# Patient Record
Sex: MALE | Race: WHITE | NOT HISPANIC OR LATINO | Employment: OTHER | URBAN - METROPOLITAN AREA
[De-identification: names, ages, dates, MRNs, and addresses within clinical notes are randomized per-mention and may not be internally consistent; named-entity substitution may affect disease eponyms.]

---

## 2017-04-17 ENCOUNTER — HOSPITAL ENCOUNTER (EMERGENCY)
Facility: HOSPITAL | Age: 57
Discharge: HOME | End: 2017-04-17
Attending: EMERGENCY MEDICINE

## 2017-04-17 VITALS
WEIGHT: 155 LBS | HEIGHT: 67 IN | OXYGEN SATURATION: 98 % | DIASTOLIC BLOOD PRESSURE: 67 MMHG | RESPIRATION RATE: 20 BRPM | HEART RATE: 69 BPM | TEMPERATURE: 98 F | BODY MASS INDEX: 24.33 KG/M2 | SYSTOLIC BLOOD PRESSURE: 147 MMHG

## 2017-04-17 DIAGNOSIS — S00.432A CONTUSION OF LEFT EAR, INITIAL ENCOUNTER: ICD-10-CM

## 2017-04-17 DIAGNOSIS — S09.90XA HEAD INJURY, INITIAL ENCOUNTER: Primary | ICD-10-CM

## 2017-04-17 DIAGNOSIS — R52 PAIN: ICD-10-CM

## 2017-04-17 PROCEDURE — 99283 EMERGENCY DEPT VISIT LOW MDM: CPT | Mod: 25

## 2017-04-17 PROCEDURE — 63600175 PHARM REV CODE 636 W HCPCS: Performed by: PHYSICIAN ASSISTANT

## 2017-04-17 PROCEDURE — 96372 THER/PROPH/DIAG INJ SC/IM: CPT

## 2017-04-17 RX ORDER — NAPROXEN 500 MG/1
500 TABLET ORAL 2 TIMES DAILY WITH MEALS
Qty: 30 TABLET | Refills: 0 | Status: SHIPPED | OUTPATIENT
Start: 2017-04-17 | End: 2018-05-17

## 2017-04-17 RX ORDER — KETOROLAC TROMETHAMINE 30 MG/ML
30 INJECTION, SOLUTION INTRAMUSCULAR; INTRAVENOUS
Status: COMPLETED | OUTPATIENT
Start: 2017-04-17 | End: 2017-04-17

## 2017-04-17 RX ADMIN — KETOROLAC TROMETHAMINE 30 MG: 30 INJECTION, SOLUTION INTRAMUSCULAR at 08:04

## 2017-04-17 NOTE — ED AVS SNAPSHOT
[facility]  [address] [facility] [address]               Efra Payton III   2017  8:02 AM   ED    Description:  Male : 1960   Department:  [facility]           Your Care was Coordinated By:     Provider Role From To    Reginald Rosas MD Attending Provider 17 0840 --    KIMBERLYN Mariee Physician Assistant 17 0805 --      Reason for Visit     Head Injury     Jaw Pain           Diagnoses this Visit        Comments    Head injury, initial encounter    -  Primary     Pain         Contusion of left ear, initial encounter           ED Disposition     None           To Do List           Follow-up Information     Follow up with [facility]. Go in 1 week.    Specialty:  Family Medicine    Contact information:    [address], [address]  [facility] [address] [phone]       These Medications        Disp Refills Start End    naproxen (NAPROSYN) 500 MG tablet 30 tablet 0 2017     Take 1 tablet (500 mg total) by mouth 2 (two) times daily with meals. - Oral      [facility]     [facility] Care Line -  Assistance  Unless otherwise directed by your provider, please contact [facility], our nurse care line that is available for  assistance.     Registered nurses in the [facility] provide: appointment scheduling, clinical advisement, health education, and other advisory services.  Call: [phone] (toll free)               Medications           Message regarding Medications     Verify the changes and/or additions to your medication regime listed below are the same as discussed with your clinician today.  If any of these changes or additions are incorrect, please notify your healthcare provider.        START taking these NEW medications        Refills    naproxen (NAPROSYN) 500 MG tablet 0    Sig: Take 1 tablet (500 mg total) by mouth 2 (two) times  "daily with meals.    Class: Print    Route: Oral      These medications were administered today        Dose Freq    ketorolac injection 30 mg 30 mg ED 1 Time    Sig: Inject 30 mg into the muscle ED 1 Time.    Class: Normal    Route: Intramuscular    Cosign for Ordering: Required by Roland Swanson Jr., MD           Verify that the below list of medications is an accurate representation of the medications you are currently taking.  If none reported, the list may be blank. If incorrect, please contact your healthcare provider. Carry this list with you in case of emergency.           Current Medications     methadone (DOLOPHINE) 5 MG tablet Take 120 mg by mouth once daily.    naproxen (NAPROSYN) 500 MG tablet Take 1 tablet (500 mg total) by mouth 2 (two) times daily with meals.           Clinical Reference Information           Your Vitals Were     BP Pulse Temp Resp Height Weight    147/67 69 97.7 °F (36.5 °C) (Oral) 20 5' 7" (1.702 m) 70.3 kg (155 lb)    SpO2 BMI             98% 24.28 kg/m2         Allergies as of 4/17/2017     No Known Allergies      Immunizations Administered on Date of Encounter - 4/17/2017     None      ED Micro, Lab, POCT     None      ED Imaging Orders     Start Ordered       Status Ordering Provider    04/17/17 0814 04/17/17 0813  X-Ray Facial Bones  3 Or More View  1 time imaging      Acknowledged       Discharge References/Attachments     BRUISES (CONTUSIONS) (ENGLISH)      [facility] Sign-Up     Activating your [facility] account is as easy as 1-2-3!     1) Visit [email or web address], select Sign Up Now, enter this activation code and your date of birth, then select Next.  [email or web address]  Expires: 6/1/2017  8:55 AM      2) Create a username and password to use when you visit [facility] in the future and select a security question in case you lose your password and select Next.    3) Enter your e-mail address and click Sign Up!    Additional Information  If you have questions, please e-mail " [email or web address] or call [phone] to talk to our [facility] staff. Remember, [employer] is NOT to be used for urgent needs. For medical emergencies, dial 911.         Smoking Cessation     If you would like to quit smoking:   You may be eligible for free services if you are a [place] resident and started smoking cigarettes before September 1, 1988.  Call the Smoking Cessation Trust ([facility]) toll free at ([phone] [phone] or [phone].   Call 3-379-QUIT-NOW if you do not meet the above criteria.   Contact us via email: [email or web address]   View our website for more information: [email or web address]         [facility] complies with applicable Federal civil rights laws and does not discriminate on the basis of race, color, national origin, age, disability, or sex.        Language Assistance Services     ATTENTION: Language assistance services are available, free of charge. Please call [phone].      ATENCIÓN: Si habla español, tiene a pinedo disposición servicios gratuitos de asistencia lingüística. Llame al [phone].     CHÚ Ý: N?u b?n nói Ti?ng Vi?t, có các d?ch v? h? tr? ngôn ng? mi?n phí dành cho b?n. G?i s? [phone].
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
## 2017-10-10 ENCOUNTER — HOSPITAL ENCOUNTER (EMERGENCY)
Facility: HOSPITAL | Age: 57
Discharge: HOME | End: 2017-10-11

## 2017-10-10 DIAGNOSIS — J44.1 COPD WITH EXACERBATION: Primary | ICD-10-CM

## 2017-10-10 DIAGNOSIS — R06.02 SOB (SHORTNESS OF BREATH): ICD-10-CM

## 2017-10-10 PROCEDURE — 25000242 PHARM REV CODE 250 ALT 637 W/ HCPCS: Performed by: EMERGENCY MEDICINE

## 2017-10-10 PROCEDURE — 96374 THER/PROPH/DIAG INJ IV PUSH: CPT

## 2017-10-10 PROCEDURE — 80053 COMPREHEN METABOLIC PANEL: CPT

## 2017-10-10 PROCEDURE — 85025 COMPLETE CBC W/AUTO DIFF WBC: CPT

## 2017-10-10 PROCEDURE — 63600175 PHARM REV CODE 636 W HCPCS: Performed by: EMERGENCY MEDICINE

## 2017-10-10 PROCEDURE — 99285 EMERGENCY DEPT VISIT HI MDM: CPT | Mod: 25

## 2017-10-10 PROCEDURE — 96375 TX/PRO/DX INJ NEW DRUG ADDON: CPT

## 2017-10-10 PROCEDURE — 83880 ASSAY OF NATRIURETIC PEPTIDE: CPT

## 2017-10-10 PROCEDURE — 93005 ELECTROCARDIOGRAM TRACING: CPT

## 2017-10-10 PROCEDURE — 94640 AIRWAY INHALATION TREATMENT: CPT

## 2017-10-10 RX ORDER — IPRATROPIUM BROMIDE AND ALBUTEROL SULFATE 2.5; .5 MG/3ML; MG/3ML
3 SOLUTION RESPIRATORY (INHALATION) EVERY 20 MIN
Status: COMPLETED | OUTPATIENT
Start: 2017-10-10 | End: 2017-10-10

## 2017-10-10 RX ORDER — METHYLPREDNISOLONE SOD SUCC 125 MG
125 VIAL (EA) INJECTION
Status: COMPLETED | OUTPATIENT
Start: 2017-10-10 | End: 2017-10-10

## 2017-10-10 RX ADMIN — METHYLPREDNISOLONE SODIUM SUCCINATE 125 MG: 125 INJECTION, POWDER, FOR SOLUTION INTRAMUSCULAR; INTRAVENOUS at 11:10

## 2017-10-10 RX ADMIN — IPRATROPIUM BROMIDE AND ALBUTEROL SULFATE 3 ML: .5; 3 SOLUTION RESPIRATORY (INHALATION) at 11:10

## 2017-10-11 VITALS
HEART RATE: 73 BPM | TEMPERATURE: 98 F | OXYGEN SATURATION: 92 % | RESPIRATION RATE: 18 BRPM | HEIGHT: 67 IN | SYSTOLIC BLOOD PRESSURE: 151 MMHG | WEIGHT: 120 LBS | DIASTOLIC BLOOD PRESSURE: 63 MMHG | BODY MASS INDEX: 18.83 KG/M2

## 2017-10-11 LAB
ALBUMIN SERPL BCP-MCNC: 3.3 G/DL
ALP SERPL-CCNC: 57 U/L
ALT SERPL W/O P-5'-P-CCNC: 49 U/L
ANION GAP SERPL CALC-SCNC: 6 MMOL/L
AST SERPL-CCNC: 69 U/L
BASOPHILS # BLD AUTO: 0 K/UL
BASOPHILS NFR BLD: 0 %
BILIRUB SERPL-MCNC: 0.3 MG/DL
BNP SERPL-MCNC: 127 PG/ML
BUN SERPL-MCNC: 11 MG/DL
CALCIUM SERPL-MCNC: 9.1 MG/DL
CHLORIDE SERPL-SCNC: 101 MMOL/L
CO2 SERPL-SCNC: 31 MMOL/L
CREAT SERPL-MCNC: 0.8 MG/DL
DIFFERENTIAL METHOD: ABNORMAL
EOSINOPHIL # BLD AUTO: 0 K/UL
EOSINOPHIL NFR BLD: 0 %
ERYTHROCYTE [DISTWIDTH] IN BLOOD BY AUTOMATED COUNT: 14 %
EST. GFR  (AFRICAN AMERICAN): >60 ML/MIN/1.73 M^2
EST. GFR  (NON AFRICAN AMERICAN): >60 ML/MIN/1.73 M^2
GLUCOSE SERPL-MCNC: 121 MG/DL
HCT VFR BLD AUTO: 36.1 %
HGB BLD-MCNC: 11.9 G/DL
LYMPHOCYTES # BLD AUTO: 0.7 K/UL
LYMPHOCYTES NFR BLD: 17.6 %
MCH RBC QN AUTO: 32.3 PG
MCHC RBC AUTO-ENTMCNC: 33 G/DL
MCV RBC AUTO: 98 FL
MONOCYTES # BLD AUTO: 0.3 K/UL
MONOCYTES NFR BLD: 8.3 %
NEUTROPHILS # BLD AUTO: 3 K/UL
NEUTROPHILS NFR BLD: 73.9 %
PLATELET # BLD AUTO: 147 K/UL
PMV BLD AUTO: 10.2 FL
POTASSIUM SERPL-SCNC: 4.2 MMOL/L
PROT SERPL-MCNC: 7.5 G/DL
RBC # BLD AUTO: 3.68 M/UL
SODIUM SERPL-SCNC: 138 MMOL/L
WBC # BLD AUTO: 4.1 K/UL

## 2017-10-11 PROCEDURE — 63600175 PHARM REV CODE 636 W HCPCS: Performed by: EMERGENCY MEDICINE

## 2017-10-11 RX ORDER — METOCLOPRAMIDE HYDROCHLORIDE 5 MG/ML
10 INJECTION INTRAMUSCULAR; INTRAVENOUS
Status: COMPLETED | OUTPATIENT
Start: 2017-10-11 | End: 2017-10-11

## 2017-10-11 RX ORDER — PREDNISONE 20 MG/1
40 TABLET ORAL DAILY
Qty: 10 TABLET | Refills: 0 | Status: SHIPPED | OUTPATIENT
Start: 2017-10-11 | End: 2017-10-16

## 2017-10-11 RX ORDER — KETOROLAC TROMETHAMINE 30 MG/ML
30 INJECTION, SOLUTION INTRAMUSCULAR; INTRAVENOUS
Status: COMPLETED | OUTPATIENT
Start: 2017-10-11 | End: 2017-10-11

## 2017-10-11 RX ADMIN — KETOROLAC TROMETHAMINE 30 MG: 30 INJECTION, SOLUTION INTRAMUSCULAR at 01:10

## 2017-10-11 RX ADMIN — METOCLOPRAMIDE 10 MG: 5 INJECTION, SOLUTION INTRAMUSCULAR; INTRAVENOUS at 01:10

## 2017-10-11 NOTE — ED PROVIDER NOTES
Encounter Date: 10/10/2017       History     Chief Complaint   Patient presents with    Shortness of Breath     pt arrived via  EMS with chief complaint of SOB. AAOx4. upon EMS arrival, pt's O2 was initially 88%on RA. EMS palced pt on 4L NC. denies CP.     Chief complaint: Shortness of breath    History of present illness:Efra Payton III is a 57 y.o. male who presents with  worsening of chronic shortness of breath.  He admits to a nonproductive cough and has had no chest pain, nausea, vomiting or diaphoresis.  He smokes 2 packs of cigarettes a day and has been previously diagnosed with COPD.  He currently has no treatment for COPD.  He is a former polysubstance abuser who is currently maintained on 120 mg of methadone daily.  He denies any recent illicit drug usage.  He has had no fever          Review of patient's allergies indicates:  No Known Allergies  Past Medical History:   Diagnosis Date    COPD (chronic obstructive pulmonary disease)     Drug abuse     Eczema     Substance abuse      Past Surgical History:   Procedure Laterality Date    arm surgery      HAND SURGERY       History reviewed. No pertinent family history.  Social History   Substance Use Topics    Smoking status: Current Every Day Smoker     Packs/day: 2.00     Types: Cigarettes    Smokeless tobacco: Never Used    Alcohol use No     Review of Systems   Constitutional: Negative for activity change, appetite change, chills, fatigue and fever.   Eyes: Negative for visual disturbance.   Respiratory: Negative for apnea and shortness of breath.    Cardiovascular: Negative for chest pain and palpitations.   Gastrointestinal: Negative for abdominal distention and abdominal pain.   Genitourinary: Negative for difficulty urinating.   Musculoskeletal: Negative for neck pain.   Skin: Negative for pallor and rash.   Neurological: Negative for headaches.   Hematological: Does not bruise/bleed easily.   Psychiatric/Behavioral: Negative for  agitation.       Physical Exam     Initial Vitals [10/10/17 2241]   BP Pulse Resp Temp SpO2   (!) 134/59 72 20 98.5 °F (36.9 °C) (!) 86 %      MAP       84         Physical Exam    ED Course   Procedures  Labs Reviewed   B-TYPE NATRIURETIC PEPTIDE   CBC W/ AUTO DIFFERENTIAL   COMPREHENSIVE METABOLIC PANEL             Medical Decision Making:   Independently Interpreted Test(s):   I have ordered and independently interpreted X-rays - see summary below.       <> Summary of X-Ray Reading(s): Chest x-ray independently interpreted by me demonstrates flattening of the hemidiaphragms consistent with a COPD exacerbation.  Mediastinum is normal with no infiltrates or effusions  Clinical Tests:   Lab Tests: Ordered and Reviewed  The following lab test(s) were unremarkable: CBC and CMP  ED Management:  Efra Payton III is a 57 y.o. male who presents with  chronic shortness of breath with physical exam and chest x-ray consistent with COPD.  He has symmetric improvement bronchodilators and steroids and will be discharged with a prescription for prednisone 20 mg twice daily for 5 days and Combivent.  He is encouraged to discontinue all smoking.                   ED Course      Clinical Impression:   The primary encounter diagnosis was COPD with exacerbation. A diagnosis of SOB (shortness of breath) was also pertinent to this visit.                           Montez Gabriel III, MD  10/11/17 0100

## 2017-10-11 NOTE — ED NOTES
Pt sleeping, easily arouses.  Unable to stay awake but states that he thinks he has too much methadone.

## 2017-10-11 NOTE — ED TRIAGE NOTES
Pt to ER per EMS with c/o headache, insomnia, fatigue and SOB x 4 weeks.  Pt states that he was tired of feeling sleepy all day and wants to know what is wrong with him.  Pt states that he take 120mg methadone q day but doesn't feel that is what is making him feel bad.  Pt states that he smokes 1 1/2 packs a cigarettes per day and is not taking any medications for his COPD.

## 2017-10-11 NOTE — ED NOTES
Awake and alert, resp even and unlabored at this time.  Lungs clear but diminished at  Bases.  Discharge instructions given and explained.  Prescriptions given and explained.  Wife at side

## 2018-04-05 ENCOUNTER — HOSPITAL ENCOUNTER (EMERGENCY)
Facility: HOSPITAL | Age: 58
Discharge: HOME | End: 2018-04-06
Attending: EMERGENCY MEDICINE
Payer: COMMERCIAL

## 2018-04-05 DIAGNOSIS — H10.023 PINK EYE DISEASE OF BOTH EYES: ICD-10-CM

## 2018-04-05 DIAGNOSIS — R19.7 DIARRHEA, UNSPECIFIED TYPE: Primary | ICD-10-CM

## 2018-04-05 PROCEDURE — 99283 EMERGENCY DEPT VISIT LOW MDM: CPT

## 2018-04-06 VITALS
TEMPERATURE: 98 F | HEIGHT: 67 IN | HEART RATE: 52 BPM | WEIGHT: 130 LBS | RESPIRATION RATE: 20 BRPM | BODY MASS INDEX: 20.4 KG/M2 | OXYGEN SATURATION: 96 % | SYSTOLIC BLOOD PRESSURE: 136 MMHG | DIASTOLIC BLOOD PRESSURE: 64 MMHG

## 2018-04-06 PROCEDURE — 25000003 PHARM REV CODE 250: Performed by: EMERGENCY MEDICINE

## 2018-04-06 RX ORDER — ERYTHROMYCIN 5 MG/G
OINTMENT OPHTHALMIC
Status: COMPLETED | OUTPATIENT
Start: 2018-04-06 | End: 2018-04-06

## 2018-04-06 RX ADMIN — ERYTHROMYCIN 1 INCH: 5 OINTMENT OPHTHALMIC at 12:04

## 2018-04-06 NOTE — ED PROVIDER NOTES
Encounter Date: 4/5/2018    SCRIBE #1 NOTE: I, Neftali Knutson, am scribing for, and in the presence of,  Dr. Theresa Oliva. I have scribed the entire note.       History     Chief Complaint   Patient presents with    Diarrhea     3 episodes of diarrhea since yesterday. denies abdominal pain or n/v. pt. is drinking coke at triage.     Eye Drainage     green drainage from both eyes x3 days     Efra Payton III is a 58 y.o. male smoker who  has a past medical history of COPD (chronic obstructive pulmonary disease); Drug abuse; Eczema; and Substance abuse.    The patient presents to the ED for evaluation of diarrhea and eye redness.  Diarrhea has been present for the last two days.  He experienced 3-4 episodes yesterday and only 1 episode today. He took one dose of imodium yesterday, at which point diarrhea stopped.  He had several urges to have diarrhea today but did not until a single episode while at work tonight.  Has not noticed any blood in stool, denies associated abdominal pain. He does endorse some nausea but is able to eat/drink.      He also complains of bilateral eye redness, irritation, and green discharge x 4 days. He has been applying eye drops as well as washing his eyes  with minimal relief. He reports his granddaughter had pink eye one week prior and it looked very similar with the same symptoms.     Pt was a 3 pack/day smoker, currently smokes one pack day with reported intent to quit.        The history is provided by the patient.     Review of patient's allergies indicates:  No Known Allergies  Past Medical History:   Diagnosis Date    COPD (chronic obstructive pulmonary disease)     Drug abuse     Eczema     Substance abuse      Past Surgical History:   Procedure Laterality Date    arm surgery      HAND SURGERY       History reviewed. No pertinent family history.  Social History   Substance Use Topics    Smoking status: Current Every Day Smoker     Packs/day: 2.00     Types: Cigarettes     Smokeless tobacco: Never Used    Alcohol use No     Review of Systems   Constitutional: Negative for appetite change and fever.   Eyes: Positive for discharge, redness and itching. Negative for visual disturbance.   Gastrointestinal: Positive for diarrhea and nausea. Negative for abdominal pain, blood in stool and vomiting.   All other systems reviewed and are negative.      Physical Exam     Initial Vitals [04/05/18 2333]   BP Pulse Resp Temp SpO2   (!) 170/65 (!) 54 16 97.6 °F (36.4 °C) 95 %      MAP       100         Physical Exam    Nursing note and vitals reviewed.  Constitutional: He appears well-developed and well-nourished. No distress.   HENT:   Head: Normocephalic and atraumatic.   Eyes: Right eye exhibits no discharge. Left eye exhibits no discharge. Right conjunctiva is injected. Right conjunctiva has no hemorrhage. Left conjunctiva is injected. Left conjunctiva has no hemorrhage.   Bilateral conjunctival injection   Neck: Normal range of motion.   Cardiovascular: Normal rate, regular rhythm and normal heart sounds.   No murmur heard.  Pulmonary/Chest: Breath sounds normal. He has no wheezes. He has no rhonchi. He has no rales. He exhibits no tenderness.   Abdominal: Soft. Bowel sounds are normal. He exhibits no distension. There is no tenderness. There is no rebound and no guarding.   Musculoskeletal: Normal range of motion. He exhibits no edema or tenderness.   Neurological: He is alert and oriented to person, place, and time.   Skin: Skin is warm and dry.   Psychiatric: He has a normal mood and affect. His behavior is normal.         ED Course   Procedures  Labs Reviewed - No data to display          Medical Decision Making:   ED Management:  Diarrhea - no associated fever, vomiting or abd pain and no blood in stool.  Controlled with imodium.  Low residue diet, continue imodium.  Bilateral conjunctivitis - recent exposure to pink eye - treat with erythromycin ointment.                      Clinical  Impression:   The primary encounter diagnosis was Diarrhea, unspecified type. A diagnosis of Pink eye disease of both eyes was also pertinent to this visit.    Disposition:   Disposition: Discharged  Condition: Stable  I, Dr. Theresa Oliva, personally performed the services described in this documentation.   All medical record entries made by the scribe were at my direction and in my presence.   I have reviewed the chart and agree that the record is accurate and complete.   Theresa Oliva MD.  1:54 AM 04/06/2018                         Theresa Oliva MD  04/06/18 0154

## 2018-04-06 NOTE — DISCHARGE INSTRUCTIONS
Take imodium as directed on package, for diarrhea.  Put ointment in each eye 4 times a day x 5 days.  If your symptoms are not resolved, see an eye doctor.

## 2018-04-06 NOTE — ED NOTES
"Pt presents to ED c/o nausea and diarrhea intermittent since yesterday. Pt denies fever and vomiting. PT reports X 1 episode of diarrhea today, states "felt like had to go, but couldn't go". Pt has coke at BS, states has been able to tolerate PO. Pt denies abd pain. Pt reports d/c from bilateral eyes. States yesterday morning d/c was green. States granddaughter had same eye issues recently. Pt states was sent home from work today and was told he had to be seen by a doctor. Pt states he was unable to schedule appointment with PCP today.   "

## 2018-05-17 ENCOUNTER — OFFICE VISIT (OUTPATIENT)
Dept: INTERNAL MEDICINE | Facility: CLINIC | Age: 58
End: 2018-05-17
Payer: COMMERCIAL

## 2018-05-17 ENCOUNTER — LAB VISIT (OUTPATIENT)
Dept: LAB | Facility: HOSPITAL | Age: 58
End: 2018-05-17
Payer: COMMERCIAL

## 2018-05-17 ENCOUNTER — HOSPITAL ENCOUNTER (OUTPATIENT)
Dept: RADIOLOGY | Facility: HOSPITAL | Age: 58
Discharge: HOME | End: 2018-05-17
Attending: INTERNAL MEDICINE

## 2018-05-17 VITALS
HEIGHT: 67 IN | OXYGEN SATURATION: 94 % | SYSTOLIC BLOOD PRESSURE: 108 MMHG | TEMPERATURE: 98 F | HEART RATE: 55 BPM | BODY MASS INDEX: 19.58 KG/M2 | DIASTOLIC BLOOD PRESSURE: 62 MMHG | WEIGHT: 124.75 LBS

## 2018-05-17 DIAGNOSIS — J44.9 CHRONIC OBSTRUCTIVE PULMONARY DISEASE, UNSPECIFIED COPD TYPE: Primary | ICD-10-CM

## 2018-05-17 DIAGNOSIS — R55 SYNCOPE AND COLLAPSE: ICD-10-CM

## 2018-05-17 LAB
ALBUMIN SERPL BCP-MCNC: 3.8 G/DL
ALP SERPL-CCNC: 54 U/L
ALT SERPL W/O P-5'-P-CCNC: 22 U/L
ANION GAP SERPL CALC-SCNC: 6 MMOL/L
AST SERPL-CCNC: 33 U/L
BASOPHILS # BLD AUTO: 0.02 K/UL
BASOPHILS NFR BLD: 0.4 %
BILIRUB SERPL-MCNC: 0.3 MG/DL
BUN SERPL-MCNC: 17 MG/DL
CALCIUM SERPL-MCNC: 8.9 MG/DL
CHLORIDE SERPL-SCNC: 104 MMOL/L
CO2 SERPL-SCNC: 28 MMOL/L
CREAT SERPL-MCNC: 0.8 MG/DL
DIFFERENTIAL METHOD: ABNORMAL
EOSINOPHIL # BLD AUTO: 0.1 K/UL
EOSINOPHIL NFR BLD: 1.8 %
ERYTHROCYTE [DISTWIDTH] IN BLOOD BY AUTOMATED COUNT: 13.7 %
EST. GFR  (AFRICAN AMERICAN): >60 ML/MIN/1.73 M^2
EST. GFR  (NON AFRICAN AMERICAN): >60 ML/MIN/1.73 M^2
GLUCOSE SERPL-MCNC: 88 MG/DL
HCT VFR BLD AUTO: 40 %
HGB BLD-MCNC: 13.7 G/DL
LYMPHOCYTES # BLD AUTO: 1.3 K/UL
LYMPHOCYTES NFR BLD: 28.4 %
MCH RBC QN AUTO: 33.1 PG
MCHC RBC AUTO-ENTMCNC: 34.3 G/DL
MCV RBC AUTO: 97 FL
MONOCYTES # BLD AUTO: 0.4 K/UL
MONOCYTES NFR BLD: 9.3 %
NEUTROPHILS # BLD AUTO: 2.7 K/UL
NEUTROPHILS NFR BLD: 60.1 %
PLATELET # BLD AUTO: 86 K/UL
PMV BLD AUTO: 11.3 FL
POTASSIUM SERPL-SCNC: 4.5 MMOL/L
PROT SERPL-MCNC: 6.9 G/DL
RBC # BLD AUTO: 4.14 M/UL
SODIUM SERPL-SCNC: 138 MMOL/L
WBC # BLD AUTO: 4.54 K/UL

## 2018-05-17 PROCEDURE — 36415 COLL VENOUS BLD VENIPUNCTURE: CPT

## 2018-05-17 PROCEDURE — 3008F BODY MASS INDEX DOCD: CPT | Mod: CPTII,S$GLB,, | Performed by: INTERNAL MEDICINE

## 2018-05-17 PROCEDURE — 80053 COMPREHEN METABOLIC PANEL: CPT

## 2018-05-17 PROCEDURE — 85025 COMPLETE CBC W/AUTO DIFF WBC: CPT

## 2018-05-17 PROCEDURE — 99214 OFFICE O/P EST MOD 30 MIN: CPT | Mod: S$GLB,,, | Performed by: INTERNAL MEDICINE

## 2018-05-17 PROCEDURE — 93010 ELECTROCARDIOGRAM REPORT: CPT | Mod: S$GLB,,, | Performed by: INTERNAL MEDICINE

## 2018-05-17 PROCEDURE — 99999 PR PBB SHADOW E&M-EST. PATIENT-LVL V: CPT | Mod: PBBFAC,,, | Performed by: INTERNAL MEDICINE

## 2018-05-17 PROCEDURE — 93005 ELECTROCARDIOGRAM TRACING: CPT | Mod: S$GLB,,, | Performed by: INTERNAL MEDICINE

## 2018-05-18 ENCOUNTER — HOSPITAL ENCOUNTER (OUTPATIENT)
Dept: RADIOLOGY | Facility: HOSPITAL | Age: 58
Discharge: HOME | End: 2018-05-18
Attending: INTERNAL MEDICINE
Payer: COMMERCIAL

## 2018-05-18 ENCOUNTER — TELEPHONE (OUTPATIENT)
Dept: INTERNAL MEDICINE | Facility: CLINIC | Age: 58
End: 2018-05-18

## 2018-05-18 ENCOUNTER — TELEPHONE (OUTPATIENT)
Dept: ELECTROPHYSIOLOGY | Facility: CLINIC | Age: 58
End: 2018-05-18

## 2018-05-18 ENCOUNTER — HOSPITAL ENCOUNTER (OUTPATIENT)
Dept: PULMONOLOGY | Facility: CLINIC | Age: 58
Discharge: HOME | End: 2018-05-18
Payer: COMMERCIAL

## 2018-05-18 DIAGNOSIS — R55 SYNCOPE AND COLLAPSE: ICD-10-CM

## 2018-05-18 DIAGNOSIS — J44.9 CHRONIC OBSTRUCTIVE PULMONARY DISEASE: Primary | ICD-10-CM

## 2018-05-18 DIAGNOSIS — J44.9 CHRONIC OBSTRUCTIVE PULMONARY DISEASE, UNSPECIFIED COPD TYPE: ICD-10-CM

## 2018-05-18 LAB
POST FEV1 FVC: 0.67
POST FEV1: 2.17
POST FVC: 3.22
PRE FEV1 FVC: 63
PRE FEV1: 1.9
PRE FVC: 3.01
PREDICTED FEV1 FVC: 81
PREDICTED FEV1: 2.95
PREDICTED FVC: 3.63

## 2018-05-18 PROCEDURE — 94729 DIFFUSING CAPACITY: CPT | Mod: S$GLB,,, | Performed by: INTERNAL MEDICINE

## 2018-05-18 PROCEDURE — 70450 CT HEAD/BRAIN W/O DYE: CPT | Mod: TC

## 2018-05-18 PROCEDURE — 70450 CT HEAD/BRAIN W/O DYE: CPT | Mod: 26,,, | Performed by: RADIOLOGY

## 2018-05-18 PROCEDURE — 94060 EVALUATION OF WHEEZING: CPT | Mod: S$GLB,,, | Performed by: INTERNAL MEDICINE

## 2018-05-18 PROCEDURE — 71046 X-RAY EXAM CHEST 2 VIEWS: CPT | Mod: TC

## 2018-05-18 PROCEDURE — 71046 X-RAY EXAM CHEST 2 VIEWS: CPT | Mod: 26,,, | Performed by: RADIOLOGY

## 2018-05-18 NOTE — TELEPHONE ENCOUNTER
Called pt to review new patient questions for apt Monday PM. No answer. Left voicemail requesting pt call back to follow up.

## 2018-05-18 NOTE — PROGRESS NOTES
Subjective:       Patient ID: Efra Payton III is a 58 y.o. male.    Chief Complaint: Head Injury (headache due to a fall to the head)    Patient reports headache after a fall.  Further questioning reveals that he falls almost every day.  This is not a simple mechanical fall but he actually loses consciousness and falls.  Most recently this occurred about 2-3 days ago as he was putting the key in the lock at his home.  Next thing he knew, he was on the ground. He has no warning or aura of any type.  No history of head injury except as related to these falls.  He has COPD and now that he has insurance, he wants to get this evaluated.  Has a history of narcotic addiction but has been part of a methadone program for 3 years now and has remained clean for that period of time.  HE and his wife are currently raising their 3 grandchildren.  Patient and wife both work at odd hours so he gets very little sleep on a regular basis.  Children will soon be back with their parents      Head Injury    The incident occurred 3 to 5 days ago. The injury mechanism was a fall. He lost consciousness for a period of 1 to 5 minutes. The volume of blood lost was minimal. The quality of the pain is described as throbbing. The pain is at a severity of 3/10. The pain is mild. The pain has been constant since the injury. Pertinent negatives include no blurred vision, disorientation, headaches, memory loss, numbness, tinnitus, vomiting or weakness. He has tried nothing for the symptoms. The treatment provided no relief.     Review of Systems   Constitutional: Negative for activity change, appetite change and fever.   HENT: Negative for congestion, postnasal drip, sore throat and tinnitus.    Eyes: Negative for blurred vision.   Respiratory: Negative for cough, shortness of breath and wheezing.    Cardiovascular: Negative for chest pain and palpitations.   Gastrointestinal: Negative for abdominal pain, blood in stool, constipation, diarrhea,  nausea and vomiting.   Genitourinary: Negative for decreased urine volume, difficulty urinating, flank pain and frequency.   Musculoskeletal: Negative for arthralgias.   Neurological: Negative for dizziness, weakness, numbness and headaches.   Psychiatric/Behavioral: Negative for memory loss.       Objective:      Physical Exam   Constitutional: He is oriented to person, place, and time. He appears well-developed and well-nourished. No distress.   HENT:   Head: Normocephalic and atraumatic.   Right Ear: External ear normal.   Left Ear: External ear normal.   Eyes: Conjunctivae and EOM are normal. Pupils are equal, round, and reactive to light.   Neck: Normal range of motion. Neck supple. No thyromegaly present.   Cardiovascular: Normal rate and regular rhythm.    Pulmonary/Chest: Effort normal and breath sounds normal.   Abdominal: Soft. Bowel sounds are normal. He exhibits no mass. There is no tenderness. There is no rebound and no guarding.   Musculoskeletal: Normal range of motion.   Lymphadenopathy:     He has no cervical adenopathy.   Neurological: He is alert and oriented to person, place, and time. He has normal reflexes. He displays normal reflexes. No cranial nerve deficit. He exhibits normal muscle tone. Coordination normal.   Skin: Skin is warm and dry.       Assessment:       1. Chronic obstructive pulmonary disease, unspecified COPD type    2. Syncope and collapse        Plan:   Efra was seen today for head injury.    Diagnoses and all orders for this visit:    Chronic obstructive pulmonary disease, unspecified COPD type  -     X-Ray Chest PA And Lateral; Future  -     Complete PFT with bronchodilator  -     Ambulatory consult to Pulmonology    Syncope and collapse  -     IN OFFICE EKG 12-LEAD (to Muse)  -     X-Ray Chest PA And Lateral; Future  -     CBC auto differential; Future  -     Comprehensive metabolic panel; Future  -     CT Head Without Contrast; Future  -     Ambulatory consult to  Neurology  -     Holter monitor - 48 hour  -     Ambulatory consult to Cardiology

## 2018-05-23 ENCOUNTER — OFFICE VISIT (OUTPATIENT)
Dept: CARDIOLOGY | Facility: CLINIC | Age: 58
End: 2018-05-23
Payer: COMMERCIAL

## 2018-05-23 VITALS
SYSTOLIC BLOOD PRESSURE: 153 MMHG | DIASTOLIC BLOOD PRESSURE: 70 MMHG | HEIGHT: 67 IN | HEART RATE: 57 BPM | WEIGHT: 127.19 LBS | BODY MASS INDEX: 19.96 KG/M2

## 2018-05-23 DIAGNOSIS — R51.9 NONINTRACTABLE HEADACHE, UNSPECIFIED CHRONICITY PATTERN, UNSPECIFIED HEADACHE TYPE: ICD-10-CM

## 2018-05-23 DIAGNOSIS — R06.02 SOB (SHORTNESS OF BREATH): Primary | ICD-10-CM

## 2018-05-23 DIAGNOSIS — R55 SYNCOPE, UNSPECIFIED SYNCOPE TYPE: Primary | ICD-10-CM

## 2018-05-23 DIAGNOSIS — J44.9 CHRONIC OBSTRUCTIVE PULMONARY DISEASE, UNSPECIFIED COPD TYPE: ICD-10-CM

## 2018-05-23 PROCEDURE — 3008F BODY MASS INDEX DOCD: CPT | Mod: CPTII,S$GLB,, | Performed by: INTERNAL MEDICINE

## 2018-05-23 PROCEDURE — 99205 OFFICE O/P NEW HI 60 MIN: CPT | Mod: S$GLB,,, | Performed by: INTERNAL MEDICINE

## 2018-05-23 PROCEDURE — 99999 PR PBB SHADOW E&M-EST. PATIENT-LVL III: CPT | Mod: PBBFAC,,, | Performed by: INTERNAL MEDICINE

## 2018-05-23 NOTE — LETTER
May 23, 2018      Lyndsey Baltazar MD  [address]  [place] [address]           [facility] - Cardiology  [address]  [place] [address]  Phone: [phone]          Patient: Efra Payton III   MR Number: [id number]   YOB: 1960   Date of Visit: 5/23/2018       Dear Dr. Lyndsey Baltazar:    Thank you for referring Efra Payton to me for evaluation. Attached you will find relevant portions of my assessment and plan of care.    If you have questions, please do not hesitate to call me. I look forward to following Efra Payton along with you.    Sincerely,    Rafaela Varner MD    Enclosure  CC:  No Recipients    If you would like to receive this communication electronically, please contact [email or web address] or [phone] to request more information on [employer] Link access.    For providers and/or their staff who would like to refer a patient to [facility], please contact us through our one-stop-shop provider referral line, [facility] , at [phone].    If you feel you have received this communication in error or would no longer like to receive these types of communications, please e-mail [email or web address]

## 2018-05-23 NOTE — PROGRESS NOTES
"Subjective:   Patient ID:  Efra Payton III is a 58 y.o. male is a new patient who presents for evaluation of Syncope and collapse and Headache (x 6-8 months )    HPI:   These syncopal episodes have occurred 4,5 times in the last 6 months.he incident occurred 3 to 5 days ago. The injury mechanism was a fall. He lost consciousness for a period of 1 to 5 minutes. The volume of blood lost was minimal. The quality of the pain is described as throbbing. The pain is at a severity of 3/10. The pain is mild. The pain has been constant since the injury. Pertinent negatives include no blurred vision, disorientation, headaches, memory loss, numbness, tinnitus, vomiting or weakness. He has tried nothing for the symptoms. The treatment provided no relief.     Patient gets headaches everyday -frontal lasts couple of hours and takes BC powder goes away but immediately comes back. No relation to light, no relation to aura. Patient reports headache after a fall.   He and his wife are currently raising their 3 grandchildren.  Patient and wife both work at odd hours so he gets very little sleep on a regular basis.  Children will soon be back with their parents. He only sleeps 3,4 hours every night.   Current smoker 2 packs/day  Patient has been on methadone for 3 yrs.   Patient has to take deep  Breath before he has to  the kids.      Patient Active Problem List   Diagnosis    Carpal tunnel syndrome of left wrist    Chronic obstructive pulmonary disease     BP (!) 153/70 (BP Location: Left arm, Patient Position: Sitting, BP Method: Large (Automatic))   Pulse (!) 57   Ht 5' 7" (1.702 m)   Wt 57.7 kg (127 lb 3.3 oz)   BMI 19.92 kg/m²   Body mass index is 19.92 kg/m².  Estimated Creatinine Clearance: 82.1 mL/min (based on SCr of 0.8 mg/dL).    Lab Results   Component Value Date     05/17/2018    K 4.5 05/17/2018     05/17/2018    CO2 28 05/17/2018    BUN 17 05/17/2018    CREATININE 0.8 05/17/2018    GLU 88 " 05/17/2018    AST 33 05/17/2018    ALT 22 05/17/2018    ALBUMIN 3.8 05/17/2018    PROT 6.9 05/17/2018    BILITOT 0.3 05/17/2018    WBC 4.54 05/17/2018    HGB 13.7 (L) 05/17/2018    HCT 40.0 05/17/2018    MCV 97 05/17/2018    PLT 86 (L) 05/17/2018       Current Outpatient Prescriptions   Medication Sig    ipratropium-albuterol (COMBIVENT RESPIMAT)  mcg/actuation inhaler Inhale 1 puff into the lungs every 4 (four) hours as needed for Wheezing.    methadone (DOLOPHINE) 5 MG tablet Take 120 mg by mouth once daily.     No current facility-administered medications for this visit.        Review of Systems   Constitution: Positive for weakness. Negative for chills, decreased appetite, malaise/fatigue, night sweats, weight gain and weight loss.   Eyes: Negative for blurred vision, double vision, visual disturbance and visual halos.   Cardiovascular: Positive for dyspnea on exertion and syncope. Negative for chest pain, claudication, cyanosis, irregular heartbeat, leg swelling, near-syncope, orthopnea, palpitations and paroxysmal nocturnal dyspnea.   Respiratory: Positive for shortness of breath. Negative for cough, hemoptysis, snoring, sputum production and wheezing.    Endocrine: Negative for cold intolerance, heat intolerance, polydipsia and polyphagia.   Hematologic/Lymphatic: Negative for adenopathy and bleeding problem. Does not bruise/bleed easily.   Skin: Negative for flushing, itching, poor wound healing and rash.   Musculoskeletal: Negative for arthritis, back pain, falls, gout, joint pain, joint swelling, muscle cramps, muscle weakness, myalgias, neck pain and stiffness.   Gastrointestinal: Negative for bloating, abdominal pain, anorexia, diarrhea, dysphagia, excessive appetite, flatus, hematemesis, jaundice, melena and nausea.   Genitourinary: Negative for hesitancy and incomplete emptying.   Neurological: Positive for excessive daytime sleepiness, dizziness, light-headedness and loss of balance. Negative  for aphonia, brief paralysis, difficulty with concentration, disturbances in coordination and focal weakness.   Psychiatric/Behavioral: Negative for altered mental status, depression, hallucinations, hypervigilance, memory loss, substance abuse and suicidal ideas. The patient does not have insomnia and is not nervous/anxious.        Objective:   Physical Exam   Constitutional: He is oriented to person, place, and time. He appears well-developed and well-nourished. No distress.   HENT:   Head: Normocephalic and atraumatic.   Nose: Nose normal.   Mouth/Throat: No oropharyngeal exudate.   Eyes: Conjunctivae and EOM are normal. Pupils are equal, round, and reactive to light. Right eye exhibits no discharge. Left eye exhibits no discharge. No scleral icterus.   Neck: Normal range of motion. Neck supple. No JVD present. No tracheal deviation present. No thyromegaly present.   Cardiovascular: Normal rate, regular rhythm, normal heart sounds and intact distal pulses.  Exam reveals no gallop and no friction rub.    No murmur heard.  Pulmonary/Chest: Effort normal and breath sounds normal. No stridor. No respiratory distress. He has no wheezes. He has no rales. He exhibits no tenderness.   Abdominal: Soft. Bowel sounds are normal. He exhibits no distension and no mass. There is no tenderness.   Musculoskeletal: He exhibits no edema or tenderness.   Lymphadenopathy:     He has no cervical adenopathy.   Neurological: He is alert and oriented to person, place, and time. He displays normal reflexes. No cranial nerve deficit. He exhibits normal muscle tone. Coordination normal.   Skin: Skin is warm. No rash noted. He is not diaphoretic. No erythema. No pallor.   Psychiatric: He has a normal mood and affect. His behavior is normal. Judgment and thought content normal.       Assessment:     1. Syncope, unspecified syncope type    2. Chronic obstructive pulmonary disease, unspecified COPD type    3. Nonintractable headache,  unspecified chronicity pattern, unspecified headache type        Plan:   Patient does not sleep well. This may be a cause of his narcoleptic spells, will r/o arrythmias but they appear less likely. No cardiac SX otherwise  PFTs already ordered, will do an arterial blood gas for hypercarbia that may be contributing to narcolepsy.  BP high but in the setting of HTN do not want to start a medication.  Counseled on importance of heart healthy diet low in saturated and trans fat and salt as well gradually starting a regular aerobic exercise regimen with goal of 30min 5x/week. Recommend BP diary. Call if systolic BP > 130 mmHg on checking repeatedly  All meds reviewed and are appropriate  Patient is compliant with his medications.      I spent greater then 45 min discussing his concerns.   RTC PRN

## 2018-05-25 ENCOUNTER — HOSPITAL ENCOUNTER (OUTPATIENT)
Dept: PULMONOLOGY | Facility: CLINIC | Age: 58
Discharge: HOME | End: 2018-05-25
Payer: COMMERCIAL

## 2018-05-25 ENCOUNTER — CLINICAL SUPPORT (OUTPATIENT)
Dept: ELECTROPHYSIOLOGY | Facility: CLINIC | Age: 58
End: 2018-05-25
Attending: INTERNAL MEDICINE
Payer: COMMERCIAL

## 2018-05-25 DIAGNOSIS — R55 SYNCOPE, UNSPECIFIED SYNCOPE TYPE: ICD-10-CM

## 2018-05-25 DIAGNOSIS — R06.02 SOB (SHORTNESS OF BREATH): ICD-10-CM

## 2018-05-25 PROCEDURE — 93224 XTRNL ECG REC UP TO 48 HRS: CPT | Mod: S$GLB,,, | Performed by: INTERNAL MEDICINE

## 2018-06-04 ENCOUNTER — TELEPHONE (OUTPATIENT)
Dept: ELECTROPHYSIOLOGY | Facility: CLINIC | Age: 58
End: 2018-06-04

## 2018-06-04 NOTE — TELEPHONE ENCOUNTER
Mr Payton's home # is not his # anymore. Ms Fabian answered her #. It is noqw their phone #. Advised we need holter monitor returned. It was to be returned 5.28.18. Upon further discussion, Mr Payton will drop it off here tomorrow per Ms Fabian.

## 2018-06-07 ENCOUNTER — TELEPHONE (OUTPATIENT)
Dept: CARDIOLOGY | Facility: CLINIC | Age: 58
End: 2018-06-07

## 2018-06-07 NOTE — TELEPHONE ENCOUNTER
----- Message from Rafaela Varner MD sent at 6/7/2018  1:59 PM CDT -----  plz let the patient know that overall the holter test is normal and that there is no cardiac causes of passing out. Consider neurological causes of syncope.

## 2018-07-25 ENCOUNTER — OFFICE VISIT (OUTPATIENT)
Dept: INTERNAL MEDICINE | Facility: CLINIC | Age: 58
End: 2018-07-25
Payer: COMMERCIAL

## 2018-07-25 VITALS
OXYGEN SATURATION: 99 % | BODY MASS INDEX: 19.58 KG/M2 | WEIGHT: 125 LBS | DIASTOLIC BLOOD PRESSURE: 60 MMHG | HEART RATE: 59 BPM | SYSTOLIC BLOOD PRESSURE: 115 MMHG

## 2018-07-25 DIAGNOSIS — R07.89 OTHER CHEST PAIN: ICD-10-CM

## 2018-07-25 DIAGNOSIS — F19.10 DRUG ABUSE: ICD-10-CM

## 2018-07-25 DIAGNOSIS — G44.221 CHRONIC TENSION-TYPE HEADACHE, INTRACTABLE: ICD-10-CM

## 2018-07-25 DIAGNOSIS — J44.9 CHRONIC OBSTRUCTIVE PULMONARY DISEASE, UNSPECIFIED COPD TYPE: Primary | ICD-10-CM

## 2018-07-25 DIAGNOSIS — Z76.89 ENCOUNTER TO ESTABLISH CARE: ICD-10-CM

## 2018-07-25 DIAGNOSIS — Z12.9 SCREENING FOR CANCER: ICD-10-CM

## 2018-07-25 DIAGNOSIS — G47.00 INSOMNIA, UNSPECIFIED TYPE: ICD-10-CM

## 2018-07-25 DIAGNOSIS — F17.200 SMOKING: ICD-10-CM

## 2018-07-25 DIAGNOSIS — F41.9 ANXIETY: ICD-10-CM

## 2018-07-25 PROCEDURE — 99999 PR PBB SHADOW E&M-EST. PATIENT-LVL IV: CPT | Mod: PBBFAC,,, | Performed by: STUDENT IN AN ORGANIZED HEALTH CARE EDUCATION/TRAINING PROGRAM

## 2018-07-25 PROCEDURE — 99214 OFFICE O/P EST MOD 30 MIN: CPT | Mod: S$GLB,,, | Performed by: STUDENT IN AN ORGANIZED HEALTH CARE EDUCATION/TRAINING PROGRAM

## 2018-07-25 RX ORDER — ESCITALOPRAM OXALATE 5 MG/1
5 TABLET ORAL DAILY
Qty: 30 TABLET | Refills: 3 | Status: SHIPPED | OUTPATIENT
Start: 2018-07-25 | End: 2019-09-05 | Stop reason: CLARIF

## 2018-07-25 RX ORDER — METHOCARBAMOL 500 MG/1
500 TABLET, FILM COATED ORAL 4 TIMES DAILY
Qty: 40 TABLET | Refills: 0 | Status: SHIPPED | OUTPATIENT
Start: 2018-07-25 | End: 2018-08-04

## 2018-07-25 RX ORDER — IPRATROPIUM BROMIDE AND ALBUTEROL SULFATE 2.5; .5 MG/3ML; MG/3ML
3 SOLUTION RESPIRATORY (INHALATION) EVERY 6 HOURS PRN
Qty: 1 BOX | Refills: 0 | Status: SHIPPED | OUTPATIENT
Start: 2018-07-25 | End: 2019-02-05 | Stop reason: SDUPTHER

## 2018-07-25 NOTE — PATIENT INSTRUCTIONS
- Start Escitalopram with 5 mg for 1 week and increase it to 10 after 1 week if tolerated.    Treating Anxiety Disorders with Medicine  An anxiety disorder can make you feel nervous or apprehensive, even without a clear reason. In people age 65 and older, generalized anxiety disorder is one of the most commonly diagnosed anxiety disorders. Many times it occurs with depression. Certain anxiety disorders can cause intense feelings of fear or panic. You may even have physical symptoms such as a racing heartbeat, sweating, or dizziness. If you have these feelings, you dont have to suffer anymore. Treatment to help you overcome your fears will likely include therapy (also called counseling). Medicine may also be prescribed to help control your symptoms.    Medicines  Certain medicines may be prescribed to help control your symptoms. So you may feel less anxious. You may also feel able to move forward with therapy. At first, medicines and dosages may need to be adjusted to find what works best for you. Try to be patient. Tell your healthcare provider how a medicine makes you feel. This way, you can work together to find the treatment thats best for you. Keep in mind that medicines can have side effects. Talk with your provider about any side effects that are bothering you. Changing the dose or type of medicine may help. Dont stop taking medicine on your own. That can cause symptoms to come back.  · Anti-anxiety medicine. This medicine eases symptoms and helps you relax. Your healthcare provider will explain when and how to use it. It may be prescribed for use before situations that make you anxious. You may also be told to take medicine on a regular schedule. Anti-anxiety medicine may make you feel a little sleepy or out of it. Dont drive a car or operate machinery while on this medicine, until you know how it affects you.  Caution  Never use alcohol or other drugs with anti-anxiety medicines. This could result in  loss of muscular control, sedation, coma, or death. Also, use only the amount of medicine prescribed for you. If you think you may have taken too much, get emergency care right away.   · Antidepressant medicine. This kind of medicine is often used to treat anxiety, even if you arent depressed. An antidepressant helps balance out brain chemicals. This helps keep anxiety under control. This medicine is taken on a schedule. It takes a few weeks to start working. If you dont notice a change at first, you may just need more time. But if you dont notice results after the first few weeks, tell your provider.  Keep taking medicines as prescribed  Never change your dosage, share or use another person's medicine, or stop taking your medicines without talking to your healthcare provider first. Keep the following in mind:  · Some medicines must be taken on a schedule. Make this part of your daily routine. For instance, always take your pill before brushing your teeth. A pillbox can help you remember if youve taken your medicine each day.  · Medicines are often taken for 6 to 12 months. Your healthcare provider will then evaluate whether you need to stay on them. Many people who have also had therapy may no longer need medicine to manage anxiety.  · You may need to stop taking medicine slowly to give your body time to adjust. When its time to stop, your healthcare provider will tell you more. Remember: Never stop taking your medicine without talking to your provider first.  · If symptoms return, you may need to start taking medicines again. This isnt your fault. Its just the nature of your anxiety disorder.  Special concerns  · Side effects. Medicines may cause side effects. Ask your healthcare provider or pharmacist what you can expect. They may have ideas for avoiding some side effects.  · Sexual problems. Some antidepressants can affect your desire for sex or your ability to have an orgasm. A change in dosage or  medicine often solves the problem. If you have a sexual side effect that concerns you, tell your healthcare provider.  · Addiction. If youve never had a problem with drugs or alcohol, you may not have a problem with medicines used to treat anxiety disorders. But always discuss the medicines with your healthcare provider before taking them. If you have a history of addiction, you may not be able to use certain medicines used to treat anxiety disorders.  · Medicine interactions. Always check with your pharmacist before using any over-the-counter medicines, including herbal supplements.   Date Last Reviewed: 5/1/2017  © 3866-3935 [employer]. [address] [place] [address], [place] [address]. All rights reserved. This information is not intended as a substitute for professional medical care. Always follow your healthcare professional's instructions.

## 2018-07-25 NOTE — PROGRESS NOTES
"Clinic Note  7/25/2018      Subjective:       Patient ID:  Efra is a 58 y.o. male being seen for an established visit.    Chief Complaint: No chief complaint on file.    58 years old male patient with medical history of COPD came to establish care.  # COPD on albuterol-ipratropium inhaler/nubalizer.  Chronic cough with clear/yellow sputum.   No wheeze, shortness of breath, fever and chills.  Stable     # Chest pain  Started 1 year and half ago, left sided, sharp, stabbing pain, radiating to left arm and shoulder, brought in usually by exertion, like currying boxes at work, some times last for 5 minutes and some times last for hours, but the shoulder and arm pain last for days, patient tried hot compresses with no improvement.Associated with shortness of breath per patient due to exertion and sweating from work, sometimes nausea, denied vomiting and palpitation.    # Headache   Started 2 years ago, daily, frontal mostly, last for 30 min, feels like tension headache, usually resolved with Bc powder or ibuprofen, not positional, not associated with visual disturbance, lacrimation, runny nose, nausea, vomiting, tingling and numbness. Worse with smoking.No history of migraine.    # Insomnia   Patient stated that he sleep around 2 hours per/night for the last 2 years, usually fall asleep during inappropriate situations and palaces eg: in the car, watching TV and setting with people. But he can't fall sleep easily when he go to bed and lay down, reported that he keep thinking about every thing his life, and worry about his children, wife and his job.      - lost around 20 Ib during the last year associated with decrease appetite.   - current smoker used to smoke 3 packs per day and decreased it to 1 pack 5 month ago.  - has history of drug abuse quit in 2009, per patient he tried every thing" heroin, opioids", on methadone.   - patient reported that he has history of hepatitis"dosen't know what type" and he was treated in " the past.   - +ve strong family history of diabetes and colon cancer.   -  for 40 years, lives with his wife, dosen't exercise, usually eat home cooked food.       Review of Systems   Constitutional: Positive for weight loss. Negative for chills and fever.   HENT: Negative for congestion and sore throat.    Eyes: Negative for blurred vision and double vision.   Respiratory: Positive for cough and sputum production. Negative for wheezing.    Cardiovascular: Positive for chest pain. Negative for palpitations and leg swelling.   Gastrointestinal: Positive for nausea. Negative for abdominal pain and vomiting.   Genitourinary: Negative for flank pain and frequency.   Musculoskeletal: Positive for myalgias.   Skin: Negative for itching and rash.   Neurological: Negative for dizziness.   Psychiatric/Behavioral: Negative for depression and suicidal ideas. The patient is nervous/anxious.        Past Medical History:   Diagnosis Date    COPD (chronic obstructive pulmonary disease)     Drug abuse     Eczema     Substance abuse        Family History   Problem Relation Age of Onset    Heart disease Father     Hypertension Sister     Hypertension Brother     Heart attack Paternal Grandmother     Heart disease Paternal Grandmother         reports that he has been smoking Cigarettes.  He has been smoking about 2.00 packs per day. He has never used smokeless tobacco. He reports that he does not drink alcohol or use drugs.    Medication List with Changes/Refills   New Medications    ALBUTEROL-IPRATROPIUM (DUO-NEB) 2.5 MG-0.5 MG/3 ML NEBULIZER SOLUTION    Take 3 mLs by nebulization every 6 (six) hours as needed for Wheezing. Rescue    ESCITALOPRAM OXALATE (LEXAPRO) 5 MG TAB    Take 1 tablet (5 mg total) by mouth once daily. Start with 5 mg for 1 week and increase it to 10 after 1 week if tolerated    METHOCARBAMOL (ROBAXIN) 500 MG TAB    Take 1 tablet (500 mg total) by mouth 4 (four) times daily. for 10 days   Current  "Medications    METHADONE (DOLOPHINE) 5 MG TABLET    Take 120 mg by mouth once daily.   Changed and/or Refilled Medications    Modified Medication Previous Medication    IPRATROPIUM-ALBUTEROL (COMBIVENT RESPIMAT)  MCG/ACTUATION INHALER ipratropium-albuterol (COMBIVENT RESPIMAT)  mcg/actuation inhaler       Inhale 1 puff into the lungs every 4 (four) hours as needed for Wheezing.    Inhale 1 puff into the lungs every 4 (four) hours as needed for Wheezing.     Review of patient's allergies indicates:  No Known Allergies    Patient Active Problem List   Diagnosis    Carpal tunnel syndrome of left wrist    Chronic obstructive pulmonary disease           Objective:      /60   Pulse (!) 59   Wt 56.7 kg (125 lb)   SpO2 99%   BMI 19.58 kg/m²   Estimated body mass index is 19.58 kg/m² as calculated from the following:    Height as of 5/23/18: 5' 7" (1.702 m).    Weight as of this encounter: 56.7 kg (125 lb).  Physical Exam   Constitutional: He is oriented to person, place, and time. No distress.   Thin    HENT:   Head: Normocephalic and atraumatic.   Mouth/Throat: No oropharyngeal exudate.   Eyes: Pupils are equal, round, and reactive to light. No scleral icterus.   Cardiovascular: Normal rate, regular rhythm and normal heart sounds.    No murmur heard.  Pulmonary/Chest: Effort normal and breath sounds normal. No respiratory distress. He has no wheezes.   Abdominal: Soft. He exhibits no distension. There is no tenderness. There is no rebound.   Musculoskeletal: He exhibits tenderness (left chest and left arm). He exhibits no edema or deformity.   Lymphadenopathy:     He has no cervical adenopathy.   Neurological: He is alert and oriented to person, place, and time.   Skin: He is not diaphoretic. No pallor.   Psychiatric: Affect and judgment normal.   MING 7 score 19 indicate sever anxiety disorder    Vitals reviewed.        Assessment and Plan:         Diagnoses and all orders for this visit:    Chronic " obstructive pulmonary disease, unspecified COPD type  -     Ambulatory referral to Smoking Cessation Program  -     ipratropium-albuterol (COMBIVENT RESPIMAT)  mcg/actuation inhaler; Inhale 1 puff into the lungs every 4 (four) hours as needed for Wheezing.  -     albuterol-ipratropium (DUO-NEB) 2.5 mg-0.5 mg/3 mL nebulizer solution; Take 3 mLs by nebulization every 6 (six) hours as needed for Wheezing. Rescue    Smoking  -     Ambulatory referral to Smoking Cessation Program        -     CT Chest Lung Screening Low Dose; Future    Chronic tension-type headache, intractable  - Likely related to sever anxiety     Insomnia, unspecified type  - Likely related to sever anxiety.    Other chest pain  - likely muscle strain from currying boxes at work   - cold compresses   -     methocarbamol (ROBAXIN) 500 MG Tab; Take 1 tablet (500 mg total) by mouth 4 (four) times daily. for 10 days.    Screening for cancer  -     CT Chest Lung Screening Low Dose; Future        -     Case request GI: COLONOSCOPY  Anxiety   - MING 7 score 19   -  escitalopram oxalate (LEXAPRO) 5 MG Tab; Take 1 tablet (5 mg total) by mouth once daily. Start with 5 mg for 1 week and increase it to 10 after 1 week if tolerated    Encounter to establish care  -     Ambulatory referral to Smoking Cessation Program  -     LIPID PANEL; Future  -     Hepatitis panel, acute; Future  -     Hemoglobin A1c; Future    Drug abuse : qiut 2009        - continue Methadone   Follow-up in about 6 weeks (around 9/5/2018).    Other Orders Placed This Visit:  Orders Placed This Encounter   Procedures    CT Chest Lung Screening Low Dose    LIPID PANEL    Hepatitis panel, acute    Hemoglobin A1c    Ambulatory referral to Smoking Cessation Program     Nitin Doss  Internal Medicine, PGY 2  [phone]

## 2018-07-29 NOTE — PROGRESS NOTES
I have personally taken the history and examined this patient and agree with the resident's note as stated above with the following thoughts:  /60   Pulse (!) 59   Wt 56.7 kg (125 lb)   SpO2 99%   BMI 19.58 kg/m²     Discussed getting vaccines up to date.  Stop smoking.

## 2018-09-17 ENCOUNTER — HOSPITAL ENCOUNTER (EMERGENCY)
Facility: HOSPITAL | Age: 58
Discharge: HOME | End: 2018-09-17
Attending: EMERGENCY MEDICINE
Payer: COMMERCIAL

## 2018-09-17 ENCOUNTER — NURSE TRIAGE (OUTPATIENT)
Dept: ADMINISTRATIVE | Facility: CLINIC | Age: 58
End: 2018-09-17

## 2018-09-17 VITALS
HEART RATE: 51 BPM | TEMPERATURE: 98 F | SYSTOLIC BLOOD PRESSURE: 124 MMHG | WEIGHT: 140 LBS | OXYGEN SATURATION: 100 % | HEIGHT: 67 IN | DIASTOLIC BLOOD PRESSURE: 62 MMHG | BODY MASS INDEX: 21.97 KG/M2 | RESPIRATION RATE: 12 BRPM

## 2018-09-17 DIAGNOSIS — J44.1 COPD EXACERBATION: ICD-10-CM

## 2018-09-17 DIAGNOSIS — J06.9 URI (UPPER RESPIRATORY INFECTION): ICD-10-CM

## 2018-09-17 DIAGNOSIS — R05.9 COUGH: Primary | ICD-10-CM

## 2018-09-17 LAB
ALBUMIN SERPL BCP-MCNC: 3.7 G/DL
ALP SERPL-CCNC: 58 U/L
ALT SERPL W/O P-5'-P-CCNC: 24 U/L
ANION GAP SERPL CALC-SCNC: 9 MMOL/L
AST SERPL-CCNC: 39 U/L
BASOPHILS # BLD AUTO: 0.02 K/UL
BASOPHILS NFR BLD: 0.5 %
BILIRUB SERPL-MCNC: 0.3 MG/DL
BUN SERPL-MCNC: 13 MG/DL
CALCIUM SERPL-MCNC: 9.3 MG/DL
CHLORIDE SERPL-SCNC: 103 MMOL/L
CO2 SERPL-SCNC: 25 MMOL/L
CREAT SERPL-MCNC: 0.8 MG/DL
DIFFERENTIAL METHOD: ABNORMAL
EOSINOPHIL # BLD AUTO: 0.1 K/UL
EOSINOPHIL NFR BLD: 1.3 %
ERYTHROCYTE [DISTWIDTH] IN BLOOD BY AUTOMATED COUNT: 13.8 %
EST. GFR  (AFRICAN AMERICAN): >60 ML/MIN/1.73 M^2
EST. GFR  (NON AFRICAN AMERICAN): >60 ML/MIN/1.73 M^2
GLUCOSE SERPL-MCNC: 116 MG/DL
HCT VFR BLD AUTO: 41.8 %
HGB BLD-MCNC: 13.9 G/DL
IMM GRANULOCYTES # BLD AUTO: 0.01 K/UL
IMM GRANULOCYTES NFR BLD AUTO: 0.3 %
LYMPHOCYTES # BLD AUTO: 1.1 K/UL
LYMPHOCYTES NFR BLD: 28.8 %
MCH RBC QN AUTO: 32.9 PG
MCHC RBC AUTO-ENTMCNC: 33.3 G/DL
MCV RBC AUTO: 99 FL
MONOCYTES # BLD AUTO: 0.4 K/UL
MONOCYTES NFR BLD: 9.7 %
NEUTROPHILS # BLD AUTO: 2.2 K/UL
NEUTROPHILS NFR BLD: 59.4 %
NRBC BLD-RTO: 0 /100 WBC
PLATELET # BLD AUTO: 97 K/UL
PMV BLD AUTO: 10.4 FL
POTASSIUM SERPL-SCNC: 4.3 MMOL/L
PROT SERPL-MCNC: 7.1 G/DL
RBC # BLD AUTO: 4.23 M/UL
SODIUM SERPL-SCNC: 137 MMOL/L
WBC # BLD AUTO: 3.71 K/UL

## 2018-09-17 PROCEDURE — 93010 ELECTROCARDIOGRAM REPORT: CPT | Mod: ,,, | Performed by: INTERNAL MEDICINE

## 2018-09-17 PROCEDURE — 99284 EMERGENCY DEPT VISIT MOD MDM: CPT | Mod: ,,, | Performed by: PHYSICIAN ASSISTANT

## 2018-09-17 PROCEDURE — 99284 EMERGENCY DEPT VISIT MOD MDM: CPT | Mod: 25

## 2018-09-17 PROCEDURE — 80053 COMPREHEN METABOLIC PANEL: CPT

## 2018-09-17 PROCEDURE — 94640 AIRWAY INHALATION TREATMENT: CPT

## 2018-09-17 PROCEDURE — 25000242 PHARM REV CODE 250 ALT 637 W/ HCPCS: Performed by: PHYSICIAN ASSISTANT

## 2018-09-17 PROCEDURE — 85025 COMPLETE CBC W/AUTO DIFF WBC: CPT

## 2018-09-17 RX ORDER — AZITHROMYCIN 250 MG/1
TABLET, FILM COATED ORAL
Qty: 6 TABLET | Refills: 0 | Status: SHIPPED | OUTPATIENT
Start: 2018-09-17 | End: 2018-09-22

## 2018-09-17 RX ORDER — ALBUTEROL SULFATE 90 UG/1
1-2 AEROSOL, METERED RESPIRATORY (INHALATION) EVERY 6 HOURS PRN
Qty: 1 INHALER | Refills: 0 | Status: SHIPPED | OUTPATIENT
Start: 2018-09-17 | End: 2019-02-05

## 2018-09-17 RX ORDER — IPRATROPIUM BROMIDE AND ALBUTEROL SULFATE 2.5; .5 MG/3ML; MG/3ML
3 SOLUTION RESPIRATORY (INHALATION)
Status: COMPLETED | OUTPATIENT
Start: 2018-09-17 | End: 2018-09-17

## 2018-09-17 RX ADMIN — IPRATROPIUM BROMIDE AND ALBUTEROL SULFATE 3 ML: .5; 3 SOLUTION RESPIRATORY (INHALATION) at 02:09

## 2018-09-17 NOTE — ED TRIAGE NOTES
"Reports a cough x1 day; reports that his COPD is flaring up; reports that his throat feels "raw" but not swollen. pt. Reports that he has been out of his inhaler for weeks; denies chest pain.     No LDA's in place on arrival to department.    Pain:  denies    Psychosocial:  Patient is calm and cooperative.  Patients insight and judgement are appropriate to situation.  Appears clean, well maintained, with clothing appropriate to environment.  No evidence of hallucinations, delusions, or psychosis.    Neuro:  Eyes open spontaneously.  Awake, alert. Speech clear and appropriate.  Tolerating saliva secretions well.  Able to follow commands, demonstrating ability to actively and appropriately communicate within context of current conversation.  Symmetrical facial muscles.      Airway:  Bilateral chest rise and fall.  RR regular and non labored.  Air entry patent. Reports cough. Lung sounds clear to auscultation.     Circulatory:  Skin warm, dry, and pink.  Apical and radial pulses strong and regular.            "

## 2018-09-17 NOTE — ED PROVIDER NOTES
Encounter Date: 9/17/2018    SCRIBE #1 NOTE: I, Kelli Lobo, am scribing for, and in the presence of,  Nay Naylor PA-C. I have scribed the entire note.       History     Chief Complaint   Patient presents with    URI     coughing up  tan chunks, hx copd      Time patient was seen by the provider: 1:39 PM      The patient is a 58 y.o. male with history of COPD, who presents to the ED with a complaint of productive cough with tan colored sputum, worsening yesterday. The patient states this feels like his COPD is flaring up. Patient reports of SOB and wheezing. He has been out of his inhaler and nebulizer for a week. He has not been able to refill them. Patient states he feels symptoms are better today than yesterday. Denies chills, rhinorrhea, chest pain, sore throat, headache. No recent travels or hospitalization. Denies sick contacts. Patient is a current smoker, 1 pack a day.       The history is provided by the patient and medical records.     Review of patient's allergies indicates:  No Known Allergies  Past Medical History:   Diagnosis Date    COPD (chronic obstructive pulmonary disease)     Drug abuse     Eczema     Substance abuse      Past Surgical History:   Procedure Laterality Date    arm surgery      HAND SURGERY       Family History   Problem Relation Age of Onset    Heart disease Father     Diabetes Father     Colon cancer Father     Hypertension Sister     Diabetes Sister     Hypertension Brother     Diabetes Brother     Colon cancer Brother     Heart attack Paternal Grandmother     Heart disease Paternal Grandmother     Diabetes Paternal Grandmother     Diabetes Paternal Grandfather     Colon cancer Paternal Grandfather      Social History     Tobacco Use    Smoking status: Current Every Day Smoker     Packs/day: 1.00     Years: 49.00     Pack years: 49.00     Types: Cigarettes     Start date: 7/25/1969    Smokeless tobacco: Never Used    Tobacco comment: decreased his  smoking to 1 pack per day 5 months ago    Substance Use Topics    Alcohol use: No     Comment: has past history of alcohol abuse     Drug use: No     Comment: has past history of substance abuse      Review of Systems   Constitutional: Negative for chills and fever.   HENT: Negative for congestion, postnasal drip and sore throat.    Eyes: Negative for photophobia and visual disturbance.   Respiratory: Positive for cough (productive), shortness of breath and wheezing.    Cardiovascular: Negative for chest pain, palpitations and leg swelling.   Gastrointestinal: Negative for abdominal pain, diarrhea and nausea.   Genitourinary: Negative for dysuria.   Musculoskeletal: Negative for back pain, neck pain and neck stiffness.   Skin: Negative for rash and wound.   Neurological: Negative for weakness and headaches.   Hematological: Does not bruise/bleed easily.   Psychiatric/Behavioral: Negative for confusion.       Physical Exam     Initial Vitals [09/17/18 1317]   BP Pulse Resp Temp SpO2   138/64 (!) 56 18 97.9 °F (36.6 °C) 97 %      MAP       --         Physical Exam    Nursing note and vitals reviewed.  Constitutional: He appears well-developed and well-nourished. He is not diaphoretic. No distress.   HENT:   Head: Normocephalic.   Eyes: EOM are normal. Pupils are equal, round, and reactive to light.   Neck: Normal range of motion. Neck supple.   Cardiovascular: Regular rhythm and normal heart sounds. Bradycardia present.  Exam reveals no gallop and no friction rub.    No murmur heard.  No lower extremity edema   Pulmonary/Chest: Breath sounds normal. He has no wheezes. He has no rhonchi. He has no rales.   Abdominal: Soft. Bowel sounds are normal. There is no tenderness. There is no rebound and no guarding.   Musculoskeletal: Normal range of motion. He exhibits no edema.   Neurological: He is alert and oriented to person, place, and time. He has normal strength and normal reflexes. No cranial nerve deficit or sensory  deficit.   Skin: Skin is warm and dry. No rash noted. No erythema.   Psychiatric: He has a normal mood and affect.         ED Course   Procedures  Labs Reviewed   CBC W/ AUTO DIFFERENTIAL - Abnormal; Notable for the following components:       Result Value    WBC 3.71 (*)     RBC 4.23 (*)     Hemoglobin 13.9 (*)     MCV 99 (*)     MCH 32.9 (*)     Platelets 97 (*)     All other components within normal limits   COMPREHENSIVE METABOLIC PANEL - Abnormal; Notable for the following components:    Glucose 116 (*)     All other components within normal limits          Imaging Results          X-Ray Chest PA And Lateral (Final result)  Result time 09/17/18 14:43:08    Final result by Valentin Velez Jr., MD (09/17/18 14:43:08)                 Impression:      No detrimental change.      Electronically signed by: Valentin Velez MD  Date:    09/17/2018  Time:    14:43             Narrative:    EXAMINATION:  XR CHEST PA AND LATERAL    CLINICAL HISTORY:  cough and SOB; h/o COPD;    TECHNIQUE:  PA and lateral views of the chest were performed.    COMPARISON:  May 18, 2018.    FINDINGS:  Heart size pulmonary vessels are similar.  The lungs are well aerated and clear.  Minimal blunting of the costophrenic angles posteriorly.  Degenerative change in the spine.                                 Medical Decision Making:   History:   Old Medical Records: I decided to obtain old medical records.  Clinical Tests:   Lab Tests: Ordered and Reviewed  Radiological Study: Ordered and Reviewed  Medical Tests: Reviewed and Ordered       APC / Resident Notes:   58 y.o. Male patient with history of COPD, presents with cough and SOB since yesterday. Patient is out of his inhaler and nebulizer, he has not refilled it. VSS. Denies fever. Lungs are clear. No lower extremity edema. My differential diagnosis includes but is not limited to: COPD exacerbation, viral URI, pneumonia. Will do labs, CXR, give Duo-neb and reassess.      EKG shows sinus  bradycardia with 1st degree AV block - unchanged from prior.     No leukocytosis. CMP unremarkable.     CXR with no change.    He reports improvement of symptoms with DuoNeb. I do not feel that he needs any further labs or imaging. Since he has COPD and is a smoker - will cover with abx. Stable for discharge.    He was discharged with prescriptions for azithromycin and albuterol inhaler.  He will follow up with his PCP.  All of the patient's questions were answered.  I reviewed the patient's chart, labs, and imaging.         Scribe Attestation:   Scribe #1: I performed the above scribed service and the documentation accurately describes the services I performed. I attest to the accuracy of the note.               Clinical Impression:   The primary encounter diagnosis was Cough. Diagnoses of URI (upper respiratory infection) and COPD exacerbation were also pertinent to this visit.      Disposition:   Disposition: Discharged  Condition: Stable                        Nay Naylor PA-C  09/17/18 3294       Nay Naylor PA-C  09/17/18 1349

## 2018-09-17 NOTE — PROVIDER PROGRESS NOTES - EMERGENCY DEPT.
Encounter Date: 9/17/2018    ED Physician Progress Notes         EKG - STEMI Decision  Initial Reading: No STEMI present.    I, Norma Fountain, am scribing for, and in the presence of, Dr. Burrell. I performed the above scribed service and the documentation accurately describes the services I performed. I attest to the accuracy of the note.

## 2018-09-17 NOTE — ED NOTES
Bed: [facility] 01  Expected date:   Expected time:   Means of arrival:   Comments:

## 2019-02-05 ENCOUNTER — OFFICE VISIT (OUTPATIENT)
Dept: INTERNAL MEDICINE | Facility: CLINIC | Age: 59
End: 2019-02-05
Payer: COMMERCIAL

## 2019-02-05 VITALS
DIASTOLIC BLOOD PRESSURE: 74 MMHG | HEART RATE: 70 BPM | OXYGEN SATURATION: 93 % | BODY MASS INDEX: 20.66 KG/M2 | WEIGHT: 131.63 LBS | SYSTOLIC BLOOD PRESSURE: 140 MMHG | TEMPERATURE: 98 F | HEIGHT: 67 IN

## 2019-02-05 DIAGNOSIS — J20.9 ACUTE BRONCHITIS, UNSPECIFIED ORGANISM: ICD-10-CM

## 2019-02-05 DIAGNOSIS — R68.89 FLU-LIKE SYMPTOMS: Primary | ICD-10-CM

## 2019-02-05 DIAGNOSIS — J44.9 CHRONIC OBSTRUCTIVE PULMONARY DISEASE, UNSPECIFIED COPD TYPE: ICD-10-CM

## 2019-02-05 PROCEDURE — 99999 PR PBB SHADOW E&M-EST. PATIENT-LVL IV: ICD-10-PCS | Mod: PBBFAC,,, | Performed by: STUDENT IN AN ORGANIZED HEALTH CARE EDUCATION/TRAINING PROGRAM

## 2019-02-05 PROCEDURE — 3008F PR BODY MASS INDEX (BMI) DOCUMENTED: ICD-10-PCS | Mod: CPTII,S$GLB,, | Performed by: STUDENT IN AN ORGANIZED HEALTH CARE EDUCATION/TRAINING PROGRAM

## 2019-02-05 PROCEDURE — 99213 PR OFFICE/OUTPT VISIT, EST, LEVL III, 20-29 MIN: ICD-10-PCS | Mod: S$GLB,,, | Performed by: STUDENT IN AN ORGANIZED HEALTH CARE EDUCATION/TRAINING PROGRAM

## 2019-02-05 PROCEDURE — 3008F BODY MASS INDEX DOCD: CPT | Mod: CPTII,S$GLB,, | Performed by: STUDENT IN AN ORGANIZED HEALTH CARE EDUCATION/TRAINING PROGRAM

## 2019-02-05 PROCEDURE — 99999 PR PBB SHADOW E&M-EST. PATIENT-LVL IV: CPT | Mod: PBBFAC,,, | Performed by: STUDENT IN AN ORGANIZED HEALTH CARE EDUCATION/TRAINING PROGRAM

## 2019-02-05 PROCEDURE — 99213 OFFICE O/P EST LOW 20 MIN: CPT | Mod: S$GLB,,, | Performed by: STUDENT IN AN ORGANIZED HEALTH CARE EDUCATION/TRAINING PROGRAM

## 2019-02-05 RX ORDER — AZITHROMYCIN 250 MG/1
TABLET, FILM COATED ORAL
Qty: 6 TABLET | Refills: 0 | Status: SHIPPED | OUTPATIENT
Start: 2019-02-05 | End: 2019-02-10

## 2019-02-05 RX ORDER — IPRATROPIUM BROMIDE AND ALBUTEROL SULFATE 2.5; .5 MG/3ML; MG/3ML
3 SOLUTION RESPIRATORY (INHALATION) EVERY 6 HOURS PRN
Qty: 1 BOX | Refills: 0 | Status: SHIPPED | OUTPATIENT
Start: 2019-02-05 | End: 2020-02-05

## 2019-02-05 RX ORDER — ALBUTEROL SULFATE 90 UG/1
1-2 AEROSOL, METERED RESPIRATORY (INHALATION) EVERY 6 HOURS PRN
Qty: 1 INHALER | Refills: 0 | Status: SHIPPED | OUTPATIENT
Start: 2019-02-05 | End: 2021-02-26 | Stop reason: SDUPTHER

## 2019-02-05 NOTE — LETTER
February 5, 2019      [facility]  [address]  [facility] [address]  Phone: [phone]  Fax: [phone]       Patient: Efra Payton   YOB: 1960  Date of Visit: 02/05/2019    To Whom It May Concern:    Belkys Payton  was at [facility] on 02/05/2019. He may return to work/school on  2/8/19 with no restrictions. If you have any questions or concerns, or if I can be of further assistance, please do not hesitate to contact me.    Sincerely,       Callum Tim MD     Internal Medicine

## 2019-02-05 NOTE — PROGRESS NOTES
"Subjective:       Patient ID: Efra Payton III is a 59 y.o. male.    Chief Complaint: Night Sweats; Cough; Diarrhea; and Nasal Congestion    HPI   59 y.o M with a medical history significant for COPD continues to smoke who is presenting to clinic with flu-like symptoms. His granddaughter was dianogsed with the flu last week and subsquently he developed similar symptoms. Currently he is complaining of arthralgias, myalgias, congestion, cough - nonproductive, low grade temp 99.8.     Review of Systems   Constitutional: Positive for chills and fever.   HENT: Positive for congestion. Negative for sneezing and sore throat.    Eyes: Negative for photophobia and redness.   Respiratory: Positive for cough and shortness of breath.    Cardiovascular: Negative for chest pain, palpitations and leg swelling.   Gastrointestinal: Negative for abdominal distention and constipation.   Endocrine: Negative for polydipsia, polyphagia and polyuria.   Musculoskeletal: Positive for arthralgias and myalgias.   Neurological: Positive for headaches.   Hematological: Negative for adenopathy. Does not bruise/bleed easily.       Objective:       Vitals:    02/05/19 1521   BP: (!) 140/74   Pulse: 70   Temp: 97.9 °F (36.6 °C)   SpO2: (!) 93%   Weight: 59.7 kg (131 lb 9.8 oz)   Height: 5' 7" (1.702 m)       Physical Exam   Constitutional: He is oriented to person, place, and time. He appears well-developed and well-nourished.   HENT:   Head: Normocephalic and atraumatic.   Eyes: Pupils are equal, round, and reactive to light.   Neck: Normal range of motion. Neck supple.   Cardiovascular: Normal rate, regular rhythm and normal heart sounds.   Pulmonary/Chest: Effort normal.   Abdominal: Soft. Bowel sounds are normal.   Musculoskeletal: Normal range of motion.   Neurological: He is alert and oriented to person, place, and time.       Assessment:       1. Flu-like symptoms        Plan:       Flu-like symptoms  Pt granddaughter with confirmed flu at " OSH, patient most likely has the flu; I do not believe it is necessary to further test the pt as his symptoms correlate with the typical presentation. However, considering he is a smoker and has COPD, I will obtain a CXR to r/o PNA. Otherwise symptomatic tx.  - Z-pack,  - albuterol inhaler   - combivent   -CXR   - duo-joshua Tim MD     Internal Medicine PGY-2

## 2019-02-06 ENCOUNTER — HOSPITAL ENCOUNTER (OUTPATIENT)
Dept: RADIOLOGY | Facility: HOSPITAL | Age: 59
Discharge: HOME | End: 2019-02-06
Attending: STUDENT IN AN ORGANIZED HEALTH CARE EDUCATION/TRAINING PROGRAM
Payer: COMMERCIAL

## 2019-02-06 DIAGNOSIS — R68.89 FLU-LIKE SYMPTOMS: ICD-10-CM

## 2019-02-06 DIAGNOSIS — J44.9 CHRONIC OBSTRUCTIVE PULMONARY DISEASE, UNSPECIFIED COPD TYPE: ICD-10-CM

## 2019-02-06 DIAGNOSIS — J20.9 ACUTE BRONCHITIS, UNSPECIFIED ORGANISM: ICD-10-CM

## 2019-02-06 PROCEDURE — 71046 XR CHEST PA AND LATERAL: ICD-10-PCS | Mod: 26,,, | Performed by: RADIOLOGY

## 2019-02-06 PROCEDURE — 71046 X-RAY EXAM CHEST 2 VIEWS: CPT | Mod: TC

## 2019-02-06 PROCEDURE — 71046 X-RAY EXAM CHEST 2 VIEWS: CPT | Mod: 26,,, | Performed by: RADIOLOGY

## 2019-04-02 ENCOUNTER — CLINICAL SUPPORT (OUTPATIENT)
Dept: OTHER | Facility: CLINIC | Age: 59
End: 2019-04-02
Payer: COMMERCIAL

## 2019-04-02 DIAGNOSIS — Z00.8 ENCOUNTER FOR OTHER GENERAL EXAMINATION: ICD-10-CM

## 2019-04-02 PROCEDURE — 80061 LIPID PANEL: CPT | Mod: QW,S$GLB,, | Performed by: INTERNAL MEDICINE

## 2019-04-02 PROCEDURE — 82947 PR  ASSAY QUANTITATIVE,BLOOD GLUCOSE: ICD-10-PCS | Mod: QW,S$GLB,, | Performed by: INTERNAL MEDICINE

## 2019-04-02 PROCEDURE — 99401 PREV MED CNSL INDIV APPRX 15: CPT | Mod: S$GLB,,, | Performed by: INTERNAL MEDICINE

## 2019-04-02 PROCEDURE — 80061 PR  LIPID PANEL: ICD-10-PCS | Mod: QW,S$GLB,, | Performed by: INTERNAL MEDICINE

## 2019-04-02 PROCEDURE — 82947 ASSAY GLUCOSE BLOOD QUANT: CPT | Mod: QW,S$GLB,, | Performed by: INTERNAL MEDICINE

## 2019-04-02 PROCEDURE — 99401 PR PREVENT COUNSEL,INDIV,15 MIN: ICD-10-PCS | Mod: S$GLB,,, | Performed by: INTERNAL MEDICINE

## 2019-04-03 VITALS — BODY MASS INDEX: 20.61 KG/M2 | HEIGHT: 67 IN

## 2019-04-03 LAB
GLUCOSE SERPL-MCNC: 140 MG/DL (ref 60–140)
HDLC SERPL-MCNC: 39 MG/DL
POC CHOLESTEROL, TOTAL: 99 MG/DL
TRIGL SERPL-MCNC: 78 MG/DL

## 2019-04-30 ENCOUNTER — OFFICE VISIT (OUTPATIENT)
Dept: INTERNAL MEDICINE | Facility: CLINIC | Age: 59
End: 2019-04-30
Payer: COMMERCIAL

## 2019-04-30 VITALS
HEART RATE: 63 BPM | SYSTOLIC BLOOD PRESSURE: 155 MMHG | WEIGHT: 130.06 LBS | DIASTOLIC BLOOD PRESSURE: 86 MMHG | BODY MASS INDEX: 20.41 KG/M2 | OXYGEN SATURATION: 94 % | HEIGHT: 67 IN

## 2019-04-30 DIAGNOSIS — R10.31 GROIN PAIN, RIGHT: Primary | ICD-10-CM

## 2019-04-30 DIAGNOSIS — R03.0 ELEVATED BLOOD PRESSURE READING: ICD-10-CM

## 2019-04-30 PROCEDURE — 99999 PR PBB SHADOW E&M-EST. PATIENT-LVL III: CPT | Mod: PBBFAC,,, | Performed by: STUDENT IN AN ORGANIZED HEALTH CARE EDUCATION/TRAINING PROGRAM

## 2019-04-30 PROCEDURE — 3008F PR BODY MASS INDEX (BMI) DOCUMENTED: ICD-10-PCS | Mod: CPTII,S$GLB,, | Performed by: STUDENT IN AN ORGANIZED HEALTH CARE EDUCATION/TRAINING PROGRAM

## 2019-04-30 PROCEDURE — 99213 OFFICE O/P EST LOW 20 MIN: CPT | Mod: S$GLB,,, | Performed by: STUDENT IN AN ORGANIZED HEALTH CARE EDUCATION/TRAINING PROGRAM

## 2019-04-30 PROCEDURE — 3008F BODY MASS INDEX DOCD: CPT | Mod: CPTII,S$GLB,, | Performed by: STUDENT IN AN ORGANIZED HEALTH CARE EDUCATION/TRAINING PROGRAM

## 2019-04-30 PROCEDURE — 99213 PR OFFICE/OUTPT VISIT, EST, LEVL III, 20-29 MIN: ICD-10-PCS | Mod: S$GLB,,, | Performed by: STUDENT IN AN ORGANIZED HEALTH CARE EDUCATION/TRAINING PROGRAM

## 2019-04-30 PROCEDURE — 99999 PR PBB SHADOW E&M-EST. PATIENT-LVL III: ICD-10-PCS | Mod: PBBFAC,,, | Performed by: STUDENT IN AN ORGANIZED HEALTH CARE EDUCATION/TRAINING PROGRAM

## 2019-04-30 NOTE — PROGRESS NOTES
HPI   This is Mr. Efra Payton III, 59 y.o., patient of KAMILAH Bey, presented to the primary care for Urgent Care. He complains right groin pain started about a week ago. Works with heavy lifting. He felt that he pulled a muscle and had to stop from working. He went to [facility] on Thursday. In ED, they gave him Toradol and muscle relaxant which helped his symptoms. X-ray was done and no fracture. Got prescription from the ED for muscle relaxant but he did not fill it. The pain is getting better with rest but get worse with walking. Pt on methadone clinic contract so that he did not tried any other pain meds. Pt denies any bulging massed or lumps on groin or abdominal area. No trauma or fall. Reports for the last week, he has lower back pain that associated with sharp pain that radiate to the right leg. No dysuria, urgency or blood in the urine.     Review of Systems    Constitutional: denies unintentional weight loss, night sweats, fever or chills  Eyes: denies visual changes  ENT: denies nasal congestion, ear pain or sore throat  Respiratory: denies cough, dyspnea on exertion and pleurisy  Cardiovascular: denies chest pain, chest pressure/discomfort, dyspnea.  Gastrointestinal: denies nausea or vomiting, abdominal pain or change in bowel habits  Genitourinary: denies hematuria or dysuria  Musculoskeletal: + right groin pain and lower back pain  Neurological: denies seizures or tremors  Behavioral/Psych: denies auditory or visual hallucinations, SI, HI       Objective:  Vitals:    04/30/19 1312   BP: (!) 155/86   Pulse: 63           Physical Exam    General: Well developed, well nourished male in NAD  HEENT: Conjunctiva clear; Oropharynx clear  Neck: No JVD noted, Supple  CV: Normal S1, S2 with no murmurs  Resp: Lungs CTA Bilaterally, no wheezing  Abdomen: NTND, BS normoactive x4 quads, soft. No bulging mass or hernia.  Genitourinary: no testicular mass or tenderness.   Extrem: No cyanosis, clubbing,  edema.  Skin: No rashes, lesions, ulcers  Neuro: motor strength in tact. No focal deficit. 2+ BUE and BLE reflexes.   PSYCH: Oriented x3    Assessment:       1. Groin pain, right         Plan:       Efra was seen today for follow-up, hip pain, back pain and groin pain.    Diagnoses and all orders for this visit:    Groin pain, right  - likely due to muscle strain. No any hernia on exam. No testicular tenderness or mass. No flank tenderness.   - discussed that he needs to take OTC NSAID for couple days with rest   - heat compression   - topical muscle relaxant cream   - pt is aware if symptoms don't improve or get worse to be re-evaluated   - BP is elevated but pt reports that he does not have HTN. This could be pain related. Told him, he needs to discuss BP reading with his PCP        Case discussed with dr. Gutierrez Nino M.D.  Internal Medicine, PGY-3  [phone]

## 2019-05-03 ENCOUNTER — OFFICE VISIT (OUTPATIENT)
Dept: INTERNAL MEDICINE | Facility: CLINIC | Age: 59
End: 2019-05-03
Payer: COMMERCIAL

## 2019-05-03 ENCOUNTER — HOSPITAL ENCOUNTER (OUTPATIENT)
Dept: RADIOLOGY | Facility: HOSPITAL | Age: 59
Discharge: HOME | End: 2019-05-03
Attending: INTERNAL MEDICINE
Payer: COMMERCIAL

## 2019-05-03 ENCOUNTER — TELEPHONE (OUTPATIENT)
Dept: INTERNAL MEDICINE | Facility: CLINIC | Age: 59
End: 2019-05-03

## 2019-05-03 VITALS
DIASTOLIC BLOOD PRESSURE: 98 MMHG | BODY MASS INDEX: 19.62 KG/M2 | WEIGHT: 125 LBS | OXYGEN SATURATION: 97 % | HEIGHT: 67 IN | HEART RATE: 72 BPM | SYSTOLIC BLOOD PRESSURE: 136 MMHG

## 2019-05-03 DIAGNOSIS — M54.41 ACUTE RIGHT-SIDED LOW BACK PAIN WITH RIGHT-SIDED SCIATICA: Primary | ICD-10-CM

## 2019-05-03 DIAGNOSIS — M54.50 LOW BACK PAIN, NON-SPECIFIC: ICD-10-CM

## 2019-05-03 PROCEDURE — 3008F BODY MASS INDEX DOCD: CPT | Mod: CPTII,S$GLB,, | Performed by: INTERNAL MEDICINE

## 2019-05-03 PROCEDURE — 99213 PR OFFICE/OUTPT VISIT, EST, LEVL III, 20-29 MIN: ICD-10-PCS | Mod: S$GLB,,, | Performed by: INTERNAL MEDICINE

## 2019-05-03 PROCEDURE — 72110 X-RAY EXAM L-2 SPINE 4/>VWS: CPT | Mod: TC

## 2019-05-03 PROCEDURE — 72110 X-RAY EXAM L-2 SPINE 4/>VWS: CPT | Mod: 26,,, | Performed by: RADIOLOGY

## 2019-05-03 PROCEDURE — 3008F PR BODY MASS INDEX (BMI) DOCUMENTED: ICD-10-PCS | Mod: CPTII,S$GLB,, | Performed by: INTERNAL MEDICINE

## 2019-05-03 PROCEDURE — 99999 PR PBB SHADOW E&M-EST. PATIENT-LVL IV: CPT | Mod: PBBFAC,,, | Performed by: INTERNAL MEDICINE

## 2019-05-03 PROCEDURE — 99999 PR PBB SHADOW E&M-EST. PATIENT-LVL IV: ICD-10-PCS | Mod: PBBFAC,,, | Performed by: INTERNAL MEDICINE

## 2019-05-03 PROCEDURE — 72110 XR LUMBAR SPINE COMPLETE 5 VIEW: ICD-10-PCS | Mod: 26,,, | Performed by: RADIOLOGY

## 2019-05-03 PROCEDURE — 99213 OFFICE O/P EST LOW 20 MIN: CPT | Mod: S$GLB,,, | Performed by: INTERNAL MEDICINE

## 2019-05-03 RX ORDER — SULFAMETHOXAZOLE AND TRIMETHOPRIM 800; 160 MG/1; MG/1
1 TABLET ORAL
COMMUNITY
End: 2020-12-31

## 2019-05-03 RX ORDER — TIZANIDINE 2 MG/1
4 TABLET ORAL EVERY 6 HOURS PRN
Qty: 30 TABLET | Refills: 1 | Status: SHIPPED | OUTPATIENT
Start: 2019-05-03 | End: 2019-05-13

## 2019-05-03 RX ORDER — IBUPROFEN 600 MG/1
600 TABLET ORAL EVERY 6 HOURS PRN
Qty: 40 TABLET | Refills: 0 | Status: SHIPPED | OUTPATIENT
Start: 2019-05-03 | End: 2019-05-13

## 2019-05-03 NOTE — PROGRESS NOTES
"Subjective:       Patient ID: Efra Payton III is a 59 y.o. male.    Chief Complaint: Groin Pain (1 week)    Patient developed a "groin pull" lifting heavy pallets at work.  Groin pain is better but now has right low back and buttock pain that hurts a lot when he tries to stand up straight.  Occasionally he has pain down the back of his right leg to knee or even to ankle    Review of Systems   Constitutional: Negative for activity change, appetite change and fever.   HENT: Negative for congestion, postnasal drip and sore throat.    Respiratory: Negative for cough, shortness of breath and wheezing.    Cardiovascular: Negative for chest pain and palpitations.   Gastrointestinal: Negative for abdominal pain, blood in stool, constipation, diarrhea, nausea and vomiting.   Genitourinary: Negative for decreased urine volume, difficulty urinating, flank pain and frequency.   Musculoskeletal: Negative for arthralgias.   Neurological: Negative for dizziness, weakness and headaches.       Objective:      Physical Exam   Musculoskeletal:        Back:        Assessment:       1. Acute right-sided low back pain with right-sided sciatica    2. Low back pain, non-specific        Plan:   Efra was seen today for groin pain.    Diagnoses and all orders for this visit:    Acute right-sided low back pain with right-sided sciatica  -     Ambulatory Consult to [facility] Sp[facility] Clinic  -     Ambulatory consult to Physical Therapy    Low back pain, non-specific  -     X-Ray Lumbar Spine Complete 5 View; Future  -     Ambulatory Consult to Back & Spine Clinic  -     Ambulatory consult to Physical Therapy    Other orders  -     tiZANidine (ZANAFLEX) 2 MG tablet; Take 2 tablets (4 mg total) by mouth every 6 (six) hours as needed.  -     ibuprofen (ADVIL,MOTRIN) 600 MG tablet; Take 1 tablet (600 mg total) by mouth every 6 (six) hours as needed for Pain.      "

## 2019-05-10 NOTE — PROGRESS NOTES
I have reviewed the notes, assessments, and/or procedures performed by Dr. Nino, I concur with his documentation of Efra Payton III.

## 2019-05-14 ENCOUNTER — OFFICE VISIT (OUTPATIENT)
Dept: INTERNAL MEDICINE | Facility: CLINIC | Age: 59
End: 2019-05-14
Payer: COMMERCIAL

## 2019-05-14 VITALS
HEIGHT: 67 IN | OXYGEN SATURATION: 92 % | HEART RATE: 92 BPM | BODY MASS INDEX: 20.38 KG/M2 | DIASTOLIC BLOOD PRESSURE: 82 MMHG | SYSTOLIC BLOOD PRESSURE: 138 MMHG | WEIGHT: 129.88 LBS | TEMPERATURE: 98 F

## 2019-05-14 DIAGNOSIS — M54.16 LUMBAR RADICULOPATHY: Primary | ICD-10-CM

## 2019-05-14 PROCEDURE — 99999 PR PBB SHADOW E&M-EST. PATIENT-LVL III: CPT | Mod: PBBFAC,,, | Performed by: INTERNAL MEDICINE

## 2019-05-14 PROCEDURE — 3008F BODY MASS INDEX DOCD: CPT | Mod: CPTII,S$GLB,, | Performed by: INTERNAL MEDICINE

## 2019-05-14 PROCEDURE — 99213 OFFICE O/P EST LOW 20 MIN: CPT | Mod: S$GLB,,, | Performed by: INTERNAL MEDICINE

## 2019-05-14 PROCEDURE — 99213 PR OFFICE/OUTPT VISIT, EST, LEVL III, 20-29 MIN: ICD-10-PCS | Mod: S$GLB,,, | Performed by: INTERNAL MEDICINE

## 2019-05-14 PROCEDURE — 3008F PR BODY MASS INDEX (BMI) DOCUMENTED: ICD-10-PCS | Mod: CPTII,S$GLB,, | Performed by: INTERNAL MEDICINE

## 2019-05-14 PROCEDURE — 99999 PR PBB SHADOW E&M-EST. PATIENT-LVL III: ICD-10-PCS | Mod: PBBFAC,,, | Performed by: INTERNAL MEDICINE

## 2019-05-14 NOTE — PROGRESS NOTES
Subjective:       Patient ID: Efra Payton III is a 59 y.o. male.    Chief Complaint: Groin Pain (right)    Patient's back pain got better so he went back to work.  Within a few hours he has to sit down because of terrible pain in right thigh and inguinal area.  His employer has made this a workers' comp problem, so he will not be able to go to back and spine clinic.    Review of Systems   Constitutional: Negative for activity change, appetite change and fever.   HENT: Negative for congestion, postnasal drip and sore throat.    Respiratory: Negative for cough, shortness of breath and wheezing.    Cardiovascular: Negative for chest pain and palpitations.   Gastrointestinal: Negative for abdominal pain, blood in stool, constipation, diarrhea, nausea and vomiting.   Genitourinary: Negative for decreased urine volume, difficulty urinating, flank pain and frequency.   Musculoskeletal: Negative for arthralgias.   Neurological: Negative for dizziness, weakness and headaches.       Objective:      Physical Exam   Constitutional: He is oriented to person, place, and time. He appears well-developed and well-nourished. No distress.   HENT:   Head: Normocephalic and atraumatic.   Right Ear: External ear normal.   Left Ear: External ear normal.   Eyes: Pupils are equal, round, and reactive to light. Conjunctivae and EOM are normal.   Neck: Normal range of motion. Neck supple. No thyromegaly present.   Cardiovascular: Normal rate and regular rhythm.   Pulmonary/Chest: Effort normal and breath sounds normal.   Abdominal: Soft. Bowel sounds are normal. He exhibits no mass. There is no tenderness. There is no rebound and no guarding.   Musculoskeletal: Normal range of motion.   Lymphadenopathy:     He has no cervical adenopathy.   Neurological: He is alert and oriented to person, place, and time. He has normal reflexes. He displays normal reflexes. No cranial nerve deficit. He exhibits normal muscle tone. Coordination normal.    Skin: Skin is warm and dry.       Assessment:       1. Lumbar radiculopathy        Plan:   Efra was seen today for groin pain.    Diagnoses and all orders for this visit:    Lumbar radiculopathy

## 2019-09-05 ENCOUNTER — OFFICE VISIT (OUTPATIENT)
Dept: INTERNAL MEDICINE | Facility: CLINIC | Age: 59
End: 2019-09-05
Payer: COMMERCIAL

## 2019-09-05 VITALS
HEART RATE: 71 BPM | WEIGHT: 134.94 LBS | HEIGHT: 67 IN | SYSTOLIC BLOOD PRESSURE: 160 MMHG | DIASTOLIC BLOOD PRESSURE: 88 MMHG | BODY MASS INDEX: 21.18 KG/M2 | OXYGEN SATURATION: 96 %

## 2019-09-05 DIAGNOSIS — I10 HYPERTENSION, UNSPECIFIED TYPE: ICD-10-CM

## 2019-09-05 DIAGNOSIS — Z12.9 SCREENING FOR CANCER: ICD-10-CM

## 2019-09-05 DIAGNOSIS — R07.9 CHEST PAIN, UNSPECIFIED TYPE: ICD-10-CM

## 2019-09-05 DIAGNOSIS — Z01.818 PREOPERATIVE TESTING: Primary | ICD-10-CM

## 2019-09-05 DIAGNOSIS — F17.200 SMOKING: ICD-10-CM

## 2019-09-05 DIAGNOSIS — J44.9 CHRONIC OBSTRUCTIVE PULMONARY DISEASE, UNSPECIFIED COPD TYPE: ICD-10-CM

## 2019-09-05 PROCEDURE — 99214 OFFICE O/P EST MOD 30 MIN: CPT | Mod: S$GLB,,, | Performed by: STUDENT IN AN ORGANIZED HEALTH CARE EDUCATION/TRAINING PROGRAM

## 2019-09-05 PROCEDURE — 99214 PR OFFICE/OUTPT VISIT, EST, LEVL IV, 30-39 MIN: ICD-10-PCS | Mod: S$GLB,,, | Performed by: STUDENT IN AN ORGANIZED HEALTH CARE EDUCATION/TRAINING PROGRAM

## 2019-09-05 PROCEDURE — 99999 PR PBB SHADOW E&M-EST. PATIENT-LVL V: ICD-10-PCS | Mod: PBBFAC,,, | Performed by: STUDENT IN AN ORGANIZED HEALTH CARE EDUCATION/TRAINING PROGRAM

## 2019-09-05 PROCEDURE — 99999 PR PBB SHADOW E&M-EST. PATIENT-LVL V: CPT | Mod: PBBFAC,,, | Performed by: STUDENT IN AN ORGANIZED HEALTH CARE EDUCATION/TRAINING PROGRAM

## 2019-09-05 RX ORDER — LOSARTAN POTASSIUM 50 MG/1
50 TABLET ORAL DAILY
Qty: 90 TABLET | Refills: 3 | Status: SHIPPED | OUTPATIENT
Start: 2019-09-05 | End: 2020-12-31

## 2019-09-05 NOTE — PROGRESS NOTES
I have personally taken the history and examined this patient and agree with Dr. Marquez note.    Cardiology evaluation given intermittent left sided chest pressure on exertion with hx of RBB and 1st degree AV block.  Recommened holding on surgery until cardiac evaluation is complete.  Discussed ED evaluation for return of chest pain, significant SOB or significant pain at hernia site.  Will trial losartan for HTN.  Discussed health care maintenance, specifically colonoscopy given family history of colon cancer under 50.  Further health care maintenance to be discussed during follow up in 6 weeks.      Alek Barker M.D.  Internal Medicine  Pager [phone]

## 2019-09-05 NOTE — PROGRESS NOTES
[facility]                                                             CLINIC NOTE    Patient Name: Efra Payton III  YOB: 1960    PRESENTING HISTORY     Chief Complaint   Patient presents with    Pre-op Exam       History of Present Illness:  Mr. Efra Payton III is a 59 y.o. male w/ significant PMHx of carpal tunnel disease, depression, methadone dependence and COPD (GOLD 2 group A). He is usually followed by Dr Nitin Doss.     Pt presents to resident clinic for pre-operative evaluation prior to elective inguinal hernia repair. He has no complaints    Review of systems was positive for intermittent crushing chest pain with radiation to his left arm that worsens with physical exertion. This pain has been going on for years. He also complains of orthopnea prompting him to sleep sitting up. Also positive for PND. Denies palpitations. Also complains of urinary frequency.    COPD  Would like to get set up once more with pulmonology.   FEV1/FVC 63%  FEV1 64%  Only on rescue inhalers    Methadone dependence  Methadone 5 mg qd    Tobacco abuse  Currently smoking one pack per day, but used to smoke three. 150 pack-year smoking history (started when he was 10).        Review of Systems   Constitutional: Negative for chills, fever, malaise/fatigue and weight loss.   HENT: Negative for congestion, ear discharge and sore throat.    Eyes: Negative for blurred vision and redness.   Respiratory: Positive for cough and shortness of breath. Negative for wheezing.    Cardiovascular: Positive for chest pain and PND. Negative for palpitations and leg swelling.   Gastrointestinal: Negative for abdominal pain, constipation, diarrhea, nausea and vomiting.   Genitourinary: Positive for frequency. Negative for dysuria.   Musculoskeletal: Negative for joint pain and myalgias.   Skin: Negative for itching and rash.   Neurological: Negative for dizziness, seizures,  loss of consciousness and headaches.   Endo/Heme/Allergies: Negative for environmental allergies. Does not bruise/bleed easily.   Psychiatric/Behavioral: Negative for depression. The patient is not nervous/anxious and does not have insomnia.          PAST HISTORY:     Past Medical History:   Diagnosis Date    COPD (chronic obstructive pulmonary disease)     Drug abuse     Eczema     Substance abuse        Past Surgical History:   Procedure Laterality Date    arm surgery      HAND SURGERY         Family History   Problem Relation Age of Onset    Heart disease Father     Diabetes Father     Colon cancer Father     Hypertension Sister     Diabetes Sister     Hypertension Brother     Diabetes Brother     Colon cancer Brother     Heart attack Paternal Grandmother     Heart disease Paternal Grandmother     Diabetes Paternal Grandmother     Diabetes Paternal Grandfather     Colon cancer Paternal Grandfather        Social History     Socioeconomic History    Marital status:      Spouse name: Not on file    Number of children: Not on file    Years of education: Not on file    Highest education level: Not on file   Occupational History    Not on file   Social Needs    Financial resource strain: Not on file    Food insecurity:     Worry: Not on file     Inability: Not on file    Transportation needs:     Medical: Not on file     Non-medical: Not on file   Tobacco Use    Smoking status: Current Every Day Smoker     Packs/day: 1.00     Years: 49.00     Pack years: 49.00     Types: Cigarettes     Start date: 7/25/1969    Smokeless tobacco: Never Used    Tobacco comment: decreased his smoking to 1 pack per day 5 months ago    Substance and Sexual Activity    Alcohol use: No     Comment: has past history of alcohol abuse     Drug use: No     Comment: has past history of substance abuse     Sexual activity: Not on file   Lifestyle    Physical activity:     Days per week: Not on file      Minutes per session: Not on file    Stress: Not on file   Relationships    Social connections:     Talks on phone: Not on file     Gets together: Not on file     Attends L[employer]-day service: Not on file     Active member of club or organization: Not on file     Attends meetings of clubs or organizations: Not on file     Relationship status: Not on file   Other Topics Concern    Not on file   Social History Narrative    Not on file       MEDICATIONS & ALLERGIES:               Medication List with Changes/Refills   Current Medications    ALBUTEROL (PROVENTIL/VENTOLIN HFA) 90 MCG/ACTUATION INHALER    Inhale 1-2 puffs into the lungs every 6 (six) hours as needed for Wheezing or Shortness of Breath. Rescue    ALBUTEROL-IPRATROPIUM (DUO-NEB) 2.5 MG-0.5 MG/3 ML NEBULIZER SOLUTION    Take 3 mLs by nebulization every 6 (six) hours as needed for Wheezing. Rescue    ESCITALOPRAM OXALATE (LEXAPRO) 5 MG TAB    Take 1 tablet (5 mg total) by mouth once daily. Start with 5 mg for 1 week and increase it to 10 after 1 week if tolerated    IPRATROPIUM-ALBUTEROL (COMBIVENT RESPIMAT)  MCG/ACTUATION INHALER    Inhale 1 puff into the lungs every 4 (four) hours as needed for Wheezing.    IPRATROPIUM-ALBUTEROL (COMBIVENT)  MCG/ACTUATION INHALER    Inhale 1 puff into the lungs 4 (four) times daily. Rescue    METHADONE (DOLOPHINE) 5 MG TABLET    Take 115 mg by mouth once daily.     SULFAMETHOXAZOLE-TRIMETHOPRIM 800-160MG (BACTRIM DS) 800-160 MG TAB    Take 1 tablet by mouth.       Review of patient's allergies indicates:  No Known Allergies    OBJECTIVE:     Vital Signs:  There were no vitals filed for this visit.  Wt Readings from Last 5 Encounters:   05/14/19 58.9 kg (129 lb 13.6 oz)   05/03/19 56.7 kg (125 lb)   04/30/19 59 kg (130 lb 1.1 oz)   02/05/19 59.7 kg (131 lb 9.8 oz)   09/17/18 63.5 kg (140 lb)       No results found for this or any previous visit (from the past 24 hour(s)).      Physical Exam   Constitutional: He  is oriented to person, place, and time and well-developed, well-nourished, and in no distress.   HENT:   Head: Normocephalic and atraumatic.   Eyes: Pupils are equal, round, and reactive to light. EOM are normal. No scleral icterus.   Neck: Normal range of motion. Neck supple. No thyromegaly present.   Cardiovascular: Normal rate, regular rhythm and normal heart sounds.   No murmur heard.  Pulmonary/Chest: Effort normal and breath sounds normal. No respiratory distress. He has no wheezes.   Abdominal: Soft. Bowel sounds are normal. He exhibits no distension. There is no tenderness.   Lymphadenopathy:     He has no cervical adenopathy.   Neurological: He is alert and oriented to person, place, and time.   Skin: Skin is warm and dry. No erythema.   Psychiatric: Affect normal.   Nursing note and vitals reviewed.    STOP-BANG 4  ARISCAT 11.9%  RCRI: Needs stress echo.      ASSESSMENT & PLAN:     Efra was seen today for pre-op exam. His intermittent exertional and left-sided chest pain accompanied by orthopnea and PND warrants evaluation with stress echo. STOP-GISSELLE raises concern for polysomnagram. He will need cardiology evaluation for heart failure, ischemic heart disease and previous RBB with 1st degree AV block (these may be preliminary indicators of IHD).       Preoperative testing  -     Polysomnogram (CPAP will be added if patient meets diagnostic criteria.); Future  -     Stress Echo Which stress agent will be used? Treadmill Exercise; Color Flow Doppler? No; Future    Smoking  -     Ambulatory referral to Smoking Cessation Program    Screening for cancer  -     CT Chest Lung Screening Low Dose; Future    Chest pain, unspecified type  -     Stress Echo Which stress agent will be used? Treadmill Exercise; Color Flow Doppler? No; Future  -     Ambulatory consult to Cardiology    Chronic obstructive pulmonary disease, unspecified COPD type  -     Ambulatory Referral to Pulmonology    Hypertension, unspecified  type  -     losartan (COZAAR) 50 MG tablet; Take 1 tablet (50 mg total) by mouth once daily.    Other orders  -     Cancel: Brain natriuretic peptide; Future        AT NEXT VISIT  Go over healthcare maintenance issues; colonoscopy, lipid panel; consider starting a statin; review effect of losartan.    RTC in 6 weeks to visit with Dr Doss. Pt was instructed to contact the clinic if symptoms worsened or if new symptoms arise.    I have discussed the plan with Dr Barker.    Behram Khan, MD  Internal Medicine PGY-3  M: [phone]   #[phone]

## 2019-09-10 ENCOUNTER — HOSPITAL ENCOUNTER (OUTPATIENT)
Dept: RADIOLOGY | Facility: HOSPITAL | Age: 59
Discharge: HOME | End: 2019-09-10
Attending: STUDENT IN AN ORGANIZED HEALTH CARE EDUCATION/TRAINING PROGRAM
Payer: OTHER MISCELLANEOUS

## 2019-09-10 ENCOUNTER — CLINICAL SUPPORT (OUTPATIENT)
Dept: SMOKING CESSATION | Facility: CLINIC | Age: 59
End: 2019-09-10
Payer: COMMERCIAL

## 2019-09-10 VITALS
SYSTOLIC BLOOD PRESSURE: 137 MMHG | BODY MASS INDEX: 21.03 KG/M2 | WEIGHT: 134.25 LBS | DIASTOLIC BLOOD PRESSURE: 88 MMHG | HEART RATE: 62 BPM

## 2019-09-10 DIAGNOSIS — F17.210 NICOTINE DEPENDENCE, CIGARETTES, UNCOMPLICATED: Primary | ICD-10-CM

## 2019-09-10 DIAGNOSIS — Z12.9 SCREENING FOR CANCER: ICD-10-CM

## 2019-09-10 PROCEDURE — 99999 PR PBB SHADOW E&M-EST. PATIENT-LVL II: ICD-10-PCS | Mod: PBBFAC,,,

## 2019-09-10 PROCEDURE — G0297 CT CHEST LUNG SCREENING LOW DOSE: ICD-10-PCS | Mod: 26,,, | Performed by: RADIOLOGY

## 2019-09-10 PROCEDURE — 99999 PR PBB SHADOW E&M-EST. PATIENT-LVL II: CPT | Mod: PBBFAC,,,

## 2019-09-10 PROCEDURE — G0297 LDCT FOR LUNG CA SCREEN: HCPCS | Mod: TC

## 2019-09-10 PROCEDURE — G0297 LDCT FOR LUNG CA SCREEN: HCPCS | Mod: 26,,, | Performed by: RADIOLOGY

## 2019-09-10 PROCEDURE — 99404 PREV MED CNSL INDIV APPRX 60: CPT | Mod: S$GLB,,,

## 2019-09-10 PROCEDURE — 99404 PR PREVENT COUNSEL,INDIV,60 MIN: ICD-10-PCS | Mod: S$GLB,,,

## 2019-09-10 RX ORDER — BUPROPION HYDROCHLORIDE 150 MG/1
TABLET, EXTENDED RELEASE ORAL
Qty: 60 TABLET | Refills: 0 | Status: SHIPPED | OUTPATIENT
Start: 2019-09-10 | End: 2019-10-16 | Stop reason: SDUPTHER

## 2019-09-10 RX ORDER — DIPHENHYDRAMINE HCL 25 MG
4 CAPSULE ORAL
Qty: 220 EACH | Refills: 0 | Status: SHIPPED | OUTPATIENT
Start: 2019-09-10 | End: 2019-09-12 | Stop reason: SDUPTHER

## 2019-09-10 RX ORDER — IBUPROFEN 200 MG
1 TABLET ORAL DAILY
Qty: 28 PATCH | Refills: 0 | Status: SHIPPED | OUTPATIENT
Start: 2019-09-10 | End: 2019-09-12 | Stop reason: SDUPTHER

## 2019-09-10 NOTE — Clinical Note
Smoking Cessation Clinic initial intake appointment completed for Quit Attempt # 1 with this Program.

## 2019-09-10 NOTE — PROGRESS NOTES
9/10/19    See Smoking Cessation Smart Form    Additional Interventions:  · Recommended patient participate in Smoking Cessation Group .  · Discussed triggers and planning for quit date.  · Given patient education handouts from [employer] Tool Kit #3  · Educated patient about and gave patient education handouts from  [employer] Drug Information on: NRT, Wellbutrin, Chantix.  · Provided phone number to reach Cessation Clinic CTTS (Certified Tobacco Treatment Specialist) for future assistance and numbers to 24/7 Quit lines.

## 2019-09-12 ENCOUNTER — TELEPHONE (OUTPATIENT)
Dept: SLEEP MEDICINE | Facility: OTHER | Age: 59
End: 2019-09-12

## 2019-09-12 DIAGNOSIS — F17.210 NICOTINE DEPENDENCE, CIGARETTES, UNCOMPLICATED: ICD-10-CM

## 2019-09-12 RX ORDER — IBUPROFEN 200 MG
1 TABLET ORAL DAILY
Qty: 28 PATCH | Refills: 0 | Status: SHIPPED | OUTPATIENT
Start: 2019-09-12 | End: 2019-10-16 | Stop reason: SDUPTHER

## 2019-09-12 RX ORDER — DIPHENHYDRAMINE HCL 25 MG
4 CAPSULE ORAL
Qty: 220 EACH | Refills: 0 | Status: SHIPPED | OUTPATIENT
Start: 2019-09-12 | End: 2019-10-16 | Stop reason: SDUPTHER

## 2019-09-16 ENCOUNTER — HOSPITAL ENCOUNTER (OUTPATIENT)
Dept: CARDIOLOGY | Facility: CLINIC | Age: 59
Discharge: HOME | End: 2019-09-16
Attending: STUDENT IN AN ORGANIZED HEALTH CARE EDUCATION/TRAINING PROGRAM
Payer: OTHER MISCELLANEOUS

## 2019-09-16 ENCOUNTER — TELEPHONE (OUTPATIENT)
Dept: SMOKING CESSATION | Facility: CLINIC | Age: 59
End: 2019-09-16

## 2019-09-16 VITALS — HEIGHT: 67 IN | WEIGHT: 150 LBS | BODY MASS INDEX: 23.54 KG/M2

## 2019-09-16 DIAGNOSIS — Z01.818 PREOPERATIVE TESTING: ICD-10-CM

## 2019-09-16 DIAGNOSIS — R07.9 CHEST PAIN, UNSPECIFIED TYPE: ICD-10-CM

## 2019-09-16 LAB
ASCENDING AORTA: 2.93 CM
BSA FOR ECHO PROCEDURE: 1.79 M2
CV ECHO LV RWT: 0.26 CM
CV STRESS BASE HR: 70 BPM
DIASTOLIC BLOOD PRESSURE: 68 MMHG
DOP CALC LVOT AREA: 2.7 CM2
DOP CALC LVOT DIAMETER: 1.85 CM
DOP CALC LVOT PEAK VEL: 1.01 M/S
DOP CALC LVOT STROKE VOLUME: 45.3 CM3
DOP CALCLVOT PEAK VEL VTI: 16.86 CM
E WAVE DECELERATION TIME: 265.69 MSEC
E/A RATIO: 1.34
E/E' RATIO: 9.76 M/S
ECHO LV POSTERIOR WALL: 0.68 CM (ref 0.6–1.1)
FRACTIONAL SHORTENING: 38 % (ref 28–44)
INTERVENTRICULAR SEPTUM: 0.7 CM (ref 0.6–1.1)
IVRT: 0.11 MSEC
LA MAJOR: 4.99 CM
LA MINOR: 5.15 CM
LA WIDTH: 3.53 CM
LEFT ATRIUM SIZE: 2.77 CM
LEFT ATRIUM VOLUME INDEX: 23.5 ML/M2
LEFT ATRIUM VOLUME: 42.13 CM3
LEFT INTERNAL DIMENSION IN SYSTOLE: 3.2 CM (ref 2.1–4)
LEFT VENTRICLE DIASTOLIC VOLUME INDEX: 71.94 ML/M2
LEFT VENTRICLE DIASTOLIC VOLUME: 128.73 ML
LEFT VENTRICLE MASS INDEX: 67 G/M2
LEFT VENTRICLE SYSTOLIC VOLUME INDEX: 22.9 ML/M2
LEFT VENTRICLE SYSTOLIC VOLUME: 40.94 ML
LEFT VENTRICULAR INTERNAL DIMENSION IN DIASTOLE: 5.19 CM (ref 3.5–6)
LEFT VENTRICULAR MASS: 120.24 G
LV LATERAL E/E' RATIO: 8.3 M/S
LV SEPTAL E/E' RATIO: 11.86 M/S
MV PEAK A VEL: 0.62 M/S
MV PEAK E VEL: 0.83 M/S
OHS CV CPX 1 MINUTE RECOVERY HEART RATE: 93 BPM
OHS CV CPX 85 PERCENT MAX PREDICTED HEART RATE MALE: 137
OHS CV CPX ESTIMATED METS: 6
OHS CV CPX MAX PREDICTED HEART RATE: 161
OHS CV CPX PATIENT IS FEMALE: 0
OHS CV CPX PATIENT IS MALE: 1
OHS CV CPX PEAK DIASTOLIC BLOOD PRESSURE: 75 MMHG
OHS CV CPX PEAK HEAR RATE: 123 BPM
OHS CV CPX PEAK RATE PRESSURE PRODUCT: NORMAL
OHS CV CPX PEAK SYSTOLIC BLOOD PRESSURE: 192 MMHG
OHS CV CPX PERCENT MAX PREDICTED HEART RATE ACHIEVED: 76
OHS CV CPX RATE PRESSURE PRODUCT PRESENTING: 8330
PISA TR MAX VEL: 2.92 M/S
PULM VEIN S/D RATIO: 0.68
PV PEAK D VEL: 0.66 M/S
PV PEAK S VEL: 0.45 M/S
RA MAJOR: 3.81 CM
RA WIDTH: 2.67 CM
RIGHT VENTRICULAR END-DIASTOLIC DIMENSION: 3.6 CM
RV TISSUE DOPPLER FREE WALL SYSTOLIC VELOCITY 1 (APICAL 4 CHAMBER VIEW): 13.48 CM/S
SINUS: 3.28 CM
STJ: 2.62 CM
STRESS ECHO POST EXERCISE DUR MIN: 3 MINUTES
STRESS ECHO POST EXERCISE DUR SEC: 34 SECONDS
SYSTOLIC BLOOD PRESSURE: 119 MMHG
TDI LATERAL: 0.1 M/S
TDI SEPTAL: 0.07 M/S
TDI: 0.09 M/S
TR MAX PG: 34 MMHG
TRICUSPID ANNULAR PLANE SYSTOLIC EXCURSION: 2.08 CM

## 2019-09-16 PROCEDURE — 93351 STRESS TTE COMPLETE: CPT

## 2019-09-16 PROCEDURE — 93351 STRESS TTE COMPLETE: CPT | Mod: 26,,, | Performed by: INTERNAL MEDICINE

## 2019-09-16 PROCEDURE — 93351 STRESS ECHO (CUPID ONLY): ICD-10-PCS | Mod: 26,,, | Performed by: INTERNAL MEDICINE

## 2019-09-16 NOTE — TELEPHONE ENCOUNTER
9/16/19   4:00 pm    Telephone call to patient to reschedule missed appointment today.  Patient said his car broke down and he is relying on others for transportation.  He said he would like to call back to reschedule the appointment when his car is working.  He did  the medication from the pharmacy.

## 2019-09-17 ENCOUNTER — OFFICE VISIT (OUTPATIENT)
Dept: CARDIOLOGY | Facility: CLINIC | Age: 59
End: 2019-09-17
Payer: COMMERCIAL

## 2019-09-17 VITALS
BODY MASS INDEX: 21.31 KG/M2 | SYSTOLIC BLOOD PRESSURE: 148 MMHG | OXYGEN SATURATION: 96 % | HEART RATE: 81 BPM | DIASTOLIC BLOOD PRESSURE: 80 MMHG | HEIGHT: 67 IN | WEIGHT: 135.81 LBS

## 2019-09-17 DIAGNOSIS — I73.9 CLAUDICATION OF BOTH LOWER EXTREMITIES: Primary | ICD-10-CM

## 2019-09-17 DIAGNOSIS — I10 HYPERTENSION, UNSPECIFIED TYPE: ICD-10-CM

## 2019-09-17 DIAGNOSIS — Z01.810 PRE-OPERATIVE CARDIOVASCULAR EXAMINATION: ICD-10-CM

## 2019-09-17 PROCEDURE — 99999 PR PBB SHADOW E&M-EST. PATIENT-LVL IV: ICD-10-PCS | Mod: PBBFAC,,, | Performed by: STUDENT IN AN ORGANIZED HEALTH CARE EDUCATION/TRAINING PROGRAM

## 2019-09-17 PROCEDURE — 3008F BODY MASS INDEX DOCD: CPT | Mod: CPTII,S$GLB,, | Performed by: STUDENT IN AN ORGANIZED HEALTH CARE EDUCATION/TRAINING PROGRAM

## 2019-09-17 PROCEDURE — 3008F PR BODY MASS INDEX (BMI) DOCUMENTED: ICD-10-PCS | Mod: CPTII,S$GLB,, | Performed by: STUDENT IN AN ORGANIZED HEALTH CARE EDUCATION/TRAINING PROGRAM

## 2019-09-17 PROCEDURE — 99999 PR PBB SHADOW E&M-EST. PATIENT-LVL IV: CPT | Mod: PBBFAC,,, | Performed by: STUDENT IN AN ORGANIZED HEALTH CARE EDUCATION/TRAINING PROGRAM

## 2019-09-17 PROCEDURE — 99213 PR OFFICE/OUTPT VISIT, EST, LEVL III, 20-29 MIN: ICD-10-PCS | Mod: S$GLB,,, | Performed by: STUDENT IN AN ORGANIZED HEALTH CARE EDUCATION/TRAINING PROGRAM

## 2019-09-17 PROCEDURE — 99213 OFFICE O/P EST LOW 20 MIN: CPT | Mod: S$GLB,,, | Performed by: STUDENT IN AN ORGANIZED HEALTH CARE EDUCATION/TRAINING PROGRAM

## 2019-09-17 RX ORDER — AMLODIPINE BESYLATE 5 MG/1
5 TABLET ORAL DAILY
Qty: 30 TABLET | Refills: 11 | Status: SHIPPED | OUTPATIENT
Start: 2019-09-17 | End: 2020-12-31 | Stop reason: SDUPTHER

## 2019-09-17 NOTE — PROGRESS NOTES
Cardiology Clinic Note  Reason for Visit: Pre-op     HPI:   60 y/o gentleman here for pre-op risk stratification for inguinal hernia repair.    He is able to climb a flight of stairs (15 steps) without any dyspnea or angina. He has no prior hx of CAD/MI/DM/HF/CVA.  He did complain of atypical chest pain on his visit to the medicine clinic. This chest pain lasts few seconds, described as sharp and not associated with exertion.  Echo stress showed normal EF, failure to reach target heart rates and impaired exercise capacity of 6 METS. He has COPD and a 150 pack year hx of smoking. EKG negative for ischemia.  Chart review notable for syncope last year, however he could not remember any details of the syncopal episode, holter was negative at the time.  ROS:    Review of Systems   Constitution: Negative.   HENT: Negative.    Cardiovascular: Negative.    Respiratory: Negative.    Endocrine: Negative.    Gastrointestinal: Negative.    Neurological: Negative.      PMH:     Past Medical History:   Diagnosis Date    COPD (chronic obstructive pulmonary disease)     Drug abuse     Eczema     Substance abuse      Past Surgical History:   Procedure Laterality Date    arm surgery      HAND SURGERY       Allergies:   Review of patient's allergies indicates:  No Known Allergies  Medications:     Current Outpatient Medications on File Prior to Visit   Medication Sig Dispense Refill    albuterol (PROVENTIL/VENTOLIN HFA) 90 mcg/actuation inhaler Inhale 1-2 puffs into the lungs every 6 (six) hours as needed for Wheezing or Shortness of Breath. Rescue 1 Inhaler 0    albuterol-ipratropium (DUO-NEB) 2.5 mg-0.5 mg/3 mL nebulizer solution Take 3 mLs by nebulization every 6 (six) hours as needed for Wheezing. Rescue 1 Box 0    ipratropium-albuterol (COMBIVENT RESPIMAT)  mcg/actuation inhaler Inhale 1 puff into the lungs every 4 (four) hours as needed for Wheezing. 1 Package 5    ipratropium-albuterol (COMBIVENT)   "mcg/actuation inhaler Inhale 1 puff into the lungs 4 (four) times daily. Rescue 4 g 3    methadone (DOLOPHINE) 5 MG tablet Take 115 mg by mouth once daily.       nicotine (NICODERM CQ) 21 mg/24 hr Place 1 patch onto the skin once daily. (Generic preferred. Member of [facility] Smoking [employer]essation [employer]) 28 patch 0    nicotine polacrilex (NICORETTE) 4 MG Gum Take 1 each (4 mg total) by mouth as needed (Maximum 15 pieces/day.). (Generic preferred. Member of [facility] [employer]) 220 each 0    buPROPion (WELLBUTRIN SR) 150 MG TBSR 12 hr tablet One 150 mg tab by mouth once a day for 4 days, then twice a day thereafter.  Member of [facility] (Smoking Cessation [employer]) 60 tablet 0    losartan (COZAAR) 50 MG tablet Take 1 tablet (50 mg total) by mouth once daily. 90 tablet 3    sulfamethoxazole-trimethoprim 800-160mg (BACTRIM DS) 800-160 mg Tab Take 1 tablet by mouth.      [DISCONTINUED] methadone HCl (METHADONE ORAL) Take 120 mg by mouth. 150mg daily       No current facility-administered medications on file prior to visit.      Social History:     Social History     Tobacco Use    Smoking status: Current Every Day Smoker     Packs/day: 1.00     Years: 49.00     Pack years: 49.00     Types: Cigarettes     Start date: 7/25/1969    Smokeless tobacco: Never Used    Tobacco comment: decreased his smoking to 1 pack per day 5 months ago    Substance Use Topics    Alcohol use: No     Comment: has past history of alcohol abuse      Family History:     Family History   Problem Relation Age of Onset    Heart disease Father     Diabetes Father     Colon cancer Father     Hypertension Sister     Diabetes Sister     Hypertension Brother     Diabetes Brother     Colon cancer Brother     Heart attack Paternal Grandmother     Heart disease Paternal Grandmother     Diabetes Paternal Grandmother     Diabetes Paternal Grandfather     Colon cancer Paternal Grandfather      Physical Exam:   BP (!) 148/80   Pulse 81   Ht 5' 7" " (1.702 m)   Wt 61.6 kg (135 lb 12.9 oz)   SpO2 96%   BMI 21.27 kg/m²      Physical Exam   Constitutional: He is oriented to person, place, and time. He appears well-developed and well-nourished. No distress.   Eyes: No scleral icterus.   Neck: No JVD present.   Cardiovascular: Normal rate, regular rhythm, normal heart sounds and intact distal pulses.   PT 2+ b/l   Pulmonary/Chest: Effort normal and breath sounds normal.   Abdominal: Soft. Bowel sounds are normal.   Musculoskeletal: He exhibits no edema.   Neurological: He is alert and oriented to person, place, and time.       Labs:     Lab Results   Component Value Date     09/17/2018    K 4.3 09/17/2018     09/17/2018    CO2 25 09/17/2018    BUN 13 09/17/2018    CREATININE 0.8 09/17/2018    ANIONGAP 9 09/17/2018     No results found for: HGBA1C  Lab Results   Component Value Date     (H) 10/10/2017    Lab Results   Component Value Date    WBC 3.71 (L) 09/17/2018    HGB 13.9 (L) 09/17/2018    HCT 41.8 09/17/2018    PLT 97 (L) 09/17/2018    GRAN 2.2 09/17/2018    GRAN 59.4 09/17/2018     No results found for: CHOL, HDL, LDLCALC, TRIG       Imaging:     EKG: NSR with normal axis and normal intervals   Assessment:      1. Claudication of both lower extremities    2. Pre-operative cardiovascular examination    3. Hypertension, unspecified type        Gentleman here for pre-op risk stratifcation. Low risk patient going for an intermediate risk surgery.  Has symptoms suggestive of claudication, will order XENIA's of lower extremities, his Bp is uncontrolled, and he does not like losartan due to it causing headaches, will change to norvasc 5mg daily.    Cardiovascular Risk Assessment:  Non-emergent surgery.  No active cardiac problems (such as unstable angina, decompensated heart failure, significant uncontrolled arrhythmias or severe valvular disease).  Intermediate risk surgery.  Functional Status: able to climb a flight of stairs (> 4 METS)  Revised  cardiac risk index is 0.    1 pt Each: Ischemic Heart Disease, Cerebrovascular Disease,                     CHF, DM, Creatinine > 2             Recommendation:  1. Low cardiovascular risk patient going for an intermediate risk surgery.    Plan:     Recommendation:  1. Low cardiovascular risk patient going for an intermediate risk surgery.    -Changed to Norvasc 5mg daily for better Bp control   -Smoking cessation emphasized   -XENIA's in 3-6 months     RTC in 6 months      Signed:  Sheila Doss DO  Cardiology Fellow

## 2019-09-19 ENCOUNTER — TELEPHONE (OUTPATIENT)
Dept: SLEEP MEDICINE | Facility: OTHER | Age: 59
End: 2019-09-19

## 2019-09-23 ENCOUNTER — TELEPHONE (OUTPATIENT)
Dept: SMOKING CESSATION | Facility: CLINIC | Age: 59
End: 2019-09-23

## 2019-09-23 NOTE — TELEPHONE ENCOUNTER
9/23/19   1:35 pm    Telephone call to patient to follow up on progress quitting smoking.  Left voice mail #1 for return call.

## 2019-10-07 ENCOUNTER — TELEPHONE (OUTPATIENT)
Dept: SLEEP MEDICINE | Facility: OTHER | Age: 59
End: 2019-10-07

## 2019-10-07 DIAGNOSIS — J44.9 CHRONIC OBSTRUCTIVE PULMONARY DISEASE, UNSPECIFIED COPD TYPE: Primary | ICD-10-CM

## 2019-10-10 ENCOUNTER — HOSPITAL ENCOUNTER (OUTPATIENT)
Dept: PULMONOLOGY | Facility: CLINIC | Age: 59
Discharge: HOME | End: 2019-10-10
Payer: COMMERCIAL

## 2019-10-10 ENCOUNTER — OFFICE VISIT (OUTPATIENT)
Dept: PULMONOLOGY | Facility: CLINIC | Age: 59
End: 2019-10-10
Payer: COMMERCIAL

## 2019-10-10 VITALS
BODY MASS INDEX: 22.82 KG/M2 | HEART RATE: 72 BPM | DIASTOLIC BLOOD PRESSURE: 68 MMHG | HEIGHT: 65 IN | SYSTOLIC BLOOD PRESSURE: 124 MMHG | WEIGHT: 137 LBS | OXYGEN SATURATION: 92 %

## 2019-10-10 DIAGNOSIS — J41.0 SIMPLE CHRONIC BRONCHITIS: Primary | ICD-10-CM

## 2019-10-10 DIAGNOSIS — F17.218 CIGARETTE NICOTINE DEPENDENCE WITH OTHER NICOTINE-INDUCED DISORDER: ICD-10-CM

## 2019-10-10 DIAGNOSIS — J44.9 CHRONIC OBSTRUCTIVE PULMONARY DISEASE, UNSPECIFIED COPD TYPE: ICD-10-CM

## 2019-10-10 PROCEDURE — 3008F PR BODY MASS INDEX (BMI) DOCUMENTED: ICD-10-PCS | Mod: CPTII,S$GLB,, | Performed by: INTERNAL MEDICINE

## 2019-10-10 PROCEDURE — 3008F BODY MASS INDEX DOCD: CPT | Mod: CPTII,S$GLB,, | Performed by: INTERNAL MEDICINE

## 2019-10-10 PROCEDURE — 99999 PR PBB SHADOW E&M-EST. PATIENT-LVL III: CPT | Mod: PBBFAC,,, | Performed by: INTERNAL MEDICINE

## 2019-10-10 PROCEDURE — 94010 BREATHING CAPACITY TEST: CPT | Mod: S$GLB,,, | Performed by: INTERNAL MEDICINE

## 2019-10-10 PROCEDURE — 99999 PR PBB SHADOW E&M-EST. PATIENT-LVL III: ICD-10-PCS | Mod: PBBFAC,,, | Performed by: INTERNAL MEDICINE

## 2019-10-10 PROCEDURE — 94010 BREATHING CAPACITY TEST: ICD-10-PCS | Mod: S$GLB,,, | Performed by: INTERNAL MEDICINE

## 2019-10-10 PROCEDURE — 99214 PR OFFICE/OUTPT VISIT, EST, LEVL IV, 30-39 MIN: ICD-10-PCS | Mod: S$GLB,,, | Performed by: INTERNAL MEDICINE

## 2019-10-10 PROCEDURE — 99214 OFFICE O/P EST MOD 30 MIN: CPT | Mod: S$GLB,,, | Performed by: INTERNAL MEDICINE

## 2019-10-10 RX ORDER — PREDNISONE 10 MG/1
TABLET ORAL
Qty: 36 TABLET | Refills: 0 | Status: SHIPPED | OUTPATIENT
Start: 2019-10-10 | End: 2020-12-31

## 2019-10-10 RX ORDER — BUDESONIDE AND FORMOTEROL FUMARATE DIHYDRATE 160; 4.5 UG/1; UG/1
2 AEROSOL RESPIRATORY (INHALATION) 2 TIMES DAILY
Qty: 1 INHALER | Refills: 12 | Status: SHIPPED | OUTPATIENT
Start: 2019-10-10 | End: 2019-11-09

## 2019-10-10 RX ORDER — BUDESONIDE AND FORMOTEROL FUMARATE DIHYDRATE 160; 4.5 UG/1; UG/1
2 AEROSOL RESPIRATORY (INHALATION) 2 TIMES DAILY
Qty: 1 INHALER | Refills: 12 | Status: SHIPPED | OUTPATIENT
Start: 2019-10-10 | End: 2021-02-26 | Stop reason: SDUPTHER

## 2019-10-11 LAB
FEF 25 75 LLN: 1.31
FEF 25 75 PRE REF: 36.6 %
FEF 25 75 REF: 2.65
FEV05 LLN: 1.22
FEV05 REF: 2.36
FEV1 FVC LLN: 66
FEV1 FVC PRE REF: 84.2 %
FEV1 FVC REF: 78
FEV1 LLN: 2.3
FEV1 PRE REF: 60 %
FEV1 REF: 3.05
FVC LLN: 2.97
FVC PRE REF: 71.3 %
FVC REF: 3.89
PEF LLN: 6.21
PEF PRE REF: 64.1 %
PEF REF: 8.2
PRE FEF 25 75: 0.97 L/S (ref 1.31–4)
PRE FET 100: 6.75 SEC
PRE FEV05 REF: 60.1 %
PRE FEV1 FVC: 66.09 % (ref 66.36–90.57)
PRE FEV1: 1.83 L (ref 2.3–3.8)
PRE FEV5: 1.42 L (ref 1.22–3.49)
PRE FVC: 2.77 L (ref 2.97–4.81)
PRE PEF: 5.26 L/S (ref 6.21–10.19)

## 2019-10-11 NOTE — PROGRESS NOTES
Subjective:      Patient ID: Efra Payton III is a 59 y.o. male.    Chief Complaint: COPD and Shortness of Breath    HPI  60 yo male who is actively smoking. 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 He is laid off from [employer] Where he worked in the [employer]. Several months ago he had severe left inguinal pain which proved to be a hernia. Not repaired yet.  He has been  Seen in cardiology for claudication. He has a hx of latent Tuberculosis dictated years ago when he was incarcerated. He took two drugs for six months. His chest x-ray does not have any stigmata of tuberculosis. His PFT;s reveal moderate obstruction, and decreased FVC.He had PFT's done 5/18/18 FVC is down more impressively than the Fev-1; suggesting air trapping.  I reviewed his chest x-ray and it is clear.   Review of Systems   Constitutional: Negative.    HENT: Negative.    Eyes: Negative.    Respiratory: Positive for dyspnea on extertion.         Hx of latent TB treated for six months with 2 drugs    Former smoker 3ppd now down to 2.   Cardiovascular: Negative.    Genitourinary: Negative.         Hx of left inguinal hernia   Musculoskeletal: Negative.    Skin: Negative.    Gastrointestinal: Negative.    Neurological: Negative.    Psychiatric/Behavioral: Negative.         Former drug usage, clear for the past 5 years.     Objective:     Physical Exam   Constitutional: He is oriented to person, place, and time. He appears well-developed and well-nourished.   Thin male in no distress.   HENT:   Head: Normocephalic and atraumatic.   Right Ear: External ear normal.   Left Ear: External ear normal.   Eyes: Pupils are equal, round, and reactive to light. Conjunctivae and EOM are normal.   Neck: Normal range of motion. Neck supple.   Cardiovascular: Normal rate, regular rhythm and normal heart sounds.   Pulmonary/Chest: Effort normal and breath sounds normal.   Resting Sa02: 92% fell to 88 % after walking  the length of the oliva and back.    Moderate obstruction on PFT/s    Using combivent and ventolin prn   Abdominal: Soft. Bowel sounds are normal.   Musculoskeletal: Normal range of motion.   Neurological: He is alert and oriented to person, place, and time. He has normal reflexes.   Skin: Skin is warm and dry.   Psychiatric: He has a normal mood and affect. His behavior is normal. Judgment and thought content normal.       Assessment:     1. Simple chronic bronchitis      Outpatient Encounter Medications as of 10/10/2019   Medication Sig Dispense Refill    albuterol (PROVENTIL/VENTOLIN HFA) 90 mcg/actuation inhaler Inhale 1-2 puffs into the lungs every 6 (six) hours as needed for Wheezing or Shortness of Breath. Rescue 1 Inhaler 0    albuterol-ipratropium (DUO-NEB) 2.5 mg-0.5 mg/3 mL nebulizer solution Take 3 mLs by nebulization every 6 (six) hours as needed for Wheezing. Rescue 1 Box 0    amLODIPine (NORVASC) 5 MG tablet Take 1 tablet (5 mg total) by mouth once daily. 30 tablet 11    budesonide-formoterol 160-4.5 mcg (SYMBICORT) 160-4.5 mcg/actuation HFAA Inhale 2 puffs into the lungs 2 (two) times daily. 1 Inhaler 12    budesonide-formoterol 160-4.5 mcg (SYMBICORT) 160-4.5 mcg/actuation HFAA Inhale 2 puffs into the lungs 2 (two) times daily. 1 Inhaler 12    buPROPion (WELLBUTRIN SR) 150 MG TBSR 12 hr tablet One 150 mg tab by mouth once a day for 4 days, then twice a day thereafter.  Member of SCT (Smoking Cessation Trust) 60 tablet 0    ipratropium-albuterol (COMBIVENT RESPIMAT)  mcg/actuation inhaler Inhale 1 puff into the lungs every 4 (four) hours as needed for Wheezing. 1 Package 5    ipratropium-albuterol (COMBIVENT)  mcg/actuation inhaler Inhale 1 puff into the lungs 4 (four) times daily. Rescue 4 g 3    losartan (COZAAR) 50 MG tablet Take 1 tablet (50 mg total) by mouth once daily. 90 tablet 3    methadone (DOLOPHINE) 5 MG tablet Take 115 mg by mouth once daily.       nicotine  (NICODERM CQ) 21 mg/24 hr Place 1 patch onto the skin once daily. (Generic preferred. Member of [facility] Smoking Cessation Trust) 28 patch 0    nicotine polacrilex (NICORETTE) 4 MG Gum Take 1 each (4 mg total) by mouth as needed (Maximum 15 pieces/day.). (Generic preferred. Member of [facility] Smoking Cessation Trust) 220 each 0    predniSONE (DELTASONE) 10 MG tablet Take 3 tablets x 7 days then 2 tablets x 7days 36 tablet 0    sulfamethoxazole-trimethoprim 800-160mg (BACTRIM DS) 800-160 mg Tab Take 1 tablet by mouth.       No facility-administered encounter medications on file as of 10/10/2019.      No orders of the defined types were placed in this encounter.    Plan:     Start symbicort bid use prednisone pulse, 30 mg x 7days then 20 mg and if he smokes any more amputate his fingers.  He has copd and claudication!!  Problem List Items Addressed This Visit     Chronic obstructive pulmonary disease - Primary

## 2019-10-14 ENCOUNTER — TELEPHONE (OUTPATIENT)
Dept: SLEEP MEDICINE | Facility: OTHER | Age: 59
End: 2019-10-14

## 2019-10-16 ENCOUNTER — CLINICAL SUPPORT (OUTPATIENT)
Dept: SMOKING CESSATION | Facility: CLINIC | Age: 59
End: 2019-10-16
Payer: COMMERCIAL

## 2019-10-16 DIAGNOSIS — F17.210 NICOTINE DEPENDENCE, CIGARETTES, UNCOMPLICATED: ICD-10-CM

## 2019-10-16 DIAGNOSIS — F17.210 NICOTINE DEPENDENCE, CIGARETTES, UNCOMPLICATED: Primary | ICD-10-CM

## 2019-10-16 PROCEDURE — 99402 PR PREVENT COUNSEL,INDIV,30 MIN: ICD-10-PCS | Mod: S$GLB,,,

## 2019-10-16 PROCEDURE — 99402 PREV MED CNSL INDIV APPRX 30: CPT | Mod: S$GLB,,,

## 2019-10-17 RX ORDER — IBUPROFEN 200 MG
1 TABLET ORAL DAILY
Qty: 28 PATCH | Refills: 0 | Status: SHIPPED | OUTPATIENT
Start: 2019-10-17 | End: 2021-02-26

## 2019-10-17 RX ORDER — DIPHENHYDRAMINE HCL 25 MG
4 CAPSULE ORAL
Qty: 220 EACH | Refills: 0 | Status: SHIPPED | OUTPATIENT
Start: 2019-10-17 | End: 2020-12-31

## 2019-10-17 RX ORDER — BUPROPION HYDROCHLORIDE 150 MG/1
TABLET, EXTENDED RELEASE ORAL
Qty: 60 TABLET | Refills: 0 | Status: SHIPPED | OUTPATIENT
Start: 2019-10-17 | End: 2020-12-31 | Stop reason: SDUPTHER

## 2019-10-18 ENCOUNTER — TELEPHONE (OUTPATIENT)
Dept: INTERNAL MEDICINE | Facility: CLINIC | Age: 59
End: 2019-10-18

## 2019-10-18 NOTE — TELEPHONE ENCOUNTER
----- Message from Michellelynnette Alvarez sent at 10/18/2019 10:10 AM CDT -----  Contact: self/[phone]  Cc: KAMILAH Fuentes  Caller is requesting an earlier appt than we can schedule.  Caller declined first available appointment listed below. Caller will not accept being placed on the wait list and is requesting a message be sent to the provider.  When is the next available appointment:  11/18/19  Did you offer to schedule the next available appt and put the patient on the wait list?:   Yes, no  What visit type: preop  Symptoms:  Clearance for hernia surgery  Patient preference of timeframe to be scheduled:  ASAP.   What is the reason the patient is requesting a sooner appointment? (insurance terminating, changing jobs):    Would the patient rather a call back or a response via [facility]?:    Comments:

## 2019-10-18 NOTE — PROGRESS NOTES
Individual Follow-Up Form   The following contact was done by phone due to patients transportation difficulties.  Patient hadn't called me back because he just got his phone back.  He still   has not been able to get his car in working condition.  He said it has major issues.    10/16/19    Quit Date: Has not quit yet.    Planned Quit Date:  Patient has not identified this yet.    Clinical Status of Patient: Outpatient    Length of Service: 30 minutes    Continuing Medication:   Wellbutrin, nicotine patch, & gum    Target Symptoms: Withdrawal and medication side effects. The following were  rated moderate (3) to severe (4) on TCRS:  · Moderate (3): urges  · Severe (4): none    Comments:   Number of cigarettes still smoking: 10 - 12 cpd  Response to medication:   Nicotine patch and gum reportedly helpful.  He never obtained the Wellbutrin because of a mix up of which pharmacy he wanted to use.  Motivation:  Very good.  Patient shared his motivations for quitting in depth.  They were mostly related to being around for his grandchildren.  He listed the many health problems he has that would improve if he quit.  He is also motivated to quit so he can have his hernia repaired.  Confidence:  Patients confidence is good and getting even better since he has been able to cut down as much as he has.  Also he expects to achieve his goal because he said his desire to quit is stronger this time.  Effort:  High, shared the actions he is taking to completely quit.    Progress toward smoking cessation:    Very good, has cut down on his smoking by 40% .  His wife is reportedly trying to quit too.  Obstacles noted:  Stress management and transportation  Interventions:   Acknowledged and positively reinforced patients efforts so far in moving toward his objective to completely quit.  Listened reflectively, validated thoughts and feelings, offered support and encouragement, explored fears.  Acknowledged and  positively reinforced  change talk. made.  Reviewed triggers to urges, importance of changing routines, and reinforced positive coping strategies, re-framing thoughts, emotion management, attention to vulnerability factors, and stress management.  Used motivational interviewing strategies.  Also informed patient that I would request refills of his medications to be done through [facility] mail service so he would have all the medications he is supposed to have.    Diagnosis: F17.210    Next Visit: 2 weeks

## 2019-10-22 ENCOUNTER — OFFICE VISIT (OUTPATIENT)
Dept: INTERNAL MEDICINE | Facility: CLINIC | Age: 59
End: 2019-10-22
Payer: COMMERCIAL

## 2019-10-22 VITALS
HEIGHT: 65 IN | OXYGEN SATURATION: 90 % | BODY MASS INDEX: 23.81 KG/M2 | SYSTOLIC BLOOD PRESSURE: 130 MMHG | DIASTOLIC BLOOD PRESSURE: 82 MMHG | HEART RATE: 68 BPM | WEIGHT: 142.88 LBS

## 2019-10-22 DIAGNOSIS — J44.9 CHRONIC OBSTRUCTIVE PULMONARY DISEASE, UNSPECIFIED COPD TYPE: Primary | ICD-10-CM

## 2019-10-22 DIAGNOSIS — I10 HYPERTENSION, UNSPECIFIED TYPE: ICD-10-CM

## 2019-10-22 PROCEDURE — 3008F BODY MASS INDEX DOCD: CPT | Mod: CPTII,S$GLB,, | Performed by: STUDENT IN AN ORGANIZED HEALTH CARE EDUCATION/TRAINING PROGRAM

## 2019-10-22 PROCEDURE — 99213 OFFICE O/P EST LOW 20 MIN: CPT | Mod: S$GLB,,, | Performed by: STUDENT IN AN ORGANIZED HEALTH CARE EDUCATION/TRAINING PROGRAM

## 2019-10-22 PROCEDURE — 99213 PR OFFICE/OUTPT VISIT, EST, LEVL III, 20-29 MIN: ICD-10-PCS | Mod: S$GLB,,, | Performed by: STUDENT IN AN ORGANIZED HEALTH CARE EDUCATION/TRAINING PROGRAM

## 2019-10-22 PROCEDURE — 99999 PR PBB SHADOW E&M-EST. PATIENT-LVL IV: CPT | Mod: PBBFAC,,, | Performed by: STUDENT IN AN ORGANIZED HEALTH CARE EDUCATION/TRAINING PROGRAM

## 2019-10-22 PROCEDURE — 3008F PR BODY MASS INDEX (BMI) DOCUMENTED: ICD-10-PCS | Mod: CPTII,S$GLB,, | Performed by: STUDENT IN AN ORGANIZED HEALTH CARE EDUCATION/TRAINING PROGRAM

## 2019-10-22 PROCEDURE — 99999 PR PBB SHADOW E&M-EST. PATIENT-LVL IV: ICD-10-PCS | Mod: PBBFAC,,, | Performed by: STUDENT IN AN ORGANIZED HEALTH CARE EDUCATION/TRAINING PROGRAM

## 2019-10-22 NOTE — PATIENT INSTRUCTIONS
Labs today   [facility] for [facility]   I will send a message to cardiology , so that they can make sure you get their note  Call the surgeons and ask what else they need   You will have:  note from cardiology a complete blood count, and a comprehensive metabolic panel

## 2019-10-22 NOTE — PROGRESS NOTES
Subjective     Chief Complaint: follow up     History of Present Illness:  Mr. Efra Payton III is a 59 y.o. male with COPD, HTN who is here for follow up on COPD and HTN. He last seen by Dr. Lopez and was sent for a stress echo and to cardiology for a preop. He also saw Dr. Sinclair who optimized his COPD. He states that he is doing well , he is now on amlodipine instead of cozaar , he doesn't have a BP log but BP in clinic was WNL. He states that he does not have new cough or SOB he has cut down from 3 packs a day to 1 pack a day and is using nictorine patches and gums. No new symptoms fo fever, chills, SOB, CP, palpitations     Of note he is supposed to get a right inguinal hernia repair but is requesting his preop papers, Given that cardiology did his pre op I will ask them to share their note with patient . I also requested that he calls the surgeons to ask what other tests / paperwork they need.     Review of Systems   Constitutional: Negative for chills, diaphoresis, fever and weight loss.   HENT: Negative for congestion, hearing loss and sore throat.    Eyes: Negative for blurred vision, double vision and photophobia.   Respiratory: Positive for wheezing. Negative for cough.    Cardiovascular: Negative for chest pain, palpitations, orthopnea and PND.   Gastrointestinal: Negative for abdominal pain, constipation, diarrhea, nausea and vomiting.   Genitourinary: Negative for frequency, hematuria and urgency.   Musculoskeletal: Negative for back pain, joint pain, myalgias and neck pain.   Neurological: Negative for dizziness, sensory change, seizures, loss of consciousness, weakness and headaches.   Psychiatric/Behavioral: Negative for depression. The patient is not nervous/anxious.        PAST HISTORY:     Past Medical History:   Diagnosis Date    COPD (chronic obstructive pulmonary disease)     Drug abuse     Eczema     Substance abuse        Past Surgical History:   Procedure Laterality Date    arm  surgery      HAND SURGERY         Family History   Problem Relation Age of Onset    Heart disease Father     Diabetes Father     Colon cancer Father     Hypertension Sister     Diabetes Sister     Hypertension Brother     Diabetes Brother     Colon cancer Brother     Heart attack Paternal Grandmother     Heart disease Paternal Grandmother     Diabetes Paternal Grandmother     Diabetes Paternal Grandfather     Colon cancer Paternal Grandfather        Social History     Socioeconomic History    Marital status:      Spouse name: Not on file    Number of children: Not on file    Years of education: Not on file    Highest education level: Not on file   Occupational History    Not on file   Social Needs    Financial resource strain: Not on file    Food insecurity:     Worry: Not on file     Inability: Not on file    Transportation needs:     Medical: Not on file     Non-medical: Not on file   Tobacco Use    Smoking status: Current Every Day Smoker     Packs/day: 1.00     Years: 49.00     Pack years: 49.00     Types: Cigarettes     Start date: 7/25/1969    Smokeless tobacco: Never Used    Tobacco comment: decreased his smoking to 1 pack per day 5 months ago    Substance and Sexual Activity    Alcohol use: No     Comment: has past history of alcohol abuse     Drug use: No     Comment: has past history of substance abuse     Sexual activity: Not on file   Lifestyle    Physical activity:     Days per week: Not on file     Minutes per session: Not on file    Stress: Not on file   Relationships    Social connections:     Talks on phone: Not on file     Gets together: Not on file     Attends [employer] service: Not on file     Active member of club or organization: Not on file     Attends meetings of clubs or organizations: Not on file     Relationship status: Not on file   Other Topics Concern    Not on file   Social History Narrative    Not on file       MEDICATIONS & ALLERGIES:      Current Outpatient Medications on File Prior to Visit   Medication Sig    albuterol (PROVENTIL/VENTOLIN HFA) 90 mcg/actuation inhaler Inhale 1-2 puffs into the lungs every 6 (six) hours as needed for Wheezing or Shortness of Breath. Rescue    albuterol-ipratropium (DUO-NEB) 2.5 mg-0.5 mg/3 mL nebulizer solution Take 3 mLs by nebulization every 6 (six) hours as needed for Wheezing. Rescue    amLODIPine (NORVASC) 5 MG tablet Take 1 tablet (5 mg total) by mouth once daily.    budesonide-formoterol 160-4.5 mcg (SYMBICORT) 160-4.5 mcg/actuation HFAA Inhale 2 puffs into the lungs 2 (two) times daily.    budesonide-formoterol 160-4.5 mcg (SYMBICORT) 160-4.5 mcg/actuation HFAA Inhale 2 puffs into the lungs 2 (two) times daily.    buPROPion (WELLBUTRIN SR) 150 MG TBSR 12 hr tablet Take 1 tablet by mouth once a day for 4 days, then take 1 tablet by mouth twice a day thereafter.    ipratropium-albuterol (COMBIVENT RESPIMAT)  mcg/actuation inhaler Inhale 1 puff into the lungs every 4 (four) hours as needed for Wheezing.    ipratropium-albuterol (COMBIVENT)  mcg/actuation inhaler Inhale 1 puff into the lungs 4 (four) times daily. Rescue    losartan (COZAAR) 50 MG tablet Take 1 tablet (50 mg total) by mouth once daily.    methadone (DOLOPHINE) 5 MG tablet Take 115 mg by mouth once daily.     nicotine (NICODERM CQ) 21 mg/24 hr Place 1 patch onto the skin once daily.    nicotine polacrilex (NICORETTE) 4 MG Gum Chew 1 each (4 mg total) by mouth as needed (Maximum 15 pieces/day.).    predniSONE (DELTASONE) 10 MG tablet Take 3 tablets x 7 days then 2 tablets x 7days    sulfamethoxazole-trimethoprim 800-160mg (BACTRIM DS) 800-160 mg Tab Take 1 tablet by mouth.     No current facility-administered medications on file prior to visit.        Review of patient's allergies indicates:  No Known Allergies    OBJECTIVE:     Vital Signs:  Vitals:    10/22/19 1550   BP: 130/82   BP Location: Left arm   Patient  "Position: Sitting   BP Method: Medium (Manual)   Pulse: 68   SpO2: (!) 90%   Weight: 64.8 kg (142 lb 13.7 oz)   Height: 5' 5" (1.651 m)       Body mass index is 23.77 kg/m².     Physical Exam:  General:  Well developed, well nourished, no acute distress  Head: Normocephalic, atraumatic  Eyes: PERRL, EOMI, clear sclera  Throat: No posterior pharyngeal erythema or exudate, no tonsillar exudate  Neck: supple, normal ROM, no thyromegaly   CVS:  RRR, S1 and S2 normal, no murmurs, rubs, gallops, 2+ peripheral pulses  Resp:  + wheezes  GI:  Abdomen soft, non-tender, non-distended, normoactive bowel sounds  MSK:  No muscle atrophy, cyanosis, peripheral edema   Skin:  No rashes, ulcers, erythema  Neuro:  CNII-XII grossly intact, no focal deficits noted  Psych:  Appropriate mood and affect, normal judgement    Laboratory  Lab Results   Component Value Date    WBC 3.71 (L) 09/17/2018    HGB 13.9 (L) 09/17/2018    HCT 41.8 09/17/2018    MCV 99 (H) 09/17/2018    PLT 97 (L) 09/17/2018     @[id number](GLU,NA,K,Cl,CO2,BUN,Creatinine,Calcium,MG)@  No results found for: INR, PROTIME  No results found for: HGBA1C  No results for input(s): POCTGLUCOSE in the last 72 hours.        Health Maintenance Due   Topic Date Due    Hepatitis C Screening  1960    TETANUS VACCINE  01/22/1978    Pneumococcal Vaccine (Medium Risk) (1 of 1 - PPSV23) 01/22/1979    Colonoscopy  01/22/2010     Stress echo reviewed :    · Normal left ventricular systolic function. The estimated ejection fraction is 60%  · Normal right ventricular systolic function.  · Normal LV diastolic function.  · Exercise capacity is well below average (6 METs - normal is 12 METs). The test was stopped because the patient experienced 8/10 shortness of breath.  · The EKG portion of this study is negative for myocardial ischemia.  · The wall motion response to suboptimal exercise was blunted, with no significant augmentation seen in any wall. The LV ejection fraction does " not change significantly. These findings are abnormal but nonspecific.  · Overall, this study is nondiagnostic for myocardial ischemia with a failure to achieve the target heart rate, poor exercise capacity, and no significant augmentation of global LV function with stress.    ASSESSMENT & PLAN:   Mr. Efra Payton III is a 59 y.o. male here for follow up on HTN and COPD       Chronic obstructive pulmonary disease, unspecified COPD type  - patient has cut down on his smoking and trying to cut down more  - He has wheezing on exam but has not used his albuterol or symbicort today , I let him know to use it daily     Hypertension, unspecified type  - BP better control with norvasc and no signs of edema  - continue   -exercise stress echo reviewed no clear signs of ischemia     RTC as needed , will request him to get labs today that have been already ordered, he is to talk to surgeons about what they need, I will send a message to cardiology to share their preop note, and he is to go get his patient portal set up so he can get these results to them     Discussed with Dr. Whitley  - staff attestation to follow        Ciarra Em MD, MPH  Internal Medicine PGY3  [facility]  [address] [place] [address] [phone]

## 2019-10-24 ENCOUNTER — LAB VISIT (OUTPATIENT)
Dept: LAB | Facility: HOSPITAL | Age: 59
End: 2019-10-24
Payer: COMMERCIAL

## 2019-10-24 DIAGNOSIS — Z01.818 PREOPERATIVE TESTING: ICD-10-CM

## 2019-10-24 LAB
ALBUMIN SERPL BCP-MCNC: 4 G/DL (ref 3.5–5.2)
ALP SERPL-CCNC: 50 U/L (ref 55–135)
ALT SERPL W/O P-5'-P-CCNC: 17 U/L (ref 10–44)
ANION GAP SERPL CALC-SCNC: 10 MMOL/L (ref 8–16)
AST SERPL-CCNC: 21 U/L (ref 10–40)
BASOPHILS # BLD AUTO: 0.02 K/UL (ref 0–0.2)
BASOPHILS NFR BLD: 0.3 % (ref 0–1.9)
BILIRUB SERPL-MCNC: 0.2 MG/DL (ref 0.1–1)
BUN SERPL-MCNC: 6 MG/DL (ref 6–20)
CALCIUM SERPL-MCNC: 9.3 MG/DL (ref 8.7–10.5)
CHLORIDE SERPL-SCNC: 98 MMOL/L (ref 95–110)
CO2 SERPL-SCNC: 31 MMOL/L (ref 23–29)
CREAT SERPL-MCNC: 1 MG/DL (ref 0.5–1.4)
DIFFERENTIAL METHOD: ABNORMAL
EOSINOPHIL # BLD AUTO: 0.1 K/UL (ref 0–0.5)
EOSINOPHIL NFR BLD: 1.6 % (ref 0–8)
ERYTHROCYTE [DISTWIDTH] IN BLOOD BY AUTOMATED COUNT: 13.4 % (ref 11.5–14.5)
EST. GFR  (AFRICAN AMERICAN): >60 ML/MIN/1.73 M^2
EST. GFR  (NON AFRICAN AMERICAN): >60 ML/MIN/1.73 M^2
GLUCOSE SERPL-MCNC: 103 MG/DL (ref 70–110)
HCT VFR BLD AUTO: 45 % (ref 40–54)
HGB BLD-MCNC: 15.5 G/DL (ref 14–18)
LYMPHOCYTES # BLD AUTO: 2.3 K/UL (ref 1–4.8)
LYMPHOCYTES NFR BLD: 33 % (ref 18–48)
MCH RBC QN AUTO: 32.7 PG (ref 27–31)
MCHC RBC AUTO-ENTMCNC: 34.4 G/DL (ref 32–36)
MCV RBC AUTO: 95 FL (ref 82–98)
MONOCYTES # BLD AUTO: 0.6 K/UL (ref 0.3–1)
MONOCYTES NFR BLD: 9 % (ref 4–15)
NEUTROPHILS # BLD AUTO: 3.9 K/UL (ref 1.8–7.7)
NEUTROPHILS NFR BLD: 56.1 % (ref 38–73)
PLATELET # BLD AUTO: 90 K/UL (ref 150–350)
PMV BLD AUTO: 11 FL (ref 9.2–12.9)
POTASSIUM SERPL-SCNC: 3.9 MMOL/L (ref 3.5–5.1)
PROT SERPL-MCNC: 7.8 G/DL (ref 6–8.4)
RBC # BLD AUTO: 4.74 M/UL (ref 4.6–6.2)
SODIUM SERPL-SCNC: 139 MMOL/L (ref 136–145)
WBC # BLD AUTO: 6.98 K/UL (ref 3.9–12.7)

## 2019-10-24 PROCEDURE — 80053 COMPREHEN METABOLIC PANEL: CPT

## 2019-10-24 PROCEDURE — 85025 COMPLETE CBC W/AUTO DIFF WBC: CPT

## 2019-10-24 PROCEDURE — 36415 COLL VENOUS BLD VENIPUNCTURE: CPT

## 2019-10-24 NOTE — PROGRESS NOTES
I have reviewed the notes, assessments, and/or procedures performed by Dr. Em, I concur with her documentation of Efra Payton III.

## 2019-10-25 ENCOUNTER — NURSE TRIAGE (OUTPATIENT)
Dept: ADMINISTRATIVE | Facility: CLINIC | Age: 59
End: 2019-10-25

## 2019-10-26 NOTE — TELEPHONE ENCOUNTER
Reason for Disposition   General information question, no triage required and triager able to answer question    Protocols used: INFORMATION ONLY CALL-A-AH    Pt's Father called requesting number for assistance with [facility]. Number to [facility] assistance Provided.

## 2019-11-05 ENCOUNTER — TELEPHONE (OUTPATIENT)
Dept: SMOKING CESSATION | Facility: CLINIC | Age: 59
End: 2019-11-05

## 2019-11-05 NOTE — TELEPHONE ENCOUNTER
11/5/19   12:50 PM    Telephone call to patient to follow up on progress quitting smoking.  Left voice mail #1 for return call.

## 2019-11-14 ENCOUNTER — TELEPHONE (OUTPATIENT)
Dept: SMOKING CESSATION | Facility: CLINIC | Age: 59
End: 2019-11-14

## 2019-11-15 NOTE — TELEPHONE ENCOUNTER
11/14/19   7:30 pm    Telephone call to patient to follow up on progress quitting smoking.  Left voice mail #2 for return call.

## 2019-11-27 ENCOUNTER — TELEPHONE (OUTPATIENT)
Dept: SMOKING CESSATION | Facility: CLINIC | Age: 59
End: 2019-11-27

## 2019-11-27 NOTE — TELEPHONE ENCOUNTER
11/27/19    1:25 PM    Telephone call to patient to follow up on progress quitting smoking.  Left voice mail #3 for return call.

## 2020-01-14 ENCOUNTER — OFFICE VISIT (OUTPATIENT)
Dept: INTERNAL MEDICINE | Facility: CLINIC | Age: 60
End: 2020-01-14
Payer: COMMERCIAL

## 2020-01-14 ENCOUNTER — TELEPHONE (OUTPATIENT)
Dept: INTERNAL MEDICINE | Facility: CLINIC | Age: 60
End: 2020-01-14

## 2020-01-14 VITALS
HEART RATE: 67 BPM | BODY MASS INDEX: 22.29 KG/M2 | OXYGEN SATURATION: 93 % | SYSTOLIC BLOOD PRESSURE: 130 MMHG | DIASTOLIC BLOOD PRESSURE: 88 MMHG | WEIGHT: 142 LBS | HEIGHT: 67 IN

## 2020-01-14 DIAGNOSIS — R07.9 CHEST PAIN, UNSPECIFIED TYPE: ICD-10-CM

## 2020-01-14 DIAGNOSIS — Z00.00 HEALTHCARE MAINTENANCE: ICD-10-CM

## 2020-01-14 DIAGNOSIS — K40.90 RIGHT INGUINAL HERNIA: Primary | ICD-10-CM

## 2020-01-14 PROCEDURE — 99999 PR PBB SHADOW E&M-EST. PATIENT-LVL V: ICD-10-PCS | Mod: PBBFAC,,, | Performed by: STUDENT IN AN ORGANIZED HEALTH CARE EDUCATION/TRAINING PROGRAM

## 2020-01-14 PROCEDURE — 3079F PR MOST RECENT DIASTOLIC BLOOD PRESSURE 80-89 MM HG: ICD-10-PCS | Mod: CPTII,S$GLB,, | Performed by: STUDENT IN AN ORGANIZED HEALTH CARE EDUCATION/TRAINING PROGRAM

## 2020-01-14 PROCEDURE — 3079F DIAST BP 80-89 MM HG: CPT | Mod: CPTII,S$GLB,, | Performed by: STUDENT IN AN ORGANIZED HEALTH CARE EDUCATION/TRAINING PROGRAM

## 2020-01-14 PROCEDURE — 99213 OFFICE O/P EST LOW 20 MIN: CPT | Mod: S$GLB,,, | Performed by: STUDENT IN AN ORGANIZED HEALTH CARE EDUCATION/TRAINING PROGRAM

## 2020-01-14 PROCEDURE — 3008F PR BODY MASS INDEX (BMI) DOCUMENTED: ICD-10-PCS | Mod: CPTII,S$GLB,, | Performed by: STUDENT IN AN ORGANIZED HEALTH CARE EDUCATION/TRAINING PROGRAM

## 2020-01-14 PROCEDURE — 99213 PR OFFICE/OUTPT VISIT, EST, LEVL III, 20-29 MIN: ICD-10-PCS | Mod: S$GLB,,, | Performed by: STUDENT IN AN ORGANIZED HEALTH CARE EDUCATION/TRAINING PROGRAM

## 2020-01-14 PROCEDURE — 3008F BODY MASS INDEX DOCD: CPT | Mod: CPTII,S$GLB,, | Performed by: STUDENT IN AN ORGANIZED HEALTH CARE EDUCATION/TRAINING PROGRAM

## 2020-01-14 PROCEDURE — 99999 PR PBB SHADOW E&M-EST. PATIENT-LVL V: CPT | Mod: PBBFAC,,, | Performed by: STUDENT IN AN ORGANIZED HEALTH CARE EDUCATION/TRAINING PROGRAM

## 2020-01-14 PROCEDURE — 3075F SYST BP GE 130 - 139MM HG: CPT | Mod: CPTII,S$GLB,, | Performed by: STUDENT IN AN ORGANIZED HEALTH CARE EDUCATION/TRAINING PROGRAM

## 2020-01-14 PROCEDURE — 3075F PR MOST RECENT SYSTOLIC BLOOD PRESS GE 130-139MM HG: ICD-10-PCS | Mod: CPTII,S$GLB,, | Performed by: STUDENT IN AN ORGANIZED HEALTH CARE EDUCATION/TRAINING PROGRAM

## 2020-01-14 NOTE — TELEPHONE ENCOUNTER
----- Message from Monika Zuleta sent at 1/14/2020  7:53 AM CST -----  Contact: patient  Says he may be late to today's appointment. Actually has another appointment in same clinic today at [address] [place] [address].    Thanks  KB

## 2020-01-14 NOTE — PATIENT INSTRUCTIONS
- please get inguinal hernia belt and wear it, specially when you carry heavy object         Hernia (Adult)    A hernia can happen when there is a weakness or defect in the wall of the abdomen or groin. Intestines or nearby tissues may move from their usual location and push through the weakness in the wall. This can cause a hernia (bulge) you may see or feel.  Causes and Risk Factors   A hernia may be present at birth. Or it may be caused by the wear and tear of daily living. Certain factors can make a hernia more likely. These can include:  · Heavy lifting  · Straining, whether from lifting, movement, or constipation  · Chronic cough  · Injury to the abdominal wall  · Excess weight  · Pregnancy  · Prior surgery  · Older age  · Family history of hernia  Symptoms  Symptoms of a hernia may come on suddenly. Or they may appear slowly over time. Some common symptoms include:  · Bulge in the groin area, around the navel, or in the scrotum (the bulge may get bigger when you stand and go away when you lie down)  · Pain or pressure around the bulge  · Pain during activities such as lifting, coughing, or sneezing  · A feeling of weakness or pressure in the groin  · Pain or swelling in the scrotum  Types of hernias  There are different types of hernia. The type you have depends on its location:  · Inguinal: This type is in the groin or scrotum. It is more common in men.  · Femoral: This type is in the groin, upper thigh (where the leg bends), or labia. It is more common in women.  · Ventral: This type is in the abdominal wall.  · Umbilical: This type occurs around the navel (belly button).  · Incisional: This type occurs at the site of a previous surgery.  The condition of the hernia can help determine how urgently it needs to be treated.  · Reducible: It goes back in by itself, or it can be pushed back in.  · Irreducible: It cant be pushed back in.  · Incarcerated/Strangulated: The intestine is trapped (incarcerated). If  this happens, you wont be able to push the bulge back in. If the incarcerated hernia isnt treated, it may become strangulated. This means the area loses blood supply and the tissue may die. This requires emergency surgery! Treatment is needed right away!  In most cases, a hernia will not heal on its own. Surgery is usually needed to repair the defect in the abdominal wall or groin. Youll be told more about surgery, if needed.  If your symptoms are not severe, treatment may sometimes be delayed. In such cases, regular follow-up visits with the provider will be needed. Youll be asked to keep track of your symptoms and to watch for signs of more serious problems. You may also be given guidelines similar to the home care instructions below.  Home Care  To help keep a hernia from getting worse, you may be advised to:  · Avoid heavy lifting and straining as directed.  · Take steps to prevent constipation, such as eating more fiber and drinking more water. This may help reduce straining that can occur when having a bowel movement. Reducing straining may help keep your symptoms from getting worse.  · Maintain a healthy weight or lose excess weight. This can help reduce strain on abdominal muscles and tissues.  · Stop smoking. This can help prevent coughing that may also strain abdominal muscles and tissues.  Follow-up care  Follow up with your healthcare provider, or as directed. If imaging tests were done, they will be reviewed a doctor. You will be told the results and any new findings that may affect your care.  When to seek medical advice  Call your healthcare provider right away if any of these occur:  · Hernia hardens, swells, or grows larger  · Hernia can no longer be pushed back in  · Pain moves to the lower right abdomen (just below the waistline), or spreads to the back  Call 911  Call 911 right away if any of these occur:  · Nausea and vomiting  · Severe pain, redness, or tenderness in the area near the  hernia  · Pain worsens quickly and doesnt get better  · Inability to have a bowel movement or pass gas  · Fever of 100.4°F (38°C) or higher  · Trouble breathing  · Fainting  · Rapid heart rate  · Vomiting blood  · Large amounts of blood in stool  Date Last Reviewed: 6/9/2015  © 8417-3309 [employer]. [address] [place] [address], [place] [address]. All rights reserved. This information is not intended as a substitute for professional medical care. Always follow your healthcare professional's instructions.

## 2020-01-14 NOTE — PROGRESS NOTES
Reviewed patient's medical record in Epic. Patient's history and physical reviewed and discussed, please refer to resident physician's note for specific details. I agree with resident's assessment and plan.    Kraig Camacho MD

## 2020-01-14 NOTE — PROGRESS NOTES
Subjective:      Patient ID: Efra Payton III is a 59 y.o. male.    Chief Complaint: Follow-up (pre-op)    59 years old male patient with medical history of hypertension, and opioids abuse currently on methadone, COPD and smoking came in today to get a general surgery referral for inguinal hernia repair.    Patent was initially diagnosed with inguina hernia at [facility] when her presented to urgent care with inguinal pain after carrying heavy object at work, since then he has been having pain on/off that shot to his RLQ, increase with strain and carrying heavy objects, patient was following up with general surgery at  for elective hernia repair, [place] [facility] [place] and deemed low risk. Per patient the surgeon refused to do the surgery so he decided to come here for general surgery referral.       # Chest pain  Started 2 year and half ago, left sided, sharp, stabbing pain, not related to exertion,some times brought in at rest, last for less than 1 min, non radiating, patient was seen by cardiology  in 9/17/19 and underwent echo stress test which was not diagnositc due to shortness of breath during the test, cardiology recommended to follow up in 6 months.    Review of Systems   Constitutional: Negative for activity change, appetite change and fever.   HENT: Negative for congestion, postnasal drip and sore throat.    Respiratory: Positive for shortness of breath (due to COPD). Negative for cough and wheezing.    Cardiovascular: Negative for chest pain and palpitations.   Gastrointestinal: Negative for abdominal pain, blood in stool, constipation, diarrhea, nausea and vomiting.   Genitourinary: Negative for decreased urine volume, difficulty urinating, flank pain and frequency.   Musculoskeletal: Negative for arthralgias.   Neurological: Negative for dizziness, weakness and headaches.     Objective:     Vitals:    01/14/20 1314   BP: 130/88   Pulse: 67     Physical Exam    Constitutional: He is oriented to person, place, and time. He appears well-developed and well-nourished. No distress.   HENT:   Head: Normocephalic and atraumatic.   Eyes: No scleral icterus.   Cardiovascular: Normal rate and regular rhythm.   No murmur heard.  Pulmonary/Chest: Effort normal and breath sounds normal. No respiratory distress.   Abdominal: He exhibits no distension.   Genitourinary:   Genitourinary Comments: Right inguinal mild bulged with coughing, no swelling or tenderness   Musculoskeletal: He exhibits no edema or tenderness.   Neurological: He is alert and oriented to person, place, and time.   Skin: No rash noted. He is not diaphoretic.   Psychiatric: He has a normal mood and affect. His behavior is normal.   Nursing note and vitals reviewed.    Assessment and Plan :      1. Right inguinal hernia    2. Chest pain, unspecified type    3. Healthcare maintenance        Fera was seen today for follow-up.    Diagnoses and all orders for this visit:    Right inguinal hernia  patient was diagnosed at OSH, unclear why the surgery was not done at  despite patient is a low risk for cardiovascular events per card clearance.  patient has right inguinal bulge with coughing on exam   Reported pain with straining and heavy lifting   Will need to confirm the diagnosis and refer patient to gen surg for hernia repair eval  -     Ambulatory consult to General Surgery  -     US Abdomen Limited; Future to confirm   -     Inguinal hernia belt     Chest pain, unspecified type  Atypical chest pain,  Sharp not related to exertion, not associated with radiation, nausea/vomiting or sweating, relives with no intervention can't r/o stable angina, recent stress echo was not diagnostic due to shortness of breath   will follow up with cardiology as recommended and for further work up   Healthcare maintenance  -     Case request GI: COLONOSCOPY  -     Hepatitis C antibody; Future  Health Maintenance Due   Topic Date Due     Hepatitis C Screening  1960    TETANUS VACCINE  01/22/1978    Pneumococcal Vaccine (Medium Risk) (1 of 1 - PPSV23) 01/22/1979    Colonoscopy  01/22/2010              Nitin Doss  Internal Medicine, PGY 3  [phone]

## 2020-01-20 DIAGNOSIS — Z00.00 HEALTHCARE MAINTENANCE: Primary | ICD-10-CM

## 2020-01-30 ENCOUNTER — TELEPHONE (OUTPATIENT)
Dept: SURGERY | Facility: CLINIC | Age: 60
End: 2020-01-30

## 2020-01-30 NOTE — TELEPHONE ENCOUNTER
R/t call and spoke with pt's wife and rescheduled appt for 2/12/2020 @ 0930. Caren confirmed date, time and location and mailed reminder out.       ----- Message from Maria D Taylor sent at 1/30/2020  1:34 PM CST -----  Contact: Pt   Pt is requesting a call back to reschedule appt missed on 01/29/2020 due to car troubles   Pt would like appt to be scheduled for a date after Ultrasound is complete     Pt can be reached at [phone]

## 2020-02-08 ENCOUNTER — HOSPITAL ENCOUNTER (OUTPATIENT)
Dept: RADIOLOGY | Facility: HOSPITAL | Age: 60
Discharge: HOME | End: 2020-02-08
Attending: STUDENT IN AN ORGANIZED HEALTH CARE EDUCATION/TRAINING PROGRAM
Payer: OTHER MISCELLANEOUS

## 2020-02-08 DIAGNOSIS — K40.90 RIGHT INGUINAL HERNIA: ICD-10-CM

## 2020-02-08 PROCEDURE — 76705 ECHO EXAM OF ABDOMEN: CPT | Mod: TC

## 2020-02-08 PROCEDURE — 76705 US ABDOMEN LIMITED: ICD-10-PCS | Mod: 26,,, | Performed by: RADIOLOGY

## 2020-02-08 PROCEDURE — 76705 ECHO EXAM OF ABDOMEN: CPT | Mod: 26,,, | Performed by: RADIOLOGY

## 2020-02-12 ENCOUNTER — OFFICE VISIT (OUTPATIENT)
Dept: SURGERY | Facility: CLINIC | Age: 60
End: 2020-02-12
Payer: OTHER MISCELLANEOUS

## 2020-02-12 VITALS
HEIGHT: 67 IN | WEIGHT: 135 LBS | HEART RATE: 66 BPM | SYSTOLIC BLOOD PRESSURE: 175 MMHG | DIASTOLIC BLOOD PRESSURE: 96 MMHG | BODY MASS INDEX: 21.19 KG/M2

## 2020-02-12 DIAGNOSIS — J44.9 CHRONIC OBSTRUCTIVE PULMONARY DISEASE, UNSPECIFIED COPD TYPE: ICD-10-CM

## 2020-02-12 DIAGNOSIS — K40.90 RIGHT INGUINAL HERNIA: Primary | ICD-10-CM

## 2020-02-12 PROCEDURE — 99214 OFFICE O/P EST MOD 30 MIN: CPT | Mod: S$GLB,,, | Performed by: SURGERY

## 2020-02-12 PROCEDURE — 99999 PR PBB SHADOW E&M-EST. PATIENT-LVL III: CPT | Mod: PBBFAC,,, | Performed by: SURGERY

## 2020-02-12 PROCEDURE — 99999 PR PBB SHADOW E&M-EST. PATIENT-LVL III: ICD-10-PCS | Mod: PBBFAC,,, | Performed by: SURGERY

## 2020-02-12 PROCEDURE — 99214 PR OFFICE/OUTPT VISIT, EST, LEVL IV, 30-39 MIN: ICD-10-PCS | Mod: S$GLB,,, | Performed by: SURGERY

## 2020-02-12 RX ORDER — LIDOCAINE HYDROCHLORIDE 10 MG/ML
1 INJECTION, SOLUTION EPIDURAL; INFILTRATION; INTRACAUDAL; PERINEURAL ONCE
Status: CANCELLED | OUTPATIENT
Start: 2020-02-12 | End: 2020-02-12

## 2020-02-12 NOTE — PROGRESS NOTES
History & Physical    SUBJECTIVE:     History of Present Illness:  Patient is a 60 y.o. male with PMHx of COPD, HTN presents for evaluation of R femoral hernia. He initially experienced a pop followed by pain while lifting something at work in June 2019. He went to the ED and was diagnosed with a R inguinal hernia. He has seen multiple physicians for this, but has not had a definitive treatment plan yet. He currently experiences daily R groin pain. This is worse when sitting or laying down. He notes a very small amount of R groin swelling, if anything at all. He denies any left groin complaints, or prior inguinal/femoral hernia repairs. He also c/o mild nausea over this timeframe but denies vomiting. He does note constipation, but this is chronic with his history of methadone use (2 BMs per week).   He is currently in a methadone clinic. Has history of use of numerous drugs (heroine, narcotic pills, methamphetamine, etc), but has been clean for the past 4 years.   Denies fever, chills, abd pain, vomiting, diarrhea, hematochezia, melena    Social Hx: former drug abuse; denies alcohol; smokes 1 ppd. Works in a [employer], treatment is via workman's comp.     Chief Complaint   Patient presents with    Hernia       Review of patient's allergies indicates:  No Known Allergies    Current Outpatient Medications   Medication Sig Dispense Refill    albuterol (PROVENTIL/VENTOLIN HFA) 90 mcg/actuation inhaler Inhale 1-2 puffs into the lungs every 6 (six) hours as needed for Wheezing or Shortness of Breath. Rescue 1 Inhaler 0    amLODIPine (NORVASC) 5 MG tablet Take 1 tablet (5 mg total) by mouth once daily. 30 tablet 11    budesonide-formoterol 160-4.5 mcg (SYMBICORT) 160-4.5 mcg/actuation HFAA Inhale 2 puffs into the lungs 2 (two) times daily. 1 Inhaler 12    buPROPion (WELLBUTRIN SR) 150 MG TBSR 12 hr tablet Take 1 tablet by mouth once a day for 4 days, then take 1 tablet by mouth twice a day thereafter. 60 tablet 0     ipratropium-albuterol (COMBIVENT RESPIMAT)  mcg/actuation inhaler Inhale 1 puff into the lungs every 4 (four) hours as needed for Wheezing. 1 Package 5    ipratropium-albuterol (COMBIVENT)  mcg/actuation inhaler Inhale 1 puff into the lungs 4 (four) times daily. Rescue 4 g 3    losartan (COZAAR) 50 MG tablet Take 1 tablet (50 mg total) by mouth once daily. 90 tablet 3    methadone (DOLOPHINE) 5 MG tablet Take 115 mg by mouth once daily.       nicotine (NICODERM CQ) 21 mg/24 hr Place 1 patch onto the skin once daily. 28 patch 0    nicotine polacrilex (NICORETTE) 4 MG Gum Chew 1 each (4 mg total) by mouth as needed (Maximum 15 pieces/day.). 220 each 0    predniSONE (DELTASONE) 10 MG tablet Take 3 tablets x 7 days then 2 tablets x 7days 36 tablet 0    sulfamethoxazole-trimethoprim 800-160mg (BACTRIM DS) 800-160 mg Tab Take 1 tablet by mouth.       No current facility-administered medications for this visit.        Past Medical History:   Diagnosis Date    COPD (chronic obstructive pulmonary disease)     Drug abuse     Eczema     Substance abuse      Past Surgical History:   Procedure Laterality Date    arm surgery      HAND SURGERY       Family History   Problem Relation Age of Onset    Heart disease Father     Diabetes Father     Colon cancer Father     Hypertension Sister     Diabetes Sister     Hypertension Brother     Diabetes Brother     Colon cancer Brother     Heart attack Paternal Grandmother     Heart disease Paternal Grandmother     Diabetes Paternal Grandmother     Diabetes Paternal Grandfather     Colon cancer Paternal Grandfather      Social History     Tobacco Use    Smoking status: Current Every Day Smoker     Packs/day: 1.00     Years: 49.00     Pack years: 49.00     Types: Cigarettes     Start date: 7/25/1969    Smokeless tobacco: Never Used    Tobacco comment: decreased his smoking to 1 pack per day 5 months ago    Substance Use Topics    Alcohol use: No      "Comment: has past history of alcohol abuse     Drug use: No     Comment: has past history of substance abuse         Review of Systems:  Review of Systems   Constitutional: Negative for chills and fever.   HENT: Negative for congestion, hearing loss and voice change.    Eyes: Negative for discharge and redness.   Respiratory: Positive for cough (Hx of COPD) and shortness of breath.    Cardiovascular: Positive for chest pain (Occasional). Negative for palpitations.   Gastrointestinal: Positive for constipation (Chronic, with methadone) and nausea. Negative for abdominal pain, blood in stool, diarrhea and vomiting.   Genitourinary: Negative for difficulty urinating and hematuria.        R groin pain   Musculoskeletal: Negative for gait problem.   Skin: Negative for pallor.   Neurological: Negative for dizziness and weakness.   Psychiatric/Behavioral: Negative for behavioral problems.       OBJECTIVE:     Vital Signs (Most Recent)  Pulse: 66 (02/12/20 0939)  BP: (!) 175/96 (02/12/20 0939)  5' 7" (1.702 m)  61.2 kg (135 lb)     Physical Exam:  Physical Exam   Constitutional: He is oriented to person, place, and time. He appears well-developed and well-nourished. No distress.   HENT:   Head: Normocephalic and atraumatic.   Mouth/Throat: Oropharynx is clear and moist.   Eyes: Conjunctivae and EOM are normal. Right eye exhibits no discharge. Left eye exhibits no discharge.   Neck: Normal range of motion. Neck supple.   Cardiovascular: Normal rate, regular rhythm and intact distal pulses.   Pulmonary/Chest: Effort normal. No respiratory distress.   Abdominal: Soft. He exhibits no distension. There is no tenderness.   Genitourinary:   Genitourinary Comments: Minimal R groin swelling  Right inguinal hernia.  Easily reducible.  No evidence of femoral hernia bilaterally on exam  Mild tenderness to palpation of R groin   Musculoskeletal: Normal range of motion. He exhibits no deformity.   Neurological: He is alert and oriented " to person, place, and time.   Skin: Skin is warm and dry.   Psychiatric: He has a normal mood and affect. His behavior is normal.       Laboratory  Reviewed    Diagnostic Results:  Reviewed    US 2/8/20:  Impression       Reducible, small fat and small bowel containing right femoral hernia with minimal compression of the right common femoral vein.         ASSESSMENT/PLAN:     59 yo male with PMHx of COPD, HTN, drug abuse (currently on methadone) with R femoral hernia.    PLAN:Plan     - Discussed operative repair of femoral hernia, including the importance of treatment with risk of incarceration/strangulation. Explained benefits and risks of surgery, including possibility of recurrence and damage to surrounding structures. He consented for surgery today.   - Will need pre-op clearance by PCP  - F/u information will be provided postop  - Please call with questions or concerns      Darrion Lutz MD  General Surgery Resident - PGY1  Pager: [phone]        I have personally taken the history and examined this patient and agree with the resident's note as stated above.         Rolando Mauro MD

## 2020-02-12 NOTE — LETTER
February 12, 2020      KAMILAH Fuentes  [address] [facility] [facility] [address]           [facility] - General Surgery  [address]  [place] [address]  Phone: [phone]          Patient: Efra Payton III   MR Number: [id number]   YOB: 1960   Date of Visit: 2/12/2020       Dear Dr. Nitin Doss:    Thank you for referring Efra Payton to me for evaluation. Attached you will find relevant portions of my assessment and plan of care.    If you have questions, please do not hesitate to call me. I look forward to following Efra Payton along with you.    Sincerely,    Rolando Mauro Jr., MD    Enclosure  CC:  No Recipients    If you would like to receive this communication electronically, please contact [email or web address] or [phone] to request more information on [facility] access.    For providers and/or their staff who would like to refer a patient to [facility], please contact us through our one-stop-shop provider referral line, [facility], at [phone].    If you feel you have received this communication in error or would no longer like to receive these types of communications, please e-mail [email or web address]

## 2020-02-24 ENCOUNTER — TELEPHONE (OUTPATIENT)
Dept: CARDIOTHORACIC SURGERY | Facility: CLINIC | Age: 60
End: 2020-02-24

## 2020-02-24 NOTE — TELEPHONE ENCOUNTER
----- Message from Alexandra Walker sent at 2/24/2020 10:21 AM CST -----  Contact: Pt   Reason: Pt calling to speak with Daxa regard to scheduling surgery for hernia removal.    Communication: [phone]

## 2020-02-24 NOTE — TELEPHONE ENCOUNTER
Called pt. No answer, left message that someone will reach out to him regarding scheduling surgery.

## 2020-04-16 ENCOUNTER — TELEPHONE (OUTPATIENT)
Dept: SMOKING CESSATION | Facility: CLINIC | Age: 60
End: 2020-04-16

## 2020-05-15 ENCOUNTER — TELEPHONE (OUTPATIENT)
Dept: SMOKING CESSATION | Facility: CLINIC | Age: 60
End: 2020-05-15

## 2020-06-15 DIAGNOSIS — Z12.11 SPECIAL SCREENING FOR MALIGNANT NEOPLASMS, COLON: Primary | ICD-10-CM

## 2020-06-15 DIAGNOSIS — Z01.818 PRE-OP TESTING: ICD-10-CM

## 2020-06-15 RX ORDER — POLYETHYLENE GLYCOL 3350, SODIUM SULFATE ANHYDROUS, SODIUM BICARBONATE, SODIUM CHLORIDE, POTASSIUM CHLORIDE 236; 22.74; 6.74; 5.86; 2.97 G/4L; G/4L; G/4L; G/4L; G/4L
4 POWDER, FOR SOLUTION ORAL ONCE
Qty: 4000 ML | Refills: 0 | Status: SHIPPED | OUTPATIENT
Start: 2020-06-15 | End: 2020-06-20

## 2020-07-05 ENCOUNTER — LAB VISIT (OUTPATIENT)
Dept: SPORTS MEDICINE | Facility: CLINIC | Age: 60
End: 2020-07-05
Payer: COMMERCIAL

## 2020-07-05 DIAGNOSIS — Z01.818 PRE-OP TESTING: ICD-10-CM

## 2020-07-05 PROCEDURE — U0003 INFECTIOUS AGENT DETECTION BY NUCLEIC ACID (DNA OR RNA); SEVERE ACUTE RESPIRATORY SYNDROME CORONAVIRUS 2 (SARS-COV-2) (CORONAVIRUS DISEASE [COVID-19]), AMPLIFIED PROBE TECHNIQUE, MAKING USE OF HIGH THROUGHPUT TECHNOLOGIES AS DESCRIBED BY CMS-2020-01-R: HCPCS

## 2020-07-05 NOTE — PROGRESS NOTES
COVID Screening Specimen Collected    POSTIVE for the following symptoms:  None    NEGATIVE for the following symptoms:  Fever  Cough  Shortness of breath  Difficulty breathing  GI symptoms such as diarrhea or nausea  Loss of taste  Loss of smell    Patient was given:  1.Instructions for Patients Awaiting COVID-19 Test Results  2. CDC: What to do if you are sick with coronavirus disease 2019 (COVID-19)       Speaking Coherently

## 2020-07-06 LAB — SARS-COV-2 RNA RESP QL NAA+PROBE: NOT DETECTED

## 2020-07-15 ENCOUNTER — TELEPHONE (OUTPATIENT)
Dept: SURGERY | Facility: CLINIC | Age: 60
End: 2020-07-15

## 2020-07-15 NOTE — TELEPHONE ENCOUNTER
Pt to call back once he discusses details.  He is doing fine w/ slight pain, but is worried about covid.

## 2020-07-15 NOTE — TELEPHONE ENCOUNTER
----- Message from Silverio Covarrubias sent at 7/15/2020 11:21 AM CDT -----  Patient called to speak w/ someone regarding scheduling for hernia surgery,requesting callback     Callback: [phone]

## 2020-09-10 ENCOUNTER — TELEPHONE (OUTPATIENT)
Dept: SMOKING CESSATION | Facility: CLINIC | Age: 60
End: 2020-09-10

## 2020-10-06 ENCOUNTER — TELEPHONE (OUTPATIENT)
Dept: SURGERY | Facility: CLINIC | Age: 60
End: 2020-10-06

## 2020-10-07 ENCOUNTER — TELEPHONE (OUTPATIENT)
Dept: SMOKING CESSATION | Facility: CLINIC | Age: 60
End: 2020-10-07

## 2020-10-16 ENCOUNTER — OFFICE VISIT (OUTPATIENT)
Dept: SURGERY | Facility: CLINIC | Age: 60
End: 2020-10-16
Payer: COMMERCIAL

## 2020-10-16 VITALS
HEIGHT: 67 IN | WEIGHT: 144.5 LBS | SYSTOLIC BLOOD PRESSURE: 135 MMHG | BODY MASS INDEX: 22.68 KG/M2 | HEART RATE: 40 BPM | DIASTOLIC BLOOD PRESSURE: 73 MMHG

## 2020-10-16 DIAGNOSIS — I10 HYPERTENSION, UNSPECIFIED TYPE: ICD-10-CM

## 2020-10-16 DIAGNOSIS — J44.9 CHRONIC OBSTRUCTIVE PULMONARY DISEASE, UNSPECIFIED COPD TYPE: ICD-10-CM

## 2020-10-16 DIAGNOSIS — Z01.810 PRE-OPERATIVE CARDIOVASCULAR EXAMINATION: ICD-10-CM

## 2020-10-16 DIAGNOSIS — K41.90 FEMORAL HERNIA OF RIGHT SIDE: Primary | ICD-10-CM

## 2020-10-16 DIAGNOSIS — Z01.818 PREOP EXAMINATION: ICD-10-CM

## 2020-10-16 DIAGNOSIS — F17.200 SMOKER: ICD-10-CM

## 2020-10-16 PROBLEM — K40.90 RIGHT INGUINAL HERNIA: Status: ACTIVE | Noted: 2020-10-16

## 2020-10-16 PROCEDURE — 99999 PR PBB SHADOW E&M-EST. PATIENT-LVL III: ICD-10-PCS | Mod: PBBFAC,,, | Performed by: SURGERY

## 2020-10-16 PROCEDURE — 99213 OFFICE O/P EST LOW 20 MIN: CPT | Mod: S$GLB,,, | Performed by: SURGERY

## 2020-10-16 PROCEDURE — 3078F PR MOST RECENT DIASTOLIC BLOOD PRESSURE < 80 MM HG: ICD-10-PCS | Mod: CPTII,S$GLB,, | Performed by: SURGERY

## 2020-10-16 PROCEDURE — 3075F SYST BP GE 130 - 139MM HG: CPT | Mod: CPTII,S$GLB,, | Performed by: SURGERY

## 2020-10-16 PROCEDURE — 3075F PR MOST RECENT SYSTOLIC BLOOD PRESS GE 130-139MM HG: ICD-10-PCS | Mod: CPTII,S$GLB,, | Performed by: SURGERY

## 2020-10-16 PROCEDURE — 3008F BODY MASS INDEX DOCD: CPT | Mod: CPTII,S$GLB,, | Performed by: SURGERY

## 2020-10-16 PROCEDURE — 3008F PR BODY MASS INDEX (BMI) DOCUMENTED: ICD-10-PCS | Mod: CPTII,S$GLB,, | Performed by: SURGERY

## 2020-10-16 PROCEDURE — 3078F DIAST BP <80 MM HG: CPT | Mod: CPTII,S$GLB,, | Performed by: SURGERY

## 2020-10-16 PROCEDURE — 99999 PR PBB SHADOW E&M-EST. PATIENT-LVL III: CPT | Mod: PBBFAC,,, | Performed by: SURGERY

## 2020-10-16 PROCEDURE — 99213 PR OFFICE/OUTPT VISIT, EST, LEVL III, 20-29 MIN: ICD-10-PCS | Mod: S$GLB,,, | Performed by: SURGERY

## 2020-10-16 NOTE — PROGRESS NOTES
GENERAL SURGERY CLINIC NOTE     Efra Payton III is a 60 y.o. male with history of COPD (current smoker ~1ppd), HTN, Hx of narcotics abuse (on methadone 115 mg/day) who presents to clinic today to schedule surgery for R femoral hernia.      Pt was evaluated in 2/2020 for R femoral hernia evaluation but had not scheduled repair due to COVID.  He reports worsening R sided groin pain especially with prolonged activity. The pain radiates to his R hip and down the R anteromedial leg.      Ultrasound on 2/8/2020 showed a reducible small fat and small bowel containing R femoral hernia with minimal compression of R common femoral vein.          ROS:   Review of Systems   Constitutional: Negative for fever.   Respiratory: Positive for cough and shortness of breath.    Cardiovascular: Negative for chest pain.   Gastrointestinal: Positive for constipation and nausea. Negative for diarrhea and vomiting.        Straining during bowel movements due to chronic constipation   Genitourinary: Positive for frequency.        Straining while voiding and difficulty initiating     Neurological: Positive for headaches.   Endo/Heme/Allergies: Bruises/bleeds easily.   Psychiatric/Behavioral: Positive for substance abuse.              Past Medical History:   Diagnosis Date    COPD (chronic obstructive pulmonary disease)      Drug abuse      Eczema      Substance abuse              Past Surgical History:   Procedure Laterality Date    arm surgery        COLONOSCOPY N/A 7/8/2020     Procedure: COLONOSCOPY;  Surgeon: Mona Powell MD;  Location: [facility] ([address] [place] [address]);  Service: Endoscopy;  Laterality: N/A;  covid [place]-7/5-tb-hx copd    HAND SURGERY          Social History               Socioeconomic History    Marital status:        Spouse name: Not on file    Number of children: Not on file    Years of education: Not on file    Highest education level: Not on file   Occupational History    Not on file   Social Needs     Financial resource strain: Not on file    Food insecurity       Worry: Not on file       Inability: Not on file    Transportation needs       Medical: Not on file       Non-medical: Not on file   Tobacco Use    Smoking status: Current Every Day Smoker       Packs/day: 1.00       Years: 49.00       Pack years: 49.00       Types: Cigarettes       Start date: 7/25/1969    Smokeless tobacco: Never Used    Tobacco comment: decreased his smoking to 1 pack per day 5 months ago    Substance and Sexual Activity    Alcohol use: No       Comment: has past history of alcohol abuse     Drug use: No       Comment: has past history of substance abuse     Sexual activity: Not on file   Lifestyle    Physical activity       Days per week: Not on file       Minutes per session: Not on file    Stress: Not on file   Relationships    Social connections       Talks on phone: Not on file       Gets together: Not on file       Attends Sikhism service: Not on file       Active member of club or organization: Not on file       Attends meetings of clubs or organizations: Not on file       Relationship status: Not on file   Other Topics Concern    Not on file   Social History Narrative    Not on file        Review of patient's allergies indicates:  No Known Allergies        PHYSICAL EXAM:      Vitals:     10/16/20 0915   BP: 135/73   Pulse: (!) 40      Physical Exam  Constitutional:       Appearance: Normal appearance. He is normal weight.   Neck:      Musculoskeletal: Neck supple.   Cardiovascular:      Rate and Rhythm: Normal rate and regular rhythm.      Pulses: Normal pulses.      Heart sounds: Normal heart sounds.   Pulmonary:      Comments: Coughing with deep inspiration   Abdominal:      General: Bowel sounds are normal.      Hernia: A hernia is present.      Comments: Right sided groin bulge; telangiectasias    Neurological:      Mental Status: He is alert.             PERTINENT LABS:  Reviewed. None.        PERTINENT  IMAGING:  Ultrasound 2/8/2020: reducible small fat and small bowel containing R femoral hernia with minimal compression of R common femoral vein.        ASSESSMENT/PLAN:  60 y.o. male with R femoral hernia and possible R inguinal hernia.      - Lap R femoral hernia repair  - Will obtain consent AM of surgery  - Patient would like to go at the end of November

## 2020-10-16 NOTE — MEDICAL/APP STUDENT
GENERAL SURGERY CLINIC NOTE    Efra Payton III is a 60 y.o. male with history of COPD (current smoker ~1ppd), HTN, Hx of narcotics abuse (on methadone 115 mg/day) who presents to clinic today to schedule surgery for R femoral hernia.     Pt was evaluated in 2/2020 for R femoral hernia evaluation but had not scheduled repair due to COVID.  He reports worsening R sided groin pain especially with prolonged activity. The pain radiates to his R hip and down the R anteromedial leg.     Ultrasound on 2/8/2020 showed a reducible small fat and small bowel containing R femoral hernia with minimal compression of R common femoral vein.        ROS:   Review of Systems   Constitutional: Negative for fever.   Respiratory: Positive for cough and shortness of breath.    Cardiovascular: Negative for chest pain.   Gastrointestinal: Positive for constipation and nausea. Negative for diarrhea and vomiting.        Straining during bowel movements due to chronic constipation   Genitourinary: Positive for frequency.        Straining while voiding and difficulty initiating     Neurological: Positive for headaches.   Endo/Heme/Allergies: Bruises/bleeds easily.   Psychiatric/Behavioral: Positive for substance abuse.       Past Medical History:   Diagnosis Date    COPD (chronic obstructive pulmonary disease)     Drug abuse     Eczema     Substance abuse      Past Surgical History:   Procedure Laterality Date    arm surgery      COLONOSCOPY N/A 7/8/2020    Procedure: COLONOSCOPY;  Surgeon: Mona Powell MD;  Location: [facility] ([address] [place] [address]);  Service: Endoscopy;  Laterality: N/A;  covid [place]-7/5-tb-hx copd    HAND SURGERY       Social History     Socioeconomic History    Marital status:      Spouse name: Not on file    Number of children: Not on file    Years of education: Not on file    Highest education level: Not on file   Occupational History    Not on file   Social Needs    Financial resource strain: Not on file     Food insecurity     Worry: Not on file     Inability: Not on file    Transportation needs     Medical: Not on file     Non-medical: Not on file   Tobacco Use    Smoking status: Current Every Day Smoker     Packs/day: 1.00     Years: 49.00     Pack years: 49.00     Types: Cigarettes     Start date: 7/25/1969    Smokeless tobacco: Never Used    Tobacco comment: decreased his smoking to 1 pack per day 5 months ago    Substance and Sexual Activity    Alcohol use: No     Comment: has past history of alcohol abuse     Drug use: No     Comment: has past history of substance abuse     Sexual activity: Not on file   Lifestyle    Physical activity     Days per week: Not on file     Minutes per session: Not on file    Stress: Not on file   Relationships    Social connections     Talks on phone: Not on file     Gets together: Not on file     Attends Jainism service: Not on file     Active member of club or organization: Not on file     Attends meetings of clubs or organizations: Not on file     Relationship status: Not on file   Other Topics Concern    Not on file   Social History Narrative    Not on file     Review of patient's allergies indicates:  No Known Allergies      PHYSICAL EXAM:  Vitals:    10/16/20 0915   BP: 135/73   Pulse: (!) 40     Physical Exam  Constitutional:       Appearance: Normal appearance. He is normal weight.   Neck:      Musculoskeletal: Neck supple.   Cardiovascular:      Rate and Rhythm: Normal rate and regular rhythm.      Pulses: Normal pulses.      Heart sounds: Normal heart sounds.   Pulmonary:      Comments: Coughing with deep inspiration   Abdominal:      General: Bowel sounds are normal.      Hernia: A hernia is present.      Comments: Right sided groin bulge; telangiectasias    Neurological:      Mental Status: He is alert.           PERTINENT LABS:  Reviewed. None.      PERTINENT IMAGING:  Ultrasound 2/8/2020: reducible small fat and small bowel containing R femoral hernia  with minimal compression of R common femoral vein.      ASSESSMENT/PLAN:  60 y.o. male with R femoral hernia and possible R inguinal hernia.     - R femoral hernia repair      [facility]  Medical Student 3

## 2020-11-04 ENCOUNTER — TELEPHONE (OUTPATIENT)
Dept: SURGERY | Facility: CLINIC | Age: 60
End: 2020-11-04

## 2020-11-04 NOTE — TELEPHONE ENCOUNTER
----- Message from Silverio Covarrubias sent at 11/4/2020 10:51 AM CST -----  Patient called to speak w/ someone regarding scheduling for surgery(states he'd prefer 12/3), requesting callback [phone]

## 2020-12-14 ENCOUNTER — OFFICE VISIT (OUTPATIENT)
Dept: SURGERY | Facility: CLINIC | Age: 60
End: 2020-12-14
Payer: COMMERCIAL

## 2020-12-14 VITALS
WEIGHT: 144.5 LBS | HEART RATE: 70 BPM | SYSTOLIC BLOOD PRESSURE: 206 MMHG | HEIGHT: 67 IN | DIASTOLIC BLOOD PRESSURE: 106 MMHG | BODY MASS INDEX: 22.68 KG/M2

## 2020-12-14 DIAGNOSIS — K41.90 FEMORAL HERNIA OF RIGHT SIDE: Primary | ICD-10-CM

## 2020-12-14 PROCEDURE — 3008F PR BODY MASS INDEX (BMI) DOCUMENTED: ICD-10-PCS | Mod: CPTII,S$GLB,, | Performed by: SURGERY

## 2020-12-14 PROCEDURE — 99999 PR PBB SHADOW E&M-EST. PATIENT-LVL III: CPT | Mod: PBBFAC,,, | Performed by: SURGERY

## 2020-12-14 PROCEDURE — 99212 PR OFFICE/OUTPT VISIT, EST, LEVL II, 10-19 MIN: ICD-10-PCS | Mod: S$GLB,,, | Performed by: SURGERY

## 2020-12-14 PROCEDURE — 99999 PR PBB SHADOW E&M-EST. PATIENT-LVL III: ICD-10-PCS | Mod: PBBFAC,,, | Performed by: SURGERY

## 2020-12-14 PROCEDURE — 3077F PR MOST RECENT SYSTOLIC BLOOD PRESSURE >= 140 MM HG: ICD-10-PCS | Mod: CPTII,S$GLB,, | Performed by: SURGERY

## 2020-12-14 PROCEDURE — 99212 OFFICE O/P EST SF 10 MIN: CPT | Mod: S$GLB,,, | Performed by: SURGERY

## 2020-12-14 PROCEDURE — 1126F AMNT PAIN NOTED NONE PRSNT: CPT | Mod: S$GLB,,, | Performed by: SURGERY

## 2020-12-14 PROCEDURE — 3080F DIAST BP >= 90 MM HG: CPT | Mod: CPTII,S$GLB,, | Performed by: SURGERY

## 2020-12-14 PROCEDURE — 3008F BODY MASS INDEX DOCD: CPT | Mod: CPTII,S$GLB,, | Performed by: SURGERY

## 2020-12-14 PROCEDURE — 3080F PR MOST RECENT DIASTOLIC BLOOD PRESSURE >= 90 MM HG: ICD-10-PCS | Mod: CPTII,S$GLB,, | Performed by: SURGERY

## 2020-12-14 PROCEDURE — 3077F SYST BP >= 140 MM HG: CPT | Mod: CPTII,S$GLB,, | Performed by: SURGERY

## 2020-12-14 PROCEDURE — 1126F PR PAIN SEVERITY QUANTIFIED, NO PAIN PRESENT: ICD-10-PCS | Mod: S$GLB,,, | Performed by: SURGERY

## 2020-12-14 NOTE — PROGRESS NOTES
GENERAL SURGERY CLINIC NOTE    Interval History:  Patient seen and examined in clinic today. Extensive discussion regarding upcoming laparoscopic R femoral hernia repair. Discussion regarding changing date of surgery held. Plan to reach out to methadone clinic for perioperative assessment and post-operative instructions.       HPI:   Efar Payton III is a 60 y.o. male with history of COPD (current smoker ~1ppd), HTN, Hx of narcotics abuse (on methadone 115 mg/day) who presents to clinic today to schedule surgery for R femoral hernia.      Pt was evaluated in 2/2020 for R femoral hernia evaluation but had not scheduled repair due to COVID.  He reports worsening R sided groin pain especially with prolonged activity. The pain radiates to his R hip and down the R anteromedial leg.      Ultrasound on 2/8/2020 showed a reducible small fat and small bowel containing R femoral hernia with minimal compression of R common femoral vein.          ROS:   Review of Systems   Constitutional: Negative for fever.   Respiratory: Positive for cough and shortness of breath.    Cardiovascular: Negative for chest pain.   Gastrointestinal: Positive for constipation and nausea. Negative for diarrhea and vomiting.        Straining during bowel movements due to chronic constipation   Genitourinary: Positive for frequency.        Straining while voiding and difficulty initiating     Neurological: Positive for headaches.   Endo/Heme/Allergies: Bruises/bleeds easily.   Psychiatric/Behavioral: Positive for substance abuse.                 Past Medical History:   Diagnosis Date    COPD (chronic obstructive pulmonary disease)      Drug abuse      Eczema      Substance abuse                  Past Surgical History:   Procedure Laterality Date    arm surgery        COLONOSCOPY N/A 7/8/2020     Procedure: COLONOSCOPY;  Surgeon: Mona Powell MD;  Location: [facility] ([address] [place] [address]);  Service: Endoscopy;  Laterality: N/A;  covid  [place]-7/5-tb-hx copd    HAND SURGERY          Social History                   Socioeconomic History    Marital status:        Spouse name: Not on file    Number of children: Not on file    Years of education: Not on file    Highest education level: Not on file   Occupational History    Not on file   Social Needs    Financial resource strain: Not on file    Food insecurity       Worry: Not on file       Inability: Not on file    Transportation needs       Medical: Not on file       Non-medical: Not on file   Tobacco Use    Smoking status: Current Every Day Smoker       Packs/day: 1.00       Years: 49.00       Pack years: 49.00       Types: Cigarettes       Start date: 7/25/1969    Smokeless tobacco: Never Used    Tobacco comment: decreased his smoking to 1 pack per day 5 months ago    Substance and Sexual Activity    Alcohol use: No       Comment: has past history of alcohol abuse     Drug use: No       Comment: has past history of substance abuse     Sexual activity: Not on file   Lifestyle    Physical activity       Days per week: Not on file       Minutes per session: Not on file    Stress: Not on file   Relationships    Social connections       Talks on phone: Not on file       Gets together: Not on file       Attends Congregational service: Not on file       Active member of club or organization: Not on file       Attends meetings of clubs or organizations: Not on file       Relationship status: Not on file   Other Topics Concern    Not on file   Social History Narrative    Not on file         Review of patient's allergies indicates:  No Known Allergies        PHYSICAL EXAM:        Vitals:     10/16/20 0915   BP: 135/73   Pulse: (!) 40      Physical Exam  Constitutional:       Appearance: Normal appearance. He is normal weight.   Neck:      Musculoskeletal: Neck supple.   Cardiovascular:      Rate and Rhythm: Normal rate and regular rhythm.      Pulses: Normal pulses.      Heart sounds:  Normal heart sounds.   Pulmonary:      Comments: Coughing with deep inspiration   Abdominal:      General: Bowel sounds are normal.      Hernia: A hernia is present.      Comments: Right sided groin bulge; telangiectasias    Neurological:      Mental Status: He is alert.             PERTINENT LABS:  Reviewed. None.        PERTINENT IMAGING:  Ultrasound 2/8/2020: reducible small fat and small bowel containing R femoral hernia with minimal compression of R common femoral vein.       A/P:  - plan to change date of surgery  - reach out to methadone clinic to discuss perioperative plan

## 2020-12-30 ENCOUNTER — HOSPITAL ENCOUNTER (OUTPATIENT)
Dept: CARDIOLOGY | Facility: CLINIC | Age: 60
Discharge: HOME | End: 2020-12-30
Payer: COMMERCIAL

## 2020-12-30 ENCOUNTER — HOSPITAL ENCOUNTER (OUTPATIENT)
Dept: RADIOLOGY | Facility: HOSPITAL | Age: 60
Discharge: HOME | End: 2020-12-30
Attending: SURGERY
Payer: COMMERCIAL

## 2020-12-30 DIAGNOSIS — J44.9 CHRONIC OBSTRUCTIVE PULMONARY DISEASE, UNSPECIFIED COPD TYPE: ICD-10-CM

## 2020-12-30 DIAGNOSIS — I10 HYPERTENSION, UNSPECIFIED TYPE: ICD-10-CM

## 2020-12-30 DIAGNOSIS — F17.200 SMOKER: ICD-10-CM

## 2020-12-30 DIAGNOSIS — Z01.810 PRE-OPERATIVE CARDIOVASCULAR EXAMINATION: ICD-10-CM

## 2020-12-30 DIAGNOSIS — Z01.818 PREOP EXAMINATION: ICD-10-CM

## 2020-12-30 PROCEDURE — 93005 ELECTROCARDIOGRAM TRACING: CPT | Mod: S$GLB,,, | Performed by: SURGERY

## 2020-12-30 PROCEDURE — 71046 X-RAY EXAM CHEST 2 VIEWS: CPT | Mod: 26,,, | Performed by: RADIOLOGY

## 2020-12-30 PROCEDURE — 93010 ELECTROCARDIOGRAM REPORT: CPT | Mod: S$GLB,,, | Performed by: INTERNAL MEDICINE

## 2020-12-30 PROCEDURE — 93005 EKG 12-LEAD: ICD-10-PCS | Mod: S$GLB,,, | Performed by: SURGERY

## 2020-12-30 PROCEDURE — 71046 XR CHEST PA AND LATERAL: ICD-10-PCS | Mod: 26,,, | Performed by: RADIOLOGY

## 2020-12-30 PROCEDURE — 93010 EKG 12-LEAD: ICD-10-PCS | Mod: S$GLB,,, | Performed by: INTERNAL MEDICINE

## 2020-12-30 PROCEDURE — 71046 X-RAY EXAM CHEST 2 VIEWS: CPT | Mod: TC,FY

## 2020-12-31 ENCOUNTER — LAB VISIT (OUTPATIENT)
Dept: LAB | Facility: HOSPITAL | Age: 60
End: 2020-12-31
Attending: INTERNAL MEDICINE
Payer: COMMERCIAL

## 2020-12-31 ENCOUNTER — OFFICE VISIT (OUTPATIENT)
Dept: INTERNAL MEDICINE | Facility: CLINIC | Age: 60
End: 2020-12-31
Payer: COMMERCIAL

## 2020-12-31 VITALS
HEIGHT: 67 IN | DIASTOLIC BLOOD PRESSURE: 104 MMHG | WEIGHT: 144.63 LBS | HEART RATE: 74 BPM | OXYGEN SATURATION: 97 % | SYSTOLIC BLOOD PRESSURE: 162 MMHG | BODY MASS INDEX: 22.7 KG/M2

## 2020-12-31 DIAGNOSIS — I10 ESSENTIAL HYPERTENSION: Primary | ICD-10-CM

## 2020-12-31 DIAGNOSIS — F11.20 METHADONE MAINTENANCE THERAPY PATIENT: ICD-10-CM

## 2020-12-31 DIAGNOSIS — Z12.11 SCREENING FOR MALIGNANT NEOPLASM OF COLON: ICD-10-CM

## 2020-12-31 DIAGNOSIS — I10 ESSENTIAL HYPERTENSION: ICD-10-CM

## 2020-12-31 DIAGNOSIS — F17.210 NICOTINE DEPENDENCE, CIGARETTES, UNCOMPLICATED: ICD-10-CM

## 2020-12-31 DIAGNOSIS — J44.9 CHRONIC OBSTRUCTIVE PULMONARY DISEASE, UNSPECIFIED COPD TYPE: ICD-10-CM

## 2020-12-31 PROBLEM — Z01.810 PRE-OPERATIVE CARDIOVASCULAR EXAMINATION: Status: RESOLVED | Noted: 2019-09-17 | Resolved: 2020-12-31

## 2020-12-31 LAB
ALBUMIN SERPL BCP-MCNC: 4 G/DL (ref 3.5–5.2)
ALP SERPL-CCNC: 65 U/L (ref 55–135)
ALT SERPL W/O P-5'-P-CCNC: 20 U/L (ref 10–44)
ANION GAP SERPL CALC-SCNC: 7 MMOL/L (ref 8–16)
AST SERPL-CCNC: 30 U/L (ref 10–40)
BILIRUB SERPL-MCNC: 0.3 MG/DL (ref 0.1–1)
BUN SERPL-MCNC: 8 MG/DL (ref 6–20)
CALCIUM SERPL-MCNC: 9.1 MG/DL (ref 8.7–10.5)
CHLORIDE SERPL-SCNC: 101 MMOL/L (ref 95–110)
CO2 SERPL-SCNC: 31 MMOL/L (ref 23–29)
CREAT SERPL-MCNC: 0.9 MG/DL (ref 0.5–1.4)
EST. GFR  (AFRICAN AMERICAN): >60 ML/MIN/1.73 M^2
EST. GFR  (NON AFRICAN AMERICAN): >60 ML/MIN/1.73 M^2
GLUCOSE SERPL-MCNC: 88 MG/DL (ref 70–110)
HCV AB SERPL QL IA: POSITIVE
HIV 1+2 AB+HIV1 P24 AG SERPL QL IA: NEGATIVE
POTASSIUM SERPL-SCNC: 4.4 MMOL/L (ref 3.5–5.1)
PROT SERPL-MCNC: 7.8 G/DL (ref 6–8.4)
SODIUM SERPL-SCNC: 139 MMOL/L (ref 136–145)

## 2020-12-31 PROCEDURE — 3077F PR MOST RECENT SYSTOLIC BLOOD PRESSURE >= 140 MM HG: ICD-10-PCS | Mod: CPTII,S$GLB,, | Performed by: INTERNAL MEDICINE

## 2020-12-31 PROCEDURE — 3008F BODY MASS INDEX DOCD: CPT | Mod: CPTII,S$GLB,, | Performed by: INTERNAL MEDICINE

## 2020-12-31 PROCEDURE — 1126F AMNT PAIN NOTED NONE PRSNT: CPT | Mod: S$GLB,,, | Performed by: INTERNAL MEDICINE

## 2020-12-31 PROCEDURE — 99214 PR OFFICE/OUTPT VISIT, EST, LEVL IV, 30-39 MIN: ICD-10-PCS | Mod: S$GLB,,, | Performed by: INTERNAL MEDICINE

## 2020-12-31 PROCEDURE — 86703 HIV-1/HIV-2 1 RESULT ANTBDY: CPT

## 2020-12-31 PROCEDURE — 80053 COMPREHEN METABOLIC PANEL: CPT

## 2020-12-31 PROCEDURE — 3008F PR BODY MASS INDEX (BMI) DOCUMENTED: ICD-10-PCS | Mod: CPTII,S$GLB,, | Performed by: INTERNAL MEDICINE

## 2020-12-31 PROCEDURE — 99999 PR PBB SHADOW E&M-EST. PATIENT-LVL III: ICD-10-PCS | Mod: PBBFAC,,, | Performed by: INTERNAL MEDICINE

## 2020-12-31 PROCEDURE — 3077F SYST BP >= 140 MM HG: CPT | Mod: CPTII,S$GLB,, | Performed by: INTERNAL MEDICINE

## 2020-12-31 PROCEDURE — 99999 PR PBB SHADOW E&M-EST. PATIENT-LVL III: CPT | Mod: PBBFAC,,, | Performed by: INTERNAL MEDICINE

## 2020-12-31 PROCEDURE — 86803 HEPATITIS C AB TEST: CPT

## 2020-12-31 PROCEDURE — 1126F PR PAIN SEVERITY QUANTIFIED, NO PAIN PRESENT: ICD-10-PCS | Mod: S$GLB,,, | Performed by: INTERNAL MEDICINE

## 2020-12-31 PROCEDURE — 99214 OFFICE O/P EST MOD 30 MIN: CPT | Mod: S$GLB,,, | Performed by: INTERNAL MEDICINE

## 2020-12-31 PROCEDURE — 3080F DIAST BP >= 90 MM HG: CPT | Mod: CPTII,S$GLB,, | Performed by: INTERNAL MEDICINE

## 2020-12-31 PROCEDURE — 3080F PR MOST RECENT DIASTOLIC BLOOD PRESSURE >= 90 MM HG: ICD-10-PCS | Mod: CPTII,S$GLB,, | Performed by: INTERNAL MEDICINE

## 2020-12-31 PROCEDURE — 36415 COLL VENOUS BLD VENIPUNCTURE: CPT

## 2020-12-31 RX ORDER — BUPROPION HYDROCHLORIDE 150 MG/1
150 TABLET, EXTENDED RELEASE ORAL DAILY
Qty: 90 TABLET | Refills: 3 | Status: SHIPPED | OUTPATIENT
Start: 2020-12-31 | End: 2021-11-04 | Stop reason: SDUPTHER

## 2020-12-31 RX ORDER — AMLODIPINE BESYLATE 5 MG/1
5 TABLET ORAL DAILY
Qty: 90 TABLET | Refills: 3 | Status: SHIPPED | OUTPATIENT
Start: 2020-12-31 | End: 2021-01-12 | Stop reason: SDUPTHER

## 2021-01-04 ENCOUNTER — LAB VISIT (OUTPATIENT)
Dept: INTERNAL MEDICINE | Facility: CLINIC | Age: 61
End: 2021-01-04
Payer: COMMERCIAL

## 2021-01-04 ENCOUNTER — TELEPHONE (OUTPATIENT)
Dept: INTERNAL MEDICINE | Facility: CLINIC | Age: 61
End: 2021-01-04

## 2021-01-04 DIAGNOSIS — Z01.818 PREOP EXAMINATION: ICD-10-CM

## 2021-01-04 DIAGNOSIS — R76.8 HEPATITIS C ANTIBODY TEST POSITIVE: Primary | ICD-10-CM

## 2021-01-04 DIAGNOSIS — K41.90 FEMORAL HERNIA OF RIGHT SIDE: ICD-10-CM

## 2021-01-04 PROCEDURE — U0003 INFECTIOUS AGENT DETECTION BY NUCLEIC ACID (DNA OR RNA); SEVERE ACUTE RESPIRATORY SYNDROME CORONAVIRUS 2 (SARS-COV-2) (CORONAVIRUS DISEASE [COVID-19]), AMPLIFIED PROBE TECHNIQUE, MAKING USE OF HIGH THROUGHPUT TECHNOLOGIES AS DESCRIBED BY CMS-2020-01-R: HCPCS

## 2021-01-05 ENCOUNTER — LAB VISIT (OUTPATIENT)
Dept: LAB | Facility: HOSPITAL | Age: 61
End: 2021-01-05
Attending: INTERNAL MEDICINE
Payer: COMMERCIAL

## 2021-01-05 DIAGNOSIS — R76.8 HEPATITIS C ANTIBODY TEST POSITIVE: ICD-10-CM

## 2021-01-05 LAB — SARS-COV-2 RNA RESP QL NAA+PROBE: NOT DETECTED

## 2021-01-05 PROCEDURE — 36415 COLL VENOUS BLD VENIPUNCTURE: CPT

## 2021-01-05 PROCEDURE — 87522 HEPATITIS C REVRS TRNSCRPJ: CPT

## 2021-01-06 ENCOUNTER — TELEPHONE (OUTPATIENT)
Dept: SURGERY | Facility: CLINIC | Age: 61
End: 2021-01-06

## 2021-01-07 ENCOUNTER — DOCUMENTATION ONLY (OUTPATIENT)
Dept: SURGERY | Facility: CLINIC | Age: 61
End: 2021-01-07

## 2021-01-07 ENCOUNTER — TELEPHONE (OUTPATIENT)
Dept: INTERNAL MEDICINE | Facility: CLINIC | Age: 61
End: 2021-01-07

## 2021-01-07 DIAGNOSIS — B18.2 CHRONIC HEPATITIS C WITHOUT HEPATIC COMA: Primary | ICD-10-CM

## 2021-01-07 LAB
HCV RNA SERPL NAA+PROBE-LOG IU: 6.87 LOG (10) IU/ML
HCV RNA SERPL QL NAA+PROBE: DETECTED IU/ML
HCV RNA SPEC NAA+PROBE-ACNC: ABNORMAL IU/ML

## 2021-01-08 ENCOUNTER — TELEPHONE (OUTPATIENT)
Dept: INTERNAL MEDICINE | Facility: CLINIC | Age: 61
End: 2021-01-08

## 2021-01-11 ENCOUNTER — TELEPHONE (OUTPATIENT)
Dept: HEPATOLOGY | Facility: CLINIC | Age: 61
End: 2021-01-11

## 2021-01-12 ENCOUNTER — OFFICE VISIT (OUTPATIENT)
Dept: INTERNAL MEDICINE | Facility: CLINIC | Age: 61
End: 2021-01-12
Payer: COMMERCIAL

## 2021-01-12 VITALS
DIASTOLIC BLOOD PRESSURE: 80 MMHG | SYSTOLIC BLOOD PRESSURE: 150 MMHG | HEIGHT: 67 IN | WEIGHT: 147.25 LBS | BODY MASS INDEX: 23.11 KG/M2 | OXYGEN SATURATION: 97 % | HEART RATE: 86 BPM

## 2021-01-12 DIAGNOSIS — I10 ESSENTIAL HYPERTENSION: Primary | ICD-10-CM

## 2021-01-12 DIAGNOSIS — F17.200 SMOKER: ICD-10-CM

## 2021-01-12 DIAGNOSIS — B18.2 CHRONIC ACTIVE HEPATITIS C: ICD-10-CM

## 2021-01-12 DIAGNOSIS — J44.9 CHRONIC OBSTRUCTIVE PULMONARY DISEASE, UNSPECIFIED COPD TYPE: ICD-10-CM

## 2021-01-12 DIAGNOSIS — F11.20 METHADONE MAINTENANCE THERAPY PATIENT: ICD-10-CM

## 2021-01-12 PROCEDURE — 1125F AMNT PAIN NOTED PAIN PRSNT: CPT | Mod: S$GLB,,, | Performed by: INTERNAL MEDICINE

## 2021-01-12 PROCEDURE — 3079F PR MOST RECENT DIASTOLIC BLOOD PRESSURE 80-89 MM HG: ICD-10-PCS | Mod: CPTII,S$GLB,, | Performed by: INTERNAL MEDICINE

## 2021-01-12 PROCEDURE — 99999 PR PBB SHADOW E&M-EST. PATIENT-LVL III: CPT | Mod: PBBFAC,,, | Performed by: INTERNAL MEDICINE

## 2021-01-12 PROCEDURE — 3008F PR BODY MASS INDEX (BMI) DOCUMENTED: ICD-10-PCS | Mod: CPTII,S$GLB,, | Performed by: INTERNAL MEDICINE

## 2021-01-12 PROCEDURE — 3008F BODY MASS INDEX DOCD: CPT | Mod: CPTII,S$GLB,, | Performed by: INTERNAL MEDICINE

## 2021-01-12 PROCEDURE — 1125F PR PAIN SEVERITY QUANTIFIED, PAIN PRESENT: ICD-10-PCS | Mod: S$GLB,,, | Performed by: INTERNAL MEDICINE

## 2021-01-12 PROCEDURE — 3079F DIAST BP 80-89 MM HG: CPT | Mod: CPTII,S$GLB,, | Performed by: INTERNAL MEDICINE

## 2021-01-12 PROCEDURE — 99214 OFFICE O/P EST MOD 30 MIN: CPT | Mod: S$GLB,,, | Performed by: INTERNAL MEDICINE

## 2021-01-12 PROCEDURE — 3077F SYST BP >= 140 MM HG: CPT | Mod: CPTII,S$GLB,, | Performed by: INTERNAL MEDICINE

## 2021-01-12 PROCEDURE — 3077F PR MOST RECENT SYSTOLIC BLOOD PRESSURE >= 140 MM HG: ICD-10-PCS | Mod: CPTII,S$GLB,, | Performed by: INTERNAL MEDICINE

## 2021-01-12 PROCEDURE — 99214 PR OFFICE/OUTPT VISIT, EST, LEVL IV, 30-39 MIN: ICD-10-PCS | Mod: S$GLB,,, | Performed by: INTERNAL MEDICINE

## 2021-01-12 PROCEDURE — 99999 PR PBB SHADOW E&M-EST. PATIENT-LVL III: ICD-10-PCS | Mod: PBBFAC,,, | Performed by: INTERNAL MEDICINE

## 2021-01-12 RX ORDER — AMLODIPINE BESYLATE 5 MG/1
10 TABLET ORAL DAILY
Qty: 90 TABLET | Refills: 3 | Status: SHIPPED | OUTPATIENT
Start: 2021-01-12 | End: 2021-01-12 | Stop reason: SDUPTHER

## 2021-01-12 RX ORDER — AMLODIPINE BESYLATE 10 MG/1
10 TABLET ORAL DAILY
Qty: 90 TABLET | Refills: 3 | Status: SHIPPED | OUTPATIENT
Start: 2021-01-12 | End: 2021-01-12 | Stop reason: SDUPTHER

## 2021-01-12 RX ORDER — AMLODIPINE BESYLATE 10 MG/1
10 TABLET ORAL DAILY
Qty: 90 TABLET | Refills: 3 | Status: SHIPPED | OUTPATIENT
Start: 2021-01-12 | End: 2021-11-11 | Stop reason: SDUPTHER

## 2021-01-13 ENCOUNTER — TELEPHONE (OUTPATIENT)
Dept: HEPATOLOGY | Facility: CLINIC | Age: 61
End: 2021-01-13

## 2021-01-29 ENCOUNTER — PATIENT OUTREACH (OUTPATIENT)
Dept: ADMINISTRATIVE | Facility: HOSPITAL | Age: 61
End: 2021-01-29

## 2021-02-01 ENCOUNTER — LAB VISIT (OUTPATIENT)
Dept: LAB | Facility: HOSPITAL | Age: 61
End: 2021-02-01
Payer: COMMERCIAL

## 2021-02-01 ENCOUNTER — OFFICE VISIT (OUTPATIENT)
Dept: HEPATOLOGY | Facility: CLINIC | Age: 61
End: 2021-02-01
Payer: COMMERCIAL

## 2021-02-01 VITALS
WEIGHT: 148.13 LBS | SYSTOLIC BLOOD PRESSURE: 129 MMHG | HEART RATE: 84 BPM | RESPIRATION RATE: 16 BRPM | HEIGHT: 67 IN | BODY MASS INDEX: 23.25 KG/M2 | TEMPERATURE: 97 F | DIASTOLIC BLOOD PRESSURE: 77 MMHG

## 2021-02-01 DIAGNOSIS — B18.2 CHRONIC HEPATITIS C WITHOUT HEPATIC COMA: ICD-10-CM

## 2021-02-01 DIAGNOSIS — D69.6 THROMBOCYTOPENIA: Primary | ICD-10-CM

## 2021-02-01 LAB
HBV CORE AB SERPL QL IA: POSITIVE
HBV SURFACE AB SER-ACNC: NEGATIVE M[IU]/ML
HBV SURFACE AG SERPL QL IA: NEGATIVE
HEPATITIS A ANTIBODY, IGG: POSITIVE

## 2021-02-01 PROCEDURE — 99999 PR PBB SHADOW E&M-EST. PATIENT-LVL IV: ICD-10-PCS | Mod: PBBFAC,,, | Performed by: PHYSICIAN ASSISTANT

## 2021-02-01 PROCEDURE — 99203 PR OFFICE/OUTPT VISIT, NEW, LEVL III, 30-44 MIN: ICD-10-PCS | Mod: S$GLB,,, | Performed by: PHYSICIAN ASSISTANT

## 2021-02-01 PROCEDURE — 1126F PR PAIN SEVERITY QUANTIFIED, NO PAIN PRESENT: ICD-10-PCS | Mod: S$GLB,,, | Performed by: PHYSICIAN ASSISTANT

## 2021-02-01 PROCEDURE — 3008F BODY MASS INDEX DOCD: CPT | Mod: CPTII,S$GLB,, | Performed by: PHYSICIAN ASSISTANT

## 2021-02-01 PROCEDURE — 1126F AMNT PAIN NOTED NONE PRSNT: CPT | Mod: S$GLB,,, | Performed by: PHYSICIAN ASSISTANT

## 2021-02-01 PROCEDURE — 87902 NFCT AGT GNTYP ALYS HEP C: CPT

## 2021-02-01 PROCEDURE — 86706 HEP B SURFACE ANTIBODY: CPT

## 2021-02-01 PROCEDURE — 86704 HEP B CORE ANTIBODY TOTAL: CPT

## 2021-02-01 PROCEDURE — 3074F PR MOST RECENT SYSTOLIC BLOOD PRESSURE < 130 MM HG: ICD-10-PCS | Mod: CPTII,S$GLB,, | Performed by: PHYSICIAN ASSISTANT

## 2021-02-01 PROCEDURE — 99203 OFFICE O/P NEW LOW 30 MIN: CPT | Mod: S$GLB,,, | Performed by: PHYSICIAN ASSISTANT

## 2021-02-01 PROCEDURE — 3078F DIAST BP <80 MM HG: CPT | Mod: CPTII,S$GLB,, | Performed by: PHYSICIAN ASSISTANT

## 2021-02-01 PROCEDURE — 3078F PR MOST RECENT DIASTOLIC BLOOD PRESSURE < 80 MM HG: ICD-10-PCS | Mod: CPTII,S$GLB,, | Performed by: PHYSICIAN ASSISTANT

## 2021-02-01 PROCEDURE — 87340 HEPATITIS B SURFACE AG IA: CPT

## 2021-02-01 PROCEDURE — 86790 VIRUS ANTIBODY NOS: CPT

## 2021-02-01 PROCEDURE — 99999 PR PBB SHADOW E&M-EST. PATIENT-LVL IV: CPT | Mod: PBBFAC,,, | Performed by: PHYSICIAN ASSISTANT

## 2021-02-01 PROCEDURE — 3074F SYST BP LT 130 MM HG: CPT | Mod: CPTII,S$GLB,, | Performed by: PHYSICIAN ASSISTANT

## 2021-02-01 PROCEDURE — 3008F PR BODY MASS INDEX (BMI) DOCUMENTED: ICD-10-PCS | Mod: CPTII,S$GLB,, | Performed by: PHYSICIAN ASSISTANT

## 2021-02-03 ENCOUNTER — TELEPHONE (OUTPATIENT)
Dept: SURGERY | Facility: CLINIC | Age: 61
End: 2021-02-03

## 2021-02-08 LAB
HCV GENTYP SERPL NAA+PROBE: 3
HCV RNA SERPL NAA+PROBE-LOG IU: 6.84 LOG (10) IU/ML
HCV RNA SERPL QL NAA+PROBE: DETECTED
HCV RNA SPEC NAA+PROBE-ACNC: ABNORMAL IU/ML

## 2021-02-26 ENCOUNTER — OFFICE VISIT (OUTPATIENT)
Dept: INTERNAL MEDICINE | Facility: CLINIC | Age: 61
End: 2021-02-26
Payer: COMMERCIAL

## 2021-02-26 VITALS
OXYGEN SATURATION: 96 % | WEIGHT: 145.5 LBS | BODY MASS INDEX: 22.84 KG/M2 | SYSTOLIC BLOOD PRESSURE: 128 MMHG | HEART RATE: 92 BPM | DIASTOLIC BLOOD PRESSURE: 84 MMHG | HEIGHT: 67 IN

## 2021-02-26 DIAGNOSIS — Z12.11 SCREENING FOR MALIGNANT NEOPLASM OF COLON: ICD-10-CM

## 2021-02-26 DIAGNOSIS — K40.90 RIGHT INGUINAL HERNIA: ICD-10-CM

## 2021-02-26 DIAGNOSIS — B18.2 CHRONIC ACTIVE HEPATITIS C: ICD-10-CM

## 2021-02-26 DIAGNOSIS — J44.9 CHRONIC OBSTRUCTIVE PULMONARY DISEASE, UNSPECIFIED COPD TYPE: ICD-10-CM

## 2021-02-26 DIAGNOSIS — I10 ESSENTIAL HYPERTENSION: Primary | ICD-10-CM

## 2021-02-26 PROCEDURE — 3074F PR MOST RECENT SYSTOLIC BLOOD PRESSURE < 130 MM HG: ICD-10-PCS | Mod: CPTII,S$GLB,, | Performed by: INTERNAL MEDICINE

## 2021-02-26 PROCEDURE — 3008F BODY MASS INDEX DOCD: CPT | Mod: CPTII,S$GLB,, | Performed by: INTERNAL MEDICINE

## 2021-02-26 PROCEDURE — 99999 PR PBB SHADOW E&M-EST. PATIENT-LVL III: ICD-10-PCS | Mod: PBBFAC,,, | Performed by: INTERNAL MEDICINE

## 2021-02-26 PROCEDURE — 1126F AMNT PAIN NOTED NONE PRSNT: CPT | Mod: S$GLB,,, | Performed by: INTERNAL MEDICINE

## 2021-02-26 PROCEDURE — 3074F SYST BP LT 130 MM HG: CPT | Mod: CPTII,S$GLB,, | Performed by: INTERNAL MEDICINE

## 2021-02-26 PROCEDURE — 1126F PR PAIN SEVERITY QUANTIFIED, NO PAIN PRESENT: ICD-10-PCS | Mod: S$GLB,,, | Performed by: INTERNAL MEDICINE

## 2021-02-26 PROCEDURE — 99214 PR OFFICE/OUTPT VISIT, EST, LEVL IV, 30-39 MIN: ICD-10-PCS | Mod: S$GLB,,, | Performed by: INTERNAL MEDICINE

## 2021-02-26 PROCEDURE — 3079F DIAST BP 80-89 MM HG: CPT | Mod: CPTII,S$GLB,, | Performed by: INTERNAL MEDICINE

## 2021-02-26 PROCEDURE — 99999 PR PBB SHADOW E&M-EST. PATIENT-LVL III: CPT | Mod: PBBFAC,,, | Performed by: INTERNAL MEDICINE

## 2021-02-26 PROCEDURE — 3079F PR MOST RECENT DIASTOLIC BLOOD PRESSURE 80-89 MM HG: ICD-10-PCS | Mod: CPTII,S$GLB,, | Performed by: INTERNAL MEDICINE

## 2021-02-26 PROCEDURE — 3008F PR BODY MASS INDEX (BMI) DOCUMENTED: ICD-10-PCS | Mod: CPTII,S$GLB,, | Performed by: INTERNAL MEDICINE

## 2021-02-26 PROCEDURE — 99214 OFFICE O/P EST MOD 30 MIN: CPT | Mod: S$GLB,,, | Performed by: INTERNAL MEDICINE

## 2021-02-26 RX ORDER — ALBUTEROL SULFATE 90 UG/1
1-2 AEROSOL, METERED RESPIRATORY (INHALATION) EVERY 6 HOURS PRN
Qty: 18 G | Refills: 3 | Status: SHIPPED | OUTPATIENT
Start: 2021-02-26 | End: 2022-04-16 | Stop reason: SDUPTHER

## 2021-02-26 RX ORDER — FLUTICASONE FUROATE AND VILANTEROL TRIFENATATE 100; 25 UG/1; UG/1
1 POWDER RESPIRATORY (INHALATION) DAILY
Qty: 90 EACH | Refills: 3 | Status: SHIPPED | OUTPATIENT
Start: 2021-02-26 | End: 2021-11-11

## 2021-03-01 ENCOUNTER — TELEPHONE (OUTPATIENT)
Dept: SURGERY | Facility: CLINIC | Age: 61
End: 2021-03-01

## 2021-03-04 ENCOUNTER — SPECIALTY PHARMACY (OUTPATIENT)
Dept: PHARMACY | Facility: CLINIC | Age: 61
End: 2021-03-04

## 2021-03-04 ENCOUNTER — HOSPITAL ENCOUNTER (OUTPATIENT)
Dept: RADIOLOGY | Facility: HOSPITAL | Age: 61
Discharge: HOME | End: 2021-03-04
Attending: PHYSICIAN ASSISTANT
Payer: COMMERCIAL

## 2021-03-04 ENCOUNTER — PROCEDURE VISIT (OUTPATIENT)
Dept: HEPATOLOGY | Facility: CLINIC | Age: 61
End: 2021-03-04
Payer: COMMERCIAL

## 2021-03-04 ENCOUNTER — OFFICE VISIT (OUTPATIENT)
Dept: HEPATOLOGY | Facility: CLINIC | Age: 61
End: 2021-03-04
Payer: COMMERCIAL

## 2021-03-04 VITALS
WEIGHT: 145.5 LBS | RESPIRATION RATE: 18 BRPM | SYSTOLIC BLOOD PRESSURE: 124 MMHG | TEMPERATURE: 97 F | BODY MASS INDEX: 22.84 KG/M2 | HEART RATE: 80 BPM | HEIGHT: 67 IN | DIASTOLIC BLOOD PRESSURE: 80 MMHG | OXYGEN SATURATION: 89 %

## 2021-03-04 DIAGNOSIS — K83.8 DILATED BILE DUCT: ICD-10-CM

## 2021-03-04 DIAGNOSIS — B18.2 CHRONIC HEPATITIS C WITHOUT HEPATIC COMA: ICD-10-CM

## 2021-03-04 DIAGNOSIS — K74.60 HEPATIC CIRRHOSIS, UNSPECIFIED HEPATIC CIRRHOSIS TYPE, UNSPECIFIED WHETHER ASCITES PRESENT: ICD-10-CM

## 2021-03-04 DIAGNOSIS — B18.2 CHRONIC HEPATITIS C WITHOUT HEPATIC COMA: Primary | ICD-10-CM

## 2021-03-04 DIAGNOSIS — K86.89 PANCREATIC DUCT DILATED: ICD-10-CM

## 2021-03-04 PROCEDURE — 99215 PR OFFICE/OUTPT VISIT, EST, LEVL V, 40-54 MIN: ICD-10-PCS | Mod: S$GLB,,, | Performed by: PHYSICIAN ASSISTANT

## 2021-03-04 PROCEDURE — 91200 LIVER ELASTOGRAPHY: CPT | Mod: S$GLB,,, | Performed by: PHYSICIAN ASSISTANT

## 2021-03-04 PROCEDURE — 76700 US EXAM ABDOM COMPLETE: CPT | Mod: 26,,, | Performed by: RADIOLOGY

## 2021-03-04 PROCEDURE — 3074F PR MOST RECENT SYSTOLIC BLOOD PRESSURE < 130 MM HG: ICD-10-PCS | Mod: CPTII,S$GLB,, | Performed by: PHYSICIAN ASSISTANT

## 2021-03-04 PROCEDURE — 76700 US EXAM ABDOM COMPLETE: CPT | Mod: TC

## 2021-03-04 PROCEDURE — 3008F PR BODY MASS INDEX (BMI) DOCUMENTED: ICD-10-PCS | Mod: CPTII,S$GLB,, | Performed by: PHYSICIAN ASSISTANT

## 2021-03-04 PROCEDURE — 1126F AMNT PAIN NOTED NONE PRSNT: CPT | Mod: S$GLB,,, | Performed by: PHYSICIAN ASSISTANT

## 2021-03-04 PROCEDURE — 3008F BODY MASS INDEX DOCD: CPT | Mod: CPTII,S$GLB,, | Performed by: PHYSICIAN ASSISTANT

## 2021-03-04 PROCEDURE — 3074F SYST BP LT 130 MM HG: CPT | Mod: CPTII,S$GLB,, | Performed by: PHYSICIAN ASSISTANT

## 2021-03-04 PROCEDURE — 99999 PR PBB SHADOW E&M-EST. PATIENT-LVL IV: CPT | Mod: PBBFAC,,, | Performed by: PHYSICIAN ASSISTANT

## 2021-03-04 PROCEDURE — 3079F PR MOST RECENT DIASTOLIC BLOOD PRESSURE 80-89 MM HG: ICD-10-PCS | Mod: CPTII,S$GLB,, | Performed by: PHYSICIAN ASSISTANT

## 2021-03-04 PROCEDURE — 3079F DIAST BP 80-89 MM HG: CPT | Mod: CPTII,S$GLB,, | Performed by: PHYSICIAN ASSISTANT

## 2021-03-04 PROCEDURE — 76700 US ABDOMEN COMPLETE: ICD-10-PCS | Mod: 26,,, | Performed by: RADIOLOGY

## 2021-03-04 PROCEDURE — 91200 FIBROSCAN (VIBRATION CONTROLLED TRANSIENT ELASTOGRAPHY): ICD-10-PCS | Mod: S$GLB,,, | Performed by: PHYSICIAN ASSISTANT

## 2021-03-04 PROCEDURE — 99999 PR PBB SHADOW E&M-EST. PATIENT-LVL IV: ICD-10-PCS | Mod: PBBFAC,,, | Performed by: PHYSICIAN ASSISTANT

## 2021-03-04 PROCEDURE — 99215 OFFICE O/P EST HI 40 MIN: CPT | Mod: S$GLB,,, | Performed by: PHYSICIAN ASSISTANT

## 2021-03-04 PROCEDURE — 1126F PR PAIN SEVERITY QUANTIFIED, NO PAIN PRESENT: ICD-10-PCS | Mod: S$GLB,,, | Performed by: PHYSICIAN ASSISTANT

## 2021-03-04 RX ORDER — VELPATASVIR AND SOFOSBUVIR 100; 400 MG/1; MG/1
1 TABLET, FILM COATED ORAL DAILY
Qty: 28 TABLET | Refills: 2 | Status: SHIPPED | OUTPATIENT
Start: 2021-03-04 | End: 2021-11-11

## 2021-03-16 ENCOUNTER — TELEPHONE (OUTPATIENT)
Dept: ENDOSCOPY | Facility: HOSPITAL | Age: 61
End: 2021-03-16

## 2021-03-16 DIAGNOSIS — K83.8 DILATED BILE DUCT: ICD-10-CM

## 2021-03-21 ENCOUNTER — LAB VISIT (OUTPATIENT)
Dept: SPORTS MEDICINE | Facility: CLINIC | Age: 61
End: 2021-03-21
Payer: COMMERCIAL

## 2021-03-21 DIAGNOSIS — K83.8 DILATED BILE DUCT: ICD-10-CM

## 2021-03-21 PROCEDURE — U0003 INFECTIOUS AGENT DETECTION BY NUCLEIC ACID (DNA OR RNA); SEVERE ACUTE RESPIRATORY SYNDROME CORONAVIRUS 2 (SARS-COV-2) (CORONAVIRUS DISEASE [COVID-19]), AMPLIFIED PROBE TECHNIQUE, MAKING USE OF HIGH THROUGHPUT TECHNOLOGIES AS DESCRIBED BY CMS-2020-01-R: HCPCS | Performed by: FAMILY MEDICINE

## 2021-03-21 PROCEDURE — U0005 INFEC AGEN DETEC AMPLI PROBE: HCPCS | Performed by: FAMILY MEDICINE

## 2021-03-22 LAB — SARS-COV-2 RNA RESP QL NAA+PROBE: NOT DETECTED

## 2021-03-24 ENCOUNTER — ANESTHESIA EVENT (OUTPATIENT)
Dept: ENDOSCOPY | Facility: HOSPITAL | Age: 61
End: 2021-03-24
Payer: COMMERCIAL

## 2021-03-24 ENCOUNTER — TELEPHONE (OUTPATIENT)
Dept: HEPATOLOGY | Facility: CLINIC | Age: 61
End: 2021-03-24

## 2021-03-24 ENCOUNTER — HOSPITAL ENCOUNTER (OUTPATIENT)
Facility: HOSPITAL | Age: 61
Discharge: HOME | End: 2021-03-24
Attending: INTERNAL MEDICINE | Admitting: INTERNAL MEDICINE
Payer: COMMERCIAL

## 2021-03-24 ENCOUNTER — ANESTHESIA (OUTPATIENT)
Dept: ENDOSCOPY | Facility: HOSPITAL | Age: 61
End: 2021-03-24
Payer: COMMERCIAL

## 2021-03-24 VITALS
HEIGHT: 67 IN | DIASTOLIC BLOOD PRESSURE: 72 MMHG | TEMPERATURE: 98 F | SYSTOLIC BLOOD PRESSURE: 138 MMHG | RESPIRATION RATE: 18 BRPM | BODY MASS INDEX: 23.86 KG/M2 | OXYGEN SATURATION: 93 % | WEIGHT: 152 LBS | HEART RATE: 76 BPM

## 2021-03-24 DIAGNOSIS — K83.8 DILATED BILE DUCT: Primary | ICD-10-CM

## 2021-03-24 PROCEDURE — 43259 EGD US EXAM DUODENUM/JEJUNUM: CPT | Mod: ,,, | Performed by: INTERNAL MEDICINE

## 2021-03-24 PROCEDURE — D9220A PRA ANESTHESIA: ICD-10-PCS | Mod: ,,, | Performed by: ANESTHESIOLOGY

## 2021-03-24 PROCEDURE — 43259 EGD US EXAM DUODENUM/JEJUNUM: CPT | Performed by: INTERNAL MEDICINE

## 2021-03-24 PROCEDURE — D9220A PRA ANESTHESIA: ICD-10-PCS | Mod: ,,, | Performed by: NURSE ANESTHETIST, CERTIFIED REGISTERED

## 2021-03-24 PROCEDURE — 25000003 PHARM REV CODE 250: Performed by: INTERNAL MEDICINE

## 2021-03-24 PROCEDURE — 25000003 PHARM REV CODE 250: Performed by: NURSE ANESTHETIST, CERTIFIED REGISTERED

## 2021-03-24 PROCEDURE — 25000242 PHARM REV CODE 250 ALT 637 W/ HCPCS: Performed by: NURSE ANESTHETIST, CERTIFIED REGISTERED

## 2021-03-24 PROCEDURE — 43259 PR ENDOSCOPIC ULTRASOUND EXAM: ICD-10-PCS | Mod: ,,, | Performed by: INTERNAL MEDICINE

## 2021-03-24 PROCEDURE — D9220A PRA ANESTHESIA: Mod: ,,, | Performed by: ANESTHESIOLOGY

## 2021-03-24 PROCEDURE — 63600175 PHARM REV CODE 636 W HCPCS: Performed by: NURSE ANESTHETIST, CERTIFIED REGISTERED

## 2021-03-24 PROCEDURE — D9220A PRA ANESTHESIA: Mod: ,,, | Performed by: NURSE ANESTHETIST, CERTIFIED REGISTERED

## 2021-03-24 PROCEDURE — 37000008 HC ANESTHESIA 1ST 15 MINUTES: Performed by: INTERNAL MEDICINE

## 2021-03-24 PROCEDURE — 37000009 HC ANESTHESIA EA ADD 15 MINS: Performed by: INTERNAL MEDICINE

## 2021-03-24 RX ORDER — HYDROMORPHONE HYDROCHLORIDE 1 MG/ML
0.2 INJECTION, SOLUTION INTRAMUSCULAR; INTRAVENOUS; SUBCUTANEOUS EVERY 5 MIN PRN
Status: DISCONTINUED | OUTPATIENT
Start: 2021-03-24 | End: 2021-03-24 | Stop reason: HOSPADM

## 2021-03-24 RX ORDER — PROPOFOL 10 MG/ML
VIAL (ML) INTRAVENOUS CONTINUOUS PRN
Status: DISCONTINUED | OUTPATIENT
Start: 2021-03-24 | End: 2021-03-24

## 2021-03-24 RX ORDER — ALBUTEROL SULFATE 90 UG/1
AEROSOL, METERED RESPIRATORY (INHALATION)
Status: DISCONTINUED | OUTPATIENT
Start: 2021-03-24 | End: 2021-03-24

## 2021-03-24 RX ORDER — LIDOCAINE HYDROCHLORIDE 20 MG/ML
INJECTION INTRAVENOUS
Status: DISCONTINUED | OUTPATIENT
Start: 2021-03-24 | End: 2021-03-24

## 2021-03-24 RX ORDER — PROPOFOL 10 MG/ML
VIAL (ML) INTRAVENOUS
Status: DISCONTINUED | OUTPATIENT
Start: 2021-03-24 | End: 2021-03-24

## 2021-03-24 RX ORDER — SODIUM CHLORIDE 9 MG/ML
INJECTION, SOLUTION INTRAVENOUS CONTINUOUS
Status: DISCONTINUED | OUTPATIENT
Start: 2021-03-24 | End: 2021-03-24 | Stop reason: HOSPADM

## 2021-03-24 RX ORDER — SODIUM CHLORIDE 0.9 % (FLUSH) 0.9 %
10 SYRINGE (ML) INJECTION
Status: DISCONTINUED | OUTPATIENT
Start: 2021-03-24 | End: 2021-03-24 | Stop reason: HOSPADM

## 2021-03-24 RX ADMIN — ALBUTEROL SULFATE 6 PUFF: 108 AEROSOL, METERED RESPIRATORY (INHALATION) at 02:03

## 2021-03-24 RX ADMIN — PROPOFOL 100 MG: 10 INJECTION, EMULSION INTRAVENOUS at 02:03

## 2021-03-24 RX ADMIN — LIDOCAINE HYDROCHLORIDE 100 MG: 20 INJECTION, SOLUTION INTRAVENOUS at 02:03

## 2021-03-24 RX ADMIN — SODIUM CHLORIDE: 0.9 INJECTION, SOLUTION INTRAVENOUS at 01:03

## 2021-03-24 RX ADMIN — PROPOFOL 200 MCG/KG/MIN: 10 INJECTION, EMULSION INTRAVENOUS at 02:03

## 2021-03-25 ENCOUNTER — OFFICE VISIT (OUTPATIENT)
Dept: HEPATOLOGY | Facility: CLINIC | Age: 61
End: 2021-03-25
Payer: COMMERCIAL

## 2021-03-25 VITALS — BODY MASS INDEX: 23.1 KG/M2 | WEIGHT: 147.5 LBS

## 2021-03-25 DIAGNOSIS — K74.60 HEPATIC CIRRHOSIS, UNSPECIFIED HEPATIC CIRRHOSIS TYPE, UNSPECIFIED WHETHER ASCITES PRESENT: Primary | ICD-10-CM

## 2021-03-25 PROCEDURE — 3008F BODY MASS INDEX DOCD: CPT | Mod: CPTII,S$GLB,, | Performed by: PHYSICIAN ASSISTANT

## 2021-03-25 PROCEDURE — 1125F AMNT PAIN NOTED PAIN PRSNT: CPT | Mod: S$GLB,,, | Performed by: PHYSICIAN ASSISTANT

## 2021-03-25 PROCEDURE — 1125F PR PAIN SEVERITY QUANTIFIED, PAIN PRESENT: ICD-10-PCS | Mod: S$GLB,,, | Performed by: PHYSICIAN ASSISTANT

## 2021-03-25 PROCEDURE — 99999 PR PBB SHADOW E&M-EST. PATIENT-LVL III: ICD-10-PCS | Mod: PBBFAC,,, | Performed by: PHYSICIAN ASSISTANT

## 2021-03-25 PROCEDURE — 3008F PR BODY MASS INDEX (BMI) DOCUMENTED: ICD-10-PCS | Mod: CPTII,S$GLB,, | Performed by: PHYSICIAN ASSISTANT

## 2021-03-25 PROCEDURE — 99214 OFFICE O/P EST MOD 30 MIN: CPT | Mod: S$GLB,,, | Performed by: PHYSICIAN ASSISTANT

## 2021-03-25 PROCEDURE — 99214 PR OFFICE/OUTPT VISIT, EST, LEVL IV, 30-39 MIN: ICD-10-PCS | Mod: S$GLB,,, | Performed by: PHYSICIAN ASSISTANT

## 2021-03-25 PROCEDURE — 99999 PR PBB SHADOW E&M-EST. PATIENT-LVL III: CPT | Mod: PBBFAC,,, | Performed by: PHYSICIAN ASSISTANT

## 2021-04-07 ENCOUNTER — TELEPHONE (OUTPATIENT)
Dept: HEPATOLOGY | Facility: CLINIC | Age: 61
End: 2021-04-07

## 2021-04-07 DIAGNOSIS — B18.2 CHRONIC HEPATITIS C WITHOUT HEPATIC COMA: Primary | ICD-10-CM

## 2021-04-14 ENCOUNTER — PATIENT OUTREACH (OUTPATIENT)
Dept: ADMINISTRATIVE | Facility: OTHER | Age: 61
End: 2021-04-14

## 2021-04-16 ENCOUNTER — OFFICE VISIT (OUTPATIENT)
Dept: SURGERY | Facility: CLINIC | Age: 61
End: 2021-04-16
Payer: COMMERCIAL

## 2021-04-16 VITALS
SYSTOLIC BLOOD PRESSURE: 144 MMHG | HEIGHT: 67 IN | BODY MASS INDEX: 22.88 KG/M2 | DIASTOLIC BLOOD PRESSURE: 80 MMHG | WEIGHT: 145.75 LBS | HEART RATE: 79 BPM

## 2021-04-16 DIAGNOSIS — K41.90 FEMORAL HERNIA OF RIGHT SIDE WITHOUT OBSTRUCTION OR GANGRENE: Primary | ICD-10-CM

## 2021-04-16 PROCEDURE — 99999 PR PBB SHADOW E&M-EST. PATIENT-LVL III: CPT | Mod: PBBFAC,,, | Performed by: SURGERY

## 2021-04-16 PROCEDURE — 3008F PR BODY MASS INDEX (BMI) DOCUMENTED: ICD-10-PCS | Mod: CPTII,S$GLB,, | Performed by: SURGERY

## 2021-04-16 PROCEDURE — 3008F BODY MASS INDEX DOCD: CPT | Mod: CPTII,S$GLB,, | Performed by: SURGERY

## 2021-04-16 PROCEDURE — 1125F PR PAIN SEVERITY QUANTIFIED, PAIN PRESENT: ICD-10-PCS | Mod: S$GLB,,, | Performed by: SURGERY

## 2021-04-16 PROCEDURE — 99213 PR OFFICE/OUTPT VISIT, EST, LEVL III, 20-29 MIN: ICD-10-PCS | Mod: S$GLB,,, | Performed by: SURGERY

## 2021-04-16 PROCEDURE — 99999 PR PBB SHADOW E&M-EST. PATIENT-LVL III: ICD-10-PCS | Mod: PBBFAC,,, | Performed by: SURGERY

## 2021-04-16 PROCEDURE — 99213 OFFICE O/P EST LOW 20 MIN: CPT | Mod: S$GLB,,, | Performed by: SURGERY

## 2021-04-16 PROCEDURE — 1125F AMNT PAIN NOTED PAIN PRSNT: CPT | Mod: S$GLB,,, | Performed by: SURGERY

## 2021-04-23 ENCOUNTER — TELEPHONE (OUTPATIENT)
Dept: INTERNAL MEDICINE | Facility: CLINIC | Age: 61
End: 2021-04-23

## 2021-04-23 DIAGNOSIS — R19.5 POSITIVE COLORECTAL CANCER SCREENING USING COLOGUARD TEST: Primary | ICD-10-CM

## 2021-04-27 DIAGNOSIS — Z12.11 SPECIAL SCREENING FOR MALIGNANT NEOPLASMS, COLON: Primary | ICD-10-CM

## 2021-04-27 DIAGNOSIS — Z01.818 PRE-OP TESTING: ICD-10-CM

## 2021-04-27 DIAGNOSIS — K74.60 HEPATIC CIRRHOSIS, UNSPECIFIED HEPATIC CIRRHOSIS TYPE, UNSPECIFIED WHETHER ASCITES PRESENT: ICD-10-CM

## 2021-04-27 RX ORDER — SODIUM, POTASSIUM,MAG SULFATES 17.5-3.13G
1 SOLUTION, RECONSTITUTED, ORAL ORAL DAILY
Qty: 1 KIT | Refills: 0 | Status: SHIPPED | OUTPATIENT
Start: 2021-04-27 | End: 2021-05-01

## 2021-05-01 ENCOUNTER — LAB VISIT (OUTPATIENT)
Dept: INTERNAL MEDICINE | Facility: CLINIC | Age: 61
End: 2021-05-01
Payer: COMMERCIAL

## 2021-05-01 DIAGNOSIS — Z01.818 PRE-OP TESTING: ICD-10-CM

## 2021-05-01 PROCEDURE — U0003 INFECTIOUS AGENT DETECTION BY NUCLEIC ACID (DNA OR RNA); SEVERE ACUTE RESPIRATORY SYNDROME CORONAVIRUS 2 (SARS-COV-2) (CORONAVIRUS DISEASE [COVID-19]), AMPLIFIED PROBE TECHNIQUE, MAKING USE OF HIGH THROUGHPUT TECHNOLOGIES AS DESCRIBED BY CMS-2020-01-R: HCPCS | Performed by: FAMILY MEDICINE

## 2021-05-01 PROCEDURE — U0005 INFEC AGEN DETEC AMPLI PROBE: HCPCS | Performed by: FAMILY MEDICINE

## 2021-05-02 LAB — SARS-COV-2 RNA RESP QL NAA+PROBE: NOT DETECTED

## 2021-05-03 ENCOUNTER — TELEPHONE (OUTPATIENT)
Dept: ENDOSCOPY | Facility: HOSPITAL | Age: 61
End: 2021-05-03

## 2021-05-03 ENCOUNTER — TELEPHONE (OUTPATIENT)
Dept: INTERNAL MEDICINE | Facility: CLINIC | Age: 61
End: 2021-05-03

## 2021-05-04 ENCOUNTER — LAB VISIT (OUTPATIENT)
Dept: LAB | Facility: HOSPITAL | Age: 61
End: 2021-05-04
Attending: INTERNAL MEDICINE
Payer: COMMERCIAL

## 2021-05-04 DIAGNOSIS — K74.60 HEPATIC CIRRHOSIS, UNSPECIFIED HEPATIC CIRRHOSIS TYPE, UNSPECIFIED WHETHER ASCITES PRESENT: ICD-10-CM

## 2021-05-04 DIAGNOSIS — R11.0 NAUSEA: Primary | ICD-10-CM

## 2021-05-04 LAB
BASOPHILS # BLD AUTO: 0.03 K/UL (ref 0–0.2)
BASOPHILS NFR BLD: 0.6 % (ref 0–1.9)
DIFFERENTIAL METHOD: ABNORMAL
EOSINOPHIL # BLD AUTO: 0.1 K/UL (ref 0–0.5)
EOSINOPHIL NFR BLD: 1 % (ref 0–8)
ERYTHROCYTE [DISTWIDTH] IN BLOOD BY AUTOMATED COUNT: 13.5 % (ref 11.5–14.5)
HCT VFR BLD AUTO: 41.8 % (ref 40–54)
HGB BLD-MCNC: 14 G/DL (ref 14–18)
IMM GRANULOCYTES # BLD AUTO: 0.02 K/UL (ref 0–0.04)
IMM GRANULOCYTES NFR BLD AUTO: 0.4 % (ref 0–0.5)
INR PPP: 1 (ref 0.8–1.2)
LYMPHOCYTES # BLD AUTO: 1.2 K/UL (ref 1–4.8)
LYMPHOCYTES NFR BLD: 23.3 % (ref 18–48)
MCH RBC QN AUTO: 31.2 PG (ref 27–31)
MCHC RBC AUTO-ENTMCNC: 33.5 G/DL (ref 32–36)
MCV RBC AUTO: 93 FL (ref 82–98)
MONOCYTES # BLD AUTO: 0.5 K/UL (ref 0.3–1)
MONOCYTES NFR BLD: 10.1 % (ref 4–15)
NEUTROPHILS # BLD AUTO: 3.3 K/UL (ref 1.8–7.7)
NEUTROPHILS NFR BLD: 64.6 % (ref 38–73)
NRBC BLD-RTO: 0 /100 WBC
PLATELET # BLD AUTO: 134 K/UL (ref 150–450)
PMV BLD AUTO: 11.2 FL (ref 9.2–12.9)
PROTHROMBIN TIME: 10.9 SEC (ref 9–12.5)
RBC # BLD AUTO: 4.49 M/UL (ref 4.6–6.2)
WBC # BLD AUTO: 5.03 K/UL (ref 3.9–12.7)

## 2021-05-04 PROCEDURE — 85610 PROTHROMBIN TIME: CPT | Performed by: INTERNAL MEDICINE

## 2021-05-04 PROCEDURE — 36415 COLL VENOUS BLD VENIPUNCTURE: CPT | Performed by: INTERNAL MEDICINE

## 2021-05-04 PROCEDURE — 85025 COMPLETE CBC W/AUTO DIFF WBC: CPT | Performed by: INTERNAL MEDICINE

## 2021-05-04 RX ORDER — ONDANSETRON 4 MG/1
4 TABLET, ORALLY DISINTEGRATING ORAL EVERY 6 HOURS PRN
Qty: 30 TABLET | Refills: 1 | Status: SHIPPED | OUTPATIENT
Start: 2021-05-04 | End: 2021-11-11

## 2021-05-06 ENCOUNTER — TELEPHONE (OUTPATIENT)
Dept: ENDOSCOPY | Facility: HOSPITAL | Age: 61
End: 2021-05-06

## 2021-05-06 DIAGNOSIS — Z12.11 COLON CANCER SCREENING: ICD-10-CM

## 2021-05-06 DIAGNOSIS — Z12.11 SPECIAL SCREENING FOR MALIGNANT NEOPLASMS, COLON: Primary | ICD-10-CM

## 2021-05-06 RX ORDER — SODIUM, POTASSIUM,MAG SULFATES 17.5-3.13G
1 SOLUTION, RECONSTITUTED, ORAL ORAL ONCE
Qty: 1 KIT | Refills: 0 | Status: SHIPPED | OUTPATIENT
Start: 2021-05-06 | End: 2021-05-13

## 2021-05-14 ENCOUNTER — LAB VISIT (OUTPATIENT)
Dept: LAB | Facility: HOSPITAL | Age: 61
End: 2021-05-14
Attending: PHYSICIAN ASSISTANT
Payer: COMMERCIAL

## 2021-05-14 DIAGNOSIS — B18.2 CHRONIC HEPATITIS C WITHOUT HEPATIC COMA: ICD-10-CM

## 2021-05-14 PROCEDURE — 87522 HEPATITIS C REVRS TRNSCRPJ: CPT | Performed by: PHYSICIAN ASSISTANT

## 2021-05-14 PROCEDURE — 80053 COMPREHEN METABOLIC PANEL: CPT | Performed by: PHYSICIAN ASSISTANT

## 2021-05-14 PROCEDURE — 36415 COLL VENOUS BLD VENIPUNCTURE: CPT | Performed by: PHYSICIAN ASSISTANT

## 2021-05-15 LAB
ALBUMIN SERPL BCP-MCNC: 3.8 G/DL (ref 3.5–5.2)
ALP SERPL-CCNC: 78 U/L (ref 55–135)
ALT SERPL W/O P-5'-P-CCNC: 10 U/L (ref 10–44)
ANION GAP SERPL CALC-SCNC: 11 MMOL/L (ref 8–16)
AST SERPL-CCNC: 20 U/L (ref 10–40)
BILIRUB SERPL-MCNC: 0.2 MG/DL (ref 0.1–1)
BUN SERPL-MCNC: 14 MG/DL (ref 8–23)
CALCIUM SERPL-MCNC: 9.3 MG/DL (ref 8.7–10.5)
CHLORIDE SERPL-SCNC: 102 MMOL/L (ref 95–110)
CO2 SERPL-SCNC: 24 MMOL/L (ref 23–29)
CREAT SERPL-MCNC: 1.1 MG/DL (ref 0.5–1.4)
EST. GFR  (AFRICAN AMERICAN): >60 ML/MIN/1.73 M^2
EST. GFR  (NON AFRICAN AMERICAN): >60 ML/MIN/1.73 M^2
GLUCOSE SERPL-MCNC: 120 MG/DL (ref 70–110)
HEPATITIS C VIRUS (HCV) RNA DETECTION/QUANTIFICATION RT-PCR: NORMAL IU/ML
POTASSIUM SERPL-SCNC: 4.3 MMOL/L (ref 3.5–5.1)
PROT SERPL-MCNC: 7.8 G/DL (ref 6–8.4)
SODIUM SERPL-SCNC: 137 MMOL/L (ref 136–145)

## 2021-05-17 ENCOUNTER — TELEPHONE (OUTPATIENT)
Dept: HEPATOLOGY | Facility: CLINIC | Age: 61
End: 2021-05-17

## 2021-05-24 ENCOUNTER — TELEPHONE (OUTPATIENT)
Dept: HEPATOLOGY | Facility: CLINIC | Age: 61
End: 2021-05-24

## 2021-05-24 ENCOUNTER — LAB VISIT (OUTPATIENT)
Dept: INTERNAL MEDICINE | Facility: CLINIC | Age: 61
End: 2021-05-24
Payer: COMMERCIAL

## 2021-05-24 DIAGNOSIS — Z12.11 COLON CANCER SCREENING: ICD-10-CM

## 2021-05-24 LAB — SARS-COV-2 RNA RESP QL NAA+PROBE: NOT DETECTED

## 2021-05-24 PROCEDURE — U0003 INFECTIOUS AGENT DETECTION BY NUCLEIC ACID (DNA OR RNA); SEVERE ACUTE RESPIRATORY SYNDROME CORONAVIRUS 2 (SARS-COV-2) (CORONAVIRUS DISEASE [COVID-19]), AMPLIFIED PROBE TECHNIQUE, MAKING USE OF HIGH THROUGHPUT TECHNOLOGIES AS DESCRIBED BY CMS-2020-01-R: HCPCS | Performed by: CLINICAL NURSE SPECIALIST

## 2021-05-24 PROCEDURE — U0005 INFEC AGEN DETEC AMPLI PROBE: HCPCS | Performed by: CLINICAL NURSE SPECIALIST

## 2021-05-27 ENCOUNTER — ANESTHESIA (OUTPATIENT)
Dept: ENDOSCOPY | Facility: HOSPITAL | Age: 61
End: 2021-05-27
Payer: COMMERCIAL

## 2021-05-27 ENCOUNTER — HOSPITAL ENCOUNTER (OUTPATIENT)
Facility: HOSPITAL | Age: 61
Discharge: HOME | End: 2021-05-27
Attending: COLON & RECTAL SURGERY | Admitting: COLON & RECTAL SURGERY
Payer: COMMERCIAL

## 2021-05-27 ENCOUNTER — ANESTHESIA EVENT (OUTPATIENT)
Dept: ENDOSCOPY | Facility: HOSPITAL | Age: 61
End: 2021-05-27
Payer: COMMERCIAL

## 2021-05-27 VITALS
TEMPERATURE: 97 F | BODY MASS INDEX: 23.54 KG/M2 | HEIGHT: 67 IN | HEART RATE: 81 BPM | WEIGHT: 150 LBS | DIASTOLIC BLOOD PRESSURE: 76 MMHG | SYSTOLIC BLOOD PRESSURE: 140 MMHG | RESPIRATION RATE: 20 BRPM | OXYGEN SATURATION: 95 %

## 2021-05-27 DIAGNOSIS — R19.5 POSITIVE COLORECTAL CANCER SCREENING USING COLOGUARD TEST: Primary | ICD-10-CM

## 2021-05-27 PROBLEM — Z12.11 SCREENING FOR MALIGNANT NEOPLASM OF COLON: Status: ACTIVE | Noted: 2021-05-27

## 2021-05-27 PROCEDURE — 88305 TISSUE EXAM BY PATHOLOGIST: CPT | Performed by: PATHOLOGY

## 2021-05-27 PROCEDURE — 25000003 PHARM REV CODE 250: Performed by: NURSE ANESTHETIST, CERTIFIED REGISTERED

## 2021-05-27 PROCEDURE — 45385 COLONOSCOPY W/LESION REMOVAL: CPT | Mod: 33,,, | Performed by: COLON & RECTAL SURGERY

## 2021-05-27 PROCEDURE — 25000003 PHARM REV CODE 250: Performed by: COLON & RECTAL SURGERY

## 2021-05-27 PROCEDURE — E9220 PRA ENDO ANESTHESIA: HCPCS | Mod: 33,,, | Performed by: NURSE ANESTHETIST, CERTIFIED REGISTERED

## 2021-05-27 PROCEDURE — 37000009 HC ANESTHESIA EA ADD 15 MINS: Performed by: COLON & RECTAL SURGERY

## 2021-05-27 PROCEDURE — 37000008 HC ANESTHESIA 1ST 15 MINUTES: Performed by: COLON & RECTAL SURGERY

## 2021-05-27 PROCEDURE — 88305 TISSUE EXAM BY PATHOLOGIST: ICD-10-PCS | Mod: 26,,, | Performed by: PATHOLOGY

## 2021-05-27 PROCEDURE — 45385 PR COLONOSCOPY,REMV LESN,SNARE: ICD-10-PCS | Mod: 33,,, | Performed by: COLON & RECTAL SURGERY

## 2021-05-27 PROCEDURE — 27201089 HC SNARE, DISP (ANY): Performed by: COLON & RECTAL SURGERY

## 2021-05-27 PROCEDURE — 27201012 HC FORCEPS, HOT/COLD, DISP: Performed by: COLON & RECTAL SURGERY

## 2021-05-27 PROCEDURE — 45385 COLONOSCOPY W/LESION REMOVAL: CPT | Performed by: COLON & RECTAL SURGERY

## 2021-05-27 PROCEDURE — 63600175 PHARM REV CODE 636 W HCPCS: Performed by: NURSE ANESTHETIST, CERTIFIED REGISTERED

## 2021-05-27 PROCEDURE — E9220 PRA ENDO ANESTHESIA: ICD-10-PCS | Mod: 33,,, | Performed by: NURSE ANESTHETIST, CERTIFIED REGISTERED

## 2021-05-27 PROCEDURE — 45380 COLONOSCOPY AND BIOPSY: CPT | Performed by: COLON & RECTAL SURGERY

## 2021-05-27 PROCEDURE — 45380 PR COLONOSCOPY,BIOPSY: ICD-10-PCS | Mod: 59,,, | Performed by: COLON & RECTAL SURGERY

## 2021-05-27 PROCEDURE — 88305 TISSUE EXAM BY PATHOLOGIST: CPT | Mod: 26,,, | Performed by: PATHOLOGY

## 2021-05-27 PROCEDURE — 45380 COLONOSCOPY AND BIOPSY: CPT | Mod: 59,,, | Performed by: COLON & RECTAL SURGERY

## 2021-05-27 RX ORDER — LIDOCAINE HYDROCHLORIDE 20 MG/ML
INJECTION INTRAVENOUS
Status: DISCONTINUED | OUTPATIENT
Start: 2021-05-27 | End: 2021-05-27

## 2021-05-27 RX ORDER — SODIUM CHLORIDE 9 MG/ML
INJECTION, SOLUTION INTRAVENOUS CONTINUOUS
Status: DISCONTINUED | OUTPATIENT
Start: 2021-05-27 | End: 2021-05-27 | Stop reason: HOSPADM

## 2021-05-27 RX ORDER — PROPOFOL 10 MG/ML
VIAL (ML) INTRAVENOUS
Status: DISCONTINUED | OUTPATIENT
Start: 2021-05-27 | End: 2021-05-27

## 2021-05-27 RX ORDER — PROPOFOL 10 MG/ML
VIAL (ML) INTRAVENOUS CONTINUOUS PRN
Status: DISCONTINUED | OUTPATIENT
Start: 2021-05-27 | End: 2021-05-27

## 2021-05-27 RX ADMIN — PROPOFOL 90 MG: 10 INJECTION, EMULSION INTRAVENOUS at 12:05

## 2021-05-27 RX ADMIN — Medication 150 MCG/KG/MIN: at 12:05

## 2021-05-27 RX ADMIN — LIDOCAINE HYDROCHLORIDE 50 MG: 20 INJECTION, SOLUTION INTRAVENOUS at 12:05

## 2021-05-27 RX ADMIN — SODIUM CHLORIDE: 0.9 INJECTION, SOLUTION INTRAVENOUS at 12:05

## 2021-06-01 ENCOUNTER — OFFICE VISIT (OUTPATIENT)
Dept: INTERNAL MEDICINE | Facility: CLINIC | Age: 61
End: 2021-06-01
Payer: COMMERCIAL

## 2021-06-01 VITALS
SYSTOLIC BLOOD PRESSURE: 136 MMHG | HEIGHT: 67 IN | WEIGHT: 140.63 LBS | HEART RATE: 85 BPM | BODY MASS INDEX: 22.07 KG/M2 | OXYGEN SATURATION: 98 % | DIASTOLIC BLOOD PRESSURE: 70 MMHG

## 2021-06-01 DIAGNOSIS — K59.03 DRUG-INDUCED CONSTIPATION: ICD-10-CM

## 2021-06-01 DIAGNOSIS — F17.200 SMOKER: ICD-10-CM

## 2021-06-01 DIAGNOSIS — F17.210 NICOTINE DEPENDENCE, CIGARETTES, UNCOMPLICATED: ICD-10-CM

## 2021-06-01 DIAGNOSIS — B18.2 CHRONIC HEPATITIS C WITHOUT HEPATIC COMA: ICD-10-CM

## 2021-06-01 DIAGNOSIS — K63.5 POLYP OF COLON, UNSPECIFIED PART OF COLON, UNSPECIFIED TYPE: Primary | ICD-10-CM

## 2021-06-01 PROCEDURE — 3008F BODY MASS INDEX DOCD: CPT | Mod: CPTII,S$GLB,, | Performed by: INTERNAL MEDICINE

## 2021-06-01 PROCEDURE — 99214 OFFICE O/P EST MOD 30 MIN: CPT | Mod: S$GLB,,, | Performed by: INTERNAL MEDICINE

## 2021-06-01 PROCEDURE — 99999 PR PBB SHADOW E&M-EST. PATIENT-LVL IV: CPT | Mod: PBBFAC,,, | Performed by: INTERNAL MEDICINE

## 2021-06-01 PROCEDURE — 1126F PR PAIN SEVERITY QUANTIFIED, NO PAIN PRESENT: ICD-10-PCS | Mod: S$GLB,,, | Performed by: INTERNAL MEDICINE

## 2021-06-01 PROCEDURE — 99214 PR OFFICE/OUTPT VISIT, EST, LEVL IV, 30-39 MIN: ICD-10-PCS | Mod: S$GLB,,, | Performed by: INTERNAL MEDICINE

## 2021-06-01 PROCEDURE — 1126F AMNT PAIN NOTED NONE PRSNT: CPT | Mod: S$GLB,,, | Performed by: INTERNAL MEDICINE

## 2021-06-01 PROCEDURE — 3008F PR BODY MASS INDEX (BMI) DOCUMENTED: ICD-10-PCS | Mod: CPTII,S$GLB,, | Performed by: INTERNAL MEDICINE

## 2021-06-01 PROCEDURE — 99999 PR PBB SHADOW E&M-EST. PATIENT-LVL IV: ICD-10-PCS | Mod: PBBFAC,,, | Performed by: INTERNAL MEDICINE

## 2021-06-03 ENCOUNTER — HOSPITAL ENCOUNTER (OUTPATIENT)
Dept: RADIOLOGY | Facility: HOSPITAL | Age: 61
Discharge: HOME | End: 2021-06-03
Attending: INTERNAL MEDICINE
Payer: COMMERCIAL

## 2021-06-03 DIAGNOSIS — F17.210 NICOTINE DEPENDENCE, CIGARETTES, UNCOMPLICATED: ICD-10-CM

## 2021-06-03 DIAGNOSIS — F17.200 SMOKER: ICD-10-CM

## 2021-06-03 PROCEDURE — 71271 CT THORAX LUNG CANCER SCR C-: CPT | Mod: TC

## 2021-06-03 PROCEDURE — 71271 CT CHEST LUNG SCREENING LOW DOSE: ICD-10-PCS | Mod: 26,,, | Performed by: RADIOLOGY

## 2021-06-03 PROCEDURE — 71271 CT THORAX LUNG CANCER SCR C-: CPT | Mod: 26,,, | Performed by: RADIOLOGY

## 2021-06-08 LAB
FINAL PATHOLOGIC DIAGNOSIS: NORMAL
Lab: NORMAL

## 2021-09-29 ENCOUNTER — TELEPHONE (OUTPATIENT)
Dept: HEPATOLOGY | Facility: CLINIC | Age: 61
End: 2021-09-29

## 2021-11-04 DIAGNOSIS — F17.210 NICOTINE DEPENDENCE, CIGARETTES, UNCOMPLICATED: ICD-10-CM

## 2021-11-04 RX ORDER — BUPROPION HYDROCHLORIDE 150 MG/1
150 TABLET, EXTENDED RELEASE ORAL DAILY
Qty: 90 TABLET | Refills: 3 | Status: SHIPPED | OUTPATIENT
Start: 2021-11-04 | End: 2021-12-22

## 2021-11-11 ENCOUNTER — OFFICE VISIT (OUTPATIENT)
Dept: INTERNAL MEDICINE | Facility: CLINIC | Age: 61
End: 2021-11-11
Payer: COMMERCIAL

## 2021-11-11 VITALS
OXYGEN SATURATION: 96 % | BODY MASS INDEX: 22.29 KG/M2 | HEIGHT: 67 IN | WEIGHT: 142 LBS | SYSTOLIC BLOOD PRESSURE: 152 MMHG | HEART RATE: 83 BPM | DIASTOLIC BLOOD PRESSURE: 70 MMHG

## 2021-11-11 DIAGNOSIS — H61.21 IMPACTED CERUMEN OF RIGHT EAR: ICD-10-CM

## 2021-11-11 DIAGNOSIS — I10 ESSENTIAL HYPERTENSION: Primary | ICD-10-CM

## 2021-11-11 DIAGNOSIS — J44.9 CHRONIC OBSTRUCTIVE PULMONARY DISEASE, UNSPECIFIED COPD TYPE: ICD-10-CM

## 2021-11-11 DIAGNOSIS — F11.20 METHADONE MAINTENANCE THERAPY PATIENT: ICD-10-CM

## 2021-11-11 PROBLEM — Z12.11 SCREENING FOR MALIGNANT NEOPLASM OF COLON: Status: RESOLVED | Noted: 2021-05-27 | Resolved: 2021-11-11

## 2021-11-11 PROCEDURE — 4010F ACE/ARB THERAPY RXD/TAKEN: CPT | Mod: CPTII,S$GLB,, | Performed by: INTERNAL MEDICINE

## 2021-11-11 PROCEDURE — 99999 PR PBB SHADOW E&M-EST. PATIENT-LVL III: CPT | Mod: PBBFAC,,, | Performed by: INTERNAL MEDICINE

## 2021-11-11 PROCEDURE — 1160F PR REVIEW ALL MEDS BY PRESCRIBER/CLIN PHARMACIST DOCUMENTED: ICD-10-PCS | Mod: CPTII,S$GLB,, | Performed by: INTERNAL MEDICINE

## 2021-11-11 PROCEDURE — 3008F PR BODY MASS INDEX (BMI) DOCUMENTED: ICD-10-PCS | Mod: CPTII,S$GLB,, | Performed by: INTERNAL MEDICINE

## 2021-11-11 PROCEDURE — 99999 PR PBB SHADOW E&M-EST. PATIENT-LVL III: ICD-10-PCS | Mod: PBBFAC,,, | Performed by: INTERNAL MEDICINE

## 2021-11-11 PROCEDURE — 4010F PR ACE/ARB THEARPY RXD/TAKEN: ICD-10-PCS | Mod: CPTII,S$GLB,, | Performed by: INTERNAL MEDICINE

## 2021-11-11 PROCEDURE — 3077F PR MOST RECENT SYSTOLIC BLOOD PRESSURE >= 140 MM HG: ICD-10-PCS | Mod: CPTII,S$GLB,, | Performed by: INTERNAL MEDICINE

## 2021-11-11 PROCEDURE — 1159F PR MEDICATION LIST DOCUMENTED IN MEDICAL RECORD: ICD-10-PCS | Mod: CPTII,S$GLB,, | Performed by: INTERNAL MEDICINE

## 2021-11-11 PROCEDURE — 99214 PR OFFICE/OUTPT VISIT, EST, LEVL IV, 30-39 MIN: ICD-10-PCS | Mod: S$GLB,,, | Performed by: INTERNAL MEDICINE

## 2021-11-11 PROCEDURE — 1159F MED LIST DOCD IN RCRD: CPT | Mod: CPTII,S$GLB,, | Performed by: INTERNAL MEDICINE

## 2021-11-11 PROCEDURE — 1160F RVW MEDS BY RX/DR IN RCRD: CPT | Mod: CPTII,S$GLB,, | Performed by: INTERNAL MEDICINE

## 2021-11-11 PROCEDURE — 3078F DIAST BP <80 MM HG: CPT | Mod: CPTII,S$GLB,, | Performed by: INTERNAL MEDICINE

## 2021-11-11 PROCEDURE — 3008F BODY MASS INDEX DOCD: CPT | Mod: CPTII,S$GLB,, | Performed by: INTERNAL MEDICINE

## 2021-11-11 PROCEDURE — 3077F SYST BP >= 140 MM HG: CPT | Mod: CPTII,S$GLB,, | Performed by: INTERNAL MEDICINE

## 2021-11-11 PROCEDURE — 3078F PR MOST RECENT DIASTOLIC BLOOD PRESSURE < 80 MM HG: ICD-10-PCS | Mod: CPTII,S$GLB,, | Performed by: INTERNAL MEDICINE

## 2021-11-11 PROCEDURE — 99214 OFFICE O/P EST MOD 30 MIN: CPT | Mod: S$GLB,,, | Performed by: INTERNAL MEDICINE

## 2021-11-11 RX ORDER — VALSARTAN 80 MG/1
80 TABLET ORAL DAILY
Qty: 90 TABLET | Refills: 3 | Status: SHIPPED | OUTPATIENT
Start: 2021-11-11 | End: 2022-01-03

## 2021-11-11 RX ORDER — AMLODIPINE BESYLATE 10 MG/1
10 TABLET ORAL DAILY
Qty: 90 TABLET | Refills: 3 | Status: SHIPPED | OUTPATIENT
Start: 2021-11-11 | End: 2022-02-22 | Stop reason: SDUPTHER

## 2021-11-11 RX ORDER — FLUTICASONE FUROATE AND VILANTEROL TRIFENATATE 100; 25 UG/1; UG/1
1 POWDER RESPIRATORY (INHALATION) DAILY
Qty: 60 EACH | Refills: 3 | Status: SHIPPED | OUTPATIENT
Start: 2021-11-11 | End: 2022-04-16 | Stop reason: SDUPTHER

## 2021-12-01 ENCOUNTER — PATIENT OUTREACH (OUTPATIENT)
Dept: ADMINISTRATIVE | Facility: OTHER | Age: 61
End: 2021-12-01
Payer: MEDICAID

## 2021-12-02 ENCOUNTER — OFFICE VISIT (OUTPATIENT)
Dept: HEPATOLOGY | Facility: CLINIC | Age: 61
End: 2021-12-02
Payer: MEDICAID

## 2021-12-02 ENCOUNTER — HOSPITAL ENCOUNTER (OUTPATIENT)
Dept: RADIOLOGY | Facility: HOSPITAL | Age: 61
Discharge: HOME | End: 2021-12-02
Attending: PHYSICIAN ASSISTANT
Payer: COMMERCIAL

## 2021-12-02 VITALS
TEMPERATURE: 98 F | DIASTOLIC BLOOD PRESSURE: 78 MMHG | BODY MASS INDEX: 21.93 KG/M2 | HEIGHT: 67 IN | RESPIRATION RATE: 16 BRPM | OXYGEN SATURATION: 86 % | SYSTOLIC BLOOD PRESSURE: 130 MMHG | HEART RATE: 70 BPM | WEIGHT: 139.75 LBS

## 2021-12-02 DIAGNOSIS — K74.60 HEPATIC CIRRHOSIS, UNSPECIFIED HEPATIC CIRRHOSIS TYPE, UNSPECIFIED WHETHER ASCITES PRESENT: ICD-10-CM

## 2021-12-02 DIAGNOSIS — K74.60 HEPATIC CIRRHOSIS, UNSPECIFIED HEPATIC CIRRHOSIS TYPE, UNSPECIFIED WHETHER ASCITES PRESENT: Primary | ICD-10-CM

## 2021-12-02 PROCEDURE — 76705 ECHO EXAM OF ABDOMEN: CPT | Mod: 26,,, | Performed by: RADIOLOGY

## 2021-12-02 PROCEDURE — 99213 OFFICE O/P EST LOW 20 MIN: CPT | Mod: S$PBB,,, | Performed by: PHYSICIAN ASSISTANT

## 2021-12-02 PROCEDURE — 76705 US ABDOMEN LIMITED: ICD-10-PCS | Mod: 26,,, | Performed by: RADIOLOGY

## 2021-12-02 PROCEDURE — 76705 ECHO EXAM OF ABDOMEN: CPT | Mod: TC

## 2021-12-02 PROCEDURE — 99999 PR PBB SHADOW E&M-EST. PATIENT-LVL IV: ICD-10-PCS | Mod: PBBFAC,,, | Performed by: PHYSICIAN ASSISTANT

## 2021-12-02 PROCEDURE — 99999 PR PBB SHADOW E&M-EST. PATIENT-LVL IV: CPT | Mod: PBBFAC,,, | Performed by: PHYSICIAN ASSISTANT

## 2021-12-02 PROCEDURE — 99214 OFFICE O/P EST MOD 30 MIN: CPT | Mod: PBBFAC,25 | Performed by: PHYSICIAN ASSISTANT

## 2021-12-02 PROCEDURE — 99213 PR OFFICE/OUTPT VISIT, EST, LEVL III, 20-29 MIN: ICD-10-PCS | Mod: S$PBB,,, | Performed by: PHYSICIAN ASSISTANT

## 2021-12-06 ENCOUNTER — TELEPHONE (OUTPATIENT)
Dept: HEPATOLOGY | Facility: CLINIC | Age: 61
End: 2021-12-06
Payer: MEDICAID

## 2021-12-06 DIAGNOSIS — Z86.19 HEPATITIS C VIRUS INFECTION CURED AFTER ANTIVIRAL DRUG THERAPY: Primary | ICD-10-CM

## 2021-12-06 PROBLEM — B18.2 CHRONIC ACTIVE HEPATITIS C: Status: RESOLVED | Noted: 2021-01-12 | Resolved: 2021-12-06

## 2021-12-18 DIAGNOSIS — F17.210 NICOTINE DEPENDENCE, CIGARETTES, UNCOMPLICATED: ICD-10-CM

## 2021-12-22 RX ORDER — BUPROPION HYDROCHLORIDE 150 MG/1
150 TABLET, EXTENDED RELEASE ORAL DAILY
Qty: 90 TABLET | Refills: 3 | Status: SHIPPED | OUTPATIENT
Start: 2021-12-22 | End: 2022-04-12 | Stop reason: SDUPTHER

## 2022-01-03 ENCOUNTER — OFFICE VISIT (OUTPATIENT)
Dept: INTERNAL MEDICINE | Facility: CLINIC | Age: 62
End: 2022-01-03
Payer: MEDICAID

## 2022-01-03 VITALS
DIASTOLIC BLOOD PRESSURE: 80 MMHG | HEART RATE: 76 BPM | HEIGHT: 67 IN | SYSTOLIC BLOOD PRESSURE: 160 MMHG | WEIGHT: 146.38 LBS | OXYGEN SATURATION: 96 % | BODY MASS INDEX: 22.98 KG/M2

## 2022-01-03 DIAGNOSIS — K74.60 HEPATIC CIRRHOSIS, UNSPECIFIED HEPATIC CIRRHOSIS TYPE, UNSPECIFIED WHETHER ASCITES PRESENT: ICD-10-CM

## 2022-01-03 DIAGNOSIS — J44.9 CHRONIC OBSTRUCTIVE PULMONARY DISEASE, UNSPECIFIED COPD TYPE: ICD-10-CM

## 2022-01-03 DIAGNOSIS — I73.9 CLAUDICATION OF BOTH LOWER EXTREMITIES: ICD-10-CM

## 2022-01-03 DIAGNOSIS — H61.21 IMPACTED CERUMEN OF RIGHT EAR: ICD-10-CM

## 2022-01-03 DIAGNOSIS — I10 ESSENTIAL HYPERTENSION: Primary | ICD-10-CM

## 2022-01-03 DIAGNOSIS — D69.6 THROMBOCYTOPENIA: ICD-10-CM

## 2022-01-03 PROCEDURE — 1160F PR REVIEW ALL MEDS BY PRESCRIBER/CLIN PHARMACIST DOCUMENTED: ICD-10-PCS | Mod: CPTII,,, | Performed by: INTERNAL MEDICINE

## 2022-01-03 PROCEDURE — 99999 PR PBB SHADOW E&M-EST. PATIENT-LVL IV: CPT | Mod: PBBFAC,,, | Performed by: INTERNAL MEDICINE

## 2022-01-03 PROCEDURE — 1160F RVW MEDS BY RX/DR IN RCRD: CPT | Mod: CPTII,,, | Performed by: INTERNAL MEDICINE

## 2022-01-03 PROCEDURE — 99214 OFFICE O/P EST MOD 30 MIN: CPT | Mod: S$PBB,,, | Performed by: INTERNAL MEDICINE

## 2022-01-03 PROCEDURE — 3079F DIAST BP 80-89 MM HG: CPT | Mod: CPTII,,, | Performed by: INTERNAL MEDICINE

## 2022-01-03 PROCEDURE — 3077F PR MOST RECENT SYSTOLIC BLOOD PRESSURE >= 140 MM HG: ICD-10-PCS | Mod: CPTII,,, | Performed by: INTERNAL MEDICINE

## 2022-01-03 PROCEDURE — 1159F PR MEDICATION LIST DOCUMENTED IN MEDICAL RECORD: ICD-10-PCS | Mod: CPTII,,, | Performed by: INTERNAL MEDICINE

## 2022-01-03 PROCEDURE — 1159F MED LIST DOCD IN RCRD: CPT | Mod: CPTII,,, | Performed by: INTERNAL MEDICINE

## 2022-01-03 PROCEDURE — 3008F PR BODY MASS INDEX (BMI) DOCUMENTED: ICD-10-PCS | Mod: CPTII,,, | Performed by: INTERNAL MEDICINE

## 2022-01-03 PROCEDURE — 3077F SYST BP >= 140 MM HG: CPT | Mod: CPTII,,, | Performed by: INTERNAL MEDICINE

## 2022-01-03 PROCEDURE — 3079F PR MOST RECENT DIASTOLIC BLOOD PRESSURE 80-89 MM HG: ICD-10-PCS | Mod: CPTII,,, | Performed by: INTERNAL MEDICINE

## 2022-01-03 PROCEDURE — 99214 OFFICE O/P EST MOD 30 MIN: CPT | Mod: PBBFAC | Performed by: INTERNAL MEDICINE

## 2022-01-03 PROCEDURE — 99214 PR OFFICE/OUTPT VISIT, EST, LEVL IV, 30-39 MIN: ICD-10-PCS | Mod: S$PBB,,, | Performed by: INTERNAL MEDICINE

## 2022-01-03 PROCEDURE — 99999 PR PBB SHADOW E&M-EST. PATIENT-LVL IV: ICD-10-PCS | Mod: PBBFAC,,, | Performed by: INTERNAL MEDICINE

## 2022-01-03 PROCEDURE — 3008F BODY MASS INDEX DOCD: CPT | Mod: CPTII,,, | Performed by: INTERNAL MEDICINE

## 2022-01-03 RX ORDER — LISINOPRIL 20 MG/1
20 TABLET ORAL DAILY
Qty: 90 TABLET | Refills: 3 | Status: SHIPPED | OUTPATIENT
Start: 2022-01-03 | End: 2022-04-16 | Stop reason: SDUPTHER

## 2022-01-03 NOTE — PROGRESS NOTES
Subjective:       Patient ID: Efra Payton III is a 61 y.o. male.    Chief Complaint:   Hypertension    HPI - He stopped valsartan without talking to me.  Seemed to be associated with chest pain.  So, his BP is not controlled again today.  He didn't tolerate the carbamic peroxide drops, either, and left ear is still congested.  Still smoking cigarettes.  Still has claudication when walking.  Uses inhaler for COPD prn.  He's had two covid-19 vaccinations, but not the booster. Note thrombocytopenia in his recent lab work.    PMH  Hepatitis C, completed treatment  Colon polyps 2021 after positive cologuard  HTN, remains uncontrolled  Anxiety  Smoker  COPD  Hx incarceration     Meds:  Reviewed and reconciled in EPIC with patient during visit today     Review of Systems   Constitutional: Negative for fever.   HENT: Positive for ear pain and hearing loss. Negative for congestion.    Respiratory: Negative for shortness of breath.    Cardiovascular: Negative for chest pain.   Gastrointestinal: Negative for abdominal pain.   Genitourinary: Negative for difficulty urinating.   Musculoskeletal: Negative for arthralgias.   Skin: Negative for rash.   Neurological: Negative for headaches.   Psychiatric/Behavioral: Negative for sleep disturbance.       Objective:      Physical Exam  Vitals reviewed.   Constitutional:       General: He is not in acute distress.     Appearance: He is well-developed and well-nourished. He is not diaphoretic.      Comments: Lean, jittery man in no distress   HENT:      Head: Normocephalic and atraumatic.      Right Ear: There is impacted cerumen (very hard, gray in appearance).      Left Ear: Tympanic membrane, ear canal and external ear normal.   Cardiovascular:      Rate and Rhythm: Normal rate and regular rhythm.      Heart sounds: Normal heart sounds. No murmur heard.  No friction rub. No gallop.    Pulmonary:      Effort: No respiratory distress.      Breath sounds: No wheezing or rales.    Chest:      Chest wall: No tenderness.   Skin:     General: Skin is warm.      Findings: No erythema.   Neurological:      Mental Status: He is alert and oriented to person, place, and time.   Psychiatric:         Mood and Affect: Mood and affect normal.         Thought Content: Thought content normal.         Assessment:       1. Essential hypertension    2. Impacted cerumen of right ear    3. Hepatic cirrhosis, unspecified hepatic cirrhosis type, unspecified whether ascites present    4. Thrombocytopenia    5. Claudication of both lower extremities    6. Chronic obstructive pulmonary disease, unspecified COPD type        Plan:       Efra was seen today for hypertension.    Diagnoses and all orders for this visit:    Essential hypertension - intolerant of valsartan, so will go to lisinopril to see how he does  -     lisinopriL (PRINIVIL,ZESTRIL) 20 MG tablet; Take 1 tablet (20 mg total) by mouth once daily.    Impacted cerumen of right ear - not responding to medical treatment.  Asking ENT to help  -     Ambulatory referral/consult to ENT; Future    Hepatic cirrhosis, unspecified hepatic cirrhosis type, unspecified whether ascites present - apparent in labs.  Followed by hepatology    Thrombocytopenia    Claudication of both lower extremities - continues smoking; please quit    Chronic obstructive pulmonary disease, unspecified COPD type    rtc prn, or in 6 weeks, bp JEN Mak MD MPH  Staff Internist

## 2022-01-05 ENCOUNTER — IMMUNIZATION (OUTPATIENT)
Dept: INTERNAL MEDICINE | Facility: CLINIC | Age: 62
End: 2022-01-05
Payer: MEDICAID

## 2022-01-05 DIAGNOSIS — Z23 NEED FOR VACCINATION: Primary | ICD-10-CM

## 2022-01-05 PROCEDURE — 0064A COVID-19, MRNA, LNP-S, PF, 100 MCG/0.25 ML DOSE VACCINE (MODERNA BOOSTER): CPT | Mod: PBBFAC,CV19

## 2022-02-22 ENCOUNTER — OFFICE VISIT (OUTPATIENT)
Dept: INTERNAL MEDICINE | Facility: CLINIC | Age: 62
End: 2022-02-22
Payer: MEDICAID

## 2022-02-22 ENCOUNTER — HOSPITAL ENCOUNTER (OUTPATIENT)
Dept: RADIOLOGY | Facility: HOSPITAL | Age: 62
Discharge: HOME | End: 2022-02-22
Attending: INTERNAL MEDICINE
Payer: MEDICAID

## 2022-02-22 VITALS
DIASTOLIC BLOOD PRESSURE: 86 MMHG | OXYGEN SATURATION: 95 % | BODY MASS INDEX: 22.32 KG/M2 | HEIGHT: 67 IN | WEIGHT: 142.19 LBS | HEART RATE: 72 BPM | SYSTOLIC BLOOD PRESSURE: 142 MMHG

## 2022-02-22 DIAGNOSIS — F11.20 METHADONE MAINTENANCE THERAPY PATIENT: ICD-10-CM

## 2022-02-22 DIAGNOSIS — Z92.89 HISTORY OF POSITIVE PPD: ICD-10-CM

## 2022-02-22 DIAGNOSIS — F17.200 SMOKER: ICD-10-CM

## 2022-02-22 DIAGNOSIS — N40.0 PROSTATISM: ICD-10-CM

## 2022-02-22 DIAGNOSIS — I10 ESSENTIAL HYPERTENSION: Primary | ICD-10-CM

## 2022-02-22 DIAGNOSIS — Z86.19 HEPATITIS C VIRUS INFECTION CURED AFTER ANTIVIRAL DRUG THERAPY: ICD-10-CM

## 2022-02-22 DIAGNOSIS — J44.9 CHRONIC OBSTRUCTIVE PULMONARY DISEASE, UNSPECIFIED COPD TYPE: ICD-10-CM

## 2022-02-22 LAB
BILIRUB UR QL STRIP: NEGATIVE
CLARITY UR REFRACT.AUTO: CLEAR
COLOR UR AUTO: YELLOW
GLUCOSE UR QL STRIP: NEGATIVE
HGB UR QL STRIP: NEGATIVE
KETONES UR QL STRIP: NEGATIVE
LEUKOCYTE ESTERASE UR QL STRIP: NEGATIVE
NITRITE UR QL STRIP: NEGATIVE
PH UR STRIP: 6 [PH] (ref 5–8)
PROT UR QL STRIP: NEGATIVE
SP GR UR STRIP: 1.01 (ref 1–1.03)
URN SPEC COLLECT METH UR: NORMAL

## 2022-02-22 PROCEDURE — 1160F PR REVIEW ALL MEDS BY PRESCRIBER/CLIN PHARMACIST DOCUMENTED: ICD-10-PCS | Mod: CPTII,,, | Performed by: INTERNAL MEDICINE

## 2022-02-22 PROCEDURE — 81003 URINALYSIS AUTO W/O SCOPE: CPT | Performed by: INTERNAL MEDICINE

## 2022-02-22 PROCEDURE — 99999 PR PBB SHADOW E&M-EST. PATIENT-LVL III: ICD-10-PCS | Mod: PBBFAC,,, | Performed by: INTERNAL MEDICINE

## 2022-02-22 PROCEDURE — 3079F PR MOST RECENT DIASTOLIC BLOOD PRESSURE 80-89 MM HG: ICD-10-PCS | Mod: CPTII,,, | Performed by: INTERNAL MEDICINE

## 2022-02-22 PROCEDURE — 3079F DIAST BP 80-89 MM HG: CPT | Mod: CPTII,,, | Performed by: INTERNAL MEDICINE

## 2022-02-22 PROCEDURE — 71046 XR CHEST PA AND LATERAL: ICD-10-PCS | Mod: 26,,, | Performed by: RADIOLOGY

## 2022-02-22 PROCEDURE — 1160F RVW MEDS BY RX/DR IN RCRD: CPT | Mod: CPTII,,, | Performed by: INTERNAL MEDICINE

## 2022-02-22 PROCEDURE — 99213 OFFICE O/P EST LOW 20 MIN: CPT | Mod: PBBFAC,25 | Performed by: INTERNAL MEDICINE

## 2022-02-22 PROCEDURE — 71046 X-RAY EXAM CHEST 2 VIEWS: CPT | Mod: TC

## 2022-02-22 PROCEDURE — 4010F ACE/ARB THERAPY RXD/TAKEN: CPT | Mod: CPTII,,, | Performed by: INTERNAL MEDICINE

## 2022-02-22 PROCEDURE — 99214 OFFICE O/P EST MOD 30 MIN: CPT | Mod: S$PBB,,, | Performed by: INTERNAL MEDICINE

## 2022-02-22 PROCEDURE — 3077F SYST BP >= 140 MM HG: CPT | Mod: CPTII,,, | Performed by: INTERNAL MEDICINE

## 2022-02-22 PROCEDURE — 71046 X-RAY EXAM CHEST 2 VIEWS: CPT | Mod: 26,,, | Performed by: RADIOLOGY

## 2022-02-22 PROCEDURE — 1159F PR MEDICATION LIST DOCUMENTED IN MEDICAL RECORD: ICD-10-PCS | Mod: CPTII,,, | Performed by: INTERNAL MEDICINE

## 2022-02-22 PROCEDURE — 4010F PR ACE/ARB THEARPY RXD/TAKEN: ICD-10-PCS | Mod: CPTII,,, | Performed by: INTERNAL MEDICINE

## 2022-02-22 PROCEDURE — 3077F PR MOST RECENT SYSTOLIC BLOOD PRESSURE >= 140 MM HG: ICD-10-PCS | Mod: CPTII,,, | Performed by: INTERNAL MEDICINE

## 2022-02-22 PROCEDURE — 3008F PR BODY MASS INDEX (BMI) DOCUMENTED: ICD-10-PCS | Mod: CPTII,,, | Performed by: INTERNAL MEDICINE

## 2022-02-22 PROCEDURE — 1159F MED LIST DOCD IN RCRD: CPT | Mod: CPTII,,, | Performed by: INTERNAL MEDICINE

## 2022-02-22 PROCEDURE — 3008F BODY MASS INDEX DOCD: CPT | Mod: CPTII,,, | Performed by: INTERNAL MEDICINE

## 2022-02-22 PROCEDURE — 99999 PR PBB SHADOW E&M-EST. PATIENT-LVL III: CPT | Mod: PBBFAC,,, | Performed by: INTERNAL MEDICINE

## 2022-02-22 PROCEDURE — 99214 PR OFFICE/OUTPT VISIT, EST, LEVL IV, 30-39 MIN: ICD-10-PCS | Mod: S$PBB,,, | Performed by: INTERNAL MEDICINE

## 2022-02-22 PROCEDURE — 87086 URINE CULTURE/COLONY COUNT: CPT | Performed by: INTERNAL MEDICINE

## 2022-02-22 RX ORDER — AMLODIPINE BESYLATE 10 MG/1
10 TABLET ORAL DAILY
Qty: 90 TABLET | Refills: 3 | Status: SHIPPED | OUTPATIENT
Start: 2022-02-22 | End: 2023-02-27 | Stop reason: SDUPTHER

## 2022-02-22 RX ORDER — ALFUZOSIN HYDROCHLORIDE 10 MG/1
10 TABLET, EXTENDED RELEASE ORAL
Qty: 90 TABLET | Refills: 3 | Status: SHIPPED | OUTPATIENT
Start: 2022-02-22 | End: 2022-04-12

## 2022-02-22 NOTE — PROGRESS NOTES
Subjective:       Patient ID: Efra Payton III is a 62 y.o. male.    Chief Complaint:   Follow-up    HPI - Mr. Payton feels well today.  He still hasn't picked up the lisinopril that I wrote for him 6 weeks ago - some problem with the [facility] pharmacy.  Additionally, he ran out of norvasc 2 days ago.  Some flank pain on the right that has been getting better since stopping sodas.  He has nocturia, weak stream and retention.  He gets his methadone at the [facility].  They want a CXR as proof he's free of TB; positive skin test in the past.  He's still smoking, down to 1ppd.    PMH  Hepatitis C, completed treatment  Hx positive PPD with treatment before 2000  Colon polyps 2021 after positive cologuard  HTN, remains uncontrolled  Anxiety  Smoker  COPD  Hx incarceration     Meds:  Reviewed and reconciled in EPIC with patient during visit today     Review of Systems   Constitutional: Negative for fever.   HENT: Negative for congestion.    Respiratory: Negative for shortness of breath.    Cardiovascular: Negative for chest pain.   Gastrointestinal: Negative for abdominal pain.   Genitourinary: Positive for decreased urine volume and frequency.   Musculoskeletal: Negative for arthralgias.   Skin: Negative for rash.   Neurological: Negative for headaches.   Psychiatric/Behavioral: Negative for sleep disturbance.       Objective:      Physical Exam  Vitals reviewed.   Constitutional:       General: He is not in acute distress.     Appearance: He is well-developed and normal weight. He is not diaphoretic.   HENT:      Head: Normocephalic and atraumatic.   Cardiovascular:      Rate and Rhythm: Normal rate and regular rhythm.      Heart sounds: Normal heart sounds. No murmur heard.    No friction rub. No gallop.   Pulmonary:      Effort: No respiratory distress.      Breath sounds: No wheezing or rales.   Chest:      Chest wall: No tenderness.   Skin:     General: Skin is warm.      Findings: No erythema.   Neurological:       General: No focal deficit present.      Mental Status: He is alert.   Psychiatric:         Behavior: Behavior normal.         Thought Content: Thought content normal.         Assessment:       1. Essential hypertension    2. History of positive PPD    3. Smoker    4. Chronic obstructive pulmonary disease, unspecified COPD type    5. Methadone maintenance therapy patient    6. Hepatitis C virus infection cured after antiviral drug therapy    7. Prostatism        Plan:       Efra was seen today for follow-up.    Diagnoses and all orders for this visit:    Essential hypertension - refilling norvasc; please  lisinopril and start taking that as well  -     amLODIPine (NORVASC) 10 MG tablet; Take 1 tablet (10 mg total) by mouth once daily.    History of positive PPD - cxr for his methadone clinic  -     X-Ray Chest PA And Lateral; Future    Smoker - encouraged cessation    Chronic obstructive pulmonary disease, unspecified COPD type    Methadone maintenance therapy patient    Hepatitis C virus infection cured after antiviral drug therapy    Prostatism - new finding today.  R/o infection; start alfuzosin  -     Urinalysis  -     Urine culture  -     alfuzosin (UROXATRAL) 10 mg Tb24; Take 1 tablet (10 mg total) by mouth daily with breakfast.    rtc 6 weeks for BP    G Aaron Mak MD MPH  Staff Internist

## 2022-02-26 LAB — BACTERIA UR CULT: NO GROWTH

## 2022-04-11 ENCOUNTER — TELEPHONE (OUTPATIENT)
Dept: INTERNAL MEDICINE | Facility: CLINIC | Age: 62
End: 2022-04-11
Payer: MEDICAID

## 2022-04-11 DIAGNOSIS — F17.210 NICOTINE DEPENDENCE, CIGARETTES, UNCOMPLICATED: ICD-10-CM

## 2022-04-11 RX ORDER — BUPROPION HYDROCHLORIDE 150 MG/1
150 TABLET, EXTENDED RELEASE ORAL DAILY
Qty: 90 TABLET | Refills: 0 | Status: SHIPPED | OUTPATIENT
Start: 2022-04-11 | End: 2022-07-19 | Stop reason: SDUPTHER

## 2022-04-11 NOTE — TELEPHONE ENCOUNTER
No new care gaps identified.  Powered by [employer] Reference number: [id number].   4/11/2022 1:47:46 PM CDT

## 2022-04-11 NOTE — TELEPHONE ENCOUNTER
----- Message from Boy Reyes sent at 4/11/2022  4:35 PM CDT -----  Contact: [phone]  No blue slot available to schedule an appointment for the patient.  Patient is established with which PCP: Obdulio  Reason for the visit: swollen hands and dizziness and hot flashes  Would the patient like a call back, or a response through their [facility] portal?:  call

## 2022-04-11 NOTE — TELEPHONE ENCOUNTER
Spoke with pt and same day appt made on 04/12/22. Pt is aware that he will need to go to the ER/urgent care if symptoms worsen. verbalized understanding

## 2022-04-12 ENCOUNTER — TELEPHONE (OUTPATIENT)
Dept: INTERNAL MEDICINE | Facility: CLINIC | Age: 62
End: 2022-04-12

## 2022-04-12 ENCOUNTER — OFFICE VISIT (OUTPATIENT)
Dept: INTERNAL MEDICINE | Facility: CLINIC | Age: 62
End: 2022-04-12
Payer: MEDICAID

## 2022-04-12 VITALS
SYSTOLIC BLOOD PRESSURE: 130 MMHG | HEART RATE: 75 BPM | OXYGEN SATURATION: 93 % | WEIGHT: 140.44 LBS | DIASTOLIC BLOOD PRESSURE: 78 MMHG | HEIGHT: 67 IN | BODY MASS INDEX: 22.04 KG/M2

## 2022-04-12 DIAGNOSIS — I10 ESSENTIAL HYPERTENSION: ICD-10-CM

## 2022-04-12 DIAGNOSIS — K74.60 HEPATIC CIRRHOSIS, UNSPECIFIED HEPATIC CIRRHOSIS TYPE, UNSPECIFIED WHETHER ASCITES PRESENT: ICD-10-CM

## 2022-04-12 DIAGNOSIS — B18.2 CHRONIC HEPATITIS C WITHOUT HEPATIC COMA: ICD-10-CM

## 2022-04-12 DIAGNOSIS — N40.0 PROSTATISM: ICD-10-CM

## 2022-04-12 DIAGNOSIS — Z86.19 HEPATITIS C VIRUS INFECTION CURED AFTER ANTIVIRAL DRUG THERAPY: ICD-10-CM

## 2022-04-12 DIAGNOSIS — L03.90 CELLULITIS, UNSPECIFIED CELLULITIS SITE: Primary | ICD-10-CM

## 2022-04-12 PROCEDURE — 4010F ACE/ARB THERAPY RXD/TAKEN: CPT | Mod: CPTII,,, | Performed by: NURSE PRACTITIONER

## 2022-04-12 PROCEDURE — 99999 PR PBB SHADOW E&M-EST. PATIENT-LVL III: CPT | Mod: PBBFAC,,, | Performed by: NURSE PRACTITIONER

## 2022-04-12 PROCEDURE — 99999 PR PBB SHADOW E&M-EST. PATIENT-LVL III: ICD-10-PCS | Mod: PBBFAC,,, | Performed by: NURSE PRACTITIONER

## 2022-04-12 PROCEDURE — 3075F PR MOST RECENT SYSTOLIC BLOOD PRESS GE 130-139MM HG: ICD-10-PCS | Mod: CPTII,,, | Performed by: NURSE PRACTITIONER

## 2022-04-12 PROCEDURE — 3008F PR BODY MASS INDEX (BMI) DOCUMENTED: ICD-10-PCS | Mod: CPTII,,, | Performed by: NURSE PRACTITIONER

## 2022-04-12 PROCEDURE — 4010F PR ACE/ARB THEARPY RXD/TAKEN: ICD-10-PCS | Mod: CPTII,,, | Performed by: NURSE PRACTITIONER

## 2022-04-12 PROCEDURE — 3078F DIAST BP <80 MM HG: CPT | Mod: CPTII,,, | Performed by: NURSE PRACTITIONER

## 2022-04-12 PROCEDURE — 3075F SYST BP GE 130 - 139MM HG: CPT | Mod: CPTII,,, | Performed by: NURSE PRACTITIONER

## 2022-04-12 PROCEDURE — 1159F MED LIST DOCD IN RCRD: CPT | Mod: CPTII,,, | Performed by: NURSE PRACTITIONER

## 2022-04-12 PROCEDURE — 99214 PR OFFICE/OUTPT VISIT, EST, LEVL IV, 30-39 MIN: ICD-10-PCS | Mod: S$PBB,,, | Performed by: NURSE PRACTITIONER

## 2022-04-12 PROCEDURE — 3078F PR MOST RECENT DIASTOLIC BLOOD PRESSURE < 80 MM HG: ICD-10-PCS | Mod: CPTII,,, | Performed by: NURSE PRACTITIONER

## 2022-04-12 PROCEDURE — 99213 OFFICE O/P EST LOW 20 MIN: CPT | Mod: PBBFAC | Performed by: NURSE PRACTITIONER

## 2022-04-12 PROCEDURE — 99214 OFFICE O/P EST MOD 30 MIN: CPT | Mod: S$PBB,,, | Performed by: NURSE PRACTITIONER

## 2022-04-12 PROCEDURE — 1159F PR MEDICATION LIST DOCUMENTED IN MEDICAL RECORD: ICD-10-PCS | Mod: CPTII,,, | Performed by: NURSE PRACTITIONER

## 2022-04-12 PROCEDURE — 3008F BODY MASS INDEX DOCD: CPT | Mod: CPTII,,, | Performed by: NURSE PRACTITIONER

## 2022-04-12 RX ORDER — DOXYCYCLINE 100 MG/1
100 CAPSULE ORAL 2 TIMES DAILY
Qty: 14 CAPSULE | Refills: 0 | Status: SHIPPED | OUTPATIENT
Start: 2022-04-12 | End: 2022-04-16

## 2022-04-12 NOTE — LETTER
April 12, 2022      [facility]  [address]  [facility] [address]  Phone: [phone]  Fax: [phone]       Patient: Efra Payton   YOB: 1960  Date of Visit: 04/12/2022    To Whom It May Concern:    Belkys Payton  was at [facility] on 04/12/2022. The patient may return to work on Thursday April 14, 2022 with no restrictions. If you have any questions or concerns, or if I can be of further assistance, please do not hesitate to contact me.    Sincerely,    Denisse Rincon NP

## 2022-04-12 NOTE — TELEPHONE ENCOUNTER
Rec'd call from who asked to have the date on RTW letter changed from 04/14 to 04/15. Message was sent to provider immediately.

## 2022-04-12 NOTE — LETTER
April 12, 2022      [facility]  [address]  [facility] [address]  Phone: [phone]  Fax: [phone]       Patient: Efra Payton   YOB: 1960  Date of Visit: 04/12/2022    To Whom It May Concern:    Belkys Payton  was at [facility] on 04/12/2022. The patient may return to work on Friday April 15, 2022  with no restrictions. If you have any questions or concerns, or if I can be of further assistance, please do not hesitate to contact me.    Sincerely,    Denisse Rincon NP

## 2022-04-12 NOTE — PROGRESS NOTES
INTERNAL MEDICINE URGENT CARE NOTE    CHIEF COMPLAINT     Chief Complaint   Patient presents with    Swollen hands       HPI     Efra DONY Fito CANALES is a 62 y.o. male with CTS of the left wrist, COPD, HTN, claudication of the lower ext, thrombocytopenia, cirrhosis, Hep C and hx of drug use with methadone maintenance who presents for an urgent visit today.    Here with c/o bilateral hand swelling and numbness x 1 week   Progress to numbness in bilateral feet yesterday, also with nausea and lightheadedness (orthostatic)  No trouble speaking and swallowing. No extremity weakness.     Recently started new job x 4 weeks 5 days per week.   Had not worked for years prior to this.   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      Past Medical History:  Past Medical History:   Diagnosis Date    Cirrhosis     COPD (chronic obstructive pulmonary disease)     Eczema     Hepatitis C virus infection cured after antiviral drug therapy     s/p Rx, treated / cured - svr 2021    History of drug abuse     Now on methadone    Substance abuse        Home Medications:  Prior to Admission medications    Medication Sig Start Date End Date Taking? Authorizing Provider   albuterol (PROVENTIL/VENTOLIN HFA) 90 mcg/actuation inhaler Inhale 1-2 puffs into the lungs every 6 (six) hours as needed for Wheezing or Shortness of Breath. Rescue 2/26/21 4/12/22 Yes Jaiden Mak II, MD   alfuzosin (UROXATRAL) 10 mg Tb24 Take 1 tablet (10 mg total) by mouth daily with breakfast. 2/22/22 2/22/23 Yes Jaiden Mak II, MD   amLODIPine (NORVASC) 10 MG tablet Take 1 tablet (10 mg total) by mouth once daily. 2/22/22 2/22/23 Yes Jaiden Mak II, MD   buPROPion (WELLBUTRIN SR) 150 MG TBSR 12 hr tablet Take 1 tablet (150 mg total) by mouth once daily. 4/11/22  Yes Pavan Massey MD   fluticasone furoate-vilanteroL (BREO ELLIPTA) 100-25 mcg/dose diskus inhaler Inhale 1 puff into the lungs once  daily. Controller 11/11/21 4/12/22 Yes Jaiden Mak II, MD   lisinopriL (PRINIVIL,ZESTRIL) 20 MG tablet Take 1 tablet (20 mg total) by mouth once daily. 1/3/22 1/3/23 Yes Jaiden Mak II, MD   methadone HCl (METHADONE ORAL) Take 115 mg by mouth once daily.    Yes Historical Provider   buPROPion (WELLBUTRIN SR) 150 MG TBSR 12 hr tablet Take 1 tablet (150 mg total) by mouth once daily.  Patient not taking: Reported on 4/12/2022 12/22/21   Jaiden Mak II, MD   budesonide-formoterol 160-4.5 mcg (SYMBICORT) 160-4.5 mcg/actuation HFAA Inhale 2 puffs into the lungs 2 (two) times daily. 10/10/19 2/26/21  Wenceslao Sinclair MD       Review of Systems:  Review of Systems   Constitutional: Negative for chills, fatigue, fever and unexpected weight change.   HENT: Negative for congestion, ear pain, hearing loss, postnasal drip and sinus pressure.    Eyes: Negative for pain, redness and visual disturbance.   Respiratory: Negative for cough and shortness of breath.    Cardiovascular: Negative for chest pain, palpitations and leg swelling.   Gastrointestinal: Negative for abdominal distention and abdominal pain.   Endocrine: Negative for polydipsia, polyphagia and polyuria.   Genitourinary: Negative for dysuria, frequency, hematuria and urgency.   Musculoskeletal: Negative for arthralgias, gait problem and myalgias.   Skin: Positive for color change. Negative for pallor and rash.        Hand swelling    Allergic/Immunologic: Negative for environmental allergies and immunocompromised state.   Neurological: Positive for dizziness and numbness. Negative for weakness, light-headedness and headaches.   Hematological: Negative for adenopathy. Does not bruise/bleed easily.   Psychiatric/Behavioral: Negative for behavioral problems, confusion and sleep disturbance. The patient is not nervous/anxious.        Health Maintainence:   Immunizations:  Health Maintenance       Date Due Completion Date    Shingles Vaccine (2 of 2)  "04/19/2022 2/22/2022    LDCT Lung Screen 06/03/2022 6/3/2021    Pneumococcal Vaccines (Age 0-64) (2 - PCV) 02/22/2023 2/22/2022    Lipid Panel 04/02/2024 4/2/2019    TETANUS VACCINE 05/24/2026 5/24/2016    Colorectal Cancer Screening 05/27/2026 5/27/2021           PHYSICAL EXAM     /78 (BP Location: Left arm, Patient Position: Sitting, BP Method: Medium (Manual))   Pulse 75   Ht 5' 7" (1.702 m)   Wt 63.7 kg (140 lb 6.9 oz)   SpO2 (!) 93%   BMI 21.99 kg/m²     Physical Exam  Vitals reviewed.   Constitutional:       Appearance: He is well-developed.   HENT:      Head: Normocephalic.      Right Ear: External ear normal.      Left Ear: External ear normal.      Nose: Nose normal.      Mouth/Throat:      Pharynx: No oropharyngeal exudate.   Eyes:      Pupils: Pupils are equal, round, and reactive to light.   Neck:      Thyroid: No thyromegaly.      Vascular: No JVD.      Trachea: No tracheal deviation.   Cardiovascular:      Rate and Rhythm: Normal rate and regular rhythm.      Heart sounds: No murmur heard.    No friction rub. No gallop.   Pulmonary:      Effort: Pulmonary effort is normal. No respiratory distress.      Breath sounds: Normal breath sounds. No wheezing or rales.   Chest:      Chest wall: No tenderness.   Abdominal:      General: Bowel sounds are normal. There is no distension.      Palpations: Abdomen is soft.      Tenderness: There is no abdominal tenderness.   Musculoskeletal:         General: No tenderness. Normal range of motion.   Lymphadenopathy:      Cervical: No cervical adenopathy.   Skin:     General: Skin is warm and dry.      Findings: Erythema (bilateral hands ) present. No rash.   Neurological:      General: No focal deficit present.      Mental Status: He is alert and oriented to person, place, and time.   Psychiatric:         Behavior: Behavior normal.         LABS     No results found for: LABA1C, HGBA1C  CMP  Sodium   Date Value Ref Range Status   12/02/2021 138 136 - 145 " mmol/L Final     Potassium   Date Value Ref Range Status   12/02/2021 4.4 3.5 - 5.1 mmol/L Final     Chloride   Date Value Ref Range Status   12/02/2021 101 95 - 110 mmol/L Final     CO2   Date Value Ref Range Status   12/02/2021 26 23 - 29 mmol/L Final     Glucose   Date Value Ref Range Status   12/02/2021 77 70 - 110 mg/dL Final     BUN   Date Value Ref Range Status   12/02/2021 10 8 - 23 mg/dL Final     Creatinine   Date Value Ref Range Status   12/02/2021 1.0 0.5 - 1.4 mg/dL Final     Calcium   Date Value Ref Range Status   12/02/2021 9.4 8.7 - 10.5 mg/dL Final     Total Protein   Date Value Ref Range Status   12/02/2021 7.9 6.0 - 8.4 g/dL Final     Albumin   Date Value Ref Range Status   12/02/2021 4.1 3.5 - 5.2 g/dL Final     Total Bilirubin   Date Value Ref Range Status   12/02/2021 0.3 0.1 - 1.0 mg/dL Final     Comment:     For infants and newborns, interpretation of results should be based  on gestational age, weight and in agreement with clinical  observations.    Premature Infant recommended reference ranges:  Up to 24 hours.............<8.0 mg/dL  Up to 48 hours............<12.0 mg/dL  3-5 days..................<15.0 mg/dL  6-29 days.................<15.0 mg/dL       Alkaline Phosphatase   Date Value Ref Range Status   12/02/2021 107 55 - 135 U/L Final     AST   Date Value Ref Range Status   12/02/2021 22 10 - 40 U/L Final     ALT   Date Value Ref Range Status   12/02/2021 8 (L) 10 - 44 U/L Final     Anion Gap   Date Value Ref Range Status   12/02/2021 11 8 - 16 mmol/L Final     eGFR if    Date Value Ref Range Status   12/02/2021 >60.0 >60 mL/min/1.73 m^2 Final     eGFR if non    Date Value Ref Range Status   12/02/2021 >60.0 >60 mL/min/1.73 m^2 Final     Comment:     Calculation used to obtain the estimated glomerular filtration  rate (eGFR) is the CKD-EPI equation.        Lab Results   Component Value Date    WBC 4.80 12/02/2021    HGB 14.3 12/02/2021    HCT 43.9  12/02/2021    MCV 95 12/02/2021     (L) 12/02/2021     No results found for: CHOL  No results found for: HDL  No results found for: LDLCALC  No results found for: TRIG  No results found for: CHOLHDL  No results found for: TSH, L1TTZNM, I9LCUZO, THYROIDAB    ASSESSMENT/PLAN     Efra DONY Payton III is a 62 y.o. male     Cellulitis, unspecified cellulitis site- bilateral hands. Will start doxycycline.   -     doxycycline (VIBRAMYCIN) 100 MG Cap; Take 1 capsule (100 mg total) by mouth 2 (two) times daily.  Dispense: 14 capsule; Refill: 0    Essential hypertension- at goal. Will cont current meds.     Hepatitis C virus infection cured after antiviral drug therapy- stable. Will cont current meds.     Hepatic cirrhosis, unspecified hepatic cirrhosis type, unspecified whether ascites present- stable. Will cont current meds.     Chronic hepatitis C without hepatic coma- stable.     Prostatism- medication not helping her pt and having some dizziness. Will hold and reassess orthostasis     Follow up with PCP     Patient education provided from [facility]. Patient was counseled on when and how to seek emergent care.       Denisse REID, APRN, FNP-c   Department of Internal Medicine - [facility] Nathan sada  11:47 AM

## 2022-04-12 NOTE — TELEPHONE ENCOUNTER
----- Message from Lisa So sent at 4/12/2022 12:18 PM CDT -----  Contact: [phone] Patient  Pt is requesting a call back from the office. Pt states he is still at [facility]

## 2022-04-14 ENCOUNTER — TELEPHONE (OUTPATIENT)
Dept: INTERNAL MEDICINE | Facility: CLINIC | Age: 62
End: 2022-04-14
Payer: MEDICAID

## 2022-04-16 ENCOUNTER — LAB VISIT (OUTPATIENT)
Dept: LAB | Facility: HOSPITAL | Age: 62
End: 2022-04-16
Attending: INTERNAL MEDICINE
Payer: MEDICAID

## 2022-04-16 ENCOUNTER — OFFICE VISIT (OUTPATIENT)
Dept: INTERNAL MEDICINE | Facility: CLINIC | Age: 62
End: 2022-04-16
Payer: MEDICAID

## 2022-04-16 VITALS
HEART RATE: 78 BPM | DIASTOLIC BLOOD PRESSURE: 76 MMHG | WEIGHT: 140.19 LBS | OXYGEN SATURATION: 90 % | HEIGHT: 67 IN | RESPIRATION RATE: 18 BRPM | SYSTOLIC BLOOD PRESSURE: 118 MMHG | BODY MASS INDEX: 22 KG/M2

## 2022-04-16 DIAGNOSIS — R09.02 HYPOXIA: ICD-10-CM

## 2022-04-16 DIAGNOSIS — M79.89 BILATERAL HAND SWELLING: Primary | ICD-10-CM

## 2022-04-16 DIAGNOSIS — F11.20 METHADONE MAINTENANCE THERAPY PATIENT: ICD-10-CM

## 2022-04-16 DIAGNOSIS — J44.9 CHRONIC OBSTRUCTIVE PULMONARY DISEASE, UNSPECIFIED COPD TYPE: ICD-10-CM

## 2022-04-16 DIAGNOSIS — I10 ESSENTIAL HYPERTENSION: ICD-10-CM

## 2022-04-16 LAB
ALBUMIN SERPL BCP-MCNC: 4 G/DL (ref 3.5–5.2)
ALP SERPL-CCNC: 95 U/L (ref 55–135)
ALT SERPL W/O P-5'-P-CCNC: 9 U/L (ref 10–44)
ANION GAP SERPL CALC-SCNC: 9 MMOL/L (ref 8–16)
AST SERPL-CCNC: 19 U/L (ref 10–40)
BASOPHILS # BLD AUTO: 0.02 K/UL (ref 0–0.2)
BASOPHILS NFR BLD: 0.5 % (ref 0–1.9)
BILIRUB SERPL-MCNC: 0.4 MG/DL (ref 0.1–1)
BUN SERPL-MCNC: 12 MG/DL (ref 8–23)
CALCIUM SERPL-MCNC: 9.4 MG/DL (ref 8.7–10.5)
CHLORIDE SERPL-SCNC: 101 MMOL/L (ref 95–110)
CHOLEST SERPL-MCNC: 121 MG/DL (ref 120–199)
CHOLEST/HDLC SERPL: 3 {RATIO} (ref 2–5)
CO2 SERPL-SCNC: 30 MMOL/L (ref 23–29)
CREAT SERPL-MCNC: 0.9 MG/DL (ref 0.5–1.4)
DIFFERENTIAL METHOD: ABNORMAL
EOSINOPHIL # BLD AUTO: 0.1 K/UL (ref 0–0.5)
EOSINOPHIL NFR BLD: 1.2 % (ref 0–8)
ERYTHROCYTE [DISTWIDTH] IN BLOOD BY AUTOMATED COUNT: 13.3 % (ref 11.5–14.5)
EST. GFR  (AFRICAN AMERICAN): >60 ML/MIN/1.73 M^2
EST. GFR  (NON AFRICAN AMERICAN): >60 ML/MIN/1.73 M^2
GLUCOSE SERPL-MCNC: 97 MG/DL (ref 70–110)
HCT VFR BLD AUTO: 42.8 % (ref 40–54)
HDLC SERPL-MCNC: 41 MG/DL (ref 40–75)
HDLC SERPL: 33.9 % (ref 20–50)
HGB BLD-MCNC: 13.7 G/DL (ref 14–18)
IMM GRANULOCYTES # BLD AUTO: 0 K/UL (ref 0–0.04)
IMM GRANULOCYTES NFR BLD AUTO: 0 % (ref 0–0.5)
LDLC SERPL CALC-MCNC: 62 MG/DL (ref 63–159)
LYMPHOCYTES # BLD AUTO: 1 K/UL (ref 1–4.8)
LYMPHOCYTES NFR BLD: 22.8 % (ref 18–48)
MCH RBC QN AUTO: 31 PG (ref 27–31)
MCHC RBC AUTO-ENTMCNC: 32 G/DL (ref 32–36)
MCV RBC AUTO: 97 FL (ref 82–98)
MONOCYTES # BLD AUTO: 0.4 K/UL (ref 0.3–1)
MONOCYTES NFR BLD: 10.1 % (ref 4–15)
NEUTROPHILS # BLD AUTO: 2.7 K/UL (ref 1.8–7.7)
NEUTROPHILS NFR BLD: 65.4 % (ref 38–73)
NONHDLC SERPL-MCNC: 80 MG/DL
NRBC BLD-RTO: 0 /100 WBC
PLATELET # BLD AUTO: 105 K/UL (ref 150–450)
PMV BLD AUTO: 11.7 FL (ref 9.2–12.9)
POTASSIUM SERPL-SCNC: 4.2 MMOL/L (ref 3.5–5.1)
PROT SERPL-MCNC: 7.7 G/DL (ref 6–8.4)
RBC # BLD AUTO: 4.42 M/UL (ref 4.6–6.2)
SODIUM SERPL-SCNC: 140 MMOL/L (ref 136–145)
TRIGL SERPL-MCNC: 90 MG/DL (ref 30–150)
WBC # BLD AUTO: 4.16 K/UL (ref 3.9–12.7)

## 2022-04-16 PROCEDURE — 3074F SYST BP LT 130 MM HG: CPT | Mod: CPTII,,, | Performed by: INTERNAL MEDICINE

## 2022-04-16 PROCEDURE — 36415 COLL VENOUS BLD VENIPUNCTURE: CPT | Performed by: INTERNAL MEDICINE

## 2022-04-16 PROCEDURE — 1160F PR REVIEW ALL MEDS BY PRESCRIBER/CLIN PHARMACIST DOCUMENTED: ICD-10-PCS | Mod: CPTII,,, | Performed by: INTERNAL MEDICINE

## 2022-04-16 PROCEDURE — 99214 PR OFFICE/OUTPT VISIT, EST, LEVL IV, 30-39 MIN: ICD-10-PCS | Mod: S$PBB,,, | Performed by: INTERNAL MEDICINE

## 2022-04-16 PROCEDURE — 3078F DIAST BP <80 MM HG: CPT | Mod: CPTII,,, | Performed by: INTERNAL MEDICINE

## 2022-04-16 PROCEDURE — 3078F PR MOST RECENT DIASTOLIC BLOOD PRESSURE < 80 MM HG: ICD-10-PCS | Mod: CPTII,,, | Performed by: INTERNAL MEDICINE

## 2022-04-16 PROCEDURE — 80061 LIPID PANEL: CPT | Performed by: INTERNAL MEDICINE

## 2022-04-16 PROCEDURE — 3008F PR BODY MASS INDEX (BMI) DOCUMENTED: ICD-10-PCS | Mod: CPTII,,, | Performed by: INTERNAL MEDICINE

## 2022-04-16 PROCEDURE — 1159F PR MEDICATION LIST DOCUMENTED IN MEDICAL RECORD: ICD-10-PCS | Mod: CPTII,,, | Performed by: INTERNAL MEDICINE

## 2022-04-16 PROCEDURE — 4010F ACE/ARB THERAPY RXD/TAKEN: CPT | Mod: CPTII,,, | Performed by: INTERNAL MEDICINE

## 2022-04-16 PROCEDURE — 3074F PR MOST RECENT SYSTOLIC BLOOD PRESSURE < 130 MM HG: ICD-10-PCS | Mod: CPTII,,, | Performed by: INTERNAL MEDICINE

## 2022-04-16 PROCEDURE — 85025 COMPLETE CBC W/AUTO DIFF WBC: CPT | Performed by: INTERNAL MEDICINE

## 2022-04-16 PROCEDURE — 80053 COMPREHEN METABOLIC PANEL: CPT | Performed by: INTERNAL MEDICINE

## 2022-04-16 PROCEDURE — 1159F MED LIST DOCD IN RCRD: CPT | Mod: CPTII,,, | Performed by: INTERNAL MEDICINE

## 2022-04-16 PROCEDURE — 99214 OFFICE O/P EST MOD 30 MIN: CPT | Mod: S$PBB,,, | Performed by: INTERNAL MEDICINE

## 2022-04-16 PROCEDURE — 99999 PR PBB SHADOW E&M-EST. PATIENT-LVL III: CPT | Mod: PBBFAC,,, | Performed by: INTERNAL MEDICINE

## 2022-04-16 PROCEDURE — 1160F RVW MEDS BY RX/DR IN RCRD: CPT | Mod: CPTII,,, | Performed by: INTERNAL MEDICINE

## 2022-04-16 PROCEDURE — 3008F BODY MASS INDEX DOCD: CPT | Mod: CPTII,,, | Performed by: INTERNAL MEDICINE

## 2022-04-16 PROCEDURE — 99999 PR PBB SHADOW E&M-EST. PATIENT-LVL III: ICD-10-PCS | Mod: PBBFAC,,, | Performed by: INTERNAL MEDICINE

## 2022-04-16 PROCEDURE — 99213 OFFICE O/P EST LOW 20 MIN: CPT | Mod: PBBFAC | Performed by: INTERNAL MEDICINE

## 2022-04-16 PROCEDURE — 4010F PR ACE/ARB THEARPY RXD/TAKEN: ICD-10-PCS | Mod: CPTII,,, | Performed by: INTERNAL MEDICINE

## 2022-04-16 RX ORDER — FLUTICASONE FUROATE AND VILANTEROL TRIFENATATE 100; 25 UG/1; UG/1
1 POWDER RESPIRATORY (INHALATION) DAILY
Qty: 120 EACH | Refills: 2 | Status: SHIPPED | OUTPATIENT
Start: 2022-04-16 | End: 2022-06-28 | Stop reason: SDUPTHER

## 2022-04-16 RX ORDER — LISINOPRIL 20 MG/1
20 TABLET ORAL DAILY
Qty: 90 TABLET | Refills: 3 | Status: SHIPPED | OUTPATIENT
Start: 2022-04-16 | End: 2023-07-24

## 2022-04-16 RX ORDER — ALBUTEROL SULFATE 90 UG/1
1-2 AEROSOL, METERED RESPIRATORY (INHALATION) EVERY 6 HOURS PRN
Qty: 18 G | Refills: 3 | Status: SHIPPED | OUTPATIENT
Start: 2022-04-16 | End: 2022-06-28 | Stop reason: SDUPTHER

## 2022-04-16 NOTE — PROGRESS NOTES
Subjective:       Patient ID: Efra Payton III is a 62 y.o. male.    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  Ever since then, his hands have been swollen and warm with some redness.  Saw PA and was diagnosed with bilateral hand cellulitis and is finishing a course of doxycycline.  He doesn't have f/c, n/v/d.  He's not on is inhalers for COPD, and his oxygen was lowish today.  Still smoking cigarettes    PMH  Hepatitis C, completed treatment  Hx positive PPD with treatment before 2000  Colon polyps 2021 after positive cologuard  HTN, at goal today  Anxiety  Smoker  COPD  Hx incarceration     Meds:  Reviewed and reconciled in EPIC with patient during visit today     Review of Systems   Constitutional: Negative for fever.   HENT: Negative for congestion.    Respiratory: Negative for shortness of breath.    Cardiovascular: Negative for chest pain.   Gastrointestinal: Negative for abdominal pain.   Genitourinary: Negative for difficulty urinating.   Musculoskeletal: Positive for arthralgias.   Skin: Negative for rash.   Neurological: Negative for headaches.   Psychiatric/Behavioral: Negative for sleep disturbance.       Objective:      Physical Exam  Vitals reviewed.   Constitutional:       General: He is not in acute distress.     Appearance: He is well-developed. He is not diaphoretic.      Comments: Lean, fidgety, well-appearing man   HENT:      Head: Normocephalic and atraumatic.   Cardiovascular:      Rate and Rhythm: Normal rate and regular rhythm.      Heart sounds: Normal heart sounds. No murmur heard.    No friction rub. No gallop.   Pulmonary:      Effort: No respiratory distress.      Breath sounds: No wheezing or rales.   Chest:      Chest wall: No tenderness.   Musculoskeletal:      Comments: Both hands warm and edematous.  Minimal erythema   Skin:     General: Skin is warm.      Findings: No erythema.    Neurological:      General: No focal deficit present.      Mental Status: He is alert.   Psychiatric:         Thought Content: Thought content normal.         Assessment:       1. Bilateral hand swelling    2. Chronic obstructive pulmonary disease, unspecified COPD type    3. Essential hypertension    4. Methadone maintenance therapy patient    5. Hypoxia        Plan:       Efra was seen today for hand pain.    Diagnoses and all orders for this visit:    Bilateral hand swelling - I don't think this is cellulitis; stop doxycycline.  This is overuse or allergy to the gloves he's using.  Trial of advil prior to work.  Change gloves to something without a rubber lining    Chronic obstructive pulmonary disease, unspecified COPD type - emphasized the importance of adherence to the breo at least daily.  -     fluticasone furoate-vilanteroL (BREO ELLIPTA) 100-25 mcg/dose diskus inhaler; Inhale 1 puff into the lungs once daily. Controller  -     albuterol (PROVENTIL/VENTOLIN HFA) 90 mcg/actuation inhaler; Inhale 1-2 puffs into the lungs every 6 (six) hours as needed for Wheezing or Shortness of Breath. Rescue    Essential hypertension - at gol.  Providing refills  -     lisinopriL (PRINIVIL,ZESTRIL) 20 MG tablet; Take 1 tablet (20 mg total) by mouth once daily.  -     Lipid panel; Future  -     Comprehensive Metabolic Panel; Future    Methadone maintenance therapy patient    Hypoxia - will look at a cbc to see if this is longstanding  -     CBC Auto Differential; Future    rtc prn, or in 3 months    G Aaron Mak MD MPH  Staff Internist

## 2022-04-18 ENCOUNTER — PATIENT MESSAGE (OUTPATIENT)
Dept: INTERNAL MEDICINE | Facility: CLINIC | Age: 62
End: 2022-04-18
Payer: MEDICAID

## 2022-04-18 DIAGNOSIS — D69.6 THROMBOCYTOPENIA: Primary | ICD-10-CM

## 2022-05-12 ENCOUNTER — OFFICE VISIT (OUTPATIENT)
Dept: INTERNAL MEDICINE | Facility: CLINIC | Age: 62
End: 2022-05-12
Payer: MEDICAID

## 2022-05-12 VITALS
DIASTOLIC BLOOD PRESSURE: 70 MMHG | BODY MASS INDEX: 21.63 KG/M2 | WEIGHT: 137.81 LBS | HEIGHT: 67 IN | SYSTOLIC BLOOD PRESSURE: 134 MMHG | OXYGEN SATURATION: 96 % | HEART RATE: 77 BPM

## 2022-05-12 DIAGNOSIS — R60.0 HAND EDEMA: ICD-10-CM

## 2022-05-12 DIAGNOSIS — K40.90 RIGHT INGUINAL HERNIA: ICD-10-CM

## 2022-05-12 DIAGNOSIS — G56.02 CARPAL TUNNEL SYNDROME OF LEFT WRIST: ICD-10-CM

## 2022-05-12 DIAGNOSIS — D69.6 THROMBOCYTOPENIA: ICD-10-CM

## 2022-05-12 DIAGNOSIS — J44.9 CHRONIC OBSTRUCTIVE PULMONARY DISEASE, UNSPECIFIED COPD TYPE: ICD-10-CM

## 2022-05-12 DIAGNOSIS — I10 ESSENTIAL HYPERTENSION: Primary | ICD-10-CM

## 2022-05-12 DIAGNOSIS — N40.0 BENIGN PROSTATIC HYPERPLASIA, UNSPECIFIED WHETHER LOWER URINARY TRACT SYMPTOMS PRESENT: ICD-10-CM

## 2022-05-12 DIAGNOSIS — F17.200 SMOKER: ICD-10-CM

## 2022-05-12 LAB
BILIRUB UR QL STRIP: NEGATIVE
CLARITY UR REFRACT.AUTO: CLEAR
COLOR UR AUTO: YELLOW
GLUCOSE UR QL STRIP: NEGATIVE
HGB UR QL STRIP: NEGATIVE
KETONES UR QL STRIP: NEGATIVE
LEUKOCYTE ESTERASE UR QL STRIP: NEGATIVE
NITRITE UR QL STRIP: NEGATIVE
PH UR STRIP: 5 [PH] (ref 5–8)
PROT UR QL STRIP: NEGATIVE
SP GR UR STRIP: 1.01 (ref 1–1.03)
URN SPEC COLLECT METH UR: NORMAL

## 2022-05-12 PROCEDURE — 3075F SYST BP GE 130 - 139MM HG: CPT | Mod: CPTII,,, | Performed by: INTERNAL MEDICINE

## 2022-05-12 PROCEDURE — 1160F RVW MEDS BY RX/DR IN RCRD: CPT | Mod: CPTII,,, | Performed by: INTERNAL MEDICINE

## 2022-05-12 PROCEDURE — 3008F BODY MASS INDEX DOCD: CPT | Mod: CPTII,,, | Performed by: INTERNAL MEDICINE

## 2022-05-12 PROCEDURE — 81003 URINALYSIS AUTO W/O SCOPE: CPT | Performed by: INTERNAL MEDICINE

## 2022-05-12 PROCEDURE — 99999 PR PBB SHADOW E&M-EST. PATIENT-LVL IV: ICD-10-PCS | Mod: PBBFAC,,, | Performed by: INTERNAL MEDICINE

## 2022-05-12 PROCEDURE — 1159F PR MEDICATION LIST DOCUMENTED IN MEDICAL RECORD: ICD-10-PCS | Mod: CPTII,,, | Performed by: INTERNAL MEDICINE

## 2022-05-12 PROCEDURE — 99214 OFFICE O/P EST MOD 30 MIN: CPT | Mod: S$PBB,,, | Performed by: INTERNAL MEDICINE

## 2022-05-12 PROCEDURE — 3078F PR MOST RECENT DIASTOLIC BLOOD PRESSURE < 80 MM HG: ICD-10-PCS | Mod: CPTII,,, | Performed by: INTERNAL MEDICINE

## 2022-05-12 PROCEDURE — 1159F MED LIST DOCD IN RCRD: CPT | Mod: CPTII,,, | Performed by: INTERNAL MEDICINE

## 2022-05-12 PROCEDURE — 3008F PR BODY MASS INDEX (BMI) DOCUMENTED: ICD-10-PCS | Mod: CPTII,,, | Performed by: INTERNAL MEDICINE

## 2022-05-12 PROCEDURE — 4010F ACE/ARB THERAPY RXD/TAKEN: CPT | Mod: CPTII,,, | Performed by: INTERNAL MEDICINE

## 2022-05-12 PROCEDURE — 99214 OFFICE O/P EST MOD 30 MIN: CPT | Mod: PBBFAC | Performed by: INTERNAL MEDICINE

## 2022-05-12 PROCEDURE — 1160F PR REVIEW ALL MEDS BY PRESCRIBER/CLIN PHARMACIST DOCUMENTED: ICD-10-PCS | Mod: CPTII,,, | Performed by: INTERNAL MEDICINE

## 2022-05-12 PROCEDURE — 99214 PR OFFICE/OUTPT VISIT, EST, LEVL IV, 30-39 MIN: ICD-10-PCS | Mod: S$PBB,,, | Performed by: INTERNAL MEDICINE

## 2022-05-12 PROCEDURE — 4010F PR ACE/ARB THEARPY RXD/TAKEN: ICD-10-PCS | Mod: CPTII,,, | Performed by: INTERNAL MEDICINE

## 2022-05-12 PROCEDURE — 3075F PR MOST RECENT SYSTOLIC BLOOD PRESS GE 130-139MM HG: ICD-10-PCS | Mod: CPTII,,, | Performed by: INTERNAL MEDICINE

## 2022-05-12 PROCEDURE — 99999 PR PBB SHADOW E&M-EST. PATIENT-LVL IV: CPT | Mod: PBBFAC,,, | Performed by: INTERNAL MEDICINE

## 2022-05-12 PROCEDURE — 3078F DIAST BP <80 MM HG: CPT | Mod: CPTII,,, | Performed by: INTERNAL MEDICINE

## 2022-05-12 RX ORDER — ALFUZOSIN HYDROCHLORIDE 10 MG/1
10 TABLET, EXTENDED RELEASE ORAL
Qty: 90 TABLET | Refills: 3 | Status: SHIPPED | OUTPATIENT
Start: 2022-05-12 | End: 2022-06-06 | Stop reason: SDUPTHER

## 2022-05-12 RX ORDER — GUAIFENESIN 400 MG/1
400 TABLET ORAL 2 TIMES DAILY
Qty: 100 TABLET | Refills: 3 | Status: SHIPPED | OUTPATIENT
Start: 2022-05-12 | End: 2022-07-01

## 2022-05-12 NOTE — PROGRESS NOTES
"Subjective:       Patient ID: Efra Payton III is a 62 y.o. male.    Chief Complaint:   Hypertension ( Month follow up)    HPI - tons of issues today.  Hand swelling is better since changing job description; less lifting.  Needs a note for work at [employer].  He's having urinary hesitancy that seems to be worsening despite use of uroxatrol.  Still smoking; trying to quit.  Cough and dyspnea seem to be worsening.  He is due for second covid booster and second shingrx. His hernia "popped out" and isn't going down.  Long wait for surgery at [facility]; wonders if he can get into clinic here more quickly.  Retake of BP was good.    PMH  Hepatitis C, completed treatment  Hx positive PPD with treatment before 2000  Colon polyps 2021 after positive cologuard  HTN, at goal today  Anxiety  Smoker  COPD  Hx incarceration     Meds:  Reviewed and reconciled in EPIC with patient during visit today     Review of Systems   Constitutional: Negative for fever.   HENT: Positive for congestion.    Respiratory: Positive for cough.    Cardiovascular: Negative for chest pain.   Gastrointestinal: Negative for abdominal pain.   Genitourinary: Positive for difficulty urinating.   Musculoskeletal: Negative for arthralgias.   Skin: Negative for rash.   Neurological: Negative for headaches.   Psychiatric/Behavioral: Negative for sleep disturbance.       Objective:      Physical Exam  Vitals reviewed.   Constitutional:       General: He is not in acute distress.     Appearance: Normal appearance. He is well-developed. He is not diaphoretic.      Comments: Lean man.  No distress   HENT:      Head: Normocephalic and atraumatic.   Cardiovascular:      Rate and Rhythm: Normal rate and regular rhythm.      Heart sounds: Murmur heard.     No friction rub. No gallop.   Pulmonary:      Effort: No respiratory distress.      Breath sounds: No wheezing or rales.   Chest:      Chest wall: No tenderness.   Skin:     General: Skin is warm.      Findings: No " erythema.   Neurological:      Mental Status: He is alert and oriented to person, place, and time.   Psychiatric:         Thought Content: Thought content normal.         Assessment:       1. Essential hypertension    2. Right inguinal hernia    3. Carpal tunnel syndrome of left wrist    4. Hand edema    5. Smoker    6. Thrombocytopenia    7. Benign prostatic hyperplasia, unspecified whether lower urinary tract symptoms present    8. Chronic obstructive pulmonary disease, unspecified COPD type        Plan:       Efra was seen today for hypertension.    Diagnoses and all orders for this visit:    Essential hypertension - at goal, stay the course    Right inguinal hernia - will see if gen surgery can take him  -     Ambulatory referral/consult to General Surgery; Future    Carpal tunnel syndrome of left wrist - improving.  Gave a note for no heavy lifting at work    Hand edema    Smoker - encouraged cessation    Thrombocytopenia - likely 2/2 liver disease.    Benign prostatic hyperplasia, unspecified whether lower urinary tract symptoms present - restart uroxatral.  Ua; if infection, will treat this to clear symptoms  -     Urinalysis  -     alfuzosin (UROXATRAL) 10 mg Tb24; Take 1 tablet (10 mg total) by mouth daily with breakfast.    Chronic obstructive pulmonary disease, unspecified COPD type - will continue to worsen as long as he smokes.  Guaifenesin will help loosen sputum  -     guaiFENesin (HUMIBID E) 400 mg Tab; Take 1 tablet (400 mg total) by mouth 2 (two) times a day.    rtc prn, or in 3 months    JEN Mak MD MPH  Staff Internist

## 2022-06-06 ENCOUNTER — TELEPHONE (OUTPATIENT)
Dept: HEPATOLOGY | Facility: CLINIC | Age: 62
End: 2022-06-06
Payer: MEDICAID

## 2022-06-06 ENCOUNTER — OFFICE VISIT (OUTPATIENT)
Dept: HEPATOLOGY | Facility: CLINIC | Age: 62
End: 2022-06-06
Payer: MEDICAID

## 2022-06-06 ENCOUNTER — HOSPITAL ENCOUNTER (OUTPATIENT)
Dept: RADIOLOGY | Facility: HOSPITAL | Age: 62
Discharge: HOME | End: 2022-06-06
Attending: PHYSICIAN ASSISTANT
Payer: MEDICAID

## 2022-06-06 VITALS
RESPIRATION RATE: 18 BRPM | OXYGEN SATURATION: 92 % | TEMPERATURE: 98 F | WEIGHT: 135.38 LBS | HEIGHT: 67 IN | DIASTOLIC BLOOD PRESSURE: 78 MMHG | BODY MASS INDEX: 21.25 KG/M2 | SYSTOLIC BLOOD PRESSURE: 140 MMHG | HEART RATE: 67 BPM

## 2022-06-06 DIAGNOSIS — K74.60 HEPATIC CIRRHOSIS, UNSPECIFIED HEPATIC CIRRHOSIS TYPE, UNSPECIFIED WHETHER ASCITES PRESENT: ICD-10-CM

## 2022-06-06 DIAGNOSIS — K74.60 HEPATIC CIRRHOSIS, UNSPECIFIED HEPATIC CIRRHOSIS TYPE, UNSPECIFIED WHETHER ASCITES PRESENT: Primary | ICD-10-CM

## 2022-06-06 DIAGNOSIS — N40.0 BENIGN PROSTATIC HYPERPLASIA, UNSPECIFIED WHETHER LOWER URINARY TRACT SYMPTOMS PRESENT: ICD-10-CM

## 2022-06-06 PROCEDURE — 3008F PR BODY MASS INDEX (BMI) DOCUMENTED: ICD-10-PCS | Mod: CPTII,,, | Performed by: PHYSICIAN ASSISTANT

## 2022-06-06 PROCEDURE — 1160F PR REVIEW ALL MEDS BY PRESCRIBER/CLIN PHARMACIST DOCUMENTED: ICD-10-PCS | Mod: CPTII,,, | Performed by: PHYSICIAN ASSISTANT

## 2022-06-06 PROCEDURE — 76705 ECHO EXAM OF ABDOMEN: CPT | Mod: 26,,, | Performed by: RADIOLOGY

## 2022-06-06 PROCEDURE — 1159F MED LIST DOCD IN RCRD: CPT | Mod: CPTII,,, | Performed by: PHYSICIAN ASSISTANT

## 2022-06-06 PROCEDURE — 76705 US ABDOMEN LIMITED: ICD-10-PCS | Mod: 26,,, | Performed by: RADIOLOGY

## 2022-06-06 PROCEDURE — 3078F DIAST BP <80 MM HG: CPT | Mod: CPTII,,, | Performed by: PHYSICIAN ASSISTANT

## 2022-06-06 PROCEDURE — 99213 PR OFFICE/OUTPT VISIT, EST, LEVL III, 20-29 MIN: ICD-10-PCS | Mod: S$PBB,,, | Performed by: PHYSICIAN ASSISTANT

## 2022-06-06 PROCEDURE — 3077F PR MOST RECENT SYSTOLIC BLOOD PRESSURE >= 140 MM HG: ICD-10-PCS | Mod: CPTII,,, | Performed by: PHYSICIAN ASSISTANT

## 2022-06-06 PROCEDURE — 99213 OFFICE O/P EST LOW 20 MIN: CPT | Mod: S$PBB,,, | Performed by: PHYSICIAN ASSISTANT

## 2022-06-06 PROCEDURE — 1160F RVW MEDS BY RX/DR IN RCRD: CPT | Mod: CPTII,,, | Performed by: PHYSICIAN ASSISTANT

## 2022-06-06 PROCEDURE — 3078F PR MOST RECENT DIASTOLIC BLOOD PRESSURE < 80 MM HG: ICD-10-PCS | Mod: CPTII,,, | Performed by: PHYSICIAN ASSISTANT

## 2022-06-06 PROCEDURE — 3077F SYST BP >= 140 MM HG: CPT | Mod: CPTII,,, | Performed by: PHYSICIAN ASSISTANT

## 2022-06-06 PROCEDURE — 99999 PR PBB SHADOW E&M-EST. PATIENT-LVL IV: ICD-10-PCS | Mod: PBBFAC,,, | Performed by: PHYSICIAN ASSISTANT

## 2022-06-06 PROCEDURE — 99214 OFFICE O/P EST MOD 30 MIN: CPT | Mod: PBBFAC,25 | Performed by: PHYSICIAN ASSISTANT

## 2022-06-06 PROCEDURE — 3008F BODY MASS INDEX DOCD: CPT | Mod: CPTII,,, | Performed by: PHYSICIAN ASSISTANT

## 2022-06-06 PROCEDURE — 99999 PR PBB SHADOW E&M-EST. PATIENT-LVL IV: CPT | Mod: PBBFAC,,, | Performed by: PHYSICIAN ASSISTANT

## 2022-06-06 PROCEDURE — 76705 ECHO EXAM OF ABDOMEN: CPT | Mod: TC

## 2022-06-06 PROCEDURE — 1159F PR MEDICATION LIST DOCUMENTED IN MEDICAL RECORD: ICD-10-PCS | Mod: CPTII,,, | Performed by: PHYSICIAN ASSISTANT

## 2022-06-06 PROCEDURE — 4010F PR ACE/ARB THEARPY RXD/TAKEN: ICD-10-PCS | Mod: CPTII,,, | Performed by: PHYSICIAN ASSISTANT

## 2022-06-06 PROCEDURE — 4010F ACE/ARB THERAPY RXD/TAKEN: CPT | Mod: CPTII,,, | Performed by: PHYSICIAN ASSISTANT

## 2022-06-06 RX ORDER — ALFUZOSIN HYDROCHLORIDE 10 MG/1
10 TABLET, EXTENDED RELEASE ORAL
Qty: 90 TABLET | Refills: 3 | Status: SHIPPED | OUTPATIENT
Start: 2022-06-06 | End: 2023-08-23 | Stop reason: SDUPTHER

## 2022-06-06 NOTE — TELEPHONE ENCOUNTER
No new care gaps identified.  [facility] Embedded Care Gaps. Reference number: [id number]. 6/06/2022   9:12:16 AM CDT

## 2022-06-06 NOTE — TELEPHONE ENCOUNTER
Attempt made to reach patient.  Msg from PA Scheuermann left on his VM.  F/u with testing scheduled 12/5/22; appt reminder notice mailed.

## 2022-06-06 NOTE — TELEPHONE ENCOUNTER
Please tell pt the rest of his labs from 6/6/22 looked fine  Liver working well  Liver cancer screening lab was normal.    Next monitoring due 12/2022    Schedule CBC, CMP, INR, AFP, u/s, visit in December

## 2022-06-06 NOTE — PROGRESS NOTES
HEPATOLOGY CLINIC VISIT NOTE - HCV clinic  CHIEF COMPLAINT: HCV Cirrhosis      HISTORY     This is a 62 y.o. White male with well compensated HCV Cirrhosis, here for f/u    Interval history:  Completed HCV rx w/ epclusa 6/2021    Returns today w/ routine labs / u/s:  · U/S today - liver unremarkable, no ascites  · Labs  Today - pending    Current symptoms of hepatic decompensation:  · Ascites:              none  · TBili elevation:   none  · HE:                    none  · EV bleed:           none    Has appt scheduled soon to discuss hernia repair    HCV history:  S/p 12 weeks epclusa, SVR12 - 2021 - treated / cured  - Treatment naive prior to epclusa  - Genotype 3       Cirrhosis history:  FibroScan 3/4/21 - kPa 23.0, F4 (, S2)  Liver disease is well compensated  No real evidence portal HTN (spleen 11cm, no EV)  Plt low: 80s-140s     MELD-Na score: 6 at 12/2/2021  9:24 AM  MELD score: 6 at 12/2/2021  9:24 AM  Calculated from:  Serum Creatinine: 1.0 mg/dL at 12/2/2021  9:24 AM  Serum Sodium: 138 mmol/L (Using max of 137 mmol/L) at 12/2/2021  9:24 AM  Total Bilirubin: 0.3 mg/dL (Using min of 1 mg/dL) at 12/2/2021  9:24 AM  INR(ratio): 1.0 at 12/2/2021  9:24 AM  Age: 61 years    Cirrhosis maintenance:   HCC screening - U/S okay, AFP pending   Varices screening - EUS W/ EGD 3/24/21 - no EV         PMH, PSH, PROBLEM LIST, SOCIAL HX, FAMILY HX, MEDS, ALLERGIES   Reviewed in Epic    FAMILY HX: neg for liver diease  . Raising 3 grandchildren (2 girls, 1 boy). Previously worked in construction, now working as manager in grocery  Alcohol - none for several yrs. Heavy in past.  Drugs - none x 6.5 yrs, on methadone now.      ROS:   Review of Systems   Constitutional: Negative for fever.   Respiratory: Negative for cough.    Cardiovascular: Negative for chest pain and leg swelling.   Gastrointestinal: Negative for abdominal pain.   Skin: Negative for rash.       PHYSICAL EXAM:  Friendly White male, in no acute  distress; alert and oriented to person, place and time  VITALS: reviewed  HEENT: Sclerae anicteric.   LUNGS: Normal respiratory effort.   ABDOMEN: Flat, soft, nontender.   SKIN: Warm and dry. No jaundice, No obvious rashes.   NEURO/PSYCH: Normal gate. Memory intact. Thought and speech pattern appropriate. Behavior normal. No depression or anxiety noted.    PERTINENT DIAGNOSTIC RESULTS     Lab Results   Component Value Date    WBC 4.16 04/16/2022    HGB 13.7 (L) 04/16/2022     (L) 04/16/2022    INR 1.0 12/02/2021     Lab Results   Component Value Date     04/16/2022    K 4.2 04/16/2022    BUN 12 04/16/2022    CREATININE 0.9 04/16/2022    ALBUMIN 4.0 04/16/2022    ALKPHOS 95 04/16/2022    BILITOT 0.4 04/16/2022    AST 19 04/16/2022    ALT 9 (L) 04/16/2022    AFP <2.0 12/02/2021       US ABDOMEN LIMITED - 6/6/22  CLINICAL HISTORY:Unspecified cirrhosis of liver  TECHNIQUE:Limited ultrasound of the right upper quadrant of the abdomen (including pancreas, liver, gallbladder, common bile duct, and spleen) was performed.  COMPARISON:U/S abdomen limited 12/02/2021; U/S abdomen complete 03/04/2021     FINDINGS:  Pancreas: The visualized portions of the pancreas appear within normal limits..     Liver: Normal in size, measuring 15.4 cm. Homogeneous echotexture. No focal hepatic lesions.  The HR index is normal measuring 0.95.     Gallbladder: Multiple mobile shadowing gallstones are seen, largest measuring 1.9 cm.  No gallbladder wall thickening or pericholecystic fluid.  No sonographic Bynum's sign.     Biliary system: The common duct is not dilated, measuring 4 mm.  No intrahepatic ductal dilatation.     Spleen: Normal in size and echotexture, measuring 11.6 x 4.8 cm.  Previously described subcentimeter intrasplenic hyperechoic focus is not visualized.     Miscellaneous: No upper abdominal ascites.     Impression:   No focal hepatic lesions.   Cholelithiasis.    ASSESSMENT     62 y.o. White male with:  1.  HISTORY OF HEPATITIS C, GENOTYPE 3 - treated / cured  -- s/p epclusa - SVR12 (2/2021)     2. WELL COMPENSATED CIRRHOSIS  -- FibroScan - F4 (kPa 23)  -- MELD  -  Pending, previously 6  -- HCC screening: pending  -- Varices screening: EUS 3/2021 - no EV  -- (+) Immunity to HAV   -- isolated HBcAb (+), neg HBV DNA     3. HX OF DILATED BILIARY AND PANCREATIC DUCTS ON PRIOR U/S, normal EUS  -- No further eval needed       PLAN     1. Await results of labs  -- Assuming all stable, due for next CBC, CMP, INR, AFP, U/S, VISIT 12/2022    2. EGD for EV screen - 3/2024      __________________________________________________________________    Duration of encounter: 26 min  This includes face-to-face time and non face-to-face time preparing to see the patient (eg, review of tests), obtaining and/or reviewing separately obtained history, documenting clinical information in the electronic or other health record, independently interpreting resultsand communicating results to the patient/family/caregiver, or care coordination.

## 2022-06-15 ENCOUNTER — OFFICE VISIT (OUTPATIENT)
Dept: SURGERY | Facility: CLINIC | Age: 62
End: 2022-06-15
Payer: MEDICAID

## 2022-06-15 VITALS
SYSTOLIC BLOOD PRESSURE: 120 MMHG | HEART RATE: 80 BPM | DIASTOLIC BLOOD PRESSURE: 73 MMHG | HEIGHT: 67 IN | BODY MASS INDEX: 21.71 KG/M2 | WEIGHT: 138.31 LBS

## 2022-06-15 DIAGNOSIS — F11.20 METHADONE MAINTENANCE THERAPY PATIENT: ICD-10-CM

## 2022-06-15 DIAGNOSIS — K74.60 HEPATIC CIRRHOSIS, UNSPECIFIED HEPATIC CIRRHOSIS TYPE, UNSPECIFIED WHETHER ASCITES PRESENT: Primary | ICD-10-CM

## 2022-06-15 DIAGNOSIS — K40.90 RIGHT INGUINAL HERNIA: ICD-10-CM

## 2022-06-15 PROCEDURE — 1159F PR MEDICATION LIST DOCUMENTED IN MEDICAL RECORD: ICD-10-PCS | Mod: CPTII,,, | Performed by: SURGERY

## 2022-06-15 PROCEDURE — 1159F MED LIST DOCD IN RCRD: CPT | Mod: CPTII,,, | Performed by: SURGERY

## 2022-06-15 PROCEDURE — 99213 OFFICE O/P EST LOW 20 MIN: CPT | Mod: PBBFAC | Performed by: SURGERY

## 2022-06-15 PROCEDURE — 4010F ACE/ARB THERAPY RXD/TAKEN: CPT | Mod: CPTII,,, | Performed by: SURGERY

## 2022-06-15 PROCEDURE — 3008F PR BODY MASS INDEX (BMI) DOCUMENTED: ICD-10-PCS | Mod: CPTII,,, | Performed by: SURGERY

## 2022-06-15 PROCEDURE — 99213 PR OFFICE/OUTPT VISIT, EST, LEVL III, 20-29 MIN: ICD-10-PCS | Mod: S$PBB,,, | Performed by: SURGERY

## 2022-06-15 PROCEDURE — 3008F BODY MASS INDEX DOCD: CPT | Mod: CPTII,,, | Performed by: SURGERY

## 2022-06-15 PROCEDURE — 1160F PR REVIEW ALL MEDS BY PRESCRIBER/CLIN PHARMACIST DOCUMENTED: ICD-10-PCS | Mod: CPTII,,, | Performed by: SURGERY

## 2022-06-15 PROCEDURE — 3074F PR MOST RECENT SYSTOLIC BLOOD PRESSURE < 130 MM HG: ICD-10-PCS | Mod: CPTII,,, | Performed by: SURGERY

## 2022-06-15 PROCEDURE — 3078F DIAST BP <80 MM HG: CPT | Mod: CPTII,,, | Performed by: SURGERY

## 2022-06-15 PROCEDURE — 99999 PR PBB SHADOW E&M-EST. PATIENT-LVL III: ICD-10-PCS | Mod: PBBFAC,,, | Performed by: SURGERY

## 2022-06-15 PROCEDURE — 1160F RVW MEDS BY RX/DR IN RCRD: CPT | Mod: CPTII,,, | Performed by: SURGERY

## 2022-06-15 PROCEDURE — 4010F PR ACE/ARB THEARPY RXD/TAKEN: ICD-10-PCS | Mod: CPTII,,, | Performed by: SURGERY

## 2022-06-15 PROCEDURE — 3074F SYST BP LT 130 MM HG: CPT | Mod: CPTII,,, | Performed by: SURGERY

## 2022-06-15 PROCEDURE — 3078F PR MOST RECENT DIASTOLIC BLOOD PRESSURE < 80 MM HG: ICD-10-PCS | Mod: CPTII,,, | Performed by: SURGERY

## 2022-06-15 PROCEDURE — 99999 PR PBB SHADOW E&M-EST. PATIENT-LVL III: CPT | Mod: PBBFAC,,, | Performed by: SURGERY

## 2022-06-15 PROCEDURE — 99213 OFFICE O/P EST LOW 20 MIN: CPT | Mod: S$PBB,,, | Performed by: SURGERY

## 2022-06-15 NOTE — PROGRESS NOTES
"Subjective 4/16/22:    HPI: Efra Payton is a 61y male who is here today to re-schedule surgery for a right sided femoral hernia. He originally was scheduled 1/07/2021 but was diagnosed with chronic active hepatitis C 1/04/2021 and so surgery was cancelled. FibroScan confirmed cirrhosis, however he is asymptomatic, negative for HCC and varices via u/s. He is currently undergoing a 3 month course of Epclusa, scheduled to finish 6/22. Patient reports right pain groin originally started 2 years ago when he was carrying a crate of cigarettes at work that "stopped him in his tracks". He was evaluated after the incident and US demonstrated a right femoral hernia, but due to COVID surgery was postponed until 2021. Since then the pain has stayed the same at 5/10 on the pain scale, however the pain onset is more easily provoked by daily tasks, and the length of soreness and tenderness. The pain is sharp and non radiating.     MHx: HCV, cirrhosis, HTN, COPD, gallstones  SHx: Colonoscopy  SocHx: Currently lives with his wife. Has a daughter, son passed away during [place]. Has 3 grandchildren. Smokes a pack a day since 50 years ago, denies alcohol and illicit drug use. Has been on workers compensation since hernia occurred.    Interval History 6/15/22:   At last visit it was decided to hold off on surgery until patient could stop Epclusa, last dose was over a month ago (states likely several months ago). Last visit with hepatology was 6/6/22. All labs were wnl. Patient liver function good. No ascites on US no evidence of portal HTN. His states that his hernia has grown in size since his last visit. Still reducible. Slightly tender. States that he has cut down on smoking from 3 packs a day down to 2/3 of a pack. Would like to talk about surgical repair today.     MELD-Na score: 6 at 6/6/2022  9:33 AM  MELD score: 6 at 6/6/2022  9:33 AM  Calculated from:  Serum Creatinine: 1.0 mg/dL at 6/6/2022  9:33 AM  Serum Sodium: 140 " mmol/L (Using max of 137 mmol/L) at 6/6/2022  9:33 AM  Total Bilirubin: 0.3 mg/dL (Using min of 1 mg/dL) at 6/6/2022  9:33 AM  INR(ratio): 1.0 at 6/6/2022  9:33 AM  Age: 62 years    Review of Systems   Constitutional: Negative for chills, fever and weight loss.   HENT: Negative for congestion and hearing loss.    Respiratory: Positive for cough and wheezing. Negative for shortness of breath.    Cardiovascular: Negative for chest pain and palpitations.   Gastrointestinal: Positive for constipation. Negative for diarrhea, heartburn, nausea and vomiting.   Genitourinary: Positive for dysuria and urgency.   Musculoskeletal: Negative for joint pain and myalgias.   Skin: Negative for itching and rash.   Neurological: Positive for headaches. Negative for dizziness.     Objective    There were no vitals filed for this visit.  Physical Exam  Constitutional:       Appearance: Normal appearance.   HENT:      Head: Normocephalic and atraumatic.      Nose: Nose normal. No congestion or rhinorrhea.      Mouth/Throat:      Mouth: Mucous membranes are moist.      Pharynx: No oropharyngeal exudate or posterior oropharyngeal erythema.      Comments: Poor dentation  Eyes:      General: No scleral icterus.     Conjunctiva/sclera: Conjunctivae normal.   Cardiovascular:      Rate and Rhythm: Normal rate and regular rhythm.      Heart sounds: Normal heart sounds. No murmur heard.  Pulmonary:      Effort: Pulmonary effort is normal.      Breath sounds: Wheezing present.   Abdominal:      General: There is no distension.      Palpations: Abdomen is soft.      Tenderness: There is abdominal tenderness. There is no guarding.      Hernia: A hernia is present.          Comments: Tender right reducible hernia palpated on exam when patient coughed   Skin:     General: Skin is warm and dry.   Neurological:      General: No focal deficit present.      Mental Status: He is alert and oriented to person, place, and time.        Ultrasound  "(1/14/20)  "Reducible, small fat and small bowel containing right femoral hernia with minimal compression of the right common femoral vein."      Assesment/Plan    Right Hernia Repair  - schedule for lap right inguinal hernia repair  - will require hepatology clearance prior to surgery   - Discuss pain medicine recs prior to surgery (Pt on methadone)    I have personally taken the history and examined this patient and agree with the resident's note as stated above.         Rolando Mauro MD        "

## 2022-06-16 ENCOUNTER — TELEPHONE (OUTPATIENT)
Dept: SURGERY | Facility: CLINIC | Age: 62
End: 2022-06-16
Payer: MEDICAID

## 2022-06-16 ENCOUNTER — TELEPHONE (OUTPATIENT)
Dept: HEPATOLOGY | Facility: CLINIC | Age: 62
End: 2022-06-16
Payer: MEDICAID

## 2022-06-16 NOTE — TELEPHONE ENCOUNTER
Pt has cirrhosis but liver disease has remained well compensated, HCV is cured, and he is off alcohol.     Given cirrhosis he is technically at increased risk for complications, including hepatic decompensation, infection, bleeding, prolonged healing, however risk is felt to be low.    Surgical risk calculated using two scoring methods w/ ASA score of 3    · [facility] - MELD calculator (with viral selected as etiology of liver disease)      · [facility] Surgical risk calculator (with abdominal laparoscopic surgery designated)        Ultimately risk / benefit analysis for proceeding w/ surgery is deferred to surgeon & patient.

## 2022-06-16 NOTE — TELEPHONE ENCOUNTER
----- Message from Daxa Sanon RN sent at 6/15/2022 10:09 AM CDT -----  Good morning-    Dr. Mauro is planning to proceed with a lap right inguinal hernia on Mr. Payton, tentatively scheduled for 6/21.    Prior to proceeding, he wanted to make sure that pt was ok to proceed from a hepatology standpoint.    Please advise.    Thanks,  Daxa

## 2022-06-16 NOTE — TELEPHONE ENCOUNTER
----- Message from Joselin Carson sent at 6/16/2022 10:30 AM CDT -----  Regarding: Plan of care  Contact: [phone]  Calling to speak with provider or nurse Mittal regarding appointments and labs needed an discuss plan of care going forward. Please call

## 2022-06-20 ENCOUNTER — TELEPHONE (OUTPATIENT)
Dept: SURGERY | Facility: CLINIC | Age: 62
End: 2022-06-20
Payer: MEDICAID

## 2022-06-20 NOTE — PRE-PROCEDURE INSTRUCTIONS
PreOp Instructions given:   - Verbal medication information (what to hold and what to take)   - NPO guidelines   - Arrival place directions given; time to be given the day before procedure by the   Surgeon's Office DOS[facility]  - Bathing with antibacterial soap   - Don't wear any jewelry or bring any valuables AM of surgery   - No makeup or moisturizer to face   - No perfume/cologne, powder, lotions or aftershave   Pt. verbalized understanding.   Pt denies any h/o Anesthesia/Sedation complications or side effects.

## 2022-06-21 ENCOUNTER — ANESTHESIA (OUTPATIENT)
Dept: SURGERY | Facility: HOSPITAL | Age: 62
End: 2022-06-21
Payer: MEDICAID

## 2022-06-21 ENCOUNTER — ANESTHESIA EVENT (OUTPATIENT)
Dept: SURGERY | Facility: HOSPITAL | Age: 62
End: 2022-06-21
Payer: MEDICAID

## 2022-06-21 ENCOUNTER — HOSPITAL ENCOUNTER (OUTPATIENT)
Facility: HOSPITAL | Age: 62
Discharge: HOME | End: 2022-06-21
Attending: SURGERY | Admitting: SURGERY
Payer: MEDICAID

## 2022-06-21 VITALS
HEIGHT: 67 IN | TEMPERATURE: 97 F | RESPIRATION RATE: 18 BRPM | WEIGHT: 145 LBS | BODY MASS INDEX: 22.76 KG/M2 | SYSTOLIC BLOOD PRESSURE: 124 MMHG | OXYGEN SATURATION: 94 % | HEART RATE: 80 BPM | DIASTOLIC BLOOD PRESSURE: 69 MMHG

## 2022-06-21 DIAGNOSIS — K40.90 RIGHT INGUINAL HERNIA: ICD-10-CM

## 2022-06-21 PROCEDURE — 71000015 HC POSTOP RECOV 1ST HR: Performed by: SURGERY

## 2022-06-21 PROCEDURE — 49650 PR LAP,INGUINAL HERNIA REPR,INITIAL: ICD-10-PCS | Mod: RT,,, | Performed by: SURGERY

## 2022-06-21 PROCEDURE — 25000003 PHARM REV CODE 250: Performed by: SURGERY

## 2022-06-21 PROCEDURE — D9220A PRA ANESTHESIA: Mod: ,,, | Performed by: STUDENT IN AN ORGANIZED HEALTH CARE EDUCATION/TRAINING PROGRAM

## 2022-06-21 PROCEDURE — 63600175 PHARM REV CODE 636 W HCPCS: Performed by: STUDENT IN AN ORGANIZED HEALTH CARE EDUCATION/TRAINING PROGRAM

## 2022-06-21 PROCEDURE — 00840 ANES IPER PX LOWER ABD NOS: CPT | Performed by: SURGERY

## 2022-06-21 PROCEDURE — C1781 MESH (IMPLANTABLE): HCPCS | Performed by: SURGERY

## 2022-06-21 PROCEDURE — 71000044 HC DOSC ROUTINE RECOVERY FIRST HOUR: Performed by: SURGERY

## 2022-06-21 PROCEDURE — C1727 CATH, BAL TIS DIS, NON-VAS: HCPCS | Performed by: SURGERY

## 2022-06-21 PROCEDURE — 25000003 PHARM REV CODE 250: Performed by: STUDENT IN AN ORGANIZED HEALTH CARE EDUCATION/TRAINING PROGRAM

## 2022-06-21 PROCEDURE — 27201423 OPTIME MED/SURG SUP & DEVICES STERILE SUPPLY: Performed by: SURGERY

## 2022-06-21 PROCEDURE — 37000008 HC ANESTHESIA 1ST 15 MINUTES: Performed by: SURGERY

## 2022-06-21 PROCEDURE — 36000708 HC OR TIME LEV III 1ST 15 MIN: Performed by: SURGERY

## 2022-06-21 PROCEDURE — D9220A PRA ANESTHESIA: ICD-10-PCS | Mod: ,,, | Performed by: STUDENT IN AN ORGANIZED HEALTH CARE EDUCATION/TRAINING PROGRAM

## 2022-06-21 PROCEDURE — 49650 LAP ING HERNIA REPAIR INIT: CPT | Mod: RT,,, | Performed by: SURGERY

## 2022-06-21 PROCEDURE — 36000709 HC OR TIME LEV III EA ADD 15 MIN: Performed by: SURGERY

## 2022-06-21 PROCEDURE — 37000009 HC ANESTHESIA EA ADD 15 MINS: Performed by: SURGERY

## 2022-06-21 PROCEDURE — 63600175 PHARM REV CODE 636 W HCPCS: Performed by: SURGERY

## 2022-06-21 DEVICE — MESH 3DMAX ANAT LG RIGHT 4X6IN: Type: IMPLANTABLE DEVICE | Site: GROIN | Status: FUNCTIONAL

## 2022-06-21 RX ORDER — HALOPERIDOL 5 MG/ML
INJECTION INTRAMUSCULAR
Status: DISCONTINUED | OUTPATIENT
Start: 2022-06-21 | End: 2022-06-21

## 2022-06-21 RX ORDER — FENTANYL CITRATE 50 UG/ML
INJECTION, SOLUTION INTRAMUSCULAR; INTRAVENOUS
Status: DISCONTINUED | OUTPATIENT
Start: 2022-06-21 | End: 2022-06-21

## 2022-06-21 RX ORDER — SODIUM CHLORIDE 0.9 % (FLUSH) 0.9 %
10 SYRINGE (ML) INJECTION
Status: DISCONTINUED | OUTPATIENT
Start: 2022-06-21 | End: 2022-06-21 | Stop reason: HOSPADM

## 2022-06-21 RX ORDER — ROCURONIUM BROMIDE 10 MG/ML
INJECTION, SOLUTION INTRAVENOUS
Status: DISCONTINUED | OUTPATIENT
Start: 2022-06-21 | End: 2022-06-21

## 2022-06-21 RX ORDER — ONDANSETRON 2 MG/ML
4 INJECTION INTRAMUSCULAR; INTRAVENOUS DAILY PRN
Status: DISCONTINUED | OUTPATIENT
Start: 2022-06-21 | End: 2022-06-21 | Stop reason: HOSPADM

## 2022-06-21 RX ORDER — NEOSTIGMINE METHYLSULFATE 0.5 MG/ML
INJECTION, SOLUTION INTRAVENOUS
Status: DISCONTINUED | OUTPATIENT
Start: 2022-06-21 | End: 2022-06-21

## 2022-06-21 RX ORDER — PHENYLEPHRINE HYDROCHLORIDE 10 MG/ML
INJECTION INTRAVENOUS
Status: DISCONTINUED | OUTPATIENT
Start: 2022-06-21 | End: 2022-06-21

## 2022-06-21 RX ORDER — KETAMINE HCL IN 0.9 % NACL 50 MG/5 ML
SYRINGE (ML) INTRAVENOUS
Status: DISCONTINUED | OUTPATIENT
Start: 2022-06-21 | End: 2022-06-21

## 2022-06-21 RX ORDER — FENTANYL CITRATE 50 UG/ML
25 INJECTION, SOLUTION INTRAMUSCULAR; INTRAVENOUS EVERY 5 MIN PRN
Status: DISCONTINUED | OUTPATIENT
Start: 2022-06-21 | End: 2022-06-21 | Stop reason: HOSPADM

## 2022-06-21 RX ORDER — LIDOCAINE HYDROCHLORIDE AND EPINEPHRINE 10; 10 MG/ML; UG/ML
INJECTION, SOLUTION INFILTRATION; PERINEURAL
Status: DISCONTINUED | OUTPATIENT
Start: 2022-06-21 | End: 2022-06-21 | Stop reason: HOSPADM

## 2022-06-21 RX ORDER — BUPIVACAINE HYDROCHLORIDE 2.5 MG/ML
INJECTION, SOLUTION EPIDURAL; INFILTRATION; INTRACAUDAL
Status: DISCONTINUED | OUTPATIENT
Start: 2022-06-21 | End: 2022-06-21 | Stop reason: HOSPADM

## 2022-06-21 RX ORDER — CEFAZOLIN SODIUM/WATER 2 G/20 ML
2 SYRINGE (ML) INTRAVENOUS
Status: COMPLETED | OUTPATIENT
Start: 2022-06-21 | End: 2022-06-21

## 2022-06-21 RX ORDER — MIDAZOLAM HYDROCHLORIDE 1 MG/ML
INJECTION, SOLUTION INTRAMUSCULAR; INTRAVENOUS
Status: DISCONTINUED | OUTPATIENT
Start: 2022-06-21 | End: 2022-06-21

## 2022-06-21 RX ORDER — GABAPENTIN 300 MG/1
300 CAPSULE ORAL EVERY 8 HOURS PRN
Qty: 90 CAPSULE | Refills: 0 | Status: SHIPPED | OUTPATIENT
Start: 2022-06-21 | End: 2023-01-10

## 2022-06-21 RX ORDER — LIDOCAINE HYDROCHLORIDE 20 MG/ML
INJECTION, SOLUTION EPIDURAL; INFILTRATION; INTRACAUDAL; PERINEURAL
Status: DISCONTINUED | OUTPATIENT
Start: 2022-06-21 | End: 2022-06-21

## 2022-06-21 RX ORDER — ONDANSETRON 2 MG/ML
INJECTION INTRAMUSCULAR; INTRAVENOUS
Status: DISCONTINUED | OUTPATIENT
Start: 2022-06-21 | End: 2022-06-21

## 2022-06-21 RX ORDER — DEXAMETHASONE SODIUM PHOSPHATE 4 MG/ML
INJECTION, SOLUTION INTRA-ARTICULAR; INTRALESIONAL; INTRAMUSCULAR; INTRAVENOUS; SOFT TISSUE
Status: DISCONTINUED | OUTPATIENT
Start: 2022-06-21 | End: 2022-06-21

## 2022-06-21 RX ADMIN — ROCURONIUM BROMIDE 40 MG: 10 INJECTION INTRAVENOUS at 12:06

## 2022-06-21 RX ADMIN — SODIUM CHLORIDE, SODIUM GLUCONATE, SODIUM ACETATE, POTASSIUM CHLORIDE, MAGNESIUM CHLORIDE, SODIUM PHOSPHATE, DIBASIC, AND POTASSIUM PHOSPHATE: .53; .5; .37; .037; .03; .012; .00082 INJECTION, SOLUTION INTRAVENOUS at 12:06

## 2022-06-21 RX ADMIN — Medication 2 G: at 01:06

## 2022-06-21 RX ADMIN — GLYCOPYRROLATE 0.6 MG: 0.2 INJECTION, SOLUTION INTRAMUSCULAR; INTRAVITREAL at 01:06

## 2022-06-21 RX ADMIN — HALOPERIDOL LACTATE 0.5 MG: 5 INJECTION, SOLUTION INTRAMUSCULAR at 01:06

## 2022-06-21 RX ADMIN — ONDANSETRON 4 MG: 2 INJECTION INTRAMUSCULAR; INTRAVENOUS at 12:06

## 2022-06-21 RX ADMIN — Medication 50 MG: at 12:06

## 2022-06-21 RX ADMIN — FENTANYL CITRATE 50 MCG: 50 INJECTION INTRAMUSCULAR; INTRAVENOUS at 01:06

## 2022-06-21 RX ADMIN — MIDAZOLAM 2 MG: 1 INJECTION INTRAMUSCULAR; INTRAVENOUS at 12:06

## 2022-06-21 RX ADMIN — NEOSTIGMINE METHYLSULFATE 3 MG: 0.5 INJECTION, SOLUTION INTRAVENOUS at 01:06

## 2022-06-21 RX ADMIN — PHENYLEPHRINE HYDROCHLORIDE 100 MCG: 10 INJECTION INTRAVENOUS at 01:06

## 2022-06-21 RX ADMIN — LIDOCAINE HYDROCHLORIDE 100 MG: 20 INJECTION, SOLUTION EPIDURAL; INFILTRATION; INTRACAUDAL at 12:06

## 2022-06-21 RX ADMIN — FENTANYL CITRATE 150 MCG: 50 INJECTION INTRAMUSCULAR; INTRAVENOUS at 12:06

## 2022-06-21 RX ADMIN — DEXAMETHASONE SODIUM PHOSPHATE 4 MG: 4 INJECTION INTRA-ARTICULAR; INTRALESIONAL; INTRAMUSCULAR; INTRAVENOUS; SOFT TISSUE at 12:06

## 2022-06-21 NOTE — ANESTHESIA PREPROCEDURE EVALUATION
06/21/2022  Efra Payton III is a 62 y.o., male.  Pre-operative evaluation for Procedure(s) (LRB):  REPAIR, HERNIA, INGUINAL, LAPAROSCOPIC, RIGHT w/ mesh (Right)    Efra Payton III is a 62 y.o. male     Patient Active Problem List   Diagnosis    Carpal tunnel syndrome of left wrist    Chronic obstructive pulmonary disease    Essential hypertension    Right inguinal hernia    Methadone maintenance therapy patient    Dilated bile duct    Hepatitis C virus infection cured after antiviral drug therapy    Hepatic cirrhosis, unspecified hepatic cirrhosis type, unspecified whether ascites present    Thrombocytopenia    Claudication of both lower extremities    History of positive PPD       Review of patient's allergies indicates:   Allergen Reactions    Tylenol [acetaminophen] Other (See Comments)     Can't take Tylenol while he is taking Methadone       No current facility-administered medications on file prior to encounter.     Current Outpatient Medications on File Prior to Encounter   Medication Sig Dispense Refill    albuterol (PROVENTIL/VENTOLIN HFA) 90 mcg/actuation inhaler Inhale 1-2 puffs into the lungs every 6 (six) hours as needed for Wheezing or Shortness of Breath. Rescue 18 g 3    alfuzosin (UROXATRAL) 10 mg Tb24 Take 1 tablet (10 mg total) by mouth daily with breakfast. 90 tablet 3    amLODIPine (NORVASC) 10 MG tablet Take 1 tablet (10 mg total) by mouth once daily. 90 tablet 3    buPROPion (WELLBUTRIN SR) 150 MG TBSR 12 hr tablet Take 1 tablet (150 mg total) by mouth once daily. 90 tablet 0    fluticasone furoate-vilanteroL (BREO ELLIPTA) 100-25 mcg/dose diskus inhaler Inhale 1 puff into the lungs once daily. Controller 120 each 2    lisinopriL (PRINIVIL,ZESTRIL) 20 MG tablet Take 1 tablet (20 mg total) by mouth once daily. 90 tablet 3    methadone HCl (METHADONE ORAL) Take 115  mg by mouth once daily.       guaiFENesin (HUMIBID E) 400 mg Tab Take 1 tablet (400 mg total) by mouth 2 (two) times a day. (Patient not taking: Reported on 6/20/2022) 100 tablet 3    [DISCONTINUED] budesonide-formoterol 160-4.5 mcg (SYMBICORT) 160-4.5 mcg/actuation HFAA Inhale 2 puffs into the lungs 2 (two) times daily. 1 Inhaler 12       Past Surgical History:   Procedure Laterality Date    arm surgery      COLONOSCOPY N/A 7/8/2020    Procedure: COLONOSCOPY;  Surgeon: Mona Powell MD;  Location: [facility] (4TH FLR);  Service: Endoscopy;  Laterality: N/A;  covid [place]-7/5-tb-hx copd    COLONOSCOPY N/A 5/27/2021    Procedure: COLONOSCOPY;  Surgeon: Mona Powell MD;  Location: [facility] (4TH FLR);  Service: Endoscopy;  Laterality: N/A;  Constipation prep - pg  CBC, PT/INR (cirrhosis) done 5/4/21 - pg  Covid-19 test 5/24/21 at [facility].5/26 Called pt. to move up earlier.Left VM.EC    ENDOSCOPIC ULTRASOUND OF UPPER GASTROINTESTINAL TRACT N/A 3/24/2021    Procedure: ULTRASOUND, UPPER GI TRACT, ENDOSCOPIC - w/ varices screen;  Surgeon: George Carrillo MD;  Location: [facility] (2ND FLR);  Service: Endoscopy;  Laterality: N/A;  Covid-19 test 3/21/21 at [place] -   PM prep    HAND SURGERY         Social History     Socioeconomic History    Marital status:    Tobacco Use    Smoking status: Current Every Day Smoker     Packs/day: 1.00     Years: 49.00     Pack years: 49.00     Types: Cigarettes     Start date: 7/25/1969    Smokeless tobacco: Never Used    Tobacco comment: decreased his smoking to 1 pack per day 5 months ago    Substance and Sexual Activity    Alcohol use: No     Comment: has past history of alcohol abuse     Drug use: No     Comment: has past history of substance abuse          CBC: No results for input(s): WBC, RBC, HGB, HCT, PLT, MCV, MCH, MCHC in the last 72 hours.    CMP: No results for input(s): NA, K, CL, CO2, BUN, CREATININE, GLU, MG, PHOS, CALCIUM, ALBUMIN, PROT,  ALKPHOS, ALT, AST, BILITOT in the last 72 hours.    INR  No results for input(s): PT, INR, PROTIME, APTT in the last 72 hours.        Diagnostic Studies:      EKD Echo:  No results found for this or any previous visit.        Pre-op Assessment    I have reviewed the Patient Summary Reports.     I have reviewed the Nursing Notes. I have reviewed the NPO Status.   I have reviewed the Medications.     Review of Systems  Cardiovascular:   Hypertension    Pulmonary:   COPD    Hepatic/GI:   Liver Disease,    Neurological:   Neuromuscular Disease,        Physical Exam    Airway:  Mallampati: II   Mouth Opening: Normal  TM Distance: Normal  Tongue: Normal  Neck ROM: Normal ROM        Anesthesia Plan  Type of Anesthesia, risks & benefits discussed:    Anesthesia Type: Gen ETT  Intra-op Monitoring Plan: Standard ASA Monitors  Post Op Pain Control Plan: multimodal analgesia  Induction:  IV  Airway Plan: Direct, Post-Induction  Informed Consent: Informed consent signed with the Patient and all parties understand the risks and agree with anesthesia plan.  All questions answered.   ASA Score: 3  Day of Surgery Review of History & Physical: H&P Update referred to the surgeon/provider.    Ready For Surgery From Anesthesia Perspective.     .

## 2022-06-21 NOTE — ANESTHESIA PROCEDURE NOTES
Intubation    Date/Time: 6/21/2022 1:03 PM  Performed by: Zuhair Lundberg MD  Authorized by: Zuhair Lundberg MD     Intubation:     Induction:  Intravenous    Intubated:  Postinduction    Mask Ventilation:  Easy mask    Attempts:  1    Attempted By:  Staff anesthesiologist    Method of Intubation:  Direct    Blade:  Ethan 3    Laryngeal View Grade: Grade I - full view of cords      Difficult Airway Encountered?: No      Complications:  None    Airway Device:  Oral endotracheal tube    Airway Device Size:  7.5    Style/Cuff Inflation:  Cuffed    Inflation Amount (mL):  5    Tube secured:  20    Secured at:  The lips    Placement Verified By:  Capnometry    Complicating Factors:  None    Findings Post-Intubation:  BS equal bilateral

## 2022-06-21 NOTE — OP NOTE
DATE OF PROCEDURE: 06/21/2022  SERVICE: General Surgery.   Surgeon(s) and Role:     * Rolando Mauro Jr., MD - Primary     * Darrion Lutz MD - Resident - Assisting  PREOPERATIVE DIAGNOSIS: Right  inguinal hernia.   POSTOPERATIVE DIAGNOSIS: Right inguinal hernia.   PROCEDURE: Laparoscopic repair of right inguinal hernia with mesh and   evaluation of the left.   ANESTHESIA: General endotracheal and local.   DESCRIPTION OF PROCEDURE: The patient was taken to the Operating Room, placed   under general anesthesia, and prepped and draped in sterile fashion. At this   time, an infraumbilical incision was made after infiltrating with local   anesthetic. This was carried down to the linea alba with electrocautery and   blunt dissection. An incision was made in the anterior fascia, just to the left  of the linea alba. At this time, the rectus muscle was then swept laterally   and elevated, and a dissecting balloon trocar was placed in the retrorectus   space. At this time, under direct visualization, the balloon was insufflated,   creating the retrorectus space without difficulty. The trocar was then inserted   into this retrorectus space, and the balloon was insufflated and the dissecting   balloon was removed. Once this was complete, further ports were placed in the   midline, 5 mm in nature, one 1 fingerbreadth above the pubic symphysis and one   between the suprapubic port and the infraumbilical port. These were placed   under direct visualization, after infiltrating with local anesthetic. Once the   ports were placed, we turned our attention to the right side.  We swept the peritoneum   down laterally, beginning at the anterior superior iliac spine and continuing this dissection medially   towards the cord. The cord was elevated. There was noted to be a small sized   indirect hernia present. The sac was  from the cord structures and   reduced back to the level of the umbilicus. The cord itself was  skeletonized   and inspected, no signs of any other abnormalities.  At this time, we continued dissection   medially towards the direct space. There were no signs of an direct hernia   present, and we cleared Fabián's ligament medially as well. Once we had completed   dissection on the right side, we turned our attention to the left side,   evaluated this, and again we swept the peritoneum down laterally and inspected   the cord. The peritoneal reflection was at the base of the cord. There were no   signs of an indirect hernia. We inspected the direct space. Again, no signs   of a direct hernia. Once we had determined there was no hernia on the left side, we   proceeded with placement of mesh on the right. A piece of large 3DMAX mesh was   placed into the retrorectus space on the right side. Medially, it was brought   to midline and anteriorly to the first port. It was then tacked with two tacks   to Fabián's ligament medially, one to the rectus muscle anteriorly and then two   tacks laterally above the ASIS. At this time, the mesh was inspected, and it   was in very good position covering all defects. The sac had been dissected back   to the umbilicus and was well out of the way of the mesh repair. At this time   under direct visualization, the air was evacuated from the retrorectus space.   The ports were then removed. The retrorectus space was then infiltrated with   further 20 mL of local anesthetic and at this time, the infraumbilical port was   removed. The anterior fascia was closed with 0 Vicryl suture in figure-of-eight   fashion, closing the fascia of this incision; and the port sites were closed   with 4-0 Monocryl in subcuticular fashion. Mastisol and Steri-Strips were then   placed. The patient was allowed to awake from general anesthesia and   transferred to bed for transport to Recovery.   COMPLICATIONS: None.   SPONGE COUNT: Correct.   BLOOD LOSS: 15 mL.   FLUIDS: Per Anesthesia.   BLOOD GIVEN: None.    DRAINS: None.   SPECIMENS: None.   CONDITION OF PATIENT: Good.   I was present for the entire procedure.

## 2022-06-21 NOTE — PATIENT INSTRUCTIONS
Surgery Post Op Instructions:    You had a laparoscopic right inguinal hernia repair with mesh performed today.     Wound care:  Your incision is covered with steri strips, a type of surgical super tape. These will fall off or we will remove them in clinic. You may shower tomorrow - let soapy water run over the incision but do not scrub the area. You must avoid submerging the incision in pools, bathtubs, etc until it is fully healed in 2 weeks.     You need to limit yourself to light duty activities (no lifting/pulling/pushing >10 lbs) for a total of 6 weeks after surgery.    No dietary restrictions.    Follow up:  Return to clinic in 2 weeks for follow up and wound check.

## 2022-06-21 NOTE — PLAN OF CARE
Pt arrived to [facility] via stretcher per OR team with CRNA. Pt sedated post procedure with simple face mask and an OPA present. Pt oxygen saturation at 100. Pt attached tomonitor at the bedside. Bedside report received. Pt IV site is clean, dry, and intact; blood return present flush with 10 ml NS and saline lock. Pt has 4 abominal incision sites with steri strips present. No signs of bleeding present. Will continue to monitor.

## 2022-06-21 NOTE — PLAN OF CARE
Pt given discharge instructions at the bedside. Pt IV access removed; bleeding controlled. Pt questions answered.Medications delivered per pharmacy tech. Pt given a coke at the bedside. Pt dressed per self. Pt wife in route to hospital to transport pt home. Will continue to monitor until arrival to hospital.

## 2022-06-22 NOTE — ANESTHESIA POSTPROCEDURE EVALUATION
Anesthesia Post Evaluation    Patient: Efra Payton III    Procedure(s) Performed: Procedure(s) (LRB):  REPAIR, HERNIA, INGUINAL, LAPAROSCOPIC, RIGHT w/ mesh (Right)    Final Anesthesia Type: general      Patient location during evaluation: PACU  Patient participation: Yes- Able to Participate  Level of consciousness: awake and alert, awake and oriented  Post-procedure vital signs: reviewed and stable  Pain management: adequate  Airway patency: patent    PONV status at discharge: No PONV  Anesthetic complications: no      Cardiovascular status: blood pressure returned to baseline, hemodynamically stable and stable  Respiratory status: unassisted, spontaneous ventilation and room air  Hydration status: euvolemic  Follow-up not needed.          Vitals Value Taken Time   /69 06/21/22 1601   Temp 36.3 °C (97.3 °F) 06/21/22 1415   Pulse 81 06/21/22 1610   Resp 18 06/21/22 1600   SpO2 94 % 06/21/22 1610   Vitals shown include unvalidated device data.      No case tracking events are documented in the log.      Pain/Latrice Score: Latrice Score: 10 (6/21/2022  4:00 PM)         no

## 2022-06-22 NOTE — BRIEF OP NOTE
[facility] - Surgery ([facility])  Brief Operative Note    Surgery Date: 6/21/2022     Surgeon(s) and Role:     * Rolando Mauro Jr., MD - Primary     * Darrion Lutz MD - Resident - Assisting        Pre-op Diagnosis:  Right inguinal hernia [K40.90]    Post-op Diagnosis:  Post-Op Diagnosis Codes:     * Right inguinal hernia [K40.90]    Procedure(s) (LRB):  REPAIR, HERNIA, INGUINAL, LAPAROSCOPIC, RIGHT w/ mesh (Right)    Anesthesia: General    Operative Findings: Lap right inguinal hernia repair with mesh, lipoma reduced on left but no hernia identified.     Estimated Blood Loss: 10 mL         Specimens:   Specimen (24h ago, onward)            None            Discharge Note    OUTCOME: Patient tolerated treatment/procedure well without complication and is now ready for discharge.    DISPOSITION: Home or Self Care    FINAL DIAGNOSIS:  Inguinal Hernia    FOLLOWUP: In clinic    DISCHARGE INSTRUCTIONS:    Discharge Procedure Orders   Diet Adult Regular     Notify your health care provider if you experience any of the following:  temperature >100.4     Notify your health care provider if you experience any of the following:  persistent nausea and vomiting or diarrhea     Notify your health care provider if you experience any of the following:  severe uncontrolled pain     Notify your health care provider if you experience any of the following:  redness, tenderness, or signs of infection (pain, swelling, redness, odor or green/yellow discharge around incision site)     Notify your health care provider if you experience any of the following:  difficulty breathing or increased cough     Notify your health care provider if you experience any of the following:  severe persistent headache     Notify your health care provider if you experience any of the following:  persistent dizziness, light-headedness, or visual disturbances     Notify your health care provider if you experience any of the following:  increased confusion  or weakness     Activity as tolerated

## 2022-06-24 ENCOUNTER — TELEPHONE (OUTPATIENT)
Dept: INTERNAL MEDICINE | Facility: CLINIC | Age: 62
End: 2022-06-24

## 2022-06-24 NOTE — TELEPHONE ENCOUNTER
----- Message from Tiffany Reilly sent at 6/24/2022  8:50 AM CDT -----  Contact: [phone]  Pt states he was told he was suppose to get a call today from the dr in regards to the surgery he had recently please advise and give return call he needs to speak to some one he states he is having some problems

## 2022-06-24 NOTE — TELEPHONE ENCOUNTER
----- Message from Sarai Toussaint sent at 6/24/2022  1:41 PM CDT -----  Contact: Efra [phone]  Patient is returning a phone call.  Who left a message for the patient: nurse  Does patient know what this is regarding:  regarding issues from surgery  Would you like a call back, or a response through your [facility] portal?:   call back  Comments:

## 2022-06-28 ENCOUNTER — OFFICE VISIT (OUTPATIENT)
Dept: INTERNAL MEDICINE | Facility: CLINIC | Age: 62
End: 2022-06-28
Payer: MEDICAID

## 2022-06-28 VITALS
BODY MASS INDEX: 22.52 KG/M2 | HEIGHT: 67 IN | HEART RATE: 84 BPM | SYSTOLIC BLOOD PRESSURE: 122 MMHG | DIASTOLIC BLOOD PRESSURE: 66 MMHG | WEIGHT: 143.5 LBS

## 2022-06-28 DIAGNOSIS — R09.02 HYPOXIA: ICD-10-CM

## 2022-06-28 DIAGNOSIS — N40.0 PROSTATISM: ICD-10-CM

## 2022-06-28 DIAGNOSIS — I10 ESSENTIAL HYPERTENSION: ICD-10-CM

## 2022-06-28 DIAGNOSIS — R10.9 ABDOMINAL PAIN, UNSPECIFIED ABDOMINAL LOCATION: ICD-10-CM

## 2022-06-28 DIAGNOSIS — F11.20 METHADONE MAINTENANCE THERAPY PATIENT: ICD-10-CM

## 2022-06-28 DIAGNOSIS — K40.90 RIGHT INGUINAL HERNIA: Primary | ICD-10-CM

## 2022-06-28 DIAGNOSIS — J44.9 CHRONIC OBSTRUCTIVE PULMONARY DISEASE, UNSPECIFIED COPD TYPE: ICD-10-CM

## 2022-06-28 PROCEDURE — 4010F ACE/ARB THERAPY RXD/TAKEN: CPT | Mod: CPTII,,, | Performed by: INTERNAL MEDICINE

## 2022-06-28 PROCEDURE — 3008F BODY MASS INDEX DOCD: CPT | Mod: CPTII,,, | Performed by: INTERNAL MEDICINE

## 2022-06-28 PROCEDURE — 4010F PR ACE/ARB THEARPY RXD/TAKEN: ICD-10-PCS | Mod: CPTII,,, | Performed by: INTERNAL MEDICINE

## 2022-06-28 PROCEDURE — 99214 OFFICE O/P EST MOD 30 MIN: CPT | Mod: S$PBB,,, | Performed by: INTERNAL MEDICINE

## 2022-06-28 PROCEDURE — 1159F PR MEDICATION LIST DOCUMENTED IN MEDICAL RECORD: ICD-10-PCS | Mod: CPTII,,, | Performed by: INTERNAL MEDICINE

## 2022-06-28 PROCEDURE — 1160F RVW MEDS BY RX/DR IN RCRD: CPT | Mod: CPTII,,, | Performed by: INTERNAL MEDICINE

## 2022-06-28 PROCEDURE — 99214 PR OFFICE/OUTPT VISIT, EST, LEVL IV, 30-39 MIN: ICD-10-PCS | Mod: S$PBB,,, | Performed by: INTERNAL MEDICINE

## 2022-06-28 PROCEDURE — 3078F PR MOST RECENT DIASTOLIC BLOOD PRESSURE < 80 MM HG: ICD-10-PCS | Mod: CPTII,,, | Performed by: INTERNAL MEDICINE

## 2022-06-28 PROCEDURE — 3008F PR BODY MASS INDEX (BMI) DOCUMENTED: ICD-10-PCS | Mod: CPTII,,, | Performed by: INTERNAL MEDICINE

## 2022-06-28 PROCEDURE — 3078F DIAST BP <80 MM HG: CPT | Mod: CPTII,,, | Performed by: INTERNAL MEDICINE

## 2022-06-28 PROCEDURE — 3074F SYST BP LT 130 MM HG: CPT | Mod: CPTII,,, | Performed by: INTERNAL MEDICINE

## 2022-06-28 PROCEDURE — 99999 PR PBB SHADOW E&M-EST. PATIENT-LVL IV: ICD-10-PCS | Mod: PBBFAC,,, | Performed by: INTERNAL MEDICINE

## 2022-06-28 PROCEDURE — 3074F PR MOST RECENT SYSTOLIC BLOOD PRESSURE < 130 MM HG: ICD-10-PCS | Mod: CPTII,,, | Performed by: INTERNAL MEDICINE

## 2022-06-28 PROCEDURE — 99999 PR PBB SHADOW E&M-EST. PATIENT-LVL IV: CPT | Mod: PBBFAC,,, | Performed by: INTERNAL MEDICINE

## 2022-06-28 PROCEDURE — 1160F PR REVIEW ALL MEDS BY PRESCRIBER/CLIN PHARMACIST DOCUMENTED: ICD-10-PCS | Mod: CPTII,,, | Performed by: INTERNAL MEDICINE

## 2022-06-28 PROCEDURE — 1159F MED LIST DOCD IN RCRD: CPT | Mod: CPTII,,, | Performed by: INTERNAL MEDICINE

## 2022-06-28 PROCEDURE — 99214 OFFICE O/P EST MOD 30 MIN: CPT | Mod: PBBFAC | Performed by: INTERNAL MEDICINE

## 2022-06-28 RX ORDER — FLUTICASONE FUROATE AND VILANTEROL 100; 25 UG/1; UG/1
1 POWDER RESPIRATORY (INHALATION) DAILY
Qty: 120 EACH | Refills: 2 | Status: SHIPPED | OUTPATIENT
Start: 2022-06-28 | End: 2022-06-29

## 2022-06-28 RX ORDER — ALBUTEROL SULFATE 90 UG/1
1-2 AEROSOL, METERED RESPIRATORY (INHALATION) EVERY 6 HOURS PRN
Qty: 18 G | Refills: 3 | Status: SHIPPED | OUTPATIENT
Start: 2022-06-28 | End: 2023-12-28

## 2022-06-28 NOTE — PROGRESS NOTES
"Subjective:       Patient ID: Efra Payton III is a 62 y.o. male.    Chief Complaint:   Hospital Follow Up    HPI - Mr. Payton had a laparoscopic RIHR on 6/22 with mesh; he continues to have abdominal distension and pain in the RLQ.  He is having regular BM's, but has had diminished urinary stream since surgery.  Describes it as low volume and dribbling.  "leaks out" when he sits to urinate.  His pulse ox was 85 on checkin; guerita to 92 with deep breaths, but fell to 89 when talking.  He has been out of breo for at least a week.  He is still smoking.     PMH  Hepatitis C, completed treatment  Hx positive PPD with treatment before 2000  Colon polyps 2021 after positive cologuard  HTN, at goal today  Anxiety  Smoker  COPD  Hx incarceration  Methadone maintenance therapy  rihr June 2022     Meds:  Reviewed and reconciled in EPIC with patient during visit today     Review of Systems   Constitutional: Negative for fever.   HENT: Negative for congestion.    Respiratory: Positive for shortness of breath.    Cardiovascular: Negative for chest pain.   Gastrointestinal: Positive for abdominal distention and abdominal pain. Negative for constipation.   Genitourinary: Positive for difficulty urinating.   Musculoskeletal: Negative for arthralgias.   Skin: Negative for rash.   Neurological: Negative for headaches.   Psychiatric/Behavioral: Negative for sleep disturbance.       Objective:      Physical Exam  Constitutional:       General: He is not in acute distress.     Appearance: He is well-developed. He is not diaphoretic.   HENT:      Head: Normocephalic and atraumatic.   Cardiovascular:      Rate and Rhythm: Normal rate and regular rhythm.      Heart sounds: Normal heart sounds. No murmur heard.    No friction rub. No gallop.   Pulmonary:      Effort: No respiratory distress.      Breath sounds: No wheezing or rales.   Chest:      Chest wall: No tenderness.   Abdominal:      General: There is distension.      Tenderness: There " is abdominal tenderness. There is no rebound.   Skin:     General: Skin is warm.      Findings: No erythema.   Neurological:      Mental Status: He is alert and oriented to person, place, and time.   Psychiatric:         Thought Content: Thought content normal.         Assessment:       1. Right inguinal hernia    2. Chronic obstructive pulmonary disease, unspecified COPD type    3. Essential hypertension    4. Prostatism    5. Hypoxia    6. Abdominal pain, unspecified abdominal location    7. Methadone maintenance therapy patient        Plan:       Efra was seen today for hospital follow up.    Diagnoses and all orders for this visit:    Right inguinal hernia - successful surgery 6 days ago.  May use advil/motrin for pain relief.  No tylenol or narcotics, though 2/2 other medical conditions    Chronic obstructive pulmonary disease, unspecified COPD type - discussed importance of using breo daily and stopping smoking.  Sending prescription to our pharmacy at his request.  Also needs refill on albuterol  -     fluticasone furoate-vilanteroL (BREO ELLIPTA) 100-25 mcg/dose diskus inhaler; Inhale 1 puff into the lungs once daily. Controller  -     albuterol (PROVENTIL/VENTOLIN HFA) 90 mcg/actuation inhaler; Inhale 1-2 puffs into the lungs every 6 (six) hours as needed for Wheezing or Shortness of Breath. Rescue    Essential hypertension - at goal, stay the course    Prostatism - this is worsening; perhaps 2/2 anesthesia.  Will ask urology to assess  -     Ambulatory referral/consult to Urology; Future    Hypoxia - concerning.  Likely 2/2 poorly treated copd.  Restart breo, stop smoking.  Come back in a month for reassessment    Abdominal pain, unspecified abdominal location    Methadone maintenance therapy patient    rtc 4 weeks    G Aaron Mak MD MPH  Staff Internist

## 2022-06-29 ENCOUNTER — TELEPHONE (OUTPATIENT)
Dept: PHARMACY | Facility: CLINIC | Age: 62
End: 2022-06-29
Payer: MEDICAID

## 2022-06-29 DIAGNOSIS — J44.9 CHRONIC OBSTRUCTIVE PULMONARY DISEASE, UNSPECIFIED COPD TYPE: Primary | ICD-10-CM

## 2022-06-29 RX ORDER — BUDESONIDE AND FORMOTEROL FUMARATE DIHYDRATE 80; 4.5 UG/1; UG/1
2 AEROSOL RESPIRATORY (INHALATION) DAILY
Qty: 10.2 G | Refills: 3 | Status: SHIPPED | OUTPATIENT
Start: 2022-06-29 | End: 2023-12-28

## 2022-06-29 NOTE — TELEPHONE ENCOUNTER
Please call him.  The breo ellipta was declined by his insurance, but it looks like they will pay for a similar medication called symbicort.  I sent that prescription to his pharmacy.    D

## 2022-06-29 NOTE — TELEPHONE ENCOUNTER
Spoke w/ and she has been notified that the prescription has been denied, and a similar medication was sent in its place.

## 2022-07-19 DIAGNOSIS — F17.210 NICOTINE DEPENDENCE, CIGARETTES, UNCOMPLICATED: ICD-10-CM

## 2022-07-19 RX ORDER — BUPROPION HYDROCHLORIDE 150 MG/1
150 TABLET, EXTENDED RELEASE ORAL DAILY
Qty: 90 TABLET | Refills: 3 | Status: SHIPPED | OUTPATIENT
Start: 2022-07-19 | End: 2022-08-11

## 2022-07-19 NOTE — TELEPHONE ENCOUNTER
No new care gaps identified.  [facility] Embedded Care Gaps. Reference number: [id number]. 7/19/2022   8:59:58 AM ADOLPHT

## 2022-08-11 ENCOUNTER — OFFICE VISIT (OUTPATIENT)
Dept: INTERNAL MEDICINE | Facility: CLINIC | Age: 62
End: 2022-08-11
Payer: MEDICAID

## 2022-08-11 VITALS
SYSTOLIC BLOOD PRESSURE: 132 MMHG | OXYGEN SATURATION: 90 % | BODY MASS INDEX: 22.45 KG/M2 | HEIGHT: 67 IN | HEART RATE: 68 BPM | DIASTOLIC BLOOD PRESSURE: 86 MMHG | WEIGHT: 143.06 LBS

## 2022-08-11 DIAGNOSIS — F17.210 NICOTINE DEPENDENCE, CIGARETTES, UNCOMPLICATED: ICD-10-CM

## 2022-08-11 DIAGNOSIS — F17.200 SMOKER: ICD-10-CM

## 2022-08-11 DIAGNOSIS — I10 ESSENTIAL HYPERTENSION: ICD-10-CM

## 2022-08-11 DIAGNOSIS — F11.20 METHADONE MAINTENANCE THERAPY PATIENT: ICD-10-CM

## 2022-08-11 DIAGNOSIS — J43.9 PULMONARY EMPHYSEMA, UNSPECIFIED EMPHYSEMA TYPE: Primary | ICD-10-CM

## 2022-08-11 PROCEDURE — 3008F PR BODY MASS INDEX (BMI) DOCUMENTED: ICD-10-PCS | Mod: CPTII,,, | Performed by: INTERNAL MEDICINE

## 2022-08-11 PROCEDURE — 3075F SYST BP GE 130 - 139MM HG: CPT | Mod: CPTII,,, | Performed by: INTERNAL MEDICINE

## 2022-08-11 PROCEDURE — 3075F PR MOST RECENT SYSTOLIC BLOOD PRESS GE 130-139MM HG: ICD-10-PCS | Mod: CPTII,,, | Performed by: INTERNAL MEDICINE

## 2022-08-11 PROCEDURE — 99214 OFFICE O/P EST MOD 30 MIN: CPT | Mod: PBBFAC | Performed by: INTERNAL MEDICINE

## 2022-08-11 PROCEDURE — 1159F PR MEDICATION LIST DOCUMENTED IN MEDICAL RECORD: ICD-10-PCS | Mod: CPTII,,, | Performed by: INTERNAL MEDICINE

## 2022-08-11 PROCEDURE — 99999 PR PBB SHADOW E&M-EST. PATIENT-LVL IV: ICD-10-PCS | Mod: PBBFAC,,, | Performed by: INTERNAL MEDICINE

## 2022-08-11 PROCEDURE — 1159F MED LIST DOCD IN RCRD: CPT | Mod: CPTII,,, | Performed by: INTERNAL MEDICINE

## 2022-08-11 PROCEDURE — 4010F PR ACE/ARB THEARPY RXD/TAKEN: ICD-10-PCS | Mod: CPTII,,, | Performed by: INTERNAL MEDICINE

## 2022-08-11 PROCEDURE — 99214 PR OFFICE/OUTPT VISIT, EST, LEVL IV, 30-39 MIN: ICD-10-PCS | Mod: S$PBB,,, | Performed by: INTERNAL MEDICINE

## 2022-08-11 PROCEDURE — 3079F PR MOST RECENT DIASTOLIC BLOOD PRESSURE 80-89 MM HG: ICD-10-PCS | Mod: CPTII,,, | Performed by: INTERNAL MEDICINE

## 2022-08-11 PROCEDURE — 99999 PR PBB SHADOW E&M-EST. PATIENT-LVL IV: CPT | Mod: PBBFAC,,, | Performed by: INTERNAL MEDICINE

## 2022-08-11 PROCEDURE — 4010F ACE/ARB THERAPY RXD/TAKEN: CPT | Mod: CPTII,,, | Performed by: INTERNAL MEDICINE

## 2022-08-11 PROCEDURE — 99214 OFFICE O/P EST MOD 30 MIN: CPT | Mod: S$PBB,,, | Performed by: INTERNAL MEDICINE

## 2022-08-11 PROCEDURE — 3079F DIAST BP 80-89 MM HG: CPT | Mod: CPTII,,, | Performed by: INTERNAL MEDICINE

## 2022-08-11 PROCEDURE — 1160F RVW MEDS BY RX/DR IN RCRD: CPT | Mod: CPTII,,, | Performed by: INTERNAL MEDICINE

## 2022-08-11 PROCEDURE — 3008F BODY MASS INDEX DOCD: CPT | Mod: CPTII,,, | Performed by: INTERNAL MEDICINE

## 2022-08-11 PROCEDURE — 1160F PR REVIEW ALL MEDS BY PRESCRIBER/CLIN PHARMACIST DOCUMENTED: ICD-10-PCS | Mod: CPTII,,, | Performed by: INTERNAL MEDICINE

## 2022-08-11 RX ORDER — BUPROPION HYDROCHLORIDE 150 MG/1
150 TABLET, EXTENDED RELEASE ORAL 2 TIMES DAILY
Qty: 180 TABLET | Refills: 3 | Status: SHIPPED | OUTPATIENT
Start: 2022-08-11 | End: 2024-01-05 | Stop reason: SDUPTHER

## 2022-08-11 NOTE — PROGRESS NOTES
Subjective:       Patient ID: Efra Payton III is a 62 y.o. male.    Chief Complaint:   Follow-up    HPI - Mr. Payton is still smoking.  Oxygenation is improved today, but still on the lower side.  He has cut his smoking down to just during the day, b/c he gets dyspneic when smoking in the evening.  He sometimes awakens at night dyspneic as well.  He is working on his life - more time at [employer]; more time with grandchildren.  He is due for LDCT lung cancer screening.      PMH  Emphysema, with new hypoxia  Hepatitis C, completed treatment  Hx positive PPD with treatment before 2000  Colon polyps 2021 after positive cologuard  HTN, at goal today  Anxiety  Smoker  Hx incarceration  Methadone maintenance therapy  rihr June 2022, with improving pain.     Meds:  Reviewed and reconciled in EPIC with patient during visit today     Review of Systems   Constitutional: Negative for fever.   HENT: Negative for congestion.    Respiratory: Positive for shortness of breath. Negative for cough.    Cardiovascular: Negative for chest pain.   Gastrointestinal: Negative for abdominal pain.   Genitourinary: Negative for difficulty urinating.   Musculoskeletal: Negative for arthralgias.   Skin: Negative for rash.   Neurological: Negative for headaches.   Psychiatric/Behavioral: Positive for sleep disturbance.       Objective:      Physical Exam  Constitutional:       General: He is not in acute distress.     Appearance: He is well-developed. He is not diaphoretic.   HENT:      Head: Normocephalic and atraumatic.   Cardiovascular:      Rate and Rhythm: Normal rate and regular rhythm.      Heart sounds: Normal heart sounds. No murmur heard.    No friction rub. No gallop.   Pulmonary:      Effort: No respiratory distress.      Breath sounds: Wheezing present. No rales.   Chest:      Chest wall: No tenderness.   Skin:     General: Skin is warm.      Findings: No erythema.   Neurological:      Mental Status: He is alert and oriented to  person, place, and time.   Psychiatric:         Thought Content: Thought content normal.         Assessment:       1. Pulmonary emphysema, unspecified emphysema type    2. Essential hypertension    3. Smoker    4. Methadone maintenance therapy patient    5. Nicotine dependence, cigarettes, uncomplicated        Plan:       Efra was seen today for follow-up.    Diagnoses and all orders for this visit:    Pulmonary emphysema, unspecified emphysema type - improved from last visit, but still with sats at 90.  He's taking meds.  Encouraged smoking cessation.  Will ask pulmonary for advice  -     Ambulatory referral/consult to Pulmonology; Future    Essential hypertension - at goal, stay the course    Smoker - unstable; still smoking.  Repeat ldct due  -     CT Chest Lung Screening Low Dose; Future    Methadone maintenance therapy patient    Nicotine dependence, cigarettes, uncomplicated - doubling this to help with mood and cessation  -     buPROPion (WELLBUTRIN SR) 150 MG TBSR 12 hr tablet; Take 1 tablet (150 mg total) by mouth 2 (two) times daily.    rtc prn, or in 3 months    JEN Mak MD MPH  Staff Internist

## 2022-08-15 ENCOUNTER — HOSPITAL ENCOUNTER (OUTPATIENT)
Dept: RADIOLOGY | Facility: HOSPITAL | Age: 62
Discharge: HOME | End: 2022-08-15
Attending: INTERNAL MEDICINE
Payer: MEDICAID

## 2022-08-15 DIAGNOSIS — F17.200 SMOKER: ICD-10-CM

## 2022-08-15 PROCEDURE — 71271 CT THORAX LUNG CANCER SCR C-: CPT | Mod: 26,,, | Performed by: RADIOLOGY

## 2022-08-15 PROCEDURE — 71271 CT THORAX LUNG CANCER SCR C-: CPT | Mod: TC

## 2022-08-15 PROCEDURE — 71271 CT CHEST LUNG SCREENING LOW DOSE: ICD-10-PCS | Mod: 26,,, | Performed by: RADIOLOGY

## 2022-12-20 ENCOUNTER — HOSPITAL ENCOUNTER (OUTPATIENT)
Facility: HOSPITAL | Age: 62
Discharge: HOME | DRG: 177 | End: 2022-12-21
Attending: EMERGENCY MEDICINE | Admitting: HOSPITALIST
Payer: MEDICAID

## 2022-12-20 DIAGNOSIS — J44.1 COPD WITH ACUTE EXACERBATION: ICD-10-CM

## 2022-12-20 DIAGNOSIS — R11.0 NAUSEA: ICD-10-CM

## 2022-12-20 DIAGNOSIS — F11.20 METHADONE DEPENDENCE: ICD-10-CM

## 2022-12-20 DIAGNOSIS — R51.9 ACUTE NONINTRACTABLE HEADACHE, UNSPECIFIED HEADACHE TYPE: ICD-10-CM

## 2022-12-20 DIAGNOSIS — J96.01 ACUTE HYPOXEMIC RESPIRATORY FAILURE DUE TO COVID-19: Primary | ICD-10-CM

## 2022-12-20 DIAGNOSIS — E87.1 HYPONATREMIA: ICD-10-CM

## 2022-12-20 DIAGNOSIS — U07.1 ACUTE HYPOXEMIC RESPIRATORY FAILURE DUE TO COVID-19: Primary | ICD-10-CM

## 2022-12-20 PROBLEM — N40.0 BPH (BENIGN PROSTATIC HYPERPLASIA): Status: ACTIVE | Noted: 2022-12-20

## 2022-12-20 LAB
ALBUMIN SERPL BCP-MCNC: 3.8 G/DL (ref 3.5–5.2)
ALP SERPL-CCNC: 85 U/L (ref 55–135)
ALT SERPL W/O P-5'-P-CCNC: 9 U/L (ref 10–44)
ANION GAP SERPL CALC-SCNC: 8 MMOL/L (ref 8–16)
APTT BLDCRRT: 28.6 SEC (ref 21–32)
AST SERPL-CCNC: 18 U/L (ref 10–40)
BASOPHILS # BLD AUTO: 0.03 K/UL (ref 0–0.2)
BASOPHILS NFR BLD: 0.3 % (ref 0–1.9)
BILIRUB SERPL-MCNC: 0.5 MG/DL (ref 0.1–1)
BNP SERPL-MCNC: 59 PG/ML (ref 0–99)
BUN SERPL-MCNC: 13 MG/DL (ref 8–23)
CALCIUM SERPL-MCNC: 8.7 MG/DL (ref 8.7–10.5)
CHLORIDE SERPL-SCNC: 97 MMOL/L (ref 95–110)
CK SERPL-CCNC: 53 U/L (ref 20–200)
CO2 SERPL-SCNC: 27 MMOL/L (ref 23–29)
CREAT SERPL-MCNC: 0.8 MG/DL (ref 0.5–1.4)
D DIMER PPP IA.FEU-MCNC: 0.29 MG/L FEU
DIFFERENTIAL METHOD: ABNORMAL
EOSINOPHIL # BLD AUTO: 0 K/UL (ref 0–0.5)
EOSINOPHIL NFR BLD: 0.1 % (ref 0–8)
ERYTHROCYTE [DISTWIDTH] IN BLOOD BY AUTOMATED COUNT: 13.2 % (ref 11.5–14.5)
ERYTHROCYTE [SEDIMENTATION RATE] IN BLOOD BY PHOTOMETRIC METHOD: 34 MM/HR (ref 0–23)
EST. GFR  (NO RACE VARIABLE): >60 ML/MIN/1.73 M^2
FERRITIN SERPL-MCNC: 130 NG/ML (ref 20–300)
GLUCOSE SERPL-MCNC: 118 MG/DL (ref 70–110)
HCT VFR BLD AUTO: 38.8 % (ref 40–54)
HCV AB SERPL QL IA: REACTIVE
HGB BLD-MCNC: 12.8 G/DL (ref 14–18)
HIV 1+2 AB+HIV1 P24 AG SERPL QL IA: NORMAL
IMM GRANULOCYTES # BLD AUTO: 0.05 K/UL (ref 0–0.04)
IMM GRANULOCYTES NFR BLD AUTO: 0.6 % (ref 0–0.5)
INFLUENZA A, MOLECULAR: NOT DETECTED
INFLUENZA B, MOLECULAR: NOT DETECTED
INR PPP: 1 (ref 0.8–1.2)
LACTATE SERPL-SCNC: 0.7 MMOL/L (ref 0.5–2.2)
LDH SERPL L TO P-CCNC: 191 U/L (ref 110–260)
LIPASE SERPL-CCNC: 11 U/L (ref 4–60)
LYMPHOCYTES # BLD AUTO: 0.3 K/UL (ref 1–4.8)
LYMPHOCYTES NFR BLD: 2.8 % (ref 18–48)
MCH RBC QN AUTO: 31 PG (ref 27–31)
MCHC RBC AUTO-ENTMCNC: 33 G/DL (ref 32–36)
MCV RBC AUTO: 94 FL (ref 82–98)
MONOCYTES # BLD AUTO: 0.9 K/UL (ref 0.3–1)
MONOCYTES NFR BLD: 10.2 % (ref 4–15)
NEUTROPHILS # BLD AUTO: 7.6 K/UL (ref 1.8–7.7)
NEUTROPHILS NFR BLD: 86 % (ref 38–73)
NRBC BLD-RTO: 0 /100 WBC
PLATELET # BLD AUTO: 105 K/UL (ref 150–450)
PMV BLD AUTO: 10.9 FL (ref 9.2–12.9)
POCT GLUCOSE: 109 MG/DL (ref 70–110)
POCT GLUCOSE: 95 MG/DL (ref 70–110)
POTASSIUM SERPL-SCNC: 4.2 MMOL/L (ref 3.5–5.1)
PROCALCITONIN SERPL IA-MCNC: 0.06 NG/ML
PROT SERPL-MCNC: 7.2 G/DL (ref 6–8.4)
PROTHROMBIN TIME: 10.7 SEC (ref 9–12.5)
RBC # BLD AUTO: 4.13 M/UL (ref 4.6–6.2)
RSV AG BY MOLECULAR METHOD: NOT DETECTED
SARS-COV-2 RNA RESP QL NAA+PROBE: DETECTED
SODIUM SERPL-SCNC: 132 MMOL/L (ref 136–145)
TROPONIN I SERPL DL<=0.01 NG/ML-MCNC: 0.01 NG/ML (ref 0–0.03)
WBC # BLD AUTO: 8.81 K/UL (ref 3.9–12.7)

## 2022-12-20 PROCEDURE — 96360 HYDRATION IV INFUSION INIT: CPT

## 2022-12-20 PROCEDURE — 25000003 PHARM REV CODE 250: Performed by: FAMILY MEDICINE

## 2022-12-20 PROCEDURE — 27000207 HC ISOLATION

## 2022-12-20 PROCEDURE — 82728 ASSAY OF FERRITIN: CPT | Performed by: FAMILY MEDICINE

## 2022-12-20 PROCEDURE — 84145 PROCALCITONIN (PCT): CPT | Performed by: FAMILY MEDICINE

## 2022-12-20 PROCEDURE — 85379 FIBRIN DEGRADATION QUANT: CPT | Performed by: FAMILY MEDICINE

## 2022-12-20 PROCEDURE — 96365 THER/PROPH/DIAG IV INF INIT: CPT

## 2022-12-20 PROCEDURE — 63600175 PHARM REV CODE 636 W HCPCS: Performed by: FAMILY MEDICINE

## 2022-12-20 PROCEDURE — 94640 AIRWAY INHALATION TREATMENT: CPT

## 2022-12-20 PROCEDURE — 83880 ASSAY OF NATRIURETIC PEPTIDE: CPT | Performed by: PHYSICIAN ASSISTANT

## 2022-12-20 PROCEDURE — 63700000 PHARM REV CODE 250 ALT 637 W/O HCPCS: Performed by: FAMILY MEDICINE

## 2022-12-20 PROCEDURE — 94761 N-INVAS EAR/PLS OXIMETRY MLT: CPT

## 2022-12-20 PROCEDURE — 99285 PR EMERGENCY DEPT VISIT,LEVEL V: ICD-10-PCS | Mod: CR,CS,, | Performed by: PHYSICIAN ASSISTANT

## 2022-12-20 PROCEDURE — 96361 HYDRATE IV INFUSION ADD-ON: CPT

## 2022-12-20 PROCEDURE — 87389 HIV-1 AG W/HIV-1&-2 AB AG IA: CPT | Performed by: PHYSICIAN ASSISTANT

## 2022-12-20 PROCEDURE — G0378 HOSPITAL OBSERVATION PER HR: HCPCS

## 2022-12-20 PROCEDURE — 85652 RBC SED RATE AUTOMATED: CPT | Performed by: FAMILY MEDICINE

## 2022-12-20 PROCEDURE — 85610 PROTHROMBIN TIME: CPT | Performed by: FAMILY MEDICINE

## 2022-12-20 PROCEDURE — 82962 GLUCOSE BLOOD TEST: CPT

## 2022-12-20 PROCEDURE — 99285 EMERGENCY DEPT VISIT HI MDM: CPT | Mod: CR,CS,, | Performed by: PHYSICIAN ASSISTANT

## 2022-12-20 PROCEDURE — 99223 PR INITIAL HOSPITAL CARE,LEVL III: ICD-10-PCS | Mod: ,,, | Performed by: FAMILY MEDICINE

## 2022-12-20 PROCEDURE — 82550 ASSAY OF CK (CPK): CPT | Performed by: FAMILY MEDICINE

## 2022-12-20 PROCEDURE — 99223 1ST HOSP IP/OBS HIGH 75: CPT | Mod: ,,, | Performed by: FAMILY MEDICINE

## 2022-12-20 PROCEDURE — 0241U SARS-COV2 (COVID) WITH FLU/RSV BY PCR: CPT | Performed by: PHYSICIAN ASSISTANT

## 2022-12-20 PROCEDURE — 83615 LACTATE (LD) (LDH) ENZYME: CPT | Performed by: FAMILY MEDICINE

## 2022-12-20 PROCEDURE — 83605 ASSAY OF LACTIC ACID: CPT | Performed by: FAMILY MEDICINE

## 2022-12-20 PROCEDURE — 25000242 PHARM REV CODE 250 ALT 637 W/ HCPCS: Performed by: PHYSICIAN ASSISTANT

## 2022-12-20 PROCEDURE — 83690 ASSAY OF LIPASE: CPT | Performed by: PHYSICIAN ASSISTANT

## 2022-12-20 PROCEDURE — 27000221 HC OXYGEN, UP TO 24 HOURS

## 2022-12-20 PROCEDURE — 12000002 HC ACUTE/MED SURGE SEMI-PRIVATE ROOM

## 2022-12-20 PROCEDURE — 87522 HEPATITIS C REVRS TRNSCRPJ: CPT | Performed by: PHYSICIAN ASSISTANT

## 2022-12-20 PROCEDURE — 25000003 PHARM REV CODE 250: Performed by: PHYSICIAN ASSISTANT

## 2022-12-20 PROCEDURE — 99285 EMERGENCY DEPT VISIT HI MDM: CPT | Mod: 25

## 2022-12-20 PROCEDURE — 84484 ASSAY OF TROPONIN QUANT: CPT | Performed by: PHYSICIAN ASSISTANT

## 2022-12-20 PROCEDURE — 80053 COMPREHEN METABOLIC PANEL: CPT | Performed by: PHYSICIAN ASSISTANT

## 2022-12-20 PROCEDURE — 85730 THROMBOPLASTIN TIME PARTIAL: CPT | Performed by: FAMILY MEDICINE

## 2022-12-20 PROCEDURE — 86803 HEPATITIS C AB TEST: CPT | Performed by: PHYSICIAN ASSISTANT

## 2022-12-20 PROCEDURE — 85025 COMPLETE CBC W/AUTO DIFF WBC: CPT | Performed by: PHYSICIAN ASSISTANT

## 2022-12-20 RX ORDER — ONDANSETRON 2 MG/ML
4 INJECTION INTRAMUSCULAR; INTRAVENOUS EVERY 8 HOURS PRN
Status: DISCONTINUED | OUTPATIENT
Start: 2022-12-20 | End: 2022-12-21 | Stop reason: HOSPADM

## 2022-12-20 RX ORDER — TALC
6 POWDER (GRAM) TOPICAL NIGHTLY PRN
Status: DISCONTINUED | OUTPATIENT
Start: 2022-12-20 | End: 2022-12-21 | Stop reason: HOSPADM

## 2022-12-20 RX ORDER — IBUPROFEN 600 MG/1
600 TABLET ORAL
Status: COMPLETED | OUTPATIENT
Start: 2022-12-20 | End: 2022-12-20

## 2022-12-20 RX ORDER — SODIUM CHLORIDE 0.9 % (FLUSH) 0.9 %
10 SYRINGE (ML) INJECTION
Status: DISCONTINUED | OUTPATIENT
Start: 2022-12-20 | End: 2022-12-21 | Stop reason: HOSPADM

## 2022-12-20 RX ORDER — IPRATROPIUM BROMIDE AND ALBUTEROL SULFATE 2.5; .5 MG/3ML; MG/3ML
3 SOLUTION RESPIRATORY (INHALATION)
Status: COMPLETED | OUTPATIENT
Start: 2022-12-20 | End: 2022-12-20

## 2022-12-20 RX ORDER — IBUPROFEN 200 MG
24 TABLET ORAL
Status: DISCONTINUED | OUTPATIENT
Start: 2022-12-20 | End: 2022-12-21 | Stop reason: HOSPADM

## 2022-12-20 RX ORDER — IBUPROFEN 200 MG
16 TABLET ORAL
Status: DISCONTINUED | OUTPATIENT
Start: 2022-12-20 | End: 2022-12-21 | Stop reason: HOSPADM

## 2022-12-20 RX ORDER — LISINOPRIL 20 MG/1
20 TABLET ORAL DAILY
Status: DISCONTINUED | OUTPATIENT
Start: 2022-12-21 | End: 2022-12-21 | Stop reason: HOSPADM

## 2022-12-20 RX ORDER — IBUPROFEN 400 MG/1
400 TABLET ORAL EVERY 4 HOURS PRN
Status: DISCONTINUED | OUTPATIENT
Start: 2022-12-20 | End: 2022-12-21 | Stop reason: HOSPADM

## 2022-12-20 RX ORDER — FLUTICASONE FUROATE AND VILANTEROL 100; 25 UG/1; UG/1
1 POWDER RESPIRATORY (INHALATION) DAILY
Status: DISCONTINUED | OUTPATIENT
Start: 2022-12-21 | End: 2022-12-21 | Stop reason: HOSPADM

## 2022-12-20 RX ORDER — AZITHROMYCIN 250 MG/1
500 TABLET, FILM COATED ORAL
Status: DISCONTINUED | OUTPATIENT
Start: 2022-12-20 | End: 2022-12-21 | Stop reason: HOSPADM

## 2022-12-20 RX ORDER — GLUCAGON 1 MG
1 KIT INJECTION
Status: DISCONTINUED | OUTPATIENT
Start: 2022-12-20 | End: 2022-12-21 | Stop reason: HOSPADM

## 2022-12-20 RX ORDER — GABAPENTIN 300 MG/1
300 CAPSULE ORAL EVERY 8 HOURS PRN
Status: DISCONTINUED | OUTPATIENT
Start: 2022-12-20 | End: 2022-12-21 | Stop reason: HOSPADM

## 2022-12-20 RX ORDER — AMLODIPINE BESYLATE 10 MG/1
10 TABLET ORAL DAILY
Status: DISCONTINUED | OUTPATIENT
Start: 2022-12-21 | End: 2022-12-21 | Stop reason: HOSPADM

## 2022-12-20 RX ORDER — ALBUTEROL SULFATE 90 UG/1
2 AEROSOL, METERED RESPIRATORY (INHALATION) EVERY 6 HOURS
Status: DISCONTINUED | OUTPATIENT
Start: 2022-12-20 | End: 2022-12-21 | Stop reason: HOSPADM

## 2022-12-20 RX ORDER — BUPROPION HYDROCHLORIDE 150 MG/1
150 TABLET ORAL DAILY
Status: DISCONTINUED | OUTPATIENT
Start: 2022-12-21 | End: 2022-12-21 | Stop reason: HOSPADM

## 2022-12-20 RX ORDER — INSULIN ASPART 100 [IU]/ML
0-5 INJECTION, SOLUTION INTRAVENOUS; SUBCUTANEOUS
Status: DISCONTINUED | OUTPATIENT
Start: 2022-12-20 | End: 2022-12-21 | Stop reason: HOSPADM

## 2022-12-20 RX ORDER — ENOXAPARIN SODIUM 100 MG/ML
1 INJECTION SUBCUTANEOUS 2 TIMES DAILY
Status: DISCONTINUED | OUTPATIENT
Start: 2022-12-20 | End: 2022-12-21

## 2022-12-20 RX ORDER — TAMSULOSIN HYDROCHLORIDE 0.4 MG/1
0.4 CAPSULE ORAL DAILY
Status: DISCONTINUED | OUTPATIENT
Start: 2022-12-21 | End: 2022-12-21 | Stop reason: HOSPADM

## 2022-12-20 RX ORDER — ASCORBIC ACID 500 MG
500 TABLET ORAL 2 TIMES DAILY
Status: DISCONTINUED | OUTPATIENT
Start: 2022-12-20 | End: 2022-12-21 | Stop reason: HOSPADM

## 2022-12-20 RX ORDER — GUAIFENESIN/DEXTROMETHORPHAN 100-10MG/5
10 SYRUP ORAL EVERY 4 HOURS PRN
Status: DISCONTINUED | OUTPATIENT
Start: 2022-12-20 | End: 2022-12-21 | Stop reason: HOSPADM

## 2022-12-20 RX ADMIN — CEFTRIAXONE 1 G: 1 INJECTION, POWDER, FOR SOLUTION INTRAMUSCULAR; INTRAVENOUS at 10:12

## 2022-12-20 RX ADMIN — IPRATROPIUM BROMIDE AND ALBUTEROL SULFATE 3 ML: 2.5; .5 SOLUTION RESPIRATORY (INHALATION) at 06:12

## 2022-12-20 RX ADMIN — DEXAMETHASONE 6 MG: 4 TABLET ORAL at 08:12

## 2022-12-20 RX ADMIN — AZITHROMYCIN MONOHYDRATE 500 MG: 250 TABLET ORAL at 08:12

## 2022-12-20 RX ADMIN — Medication 500 MG: at 08:12

## 2022-12-20 RX ADMIN — SODIUM CHLORIDE 1000 ML: 9 INJECTION, SOLUTION INTRAVENOUS at 06:12

## 2022-12-20 RX ADMIN — IBUPROFEN 600 MG: 600 TABLET, FILM COATED ORAL at 07:12

## 2022-12-20 NOTE — ED TRIAGE NOTES
The pt is a 62-year-old male who presents to the ED from home via EMS. The pt's chief complaint is headache and persistent vomiting. Pt has a hx of COPD.

## 2022-12-20 NOTE — ED NOTES
LOC: Patient is awake, alert, and aware of environment with an appropriate affect. Patient is oriented x 4 and speaking appropriately.  APPEARANCE: Patient resting comfortably and in no acute distress. Patient is clean and well groomed, patient's clothing is properly fastened.  HEENT: Pt presents with surgical mask on. Pt reports a headache, 5/10 pain  SKIN: The skin is warm and dry. Patient has normal skin turgor and dry mucus membranes.   MUSKULOSKELETAL: Patient is moving all extremities well, no obvious deformities noted. Pulses intact.   RESPIRATORY: Airway is open and patent. Respirations are spontaneous and non-labored with normal effort and rate.  CARDIAC: Patient has a normal rate and rhythm. 94 on cardiac monitor. No peripheral edema noted. Pt is hypertensive.   ABDOMEN: No distention noted. Soft and non-tender upon palpation. Pt reports nausea and vomiting, reports multiple occurrences of vomiting today. Pt states has not had an episode like this before. Pt reports constipation d/t methadone use  NEUROLOGICAL: pupils 2mm, PERRL. Facial expression is symmetrical. Hand grasps are equal bilaterally. Normal sensation in all extremities when touched with finger.

## 2022-12-21 ENCOUNTER — PATIENT MESSAGE (OUTPATIENT)
Dept: ADMINISTRATIVE | Facility: CLINIC | Age: 62
End: 2022-12-21
Payer: MEDICAID

## 2022-12-21 VITALS
HEART RATE: 69 BPM | SYSTOLIC BLOOD PRESSURE: 133 MMHG | OXYGEN SATURATION: 98 % | TEMPERATURE: 98 F | RESPIRATION RATE: 19 BRPM | DIASTOLIC BLOOD PRESSURE: 60 MMHG

## 2022-12-21 PROBLEM — J96.01 ACUTE HYPOXEMIC RESPIRATORY FAILURE DUE TO COVID-19: Status: ACTIVE | Noted: 2022-12-20

## 2022-12-21 LAB
BASOPHILS # BLD AUTO: 0.01 K/UL (ref 0–0.2)
BASOPHILS NFR BLD: 0.3 % (ref 0–1.9)
DIFFERENTIAL METHOD: ABNORMAL
EOSINOPHIL # BLD AUTO: 0 K/UL (ref 0–0.5)
EOSINOPHIL NFR BLD: 0 % (ref 0–8)
ERYTHROCYTE [DISTWIDTH] IN BLOOD BY AUTOMATED COUNT: 13.4 % (ref 11.5–14.5)
HCT VFR BLD AUTO: 38.6 % (ref 40–54)
HGB BLD-MCNC: 12.3 G/DL (ref 14–18)
IMM GRANULOCYTES # BLD AUTO: 0.02 K/UL (ref 0–0.04)
IMM GRANULOCYTES NFR BLD AUTO: 0.5 % (ref 0–0.5)
LYMPHOCYTES # BLD AUTO: 0.3 K/UL (ref 1–4.8)
LYMPHOCYTES NFR BLD: 7.5 % (ref 18–48)
MCH RBC QN AUTO: 31.2 PG (ref 27–31)
MCHC RBC AUTO-ENTMCNC: 31.9 G/DL (ref 32–36)
MCV RBC AUTO: 98 FL (ref 82–98)
MONOCYTES # BLD AUTO: 0.2 K/UL (ref 0.3–1)
MONOCYTES NFR BLD: 4.9 % (ref 4–15)
NEUTROPHILS # BLD AUTO: 3.2 K/UL (ref 1.8–7.7)
NEUTROPHILS NFR BLD: 86.8 % (ref 38–73)
NRBC BLD-RTO: 0 /100 WBC
PLATELET # BLD AUTO: 119 K/UL (ref 150–450)
PMV BLD AUTO: 12 FL (ref 9.2–12.9)
POCT GLUCOSE: 130 MG/DL (ref 70–110)
POCT GLUCOSE: 142 MG/DL (ref 70–110)
RBC # BLD AUTO: 3.94 M/UL (ref 4.6–6.2)
WBC # BLD AUTO: 3.71 K/UL (ref 3.9–12.7)

## 2022-12-21 PROCEDURE — 36415 COLL VENOUS BLD VENIPUNCTURE: CPT | Performed by: FAMILY MEDICINE

## 2022-12-21 PROCEDURE — 94761 N-INVAS EAR/PLS OXIMETRY MLT: CPT

## 2022-12-21 PROCEDURE — 25000242 PHARM REV CODE 250 ALT 637 W/ HCPCS: Performed by: FAMILY MEDICINE

## 2022-12-21 PROCEDURE — 63600175 PHARM REV CODE 636 W HCPCS: Performed by: FAMILY MEDICINE

## 2022-12-21 PROCEDURE — 94640 AIRWAY INHALATION TREATMENT: CPT

## 2022-12-21 PROCEDURE — 99239 PR HOSPITAL DISCHARGE DAY,>30 MIN: ICD-10-PCS | Mod: ,,, | Performed by: HOSPITALIST

## 2022-12-21 PROCEDURE — 99239 HOSP IP/OBS DSCHRG MGMT >30: CPT | Mod: ,,, | Performed by: HOSPITALIST

## 2022-12-21 PROCEDURE — 27000221 HC OXYGEN, UP TO 24 HOURS

## 2022-12-21 PROCEDURE — G0378 HOSPITAL OBSERVATION PER HR: HCPCS

## 2022-12-21 PROCEDURE — 85025 COMPLETE CBC W/AUTO DIFF WBC: CPT | Performed by: FAMILY MEDICINE

## 2022-12-21 PROCEDURE — 25000003 PHARM REV CODE 250: Performed by: FAMILY MEDICINE

## 2022-12-21 RX ORDER — DEXAMETHASONE 6 MG/1
6 TABLET ORAL DAILY
Qty: 10 TABLET | Refills: 0 | Status: SHIPPED | OUTPATIENT
Start: 2022-12-22 | End: 2023-01-01

## 2022-12-21 RX ORDER — ASCORBIC ACID 500 MG
500 TABLET ORAL DAILY
Qty: 10 TABLET | Refills: 0 | Status: SHIPPED | OUTPATIENT
Start: 2022-12-21 | End: 2022-12-31

## 2022-12-21 RX ADMIN — AMLODIPINE BESYLATE 10 MG: 10 TABLET ORAL at 08:12

## 2022-12-21 RX ADMIN — FLUTICASONE FUROATE AND VILANTEROL TRIFENATATE 1 PUFF: 100; 25 POWDER RESPIRATORY (INHALATION) at 07:12

## 2022-12-21 RX ADMIN — BUPROPION HYDROCHLORIDE 150 MG: 150 TABLET, FILM COATED, EXTENDED RELEASE ORAL at 08:12

## 2022-12-21 RX ADMIN — ALBUTEROL SULFATE 2 PUFF: 108 INHALANT RESPIRATORY (INHALATION) at 07:12

## 2022-12-21 RX ADMIN — LISINOPRIL 20 MG: 20 TABLET ORAL at 08:12

## 2022-12-21 RX ADMIN — TAMSULOSIN HYDROCHLORIDE 0.4 MG: 0.4 CAPSULE ORAL at 08:12

## 2022-12-21 RX ADMIN — THERA TABS 1 TABLET: TAB at 08:12

## 2022-12-21 RX ADMIN — Medication 500 MG: at 08:12

## 2022-12-21 RX ADMIN — DEXAMETHASONE 6 MG: 4 TABLET ORAL at 08:12

## 2022-12-21 RX ADMIN — ALBUTEROL SULFATE 2 PUFF: 108 INHALANT RESPIRATORY (INHALATION) at 12:12

## 2022-12-21 NOTE — PLAN OF CARE
Patient AAO calm and cooperative. Tolerated medication admin per orders. VSS. Patient medically stable for discharge per MD. Reviewed POC/discharge instructions with patient and spouse at bedside. Verbalized understanding. Reviewed home medication orders, and how to use pulse oximeter. SR upx2 call light and belongings within reach bed locked in lowest position. Tele box and PIV removed dressing applied to site.

## 2022-12-21 NOTE — HOSPITAL COURSE
Mr. Payton was admitted to Hospital Medicine for acute respiratory failure 2/2 Covid19.  He was started on Dexamethasone and given supplemental oxygen, as he refused Remdesivir.  The following day, he felt back to baseline and no longer wanted to remain in the hospital. Given his COPD and emphysema, baseline oxygen sats were maintained 88-92% after oxygen was removed.  He was discharged home with Dexamethasone and the Covid home monitoring program.  Strict return precautions were addressed.  He was given a pulse ox from pharmacy.

## 2022-12-21 NOTE — PLAN OF CARE
Patient arrived to the unit from the ED via transport. Patient aaox4. Vitals wnl. Afebrile. On 2L of O2 per nasal cannula. Telemetry monitoring in place. No acute distress noted at this time. No complaints of pain at this time. Patient able to ambulate and perform ADLs independently. Patient and wife oriented to room and call light system. Bed in lowest position. Call light within reach. All needs/concerns addressed at this time.

## 2022-12-21 NOTE — ASSESSMENT & PLAN NOTE
Patient is identified as Severe COVID-19 based on hypoxemia with O2 saturations <94% on room air or on ambulation   Initiate standard COVID protocols; COVID-19 testing ,Infection Control notification  and isolation- respiratory, contact and droplet per protocol    Diagnostics: (leukopenia, hyponatremia, hyperferritinemia, elevated troponin, elevated d-dimer, age, and comorbidities are significant predictors of poor clinical outcome)  CBC, CMP, Procalcitonin, Ferritin, CRP, BNP, Troponin, Portable CXR and INR    Management: Initiate targeted therapy with Remdesivir, 200mg IV x1, followed by 100mg IV daily x5 days total, Dexamethasone PO/IV 6mg daily x10 days and Anticoagulation, Patient admitted to non-critical care unit- Will initiate full dose anticoagulation with Weight based lovenox 1mg/kg IV q12, Maintain oxygen saturations 92-96% via Nasal Cannula 2 LPM and monitor with continuous/intermittent pulse oximetry.  and Inhaled bronchodilators as needed for shortness of breath.

## 2022-12-21 NOTE — ASSESSMENT & PLAN NOTE
- COPD exacerbation in setting of acute COVID infection with hypoxia  - start dexamethasone 6mg daily  - Albuterol q6h  - start Ceftriaxone and Azithromycin  - continue home LABA-ICS  - monitor pulse Ox and supplemental O2 as needed  - further management as above

## 2022-12-21 NOTE — PROGRESS NOTES
[facility] ([address] Medicine  Progress Note    Patient Name: Efra Payton III  MRN: [id number]  Patient Class: IP- Inpatient   Admission Date: 12/20/2022  Length of Stay: 1 days  Attending Physician: Gertrudis Hernandez MD  Primary Care Provider: Jaiden Mak Ii, MD        Subjective:     Principal Problem:Acute hypoxemic respiratory failure due to COVID-19        HPI:  This is a 63yo male with a past medical history of well compensated cirrhosis, Hep C, COPD not on home O2, tobacco use (stopped 3wks ago), polysubstance abuse (reports sober for 8yrs), methadone dependence, HTN, and BPH who presents to the ED with chief complaint of fever, headache, and shortness of breath. Patient states that he has been feeling vaguely unwell for the last two days. Also reports family members at home who are also feeling unwell. Reports that this morning when he woke up he felt feverish and also reports intense headache associated with nausea and reports cough with increased sputum production. Denies emesis, abdominal pain, chest pain, confusion.     In the ED patient afebrile and hemodynamically stable with hypoxia in mid 80's on room air improved on 3L NC. COVID positive. Patient admitted to the care of medicine for further evaluation and management of COVID infection and COPD exacerbation.      Overview/Hospital Course:  Mr. Payton was admitted to Hospital Medicine for acute respiratory failure 2/2 Covid19.  He was started on Dexamethasone and given supplemental oxygen, as he refused Remdesivir.  The following day, he felt back to baseline and no longer wanted to remain in the hospital. Given his COPD and emphysema, baseline oxygen sats were maintained 88-92% after oxygen was removed.  He was discharged home with Dexamethasone and the Covid home monitoring program.  Strict return precautions were addressed.  He was given a pulse ox from pharmacy.      Interval History: Admitted overnight.  Refused  Remdesivir.  Feels back to baseline.  Turned off oxygen and sats remained in the 90s, goal 88-92% with COPD.  Wants to go home.  Discussed Dexamethasone and strict return precautions, Covid home monitoring.    Review of Systems   Constitutional:  Negative for chills, fatigue and fever.   Respiratory:  Negative for cough and shortness of breath.    Cardiovascular:  Negative for chest pain, palpitations and leg swelling.   Gastrointestinal:  Negative for abdominal pain, diarrhea, nausea and vomiting.   Genitourinary:  Negative for dysuria and urgency.   Neurological:  Negative for dizziness and headaches.   All other systems reviewed and are negative.  Objective:     Vital Signs (Most Recent):  Temp: 97.3 °F (36.3 °C) (12/21/22 0732)  Pulse: (!) 118 (12/21/22 0732)  Resp: 20 (12/21/22 0732)  BP: (!) 143/75 (12/21/22 0732)  SpO2: (!) 94 % (12/21/22 0732)   Vital Signs (24h Range):  Temp:  [97.3 °F (36.3 °C)-98.4 °F (36.9 °C)] 97.3 °F (36.3 °C)  Pulse:  [] 118  Resp:  [11-29] 20  SpO2:  [83 %-98 %] 94 %  BP: (115-160)/(57-90) 143/75        There is no height or weight on file to calculate BMI.    Intake/Output Summary (Last 24 hours) at 12/21/2022 1056  Last data filed at 12/21/2022 0630  Gross per 24 hour   Intake --   Output 3 ml   Net -3 ml      Physical Exam  Constitutional:       Appearance: Normal appearance. He is well-developed.   HENT:      Head: Normocephalic and atraumatic.   Cardiovascular:      Rate and Rhythm: Normal rate and regular rhythm.      Heart sounds: No murmur heard.  Pulmonary:      Effort: Pulmonary effort is normal. No respiratory distress.      Breath sounds: Normal breath sounds. No wheezing or rales.   Abdominal:      General: There is no distension.      Palpations: Abdomen is soft.      Tenderness: There is no abdominal tenderness.   Musculoskeletal:         General: No deformity.   Skin:     General: Skin is warm.   Neurological:      General: No focal deficit present.      Mental  Status: He is alert. Mental status is at baseline.       Significant Labs: All pertinent labs within the past 24 hours have been reviewed.    Significant Imaging: I have reviewed all pertinent imaging results/findings within the past 24 hours.      Assessment/Plan:      * Acute hypoxemic respiratory failure due to COVID-19  Patient is identified as Severe COVID-19 based on hypoxemia with O2 saturations <94% on room air or on ambulation   Initiate standard COVID protocols; COVID-19 testing ,Infection Control notification  and isolation- respiratory, contact and droplet per protocol    Diagnostics: (leukopenia, hyponatremia, hyperferritinemia, elevated troponin, elevated d-dimer, age, and comorbidities are significant predictors of poor clinical outcome)  CBC, CMP, Procalcitonin, Ferritin, CRP, BNP, Troponin, Portable CXR and INR    Management: Initiate targeted therapy with Remdesivir, 200mg IV x1, followed by 100mg IV daily x5 days total, Dexamethasone PO/IV 6mg daily x10 days and Anticoagulation, Patient admitted to non-critical care unit- Will initiate full dose anticoagulation with Weight based lovenox 1mg/kg IV q12, Maintain oxygen saturations 92-96% via Nasal Cannula 2 LPM and monitor with continuous/intermittent pulse oximetry.  and Inhaled bronchodilators as needed for shortness of breath.    Refused Remdesivir  Feels back to baseline, goal sats 88-92% with COPD - was as low as 83% in the ER  Dexa on discharge  Strict return precautions  Covid home monitoring on DC    BPH (benign prostatic hyperplasia)  - home alfuzosin converted to tamsulosin for formulary      Hepatic cirrhosis, unspecified hepatic cirrhosis type, unspecified whether ascites present  - well compensated  - daily PT/INR/CBC/CMP      Methadone maintenance therapy patient  - continue home methadone 115mg daily      Essential hypertension  - continue home amlodipine and lisinopril      Chronic obstructive pulmonary disease with acute  exacerbation  - COPD exacerbation in setting of acute COVID infection with hypoxia  - start dexamethasone 6mg daily  - Albuterol q6h  - start Ceftriaxone and Azithromycin  - continue home LABA-ICS  - monitor pulse Ox and supplemental O2 as needed  - further management as above      VTE Risk Mitigation (From admission, onward)         Ordered     IP VTE HIGH RISK PATIENT  Once         12/20/22 1900                Discharge Planning   BALAJI: 12/21/2022     Code Status: Full Code   Is the patient medically ready for discharge?:     Reason for patient still in hospital (select all that apply): Treatment  Discharge Plan A: Home with family   Discharge Delays: None known at this time              Gertrudis Hernandez MD  Department of Hospital Medicine   [facility] - Intensive Care ([place])

## 2022-12-21 NOTE — SUBJECTIVE & OBJECTIVE
Past Medical History:   Diagnosis Date    Cirrhosis     COPD (chronic obstructive pulmonary disease)     Eczema     Hepatitis C virus infection cured after antiviral drug therapy     s/p Rx, treated / cured - svr 2021    History of drug abuse     Now on methadone    Substance abuse        Past Surgical History:   Procedure Laterality Date    arm surgery      COLONOSCOPY N/A 7/8/2020    Procedure: COLONOSCOPY;  Surgeon: Mona Powell MD;  Location: [facility] (4TH FLR);  Service: Endoscopy;  Laterality: N/A;  covid [place]-7/5-tb-hx copd    COLONOSCOPY N/A 5/27/2021    Procedure: COLONOSCOPY;  Surgeon: Mona Powell MD;  Location: [facility] (4TH FLR);  Service: Endoscopy;  Laterality: N/A;  Constipation prep - pg  CBC, PT/INR (cirrhosis) done 5/4/21 - pg  Covid-19 test 5/24/21 at [facility] - pg.5/26 Called pt. to move up earlier.Left VM.EC    ENDOSCOPIC ULTRASOUND OF UPPER GASTROINTESTINAL TRACT N/A 3/24/2021    Procedure: ULTRASOUND, UPPER GI TRACT, ENDOSCOPIC - w/ varices screen;  Surgeon: George Carrillo MD;  Location: [facility] (2ND FLR);  Service: Endoscopy;  Laterality: N/A;  Covid-19 test 3/21/21 at [place] -   PM prep    HAND SURGERY         Review of patient's allergies indicates:   Allergen Reactions    Tylenol [acetaminophen] Other (See Comments)     Can't take Tylenol while he is taking Methadone       No current facility-administered medications on file prior to encounter.     Current Outpatient Medications on File Prior to Encounter   Medication Sig    albuterol (PROVENTIL/VENTOLIN HFA) 90 mcg/actuation inhaler Inhale 1-2 puffs into the lungs every 6 (six) hours as needed for Wheezing or Shortness of Breath. Rescue    alfuzosin (UROXATRAL) 10 mg Tb24 Take 1 tablet (10 mg total) by mouth daily with breakfast.    amLODIPine (NORVASC) 10 MG tablet Take 1 tablet (10 mg total) by mouth once daily.    budesonide-formoterol 80-4.5 mcg (SYMBICORT) 80-4.5 mcg/actuation HFAA Inhale 2 puffs into the lungs  once daily at 6am. Controller    buPROPion (WELLBUTRIN SR) 150 MG TBSR 12 hr tablet Take 1 tablet (150 mg total) by mouth 2 (two) times daily.    gabapentin (NEURONTIN) 300 MG capsule Take 1 capsule (300 mg total) by mouth every 8 (eight) hours as needed (Pain).    lisinopriL (PRINIVIL,ZESTRIL) 20 MG tablet Take 1 tablet (20 mg total) by mouth once daily.    methadone HCl (METHADONE ORAL) Take 115 mg by mouth once daily.      Family History       Problem Relation (Age of Onset)    Colon cancer Father, Brother, Paternal Grandfather    Diabetes Father, Sister, Brother, Paternal Grandmother, Paternal Grandfather    Heart attack Paternal Grandmother    Heart disease Father, Paternal Grandmother    Hypertension Sister, Brother          Tobacco Use    Smoking status: Every Day     Packs/day: 1.00     Years: 49.00     Pack years: 49.00     Types: Cigarettes     Start date: 7/25/1969    Smokeless tobacco: Never    Tobacco comments:     decreased his smoking to 1 pack per day 5 months ago    Substance and Sexual Activity    Alcohol use: No     Comment: has past history of alcohol abuse     Drug use: No     Comment: has past history of substance abuse     Sexual activity: Not on file     Review of Systems   Constitutional:  Positive for appetite change, chills, fatigue and fever.   HENT:  Negative for sore throat and trouble swallowing.    Eyes:  Negative for photophobia and visual disturbance.   Respiratory:  Positive for cough, shortness of breath and wheezing.    Cardiovascular:  Negative for chest pain, palpitations and leg swelling.   Gastrointestinal:  Positive for nausea. Negative for abdominal distention, abdominal pain, diarrhea and vomiting.   Genitourinary:  Negative for dysuria and hematuria.   Musculoskeletal:  Negative for neck pain and neck stiffness.   Skin:  Negative for rash and wound.   Neurological:  Positive for headaches. Negative for syncope, weakness, light-headedness and numbness.    Psychiatric/Behavioral:  Negative for confusion and decreased concentration.    Objective:     Vital Signs (Most Recent):  Temp: 98 °F (36.7 °C) (12/20/22 1910)  Pulse: 86 (12/20/22 1918)  Resp: (!) 23 (12/20/22 1910)  BP: (!) 145/72 (12/20/22 1918)  SpO2: (!) 92 % (12/20/22 1918)   Vital Signs (24h Range):  Temp:  [98 °F (36.7 °C)] 98 °F (36.7 °C)  Pulse:  [80-94] 86  Resp:  [18-27] 23  SpO2:  [83 %-98 %] 92 %  BP: (120-160)/(62-90) 145/72        There is no height or weight on file to calculate BMI.    Physical Exam  Constitutional:       General: He is not in acute distress.     Appearance: He is not toxic-appearing or diaphoretic.   HENT:      Head: Normocephalic and atraumatic.      Nose: Nose normal.   Eyes:      General: No scleral icterus.     Extraocular Movements: Extraocular movements intact.      Pupils: Pupils are equal, round, and reactive to light.   Cardiovascular:      Rate and Rhythm: Normal rate and regular rhythm.   Pulmonary:      Effort: Pulmonary effort is normal. No respiratory distress.      Breath sounds: Wheezing present. No rales.   Abdominal:      General: Abdomen is flat. There is no distension.      Palpations: Abdomen is soft.      Tenderness: There is no abdominal tenderness. There is no guarding.   Musculoskeletal:         General: Normal range of motion.      Cervical back: Normal range of motion and neck supple. No rigidity.      Right lower leg: No edema.      Left lower leg: No edema.   Skin:     General: Skin is warm and dry.      Coloration: Skin is not jaundiced.   Neurological:      General: No focal deficit present.      Mental Status: He is alert and oriented to person, place, and time.      Cranial Nerves: No cranial nerve deficit.   Psychiatric:         Mood and Affect: Mood normal.         Behavior: Behavior normal.         CRANIAL NERVES     CN III, IV, VI   Pupils are equal, round, and reactive to light.     Significant Labs: All pertinent labs within the past 24  hours have been reviewed.  CBC:   Recent Labs   Lab 12/20/22  1722   WBC 8.81   HGB 12.8*   HCT 38.8*   *     CMP:   Recent Labs   Lab 12/20/22  1720   *   K 4.2   CL 97   CO2 27   *   BUN 13   CREATININE 0.8   CALCIUM 8.7   PROT 7.2   ALBUMIN 3.8   BILITOT 0.5   ALKPHOS 85   AST 18   ALT 9*   ANIONGAP 8     Cardiac Markers:   Recent Labs   Lab 12/20/22  1720   BNP 59     Troponin:   Recent Labs   Lab 12/20/22  1720   TROPONINI 0.006       Significant Imaging: I have reviewed all pertinent imaging results/findings within the past 24 hours.

## 2022-12-21 NOTE — PLAN OF CARE
[facility] - Intensive Care ([place] [address])  Initial Discharge Assessment       Primary Care Provider: Jaiden Mak Ii, MD    Admission Diagnosis: Hyponatremia [E87.1]  Nausea [R11.0]  Methadone dependence [F11.20]  COPD with acute exacerbation [J44.1]  Acute nonintractable headache, unspecified headache type [R51.9]  Acute hypoxemic respiratory failure due to COVID-19 [U07.1, J96.01]    Admission Date: 12/20/2022  Expected Discharge Date: 12/21/2022    SW called pt's room phone and spoke with pt's wife Caren Payton for Discharge Planning Assessment.  Per wife, patient lives with his wife, daughter, son-in-law and grandchildren in a single story home with no step(s) to enter.   Per wife, patient was independent with ADLs and used no dme for ambulation prior to admit.  Patient will have assistance from wife upon discharge.  Patient does not attend a coumadin clinic and is not on dialysis.     All questions addressed.     SW/CM to follow and assist with d/c needs as needed.    Discharge Barriers Identified: Underinsured    Payor: MEDICAID / Plan: [facility] COMMUNITY PLAN [facility] ([place] MEDICAID) / Product Type: Managed Medicaid /     Extended Emergency Contact Information  Primary Emergency Contact: Caren Payton  Address: [address] [place] [address] [facility]  Home Phone: [phone]  Mobile Phone: [phone]  Relation: Spouse    Discharge Plan A: Home with family  Discharge Plan B: Home      [facility] [address] [place] [address] [address] [place] [address]  [address] [facility] [address]  Phone: [phone] Fax: [phone]    [facility]  [address]  [place] [address]  Phone: [phone] Fax: [phone]    [facility]  [address] [facility] [address]  Phone: [phone] Fax: [phone]      Initial Assessment (most recent)       Adult Discharge Assessment - 12/21/22 0943          Discharge Assessment    Assessment Type  Discharge Planning Assessment     Confirmed/corrected address, phone number and insurance Yes     Confirmed Demographics Correct on Facesheet     Source of Information family     Communicated BALAJI with patient/caregiver Date not available/Unable to determine     Reason For Admission COVID-19 virus infection     People in Home spouse;child(zev), adult;grandchild(zev)     Do you expect to return to your current living situation? Yes     Do you have help at home or someone to help you manage your care at home? Yes     Who are your caregiver(s) and their phone number(s)? wife Caren Payton [phone]     Prior to hospitilization cognitive status: Unable to Assess     Current cognitive status: Unable to Assess     Home Accessibility wheelchair accessible     Home Layout Able to live on 1st floor     Equipment Currently Used at Home none     Do you currently have service(s) that help you manage your care at home? No     Do you have prescription coverage? Yes     Who is going to help you get home at discharge? wife     How do you get to doctors appointments? family or friend will provide     Are you on dialysis? No     Do you take coumadin? No     Discharge Plan A Home with family     Discharge Plan B Home     Discharge Plan discussed with: Spouse/sig other     Discharge Barriers Identified Underinsured        OTHER    Name(s) of People in Home wife Caren, daughter, son-in-law, and grandchildren                          Angeles Schmidt, [facility]  [id number]

## 2022-12-21 NOTE — ASSESSMENT & PLAN NOTE
Patient is identified as Severe COVID-19 based on hypoxemia with O2 saturations <94% on room air or on ambulation   Initiate standard COVID protocols; COVID-19 testing ,Infection Control notification  and isolation- respiratory, contact and droplet per protocol    Diagnostics: (leukopenia, hyponatremia, hyperferritinemia, elevated troponin, elevated d-dimer, age, and comorbidities are significant predictors of poor clinical outcome)  CBC, CMP, Procalcitonin, Ferritin, CRP, BNP, Troponin, Portable CXR and INR    Management: Initiate targeted therapy with Remdesivir, 200mg IV x1, followed by 100mg IV daily x5 days total, Dexamethasone PO/IV 6mg daily x10 days and Anticoagulation, Patient admitted to non-critical care unit- Will initiate full dose anticoagulation with Weight based lovenox 1mg/kg IV q12, Maintain oxygen saturations 92-96% via Nasal Cannula 2 LPM and monitor with continuous/intermittent pulse oximetry.  and Inhaled bronchodilators as needed for shortness of breath.    Refused Remdesivir  Feels back to baseline, goal sats 88-92% with COPD - was as low as 83% in the ER  Dexa on discharge  Strict return precautions  Covid home monitoring on DC

## 2022-12-21 NOTE — DISCHARGE SUMMARY
[facility] - Intensive Care ([place] [address])  [facility] Medicine  Discharge Summary      Patient Name: Efra Payton III  MRN: [id number]  [facility]: [id number]  Patient Class: IP- Inpatient  Admission Date: 12/20/2022  Hospital Length of Stay: 1 days  Discharge Date and Time: No discharge date for patient encounter.  Attending Physician: Gertrudis Hernandez MD   Discharging Provider: Gertrudis Hernandez MD  Primary Care Provider: Jaiden Mak Ii, MD  [facility] Medicine Team: [facility] Gertrudis Hernandez MD  Primary Care Team: [facility] MED D    HPI:   This is a 61yo male with a past medical history of well compensated cirrhosis, Hep C, COPD not on home O2, tobacco use (stopped 3wks ago), polysubstance abuse (reports sober for 8yrs), methadone dependence, HTN, and BPH who presents to the ED with chief complaint of fever, headache, and shortness of breath. Patient states that he has been feeling vaguely unwell for the last two days. Also reports family members at home who are also feeling unwell. Reports that this morning when he woke up he felt feverish and also reports intense headache associated with nausea and reports cough with increased sputum production. Denies emesis, abdominal pain, chest pain, confusion.     In the ED patient afebrile and hemodynamically stable with hypoxia in mid 80's on room air improved on 3L NC. COVID positive. Patient admitted to the care of medicine for further evaluation and management of COVID infection and COPD exacerbation.      * No surgery found *      Hospital Course:   Mr. Payton was admitted to Hospital Medicine for acute respiratory failure 2/2 Covid19.  He was started on Dexamethasone and given supplemental oxygen, as he refused Remdesivir.  The following day, he felt back to baseline and no longer wanted to remain in the hospital. Given his COPD and emphysema, baseline oxygen sats were maintained 88-92% after oxygen was removed.  He was discharged home with Dexamethasone and the Covid  home monitoring program.  Strict return precautions were addressed.  He was given a pulse ox from pharmacy.       Goals of Care Treatment Preferences:  Code Status: Full Code      Consults:   Consults (From admission, onward)        Status Ordering Provider     Inpatient consult to PICC team (LINDSEY)  Once        Provider:  (Not yet assigned)    Acknowledged ARIES MASON          No new Assessment & Plan notes have been filed under this hospital service since the last note was generated.  Service: Hospital Medicine    Final Active Diagnoses:    Diagnosis Date Noted POA    PRINCIPAL PROBLEM:  Acute hypoxemic respiratory failure due to COVID-19 [U07.1, J96.01] 12/20/2022 Yes    BPH (benign prostatic hyperplasia) [N40.0] 12/20/2022 Unknown    Hepatic cirrhosis, unspecified hepatic cirrhosis type, unspecified whether ascites present [K74.60] 01/03/2022 Yes    Methadone maintenance therapy patient [F11.20] 12/31/2020 Yes    Essential hypertension [I10] 09/17/2019 Yes    Chronic obstructive pulmonary disease with acute exacerbation [J44.1]  Yes      Problems Resolved During this Admission:    Diagnosis Date Noted Date Resolved POA    COVID-19 virus infection [U07.1] 12/20/2022 12/20/2022 Unknown       Discharged Condition: fair    Disposition: Home    Follow Up:   Follow-up Information     [facility] Transportation. Call.    Why: As needed, for transportation to medical appointments, **48 hours notice is usually needed  Contact information:  [phone]           Jaiden Mak Ii, MD. Schedule an appointment as soon as possible for a visit in 1 week(s).    Specialty: Internal Medicine  Why: Hospital follow up,  called Dr. Mak's office to schedule appointment and was told that a member of his staff will contact patient to schedule  Contact information:  [address]  [facility] [address]  [phone]                       Patient Instructions:      COVID-19  Surveillance Program     Order Specific Question Answer Comments   Does patient have a smartphone? Yes    Does patient have the [facility] ranjan on their smartphone? Yes    While in surveillance program, will patient be using home oxygen? No        Medications:  Reconciled Home Medications:      Medication List      START taking these medications    ascorbic acid (vitamin C) 500 MG tablet  Commonly known as: VITAMIN C  Take 1 tablet (500 mg total) by mouth once daily. for 10 days     dexAMETHasone 6 MG tablet  Commonly known as: DECADRON  Take 1 tablet (6 mg total) by mouth once daily. for 10 days  Start taking on: December 22, 2022     pulse oximeter device  Commonly known as: pulse oximeter  by Apply Externally route 2 (two) times a day. Use twice daily at 8 AM and 3 PM and record the value in M. [facility] as directed.        CONTINUE taking these medications    albuterol 90 mcg/actuation inhaler  Commonly known as: PROVENTIL/VENTOLIN HFA  Inhale 1-2 puffs into the lungs every 6 (six) hours as needed for Wheezing or Shortness of Breath. Rescue     alfuzosin 10 mg Tb24  Commonly known as: UROXATRAL  Take 1 tablet (10 mg total) by mouth daily with breakfast.     amLODIPine 10 MG tablet  Commonly known as: NORVASC  Take 1 tablet (10 mg total) by mouth once daily.     budesonide-formoterol 80-4.5 mcg 80-4.5 mcg/actuation Hfaa  Commonly known as: SYMBICORT  Inhale 2 puffs into the lungs once daily at 6am. Controller     buPROPion 150 MG TBSR 12 hr tablet  Commonly known as: WELLBUTRIN SR  Take 1 tablet (150 mg total) by mouth 2 (two) times daily.     gabapentin 300 MG capsule  Commonly known as: NEURONTIN  Take 1 capsule (300 mg total) by mouth every 8 (eight) hours as needed (Pain).     lisinopriL 20 MG tablet  Commonly known as: PRINIVIL,ZESTRIL  Take 1 tablet (20 mg total) by mouth once daily.     METHADONE ORAL  Take 115 mg by mouth once daily.            Indwelling Lines/Drains at time of discharge:   Lines/Drains/Airways      None                 Time spent on the discharge of patient: 35 minutes         Gertrudis Hernandez MD  Department of Hospital Medicine  [facility] - Intensive Care ([place]-16)

## 2022-12-21 NOTE — PLAN OF CARE
[facility] - Intensive Care ([facility])  Discharge Final Note    Primary Care Provider: Jaiden Mak Ii, MD  Expected Discharge Date: 12/21/2022  Patient medically ready for discharge to home today.  Hospital follow up scheduled:      called Dr. Mak's office to schedule appointment and was told that a member of his staff will contact patient to schedule.   Pt was enrolled in COVID monitoring program per Dr. Hernandez.   Final Discharge Note (most recent)       Final Note - 12/21/22 0949          Final Note    Assessment Type Final Discharge Note     Anticipated Discharge Disposition Home or Self Care     Hospital Resources/Appts/Education Provided Provided patient/caregiver with written discharge plan information;Provided education on problems/symptoms using teachback        Post-Acute Status    Post-Acute Authorization Other     Other Status Awaiting f/u Appts     Discharge Delays None known at this time                   Important Message from Medicare         Referral Info (most recent)       Referral Info    No documentation.                 Contact Info       [facility]    Phone: [phone]       Next Steps: Call    Instructions: As needed, for transportation to medical appointments, **48 hours notice is usually needed    Jaiden Mak Ii, MD   Specialty: Internal Medicine   Relationship: PCP - General    [address]  [facility] [address]   Phone: [phone]       Next Steps: Schedule an appointment as soon as possible for a visit in 1 week(s)    Instructions: Hospital follow up,  called Dr. Mak's office to schedule appointment and was told that a member of his staff will contact patient to schedule          No future appointments.  FLYNN Jenkins  Case Management  Ext: [phone]  12/21/2022

## 2022-12-21 NOTE — ED PROVIDER NOTES
Encounter Date: 12/20/2022       History     Chief Complaint   Patient presents with    Vomiting     Takes methadone, amlodipine -- didn't take meds today, has horrible headache.      The patient is a 62 year old male, who has a history of hepatic cirrhosis, Hep C, tobacco use, COPD, substance abuse, methadone dependence, and HTN. He presents to the ER for an emergent evaluation due to multiple symptoms. His chief complaint is headache and nausea. He also reports coughing, wheezes, and SOB. He states that his mouth is dry and that he feels dehydrated. He denies any vomiting or diarrhea. He denies any chest pain or dizziness. He states that symptoms have been present for 2 days. He states that the degree is moderate to severe. He states that the course is constant. He denies any additional symptoms or concerns at this time. He denies withdrawal syndrome and states that he did in fact take Methadone this morning.     Review of patient's allergies indicates:   Allergen Reactions    Tylenol [acetaminophen] Other (See Comments)     Can't take Tylenol while he is taking Methadone     Past Medical History:   Diagnosis Date    Cirrhosis     COPD (chronic obstructive pulmonary disease)     Eczema     Hepatitis C virus infection cured after antiviral drug therapy     s/p Rx, treated / cured - svr 2021    History of drug abuse     Now on methadone    Substance abuse      Past Surgical History:   Procedure Laterality Date    arm surgery      COLONOSCOPY N/A 7/8/2020    Procedure: COLONOSCOPY;  Surgeon: Mona Powell MD;  Location: [address]);  Service: Endoscopy;  [facility]: N[facility]A;  [facility]    COLONOSCOPY N/A 5/27/2021    Procedure: COLONOSCOPY;  Surgeon: Mona Powell MD;  [facility] ([address] [place] [address]);  Service: Endoscopy;  Laterality: N/A;  Constipation prep - pg  CBC, PT/INR (cirrhosis) done 5/4/21 - pg  Covid-19 test 5/24/21 at [facility] - pg.5/26 Called pt. to move up earlier.Left VM.EC     ENDOSCOPIC ULTRASOUND OF UPPER GASTROINTESTINAL TRACT N/A 3/24/2021    Procedure: ULTRASOUND, UPPER GI TRACT, ENDOSCOPIC - w/ varices screen;  Surgeon: George Carrillo MD;  Location: [facility] ([address] [place] [address]);  Service: Endoscopy;  Laterality: N/A;  Covid-19 test 3/21/21 at [place] [facility] - pg  PM prep    HAND SURGERY       Family History   Problem Relation Age of Onset    Heart disease Father     Diabetes Father     Colon cancer Father     Hypertension Sister     Diabetes Sister     Hypertension Brother     Diabetes Brother     Colon cancer Brother     Heart attack Paternal Grandmother     Heart disease Paternal Grandmother     Diabetes Paternal Grandmother     Diabetes Paternal Grandfather     Colon cancer Paternal Grandfather      Social History     Tobacco Use    Smoking status: Every Day     Packs/day: 1.00     Years: 49.00     Pack years: 49.00     Types: Cigarettes     Start date: 7/25/1969    Smokeless tobacco: Never    Tobacco comments:     decreased his smoking to 1 pack per day 5 months ago    Substance Use Topics    Alcohol use: No     Comment: has past history of alcohol abuse     Drug use: No     Comment: has past history of substance abuse      Review of Systems   Constitutional:  Negative for chills and fever.   HENT:  Negative for congestion and sore throat.    Eyes:  Negative for visual disturbance.   Respiratory:  Positive for cough, shortness of breath and wheezing.    Cardiovascular:  Negative for chest pain, palpitations and leg swelling.   Gastrointestinal:  Positive for nausea. Negative for abdominal pain, blood in stool, constipation, diarrhea and vomiting.   Genitourinary:  Positive for decreased urine volume. Negative for difficulty urinating.   Musculoskeletal:  Negative for back pain, myalgias, neck pain and neck stiffness.   Skin:  Negative for color change, rash and wound.   Allergic/Immunologic: Negative for immunocompromised state.   Neurological:  Positive for headaches. Negative for  dizziness, seizures, facial asymmetry, speech difficulty, weakness, light-headedness and numbness.   Psychiatric/Behavioral:  Negative for confusion.      Physical Exam     Initial Vitals [12/20/22 1623]   BP Pulse Resp Temp SpO2   (!) 160/90 94 20 98 °F (36.7 °C) 98 %      MAP       --         Physical Exam    Nursing note and vitals reviewed.  Constitutional: He is not diaphoretic.   Alert and interactive   HENT:   Head: Normocephalic.   Dry oral mucosa   Eyes: Conjunctivae are normal. Pupils are equal, round, and reactive to light.   Neck: Neck supple.   Cardiovascular:  Normal rate.           Pulmonary/Chest:   Occasional cough. Mild expiratory wheezes. No tachypnea. Pulse ox 86% on room air - pt placed on 2 liters nasal canula.    Abdominal: He exhibits no distension. There is no abdominal tenderness.   Musculoskeletal:         General: No tenderness or edema. Normal range of motion.      Cervical back: Neck supple.      Comments: No pretibial edema. No calf pain or swelling.      Neurological: He is alert and oriented to person, place, and time. He has normal strength. No sensory deficit.   No facial droop. No dysarthria. 5/5 strength. Oriented appropriately. No focal deficit.    Skin: Skin is warm and dry. No rash noted.   Psychiatric: He has a normal mood and affect.       ED Course   Procedures  Labs Reviewed   HEPATITIS C ANTIBODY - Abnormal; Notable for the following components:       Result Value    Hepatitis C Ab Reactive (*)     All other components within normal limits    Narrative:     Release to patient->Immediate   SARS-COV2 (COVID) WITH FLU/RSV BY PCR - Abnormal; Notable for the following components:    SARS-CoV2 (COVID-19) Qualitative PCR Detected (*)     All other components within normal limits   CBC W/ AUTO DIFFERENTIAL - Abnormal; Notable for the following components:    RBC 4.13 (*)     Hemoglobin 12.8 (*)     Hematocrit 38.8 (*)     Platelets 105 (*)     Immature Granulocytes 0.6 (*)      Immature Grans (Abs) 0.05 (*)     Lymph # 0.3 (*)     Gran % 86.0 (*)     Lymph % 2.8 (*)     All other components within normal limits   COMPREHENSIVE METABOLIC PANEL - Abnormal; Notable for the following components:    Sodium 132 (*)     Glucose 118 (*)     ALT 9 (*)     All other components within normal limits   HIV 1 / 2 ANTIBODY    Narrative:     Release to patient->Immediate   LIPASE   TROPONIN I   B-TYPE NATRIURETIC PEPTIDE   PROTIME-INR   APTT   SEDIMENTATION RATE   CK   LACTATE DEHYDROGENASE   FERRITIN   D DIMER, QUANTITATIVE   LACTIC ACID, PLASMA   PROCALCITONIN     Results for orders placed or performed during the hospital encounter of 12/20/22   HIV 1/2 Ag/Ab (4th Gen)   Result Value Ref Range    HIV 1/2 Ag/Ab Non-reactive Non-reactive   Hepatitis C Antibody   Result Value Ref Range    Hepatitis C Ab Reactive (A) Non-reactive   SARS-Cov2 (COVID) with FLU/RSV by PCR   Result Value Ref Range    SARS-CoV2 (COVID-19) Qualitative PCR Detected (A) Not Detected    Influenza A, Molecular Not Detected Not Detected    Influenza B, Molecular Not Detected Not Detected    RSV Ag by Molecular Method Not Detected Not Detected   CBC auto differential   Result Value Ref Range    WBC 8.81 3.90 - 12.70 K/uL    RBC 4.13 (L) 4.60 - 6.20 M/uL    Hemoglobin 12.8 (L) 14.0 - 18.0 g/dL    Hematocrit 38.8 (L) 40.0 - 54.0 %    MCV 94 82 - 98 fL    MCH 31.0 27.0 - 31.0 pg    MCHC 33.0 32.0 - 36.0 g/dL    RDW 13.2 11.5 - 14.5 %    Platelets 105 (L) 150 - 450 K/uL    MPV 10.9 9.2 - 12.9 fL    Immature Granulocytes 0.6 (H) 0.0 - 0.5 %    Gran # (ANC) 7.6 1.8 - 7.7 K/uL    Immature Grans (Abs) 0.05 (H) 0.00 - 0.04 K/uL    Lymph # 0.3 (L) 1.0 - 4.8 K/uL    Mono # 0.9 0.3 - 1.0 K/uL    Eos # 0.0 0.0 - 0.5 K/uL    Baso # 0.03 0.00 - 0.20 K/uL    nRBC 0 0 /100 WBC    Gran % 86.0 (H) 38.0 - 73.0 %    Lymph % 2.8 (L) 18.0 - 48.0 %    Mono % 10.2 4.0 - 15.0 %    Eosinophil % 0.1 0.0 - 8.0 %    Basophil % 0.3 0.0 - 1.9 %    Differential Method  Automated    Comprehensive metabolic panel   Result Value Ref Range    Sodium 132 (L) 136 - 145 mmol/L    Potassium 4.2 3.5 - 5.1 mmol/L    Chloride 97 95 - 110 mmol/L    CO2 27 23 - 29 mmol/L    Glucose 118 (H) 70 - 110 mg/dL    BUN 13 8 - 23 mg/dL    Creatinine 0.8 0.5 - 1.4 mg/dL    Calcium 8.7 8.7 - 10.5 mg/dL    Total Protein 7.2 6.0 - 8.4 g/dL    Albumin 3.8 3.5 - 5.2 g/dL    Total Bilirubin 0.5 0.1 - 1.0 mg/dL    Alkaline Phosphatase 85 55 - 135 U/L    AST 18 10 - 40 U/L    ALT 9 (L) 10 - 44 U/L    Anion Gap 8 8 - 16 mmol/L    eGFR >60.0 >60 mL/min/1.73 m^2   Lipase   Result Value Ref Range    Lipase 11 4 - 60 U/L   Troponin I   Result Value Ref Range    Troponin I 0.006 0.000 - 0.026 ng/mL   Brain natriuretic peptide   Result Value Ref Range    BNP 59 0 - 99 pg/mL       EKG Readings: (Independently Interpreted)   Initial Reading: No STEMI. Rhythm: Normal Sinus Rhythm. Heart Rate: 70. ST Segments: Normal ST Segments. T Waves: Normal.   ER attending physician reviewed and signed      Imaging Results              X-Ray Chest AP Portable (Final result)  Result time 12/20/22 18:43:23      Final result by Chapin Wolf MD (12/20/22 18:43:23)                   Impression:      No acute process.      Electronically signed by: Chapin Wolf MD  Date:    12/20/2022  Time:    18:43               Narrative:    EXAMINATION:  XR CHEST AP PORTABLE    CLINICAL HISTORY:  Cough, unspecified    TECHNIQUE:  Single frontal view of the chest was performed.    COMPARISON:  02/22/2022.    FINDINGS:  Monitoring EKG leads are present.  The trachea is unremarkable.  The cardiomediastinal silhouette is within normal limits.  There is no evidence of free air beneath the hemidiaphragms.  There are no pleural effusions.  There is no evidence of a pneumothorax.  There is no evidence of pneumomediastinum.  No airspace opacity is present.  There are degenerative changes in the osseous structures.                                       CT Head  Without Contrast (Final result)  Result time 12/20/22 18:42:03      Final result by Adam Valdivia MD (12/20/22 18:42:03)                   Impression:      1. No acute intracranial abnormalities.      Electronically signed by: Adam Valdivia MD  Date:    12/20/2022  Time:    18:42               Narrative:    EXAMINATION:  CT HEAD WITHOUT CONTRAST    CLINICAL HISTORY:  Headache, new or worsening (Age >= 50y);    TECHNIQUE:  Low dose axial images were obtained through the head.  Coronal and sagittal reformations were also performed. Contrast was not administered.    COMPARISON:  05/18/2018    FINDINGS:  There is generalized cerebral volume loss.  There is hypoattenuation in a periventricular fashion, likely sequela of chronic microvascular ischemic change.  There is a punctate focus of low attenuation within the anterior aspect of the right corona radiata, suggesting volume averaging from adjacent sulcus.  There is no evidence of acute major vascular territory infarct, hemorrhage, or mass.  There is no hydrocephalus.  There are no abnormal extra-axial fluid collections.  The paranasal sinuses and mastoid air cells are clear, and there is no evidence of calvarial fracture.  The visualized soft tissues are unremarkable.                                    Vitals:    12/20/22 1910 12/20/22 1918 12/20/22 1933 12/20/22 1948   BP: 132/67 (!) 145/72 (!) 124/57 (!) 142/63   Pulse: 82 86 83 80   Resp: (!) 23  (!) 29 (!) 25   Temp: 98 °F (36.7 °C)      TempSrc: Oral      SpO2: (!) 89% (!) 92% (!) 89% (!) 92%           Medications   sodium chloride 0.9% flush 10 mL (has no administration in time range)   tamsulosin 24 hr capsule 0.4 mg (has no administration in time range)   amLODIPine tablet 10 mg (has no administration in time range)   fluticasone furoate-vilanteroL 100-25 mcg/dose diskus inhaler 1 puff (has no administration in time range)   buPROPion TB24 tablet 150 mg (has no administration in time range)   gabapentin  capsule 300 mg (has no administration in time range)   lisinopriL tablet 20 mg (has no administration in time range)   methadone tablet 115 mg (has no administration in time range)   sodium chloride 0.9% flush 10 mL (has no administration in time range)   remdesivir 200 mg in sodium chloride 0.9% 250 mL infusion (has no administration in time range)   remdesivir 100 mg in sodium chloride 0.9% 250 mL infusion (has no administration in time range)   glucose chewable tablet 16 g (has no administration in time range)   glucose chewable tablet 24 g (has no administration in time range)   glucagon (human recombinant) injection 1 mg (has no administration in time range)   albuterol inhaler 2 puff (has no administration in time range)   ascorbic acid (vitamin C) tablet 500 mg (has no administration in time range)   multivitamin tablet (has no administration in time range)   dexAMETHasone tablet 6 mg (has no administration in time range)   enoxaparin injection 1 mg/kg (Dosing Weight) (has no administration in time range)   cefTRIAXone (ROCEPHIN) 1 g in dextrose 5 % in water (D5W) 5 % 50 mL IVPB (MB+) (has no administration in time range)   azithromycin tablet 500 mg (has no administration in time range)   ondansetron injection 4 mg (has no administration in time range)   insulin aspart U-100 pen 0-5 Units (has no administration in time range)   dextromethorphan-guaiFENesin  mg/5 ml liquid 10 mL (has no administration in time range)   ibuprofen tablet 400 mg (has no administration in time range)   melatonin tablet 6 mg (has no administration in time range)   dextrose 10% bolus 125 mL 125 mL (has no administration in time range)   dextrose 10% bolus 250 mL 250 mL (has no administration in time range)   albuterol-ipratropium 2.5 mg-0.5 mg/3 mL nebulizer solution 3 mL (3 mLs Nebulization Given 12/20/22 1833)   sodium chloride 0.9% bolus 1,000 mL 1,000 mL (1,000 mLs Intravenous New Bag 12/20/22 1836)   ibuprofen tablet 600  mg (600 mg Oral Given 12/20/22 1926)     Medical Decision Making:   History:   Old Medical Records: I decided to obtain old medical records.  Initial Assessment:   61 yo male, here c/o headache, nausea, cough, wheezes, SOB, and dry mouth/dehydration.   Differential Diagnosis:   CVA, ICH, URI, pneumonia, COPD/bronchitis, sinus headache, migraine, dysrhythmia, ACS, CHF, PE, electrolyte derangement, MARILYN, etc    Clinical Tests:   Lab Tests: Ordered and Reviewed  Radiological Study: Ordered and Reviewed  Medical Tests: Ordered and Reviewed  ED Management:  Vital signs reviewed   Records reviewed   Pt with SaO2 as low as 85% on room air - placed on supplemental oxygen 2 liters and improved to mid 90's - respiratory therapy gave duonebs and repeat SaO2 on room air continues to be 86% per RT - placed back on 02   Considered steroids - pt reports that he just completed course of Prednisone 40 mg daily last week   CT head completed due to severe HA complaint - no acute findings identified per radiologist   Nausea medication offered - pt declined - reports nausea subsided   Covid resulted positive - isolation and contact precautions ordered    I discussed the case with the ER attending physician   I discussed the case with hospital medicine - will admit   Additional MDM:     EKG: I have independently interpreted EKG(s) - see notes.     X-Rays: I have independently interpreted X-Ray(s) - see notes.                      Clinical Impression:   Final diagnoses:  [J44.1] COPD with acute exacerbation  [U07.1, J96.01] Acute hypoxemic respiratory failure due to COVID-19 (Primary)  [E87.1] Hyponatremia  [F11.20] Methadone dependence  [R11.0] Nausea  [R51.9] Acute nonintractable headache, unspecified headache type        ED Disposition Condition    Admit Stable                Nathan Edwards PA-C  12/20/22 2004       Nathan Edwards PA-C  12/20/22 2005

## 2022-12-21 NOTE — HPI
This is a 61yo male with a past medical history of well compensated cirrhosis, Hep C, COPD not on home O2, tobacco use (stopped 3wks ago), polysubstance abuse (reports sober for 8yrs), methadone dependence, HTN, and BPH who presents to the ED with chief complaint of fever, headache, and shortness of breath. Patient states that he has been feeling vaguely unwell for the last two days. Also reports family members at home who are also feeling unwell. Reports that this morning when he woke up he felt feverish and also reports intense headache associated with nausea and reports cough with increased sputum production. Denies emesis, abdominal pain, chest pain, confusion.     In the ED patient afebrile and hemodynamically stable with hypoxia in mid 80's on room air improved on 3L NC. COVID positive. Patient admitted to the care of medicine for further evaluation and management of COVID infection and COPD exacerbation.

## 2022-12-21 NOTE — PLAN OF CARE
Problem: Adult Inpatient Plan of Care  Goal: Plan of Care Review  Outcome: Ongoing, Progressing  Goal: Optimal Comfort and Wellbeing  Outcome: Ongoing, Progressing  Goal: Readiness for Transition of Care  Outcome: Ongoing, Progressing     Problem: Infection  Goal: Absence of Infection Signs and Symptoms  Outcome: Ongoing, Progressing     Problem: COPD (Chronic Obstructive Pulmonary Disease) Comorbidity  Goal: Maintenance of COPD Symptom Control  Outcome: Ongoing, Progressing     Problem: Hypertension Comorbidity  Goal: Blood Pressure in Desired Range  Outcome: Ongoing, Progressing

## 2022-12-21 NOTE — H&P
[facility] - Emergency Dept  [facility]  History & Physical    Patient Name: Efra Payton III  MRN: [id number]  Patient Class: IP- Inpatient  Admission Date: 12/20/2022  Attending Physician: Gertrudis Hernandez MD   Primary Care Provider: Jaiden Mak Ii, MD         Patient information was obtained from patient, past medical records and ER records.     Subjective:     Principal Problem:COVID-19 virus infection    Chief Complaint:   Chief Complaint   Patient presents with    Vomiting     Takes methadone, amlodipine -- didn't take meds today, has horrible headache.         HPI: This is a 63yo male with a past medical history of well compensated cirrhosis, Hep C, COPD not on home O2, tobacco use (stopped 3wks ago), polysubstance abuse (reports sober for 8yrs), methadone dependence, HTN, and BPH who presents to the ED with chief complaint of fever, headache, and shortness of breath. Patient states that he has been feeling vaguely unwell for the last two days. Also reports family members at home who are also feeling unwell. Reports that this morning when he woke up he felt feverish and also reports intense headache associated with nausea and reports cough with increased sputum production. Denies emesis, abdominal pain, chest pain, confusion.     In the ED patient afebrile and hemodynamically stable with hypoxia in mid 80's on room air improved on 3L NC. COVID positive. Patient admitted to the care of medicine for further evaluation and management of COVID infection and COPD exacerbation.      Past Medical History:   Diagnosis Date    Cirrhosis     COPD (chronic obstructive pulmonary disease)     Eczema     Hepatitis C virus infection cured after antiviral drug therapy     s/p Rx, treated / cured - svr 2021    History of drug abuse     Now on methadone    Substance abuse        Past Surgical History:   Procedure Laterality Date    arm surgery      COLONOSCOPY N/A 7/8/2020    Procedure: COLONOSCOPY;  Surgeon:  Mona Powell MD;  Location: [facility] (4TH FLR);  Service: Endoscopy;  Laterality: N/A;  [facility]    COLONOSCOPY N/A 5/27/2021    Procedure: COLONOSCOPY;  Surgeon: Mona Powell MD;  Location: [facility] (4TH FLR);  Service: Endoscopy;  Laterality: N/A;  Constipation prep - pg  CBC, PT/INR (cirrhosis) done 5/4/21 - pg  Covid-19 test 5/24/21 at [facility] - .5/26 Called pt. to move up earlier.Left VM.EC    ENDOSCOPIC ULTRASOUND OF UPPER GASTROINTESTINAL TRACT N/A 3/24/2021    Procedure: ULTRASOUND, UPPER GI TRACT, ENDOSCOPIC - w/ varices screen;  Surgeon: George Carrillo MD;  Location: [facility] (2ND FLR);  Service: Endoscopy;  Laterality: N/A;  Covid-19 test 3/21/21 at [place] -   PM prep    HAND SURGERY         Review of patient's allergies indicates:   Allergen Reactions    Tylenol [acetaminophen] Other (See Comments)     Can't take Tylenol while he is taking Methadone       No current facility-administered medications on file prior to encounter.     Current Outpatient Medications on File Prior to Encounter   Medication Sig    albuterol (PROVENTIL/VENTOLIN HFA) 90 mcg/actuation inhaler Inhale 1-2 puffs into the lungs every 6 (six) hours as needed for Wheezing or Shortness of Breath. Rescue    alfuzosin (UROXATRAL) 10 mg Tb24 Take 1 tablet (10 mg total) by mouth daily with breakfast.    amLODIPine (NORVASC) 10 MG tablet Take 1 tablet (10 mg total) by mouth once daily.    budesonide-formoterol 80-4.5 mcg (SYMBICORT) 80-4.5 mcg/actuation HFAA Inhale 2 puffs into the lungs once daily at 6am. Controller    buPROPion (WELLBUTRIN SR) 150 MG TBSR 12 hr tablet Take 1 tablet (150 mg total) by mouth 2 (two) times daily.    gabapentin (NEURONTIN) 300 MG capsule Take 1 capsule (300 mg total) by mouth every 8 (eight) hours as needed (Pain).    lisinopriL (PRINIVIL,ZESTRIL) 20 MG tablet Take 1 tablet (20 mg total) by mouth once daily.    methadone HCl (METHADONE ORAL) Take 115 mg by mouth  once daily.      Family History       Problem Relation (Age of Onset)    Colon cancer Father, Brother, Paternal Grandfather    Diabetes Father, Sister, Brother, Paternal Grandmother, Paternal Grandfather    Heart attack Paternal Grandmother    Heart disease Father, Paternal Grandmother    Hypertension Sister, Brother          Tobacco Use    Smoking status: Every Day     Packs/day: 1.00     Years: 49.00     Pack years: 49.00     Types: Cigarettes     Start date: 7/25/1969    Smokeless tobacco: Never    Tobacco comments:     decreased his smoking to 1 pack per day 5 months ago    Substance and Sexual Activity    Alcohol use: No     Comment: has past history of alcohol abuse     Drug use: No     Comment: has past history of substance abuse     Sexual activity: Not on file     Review of Systems   Constitutional:  Positive for appetite change, chills, fatigue and fever.   HENT:  Negative for sore throat and trouble swallowing.    Eyes:  Negative for photophobia and visual disturbance.   Respiratory:  Positive for cough, shortness of breath and wheezing.    Cardiovascular:  Negative for chest pain, palpitations and leg swelling.   Gastrointestinal:  Positive for nausea. Negative for abdominal distention, abdominal pain, diarrhea and vomiting.   Genitourinary:  Negative for dysuria and hematuria.   Musculoskeletal:  Negative for neck pain and neck stiffness.   Skin:  Negative for rash and wound.   Neurological:  Positive for headaches. Negative for syncope, weakness, light-headedness and numbness.   Psychiatric/Behavioral:  Negative for confusion and decreased concentration.    Objective:     Vital Signs (Most Recent):  Temp: 98 °F (36.7 °C) (12/20/22 1910)  Pulse: 86 (12/20/22 1918)  Resp: (!) 23 (12/20/22 1910)  BP: (!) 145/72 (12/20/22 1918)  SpO2: (!) 92 % (12/20/22 1918)   Vital Signs (24h Range):  Temp:  [98 °F (36.7 °C)] 98 °F (36.7 °C)  Pulse:  [80-94] 86  Resp:  [18-27] 23  SpO2:  [83 %-98 %] 92 %  BP:  (120-160)/(62-90) 145/72        There is no height or weight on file to calculate BMI.    Physical Exam  Constitutional:       General: He is not in acute distress.     Appearance: He is not toxic-appearing or diaphoretic.   HENT:      Head: Normocephalic and atraumatic.      Nose: Nose normal.   Eyes:      General: No scleral icterus.     Extraocular Movements: Extraocular movements intact.      Pupils: Pupils are equal, round, and reactive to light.   Cardiovascular:      Rate and Rhythm: Normal rate and regular rhythm.   Pulmonary:      Effort: Pulmonary effort is normal. No respiratory distress.      Breath sounds: Wheezing present. No rales.   Abdominal:      General: Abdomen is flat. There is no distension.      Palpations: Abdomen is soft.      Tenderness: There is no abdominal tenderness. There is no guarding.   Musculoskeletal:         General: Normal range of motion.      Cervical back: Normal range of motion and neck supple. No rigidity.      Right lower leg: No edema.      Left lower leg: No edema.   Skin:     General: Skin is warm and dry.      Coloration: Skin is not jaundiced.   Neurological:      General: No focal deficit present.      Mental Status: He is alert and oriented to person, place, and time.      Cranial Nerves: No cranial nerve deficit.   Psychiatric:         Mood and Affect: Mood normal.         Behavior: Behavior normal.         CRANIAL NERVES     CN III, IV, VI   Pupils are equal, round, and reactive to light.     Significant Labs: All pertinent labs within the past 24 hours have been reviewed.  CBC:   Recent Labs   Lab 12/20/22  1722   WBC 8.81   HGB 12.8*   HCT 38.8*   *     CMP:   Recent Labs   Lab 12/20/22  1720   *   K 4.2   CL 97   CO2 27   *   BUN 13   CREATININE 0.8   CALCIUM 8.7   PROT 7.2   ALBUMIN 3.8   BILITOT 0.5   ALKPHOS 85   AST 18   ALT 9*   ANIONGAP 8     Cardiac Markers:   Recent Labs   Lab 12/20/22  1720   BNP 59     Troponin:   Recent Labs   Lab  12/20/22  1720   TROPONINI 0.006       Significant Imaging: I have reviewed all pertinent imaging results/findings within the past 24 hours.    Assessment/Plan:     * COVID-19 virus infection  Patient is identified as Severe COVID-19 based on hypoxemia with O2 saturations <94% on room air or on ambulation   Initiate standard COVID protocols; COVID-19 testing ,Infection Control notification  and isolation- respiratory, contact and droplet per protocol    Diagnostics: (leukopenia, hyponatremia, hyperferritinemia, elevated troponin, elevated d-dimer, age, and comorbidities are significant predictors of poor clinical outcome)  CBC, CMP, Procalcitonin, Ferritin, CRP, BNP, Troponin, Portable CXR and INR    Management: Initiate targeted therapy with Remdesivir, 200mg IV x1, followed by 100mg IV daily x5 days total, Dexamethasone PO/IV 6mg daily x10 days and Anticoagulation, Patient admitted to non-critical care unit- Will initiate full dose anticoagulation with Weight based lovenox 1mg/kg IV q12, Maintain oxygen saturations 92-96% via Nasal Cannula 2 LPM and monitor with continuous/intermittent pulse oximetry.  and Inhaled bronchodilators as needed for shortness of breath.    BPH (benign prostatic hyperplasia)  - home alfuzosin converted to tamsulosin for formulary      Hepatic cirrhosis, unspecified hepatic cirrhosis type, unspecified whether ascites present  - well compensated  - daily PT/INR/CBC/CMP      Methadone maintenance therapy patient  - continue home methadone 115mg daily      Essential hypertension  - continue home amlodipine and lisinopril      Chronic obstructive pulmonary disease with acute exacerbation  - COPD exacerbation in setting of acute COVID infection with hypoxia  - start dexamethasone 6mg daily  - Albuterol q6h  - start Ceftriaxone and Azithromycin  - continue home LABA-ICS  - monitor pulse Ox and supplemental O2 as needed  - further management as above      VTE Risk Mitigation (From admission,  onward)         Ordered     enoxaparin injection 1 mg/kg (Dosing Weight)  2 times daily         12/20/22 1936     IP VTE HIGH RISK PATIENT  Once         12/20/22 1900                   Puma Sanchez MD  Department of Hospital Medicine   [facility] - Emergency Dept

## 2022-12-21 NOTE — CONSULTS
NIAS consulted to obtain IV access for patient.  Patient has adequate access at this time. The currently PIV flushed without patient's complaint of pain. Patient refused additional PIV.

## 2022-12-21 NOTE — NURSING
Patient and wife are refusing to take Remdesivir at this time; want to talk more in dept with a physician about med. Notified THOMAS Acosta MD.

## 2022-12-22 ENCOUNTER — NURSE TRIAGE (OUTPATIENT)
Dept: ADMINISTRATIVE | Facility: CLINIC | Age: 62
End: 2022-12-22
Payer: MEDICAID

## 2022-12-22 DIAGNOSIS — Z72.0 TOBACCO ABUSE: Primary | ICD-10-CM

## 2022-12-22 LAB — HCV RNA SERPL NAA+PROBE-ACNC: NORMAL IU/ML

## 2022-12-22 RX ORDER — IBUPROFEN 200 MG
1 TABLET ORAL DAILY
Qty: 14 PATCH | Refills: 0 | Status: SHIPPED | OUTPATIENT
Start: 2022-12-22 | End: 2023-01-10

## 2022-12-22 NOTE — TELEPHONE ENCOUNTER
Pt called for enrollment to covid surveillance program and he said that he will do the [id number] and he was given the number for [facility] to reach out if he needs anything. Pt said that he and his wife quit smoking 2 weeks ago and is interested in nicotine patch etc if he would be able to get RX. I will route to PCP            Reason for Disposition   Information only question and nurse able to answer    Protocols used: Information Only Call - No Triage-A-[place]

## 2023-01-04 ENCOUNTER — TELEPHONE (OUTPATIENT)
Dept: INTERNAL MEDICINE | Facility: CLINIC | Age: 63
End: 2023-01-04
Payer: MEDICAID

## 2023-01-04 NOTE — TELEPHONE ENCOUNTER
----- Message from Tiffany Reilly sent at 1/4/2023  9:59 AM CST -----  Contact: [phone]  Prs wife is calling she states the pt is needing a follow up from the ED please give return call

## 2023-01-10 ENCOUNTER — OFFICE VISIT (OUTPATIENT)
Dept: INTERNAL MEDICINE | Facility: CLINIC | Age: 63
End: 2023-01-10
Payer: MEDICAID

## 2023-01-10 VITALS
OXYGEN SATURATION: 95 % | SYSTOLIC BLOOD PRESSURE: 116 MMHG | DIASTOLIC BLOOD PRESSURE: 68 MMHG | HEART RATE: 81 BPM | HEIGHT: 67 IN | WEIGHT: 138 LBS | BODY MASS INDEX: 21.66 KG/M2

## 2023-01-10 DIAGNOSIS — R30.0 DYSURIA: ICD-10-CM

## 2023-01-10 DIAGNOSIS — I10 ESSENTIAL HYPERTENSION: ICD-10-CM

## 2023-01-10 DIAGNOSIS — N40.0 PROSTATISM: ICD-10-CM

## 2023-01-10 DIAGNOSIS — Z86.19 HEPATITIS C VIRUS INFECTION CURED AFTER ANTIVIRAL DRUG THERAPY: ICD-10-CM

## 2023-01-10 DIAGNOSIS — U07.1 COVID: Primary | ICD-10-CM

## 2023-01-10 DIAGNOSIS — J43.8 OTHER EMPHYSEMA: ICD-10-CM

## 2023-01-10 DIAGNOSIS — F11.20 METHADONE MAINTENANCE THERAPY PATIENT: ICD-10-CM

## 2023-01-10 PROCEDURE — 99214 OFFICE O/P EST MOD 30 MIN: CPT | Mod: PBBFAC | Performed by: INTERNAL MEDICINE

## 2023-01-10 PROCEDURE — 3074F PR MOST RECENT SYSTOLIC BLOOD PRESSURE < 130 MM HG: ICD-10-PCS | Mod: CPTII,,, | Performed by: INTERNAL MEDICINE

## 2023-01-10 PROCEDURE — 1160F PR REVIEW ALL MEDS BY PRESCRIBER/CLIN PHARMACIST DOCUMENTED: ICD-10-PCS | Mod: CPTII,,, | Performed by: INTERNAL MEDICINE

## 2023-01-10 PROCEDURE — 3008F PR BODY MASS INDEX (BMI) DOCUMENTED: ICD-10-PCS | Mod: CPTII,,, | Performed by: INTERNAL MEDICINE

## 2023-01-10 PROCEDURE — 3078F DIAST BP <80 MM HG: CPT | Mod: CPTII,,, | Performed by: INTERNAL MEDICINE

## 2023-01-10 PROCEDURE — 99214 PR OFFICE/OUTPT VISIT, EST, LEVL IV, 30-39 MIN: ICD-10-PCS | Mod: S$PBB,,, | Performed by: INTERNAL MEDICINE

## 2023-01-10 PROCEDURE — 99214 OFFICE O/P EST MOD 30 MIN: CPT | Mod: S$PBB,,, | Performed by: INTERNAL MEDICINE

## 2023-01-10 PROCEDURE — 3008F BODY MASS INDEX DOCD: CPT | Mod: CPTII,,, | Performed by: INTERNAL MEDICINE

## 2023-01-10 PROCEDURE — 3074F SYST BP LT 130 MM HG: CPT | Mod: CPTII,,, | Performed by: INTERNAL MEDICINE

## 2023-01-10 PROCEDURE — 99999 PR PBB SHADOW E&M-EST. PATIENT-LVL IV: ICD-10-PCS | Mod: PBBFAC,,, | Performed by: INTERNAL MEDICINE

## 2023-01-10 PROCEDURE — 99999 PR PBB SHADOW E&M-EST. PATIENT-LVL IV: CPT | Mod: PBBFAC,,, | Performed by: INTERNAL MEDICINE

## 2023-01-10 PROCEDURE — 1159F MED LIST DOCD IN RCRD: CPT | Mod: CPTII,,, | Performed by: INTERNAL MEDICINE

## 2023-01-10 PROCEDURE — 1159F PR MEDICATION LIST DOCUMENTED IN MEDICAL RECORD: ICD-10-PCS | Mod: CPTII,,, | Performed by: INTERNAL MEDICINE

## 2023-01-10 PROCEDURE — 1160F RVW MEDS BY RX/DR IN RCRD: CPT | Mod: CPTII,,, | Performed by: INTERNAL MEDICINE

## 2023-01-10 PROCEDURE — 3078F PR MOST RECENT DIASTOLIC BLOOD PRESSURE < 80 MM HG: ICD-10-PCS | Mod: CPTII,,, | Performed by: INTERNAL MEDICINE

## 2023-01-10 RX ORDER — IPRATROPIUM BROMIDE AND ALBUTEROL 20; 100 UG/1; UG/1
SPRAY, METERED RESPIRATORY (INHALATION)
COMMUNITY
Start: 2022-12-08 | End: 2023-12-28

## 2023-01-10 RX ORDER — PREDNISONE 50 MG/1
50 TABLET ORAL
COMMUNITY
Start: 2022-12-07 | End: 2023-01-10

## 2023-01-10 RX ORDER — DOXYCYCLINE 100 MG/1
100 CAPSULE ORAL 2 TIMES DAILY
COMMUNITY
Start: 2022-12-07 | End: 2023-01-10

## 2023-01-10 NOTE — PROGRESS NOTES
Subjective:       Patient ID: Efra Payton III is a 62 y.o. male.    Chief Complaint:   Follow-up    HPI - hospitalized 12/20-21 for COVID-19; recovered from that.  Stopped smoking in early December.  He's got some dysuria and hesitancy; asked to see urology.  He is not sexually active.  Taking medications as prescribed.  He will get flu shot today; wait on covid until 3 months out from infection.    PMH  Emphysema, intermittent hypoxia  Hepatitis C, completed treatment  Hx positive PPD with treatment before 2000  Colon polyps 2021 after positive cologuard  HTN, at goal  Anxiety  Former smoker  Hx incarceration  Methadone maintenance therapy     Meds:  Reviewed and reconciled in EPIC with patient during visit today    Review of Systems   Constitutional:  Negative for fever.   HENT:  Negative for congestion.    Respiratory:  Negative for shortness of breath.    Cardiovascular:  Negative for chest pain.   Gastrointestinal:  Negative for abdominal pain.   Genitourinary:  Positive for difficulty urinating and dysuria.   Musculoskeletal:  Negative for arthralgias.   Skin:  Negative for rash.   Neurological:  Negative for headaches.   Psychiatric/Behavioral:  Negative for sleep disturbance.      Objective:      Physical Exam  Constitutional:       General: He is not in acute distress.     Appearance: He is well-developed. He is not diaphoretic.   HENT:      Head: Normocephalic and atraumatic.   Cardiovascular:      Rate and Rhythm: Normal rate and regular rhythm.      Heart sounds: Normal heart sounds. No murmur heard.    No friction rub. No gallop.   Pulmonary:      Effort: No respiratory distress.      Breath sounds: No wheezing or rales.   Chest:      Chest wall: No tenderness.   Skin:     General: Skin is warm.      Findings: No erythema.   Neurological:      Mental Status: He is alert and oriented to person, place, and time.   Psychiatric:         Thought Content: Thought content normal.       Assessment:       1.  COVID    2. Other emphysema    3. Essential hypertension    4. Dysuria    5. Prostatism    6. Hepatitis C virus infection cured after antiviral drug therapy    7. Methadone maintenance therapy patient          Plan:       Efra was seen today for follow-up.    Diagnoses and all orders for this visit:    COVID - resolved now.  He needs booster in a couple of months    Other emphysema - stable.  Gave positive FB on stopping smoking    Essential hypertension - at goal, stay the course    Dysuria - will send testing, then to urology if necessary  -     Urinalysis; Future  -     CULTURE, URINE; Future  -     C. trachomatis/N. gonorrhoeae by AMP DNA; Future    Prostatism  -     Ambulatory referral/consult to Urology; Future  -     Urinalysis; Future  -     CULTURE, URINE; Future  -     C. trachomatis/N. gonorrhoeae by AMP DNA; Future    Hepatitis C virus infection cured after antiviral drug therapy    Methadone maintenance therapy patient    Rtc prn, or in 6 months    JEN Mak MD MPH  Staff Internist

## 2023-01-11 ENCOUNTER — LAB VISIT (OUTPATIENT)
Dept: LAB | Facility: HOSPITAL | Age: 63
End: 2023-01-11
Payer: MEDICAID

## 2023-01-11 DIAGNOSIS — R30.0 DYSURIA: ICD-10-CM

## 2023-01-11 DIAGNOSIS — N40.0 PROSTATISM: ICD-10-CM

## 2023-01-11 PROCEDURE — 81003 URINALYSIS AUTO W/O SCOPE: CPT | Performed by: INTERNAL MEDICINE

## 2023-02-08 ENCOUNTER — OFFICE VISIT (OUTPATIENT)
Dept: UROLOGY | Facility: CLINIC | Age: 63
End: 2023-02-08
Payer: MEDICAID

## 2023-02-08 VITALS
DIASTOLIC BLOOD PRESSURE: 70 MMHG | SYSTOLIC BLOOD PRESSURE: 114 MMHG | WEIGHT: 139.56 LBS | BODY MASS INDEX: 21.9 KG/M2 | HEART RATE: 76 BPM | HEIGHT: 67 IN

## 2023-02-08 DIAGNOSIS — N52.9 ERECTILE DYSFUNCTION, UNSPECIFIED ERECTILE DYSFUNCTION TYPE: ICD-10-CM

## 2023-02-08 DIAGNOSIS — R68.82 LOW LIBIDO: ICD-10-CM

## 2023-02-08 DIAGNOSIS — N40.1 BENIGN PROSTATIC HYPERPLASIA WITH URINARY FREQUENCY: Primary | ICD-10-CM

## 2023-02-08 DIAGNOSIS — N40.0 PROSTATISM: ICD-10-CM

## 2023-02-08 DIAGNOSIS — R35.0 BENIGN PROSTATIC HYPERPLASIA WITH URINARY FREQUENCY: Primary | ICD-10-CM

## 2023-02-08 PROCEDURE — 99213 OFFICE O/P EST LOW 20 MIN: CPT | Mod: PBBFAC,PO | Performed by: UROLOGY

## 2023-02-08 PROCEDURE — 3008F BODY MASS INDEX DOCD: CPT | Mod: CPTII,,, | Performed by: UROLOGY

## 2023-02-08 PROCEDURE — 3078F PR MOST RECENT DIASTOLIC BLOOD PRESSURE < 80 MM HG: ICD-10-PCS | Mod: CPTII,,, | Performed by: UROLOGY

## 2023-02-08 PROCEDURE — 99999 PR PBB SHADOW E&M-EST. PATIENT-LVL III: CPT | Mod: PBBFAC,,, | Performed by: UROLOGY

## 2023-02-08 PROCEDURE — 3074F SYST BP LT 130 MM HG: CPT | Mod: CPTII,,, | Performed by: UROLOGY

## 2023-02-08 PROCEDURE — 99999 PR PBB SHADOW E&M-EST. PATIENT-LVL III: ICD-10-PCS | Mod: PBBFAC,,, | Performed by: UROLOGY

## 2023-02-08 PROCEDURE — 3008F PR BODY MASS INDEX (BMI) DOCUMENTED: ICD-10-PCS | Mod: CPTII,,, | Performed by: UROLOGY

## 2023-02-08 PROCEDURE — 1159F MED LIST DOCD IN RCRD: CPT | Mod: CPTII,,, | Performed by: UROLOGY

## 2023-02-08 PROCEDURE — 3074F PR MOST RECENT SYSTOLIC BLOOD PRESSURE < 130 MM HG: ICD-10-PCS | Mod: CPTII,,, | Performed by: UROLOGY

## 2023-02-08 PROCEDURE — 1160F PR REVIEW ALL MEDS BY PRESCRIBER/CLIN PHARMACIST DOCUMENTED: ICD-10-PCS | Mod: CPTII,,, | Performed by: UROLOGY

## 2023-02-08 PROCEDURE — 1160F RVW MEDS BY RX/DR IN RCRD: CPT | Mod: CPTII,,, | Performed by: UROLOGY

## 2023-02-08 PROCEDURE — 99204 PR OFFICE/OUTPT VISIT, NEW, LEVL IV, 45-59 MIN: ICD-10-PCS | Mod: S$PBB,,, | Performed by: UROLOGY

## 2023-02-08 PROCEDURE — 3078F DIAST BP <80 MM HG: CPT | Mod: CPTII,,, | Performed by: UROLOGY

## 2023-02-08 PROCEDURE — 99204 OFFICE O/P NEW MOD 45 MIN: CPT | Mod: S$PBB,,, | Performed by: UROLOGY

## 2023-02-08 PROCEDURE — 1159F PR MEDICATION LIST DOCUMENTED IN MEDICAL RECORD: ICD-10-PCS | Mod: CPTII,,, | Performed by: UROLOGY

## 2023-02-09 ENCOUNTER — LAB VISIT (OUTPATIENT)
Dept: LAB | Facility: HOSPITAL | Age: 63
End: 2023-02-09
Attending: UROLOGY
Payer: MEDICAID

## 2023-02-09 DIAGNOSIS — R35.0 BENIGN PROSTATIC HYPERPLASIA WITH URINARY FREQUENCY: ICD-10-CM

## 2023-02-09 DIAGNOSIS — N52.9 ERECTILE DYSFUNCTION, UNSPECIFIED ERECTILE DYSFUNCTION TYPE: ICD-10-CM

## 2023-02-09 DIAGNOSIS — N40.1 BENIGN PROSTATIC HYPERPLASIA WITH URINARY FREQUENCY: ICD-10-CM

## 2023-02-09 DIAGNOSIS — R68.82 LOW LIBIDO: ICD-10-CM

## 2023-02-09 LAB
COMPLEXED PSA SERPL-MCNC: 0.22 NG/ML (ref 0–4)
TESTOST SERPL-MCNC: 373 NG/DL (ref 304–1227)

## 2023-02-09 PROCEDURE — 84153 ASSAY OF PSA TOTAL: CPT | Performed by: UROLOGY

## 2023-02-09 PROCEDURE — 84403 ASSAY OF TOTAL TESTOSTERONE: CPT | Performed by: UROLOGY

## 2023-02-09 PROCEDURE — 36415 COLL VENOUS BLD VENIPUNCTURE: CPT | Performed by: UROLOGY

## 2023-02-14 ENCOUNTER — TELEPHONE (OUTPATIENT)
Dept: INTERNAL MEDICINE | Facility: CLINIC | Age: 63
End: 2023-02-14
Payer: MEDICAID

## 2023-02-14 NOTE — TELEPHONE ENCOUNTER
----- Message from Michelle Alvarez sent at 2/14/2023  2:24 PM CST -----  Contact: [phone]  .1 Patient would like to get medical advice.  Symptoms (please be specific):ear ache and soar throat  How long has patient had these symptoms: a couples of days  Pharmacy name and phone#:[facility] [address] [place], [place] - [address]  Phone:  [phone]  Fax:  [phone]  Any drug allergies: on file  Comments: Patient would like to get medical advice. Patient would like to be seen today, but not available appointment (medicaid carriage). Please call and advise. Thank you

## 2023-02-15 ENCOUNTER — OFFICE VISIT (OUTPATIENT)
Dept: INTERNAL MEDICINE | Facility: CLINIC | Age: 63
End: 2023-02-15
Payer: MEDICAID

## 2023-02-15 VITALS
SYSTOLIC BLOOD PRESSURE: 99 MMHG | DIASTOLIC BLOOD PRESSURE: 61 MMHG | BODY MASS INDEX: 22.25 KG/M2 | HEIGHT: 67 IN | OXYGEN SATURATION: 86 % | WEIGHT: 141.75 LBS | HEART RATE: 79 BPM

## 2023-02-15 DIAGNOSIS — J02.9 SORE THROAT: Primary | ICD-10-CM

## 2023-02-15 LAB
CTP QC/QA: YES
CTP QC/QA: YES
MOLECULAR STREP A: NEGATIVE
SARS-COV-2 RDRP RESP QL NAA+PROBE: NEGATIVE

## 2023-02-15 PROCEDURE — 87651 STREP A DNA AMP PROBE: CPT | Mod: PBBFAC

## 2023-02-15 PROCEDURE — 3074F SYST BP LT 130 MM HG: CPT | Mod: CPTII,,,

## 2023-02-15 PROCEDURE — 99999 PR PBB SHADOW E&M-EST. PATIENT-LVL II: CPT | Mod: PBBFAC,,,

## 2023-02-15 PROCEDURE — 3008F BODY MASS INDEX DOCD: CPT | Mod: CPTII,,,

## 2023-02-15 PROCEDURE — 3074F PR MOST RECENT SYSTOLIC BLOOD PRESSURE < 130 MM HG: ICD-10-PCS | Mod: CPTII,,,

## 2023-02-15 PROCEDURE — 99213 OFFICE O/P EST LOW 20 MIN: CPT | Mod: S$PBB,,,

## 2023-02-15 PROCEDURE — 3078F DIAST BP <80 MM HG: CPT | Mod: CPTII,,,

## 2023-02-15 PROCEDURE — 3008F PR BODY MASS INDEX (BMI) DOCUMENTED: ICD-10-PCS | Mod: CPTII,,,

## 2023-02-15 PROCEDURE — 87635 SARS-COV-2 COVID-19 AMP PRB: CPT | Mod: PBBFAC

## 2023-02-15 PROCEDURE — 99999 PR PBB SHADOW E&M-EST. PATIENT-LVL II: ICD-10-PCS | Mod: PBBFAC,,,

## 2023-02-15 PROCEDURE — 3078F PR MOST RECENT DIASTOLIC BLOOD PRESSURE < 80 MM HG: ICD-10-PCS | Mod: CPTII,,,

## 2023-02-15 PROCEDURE — 99212 OFFICE O/P EST SF 10 MIN: CPT | Mod: PBBFAC

## 2023-02-15 PROCEDURE — 99213 PR OFFICE/OUTPT VISIT, EST, LEVL III, 20-29 MIN: ICD-10-PCS | Mod: S$PBB,,,

## 2023-02-15 NOTE — PROGRESS NOTES
INTERNAL MEDICINE CLINIC VISIT    Subjective     Chief Complaint:   Chief Complaint   Patient presents with    Sore Throat     Pt states that he has intermitted ear and throat pain since Saturday.    Otalgia       History of Present Illness:  Mr. Efra Payton III is a 63 y.o. male with COPD, cirrhosis, eczema presenting with 2 weeks of sore throat and bilateral ear pain. Intermittent pain. Pain worse with swallowing, but improved when drinking hot liquids. Mouth wash and salt water rinses help temporarily. No fevers, chills, N/V/D, cough, SOB, respiratory status improved overall after ceasing smoking in Dec 2022.  Three grandkids at home and two of them have had strep throat over the past couple of weeks that was treated with amoxicillin. Centor score 1. Triple vaccinated for COVID, does not recall getting flu vaccine.     Review of Systems   Constitutional:  Negative for chills, fever, malaise/fatigue and weight loss.   HENT:  Positive for sore throat. Negative for congestion, hearing loss and sinus pain.    Eyes:  Negative for blurred vision.   Respiratory:  Negative for cough, shortness of breath and wheezing.    Cardiovascular:  Negative for chest pain, palpitations, orthopnea and leg swelling.   Gastrointestinal:  Negative for abdominal pain, constipation, diarrhea, heartburn, nausea and vomiting.   Genitourinary:  Negative for dysuria.   Musculoskeletal:  Negative for joint pain and myalgias.   Skin:  Negative for rash.   Neurological:  Negative for dizziness, weakness and headaches.   Psychiatric/Behavioral:  Negative for depression. The patient is not nervous/anxious.      PAST HISTORY:     Past Medical History:   Diagnosis Date    Cirrhosis     COPD (chronic obstructive pulmonary disease)     Eczema     Hepatitis C virus infection cured after antiviral drug therapy     s/p Rx, treated / cured - svr 2021    History of drug abuse     Now on methadone    Substance abuse        Past Surgical History:    Procedure Laterality Date    arm surgery      COLONOSCOPY N/A 7/8/2020    Procedure: COLONOSCOPY;  Surgeon: Mona Powell MD;  Location: [facility] (4TH FLR);  Service: Endoscopy;  Laterality: N/A;  covid [place]-7/5-tb-hx copd    COLONOSCOPY N/A 5/27/2021    Procedure: COLONOSCOPY;  Surgeon: Mona Powell MD;  Location: [facility] (4TH FLR);  Service: Endoscopy;  Laterality: N/A;  Constipation prep - pg  CBC, PT/INR (cirrhosis) done 5/4/21 - pg  Covid-19 test 5/24/21 at [facility] - pg.5/26 Called pt. to move up earlier.Left VM.EC    ENDOSCOPIC ULTRASOUND OF UPPER GASTROINTESTINAL TRACT N/A 3/24/2021    Procedure: ULTRASOUND, UPPER GI TRACT, ENDOSCOPIC - w/ varices screen;  Surgeon: George Carrillo MD;  Location: [facility] (2ND FLR);  Service: Endoscopy;  Laterality: N/A;  Covid-19 test 3/21/21 at [facility]  PM prep    HAND SURGERY         Family History   Problem Relation Age of Onset    Heart disease Father     Diabetes Father     Colon cancer Father     Hypertension Sister     Diabetes Sister     Hypertension Brother     Diabetes Brother     Colon cancer Brother     Heart attack Paternal Grandmother     Heart disease Paternal Grandmother     Diabetes Paternal Grandmother     Diabetes Paternal Grandfather     Colon cancer Paternal Grandfather        Social History     Socioeconomic History    Marital status:    Tobacco Use    Smoking status: Every Day     Packs/day: 1.00     Years: 49.00     Pack years: 49.00     Types: Cigarettes     Start date: 7/25/1969    Smokeless tobacco: Never    Tobacco comments:     decreased his smoking to 1 pack per day 5 months ago    Substance and Sexual Activity    Alcohol use: No     Comment: has past history of alcohol abuse     Drug use: No     Comment: has past history of substance abuse        MEDICATIONS & ALLERGIES:     Current Outpatient Medications on File Prior to Visit   Medication Sig    albuterol (PROVENTIL/VENTOLIN HFA) 90 mcg/actuation inhaler Inhale 1-2  "puffs into the lungs every 6 (six) hours as needed for Wheezing or Shortness of Breath. Rescue    alfuzosin (UROXATRAL) 10 mg Tb24 Take 1 tablet (10 mg total) by mouth daily with breakfast.    amLODIPine (NORVASC) 10 MG tablet Take 1 tablet (10 mg total) by mouth once daily.    budesonide-formoterol 80-4.5 mcg (SYMBICORT) 80-4.5 mcg/actuation HFAA Inhale 2 puffs into the lungs once daily at 6am. Controller    buPROPion (WELLBUTRIN SR) 150 MG TBSR 12 hr tablet Take 1 tablet (150 mg total) by mouth 2 (two) times daily.    COMBIVENT RESPIMAT  mcg/actuation inhaler SMARTSI Puff(s) By Mouth Every 6 Hours PRN    lisinopriL (PRINIVIL,ZESTRIL) 20 MG tablet Take 1 tablet (20 mg total) by mouth once daily.    methadone HCl (METHADONE ORAL) Take 115 mg by mouth once daily.     pulse oximeter (PULSE OXIMETER) device by Apply Externally route 2 (two) times a day. Use twice daily at 8 AM and 3 PM and record the value in [facility] as directed.     No current facility-administered medications on file prior to visit.       Review of patient's allergies indicates:   Allergen Reactions    Tylenol [acetaminophen] Other (See Comments)     Can't take Tylenol while he is taking Methadone       OBJECTIVE:     Vital Signs:  Vitals:    02/15/23 1440   BP: 99/61   BP Location: Right arm   Patient Position: Sitting   BP Method: Medium (Automatic)   Pulse: 79   SpO2: (!) 86%   Weight: 64.3 kg (141 lb 12.1 oz)   Height: 5' 7" (1.702 m)       Body mass index is 22.2 kg/m².     Physical Exam  Vitals and nursing note reviewed.   Constitutional:       Appearance: Normal appearance.   HENT:      Head: Normocephalic and atraumatic.      Right Ear: Tympanic membrane normal.      Left Ear: Tympanic membrane normal.      Nose: No congestion or rhinorrhea.      Mouth/Throat:      Mouth: Mucous membranes are moist.      Pharynx: Oropharynx is clear. Posterior oropharyngeal erythema present. No oropharyngeal exudate.   Eyes:      Extraocular " Movements: Extraocular movements intact.      Conjunctiva/sclera: Conjunctivae normal.   Cardiovascular:      Rate and Rhythm: Normal rate and regular rhythm.   Pulmonary:      Effort: Pulmonary effort is normal.      Breath sounds: Normal breath sounds.   Abdominal:      General: Bowel sounds are normal.      Palpations: Abdomen is soft.      Tenderness: There is no abdominal tenderness.   Musculoskeletal:         General: Normal range of motion.      Cervical back: Normal range of motion and neck supple.      Right lower leg: No edema.      Left lower leg: No edema.   Skin:     General: Skin is warm and dry.   Neurological:      General: No focal deficit present.      Mental Status: He is alert and oriented to person, place, and time.   Psychiatric:         Mood and Affect: Mood normal.         Behavior: Behavior normal.     Laboratory  Lab Results   Component Value Date    WBC 3.71 (L) 12/21/2022    HGB 12.3 (L) 12/21/2022    HCT 38.6 (L) 12/21/2022    MCV 98 12/21/2022     (L) 12/21/2022     BMP  Lab Results   Component Value Date     (L) 12/20/2022    K 4.2 12/20/2022    CL 97 12/20/2022    CO2 27 12/20/2022    BUN 13 12/20/2022    CREATININE 0.8 12/20/2022    CALCIUM 8.7 12/20/2022    ANIONGAP 8 12/20/2022    EGFRNORACEVR >60.0 12/20/2022     Lab Results   Component Value Date    INR 1.0 12/20/2022    INR 1.0 06/06/2022    INR 1.0 12/02/2021     No results found for: HGBA1C    Diagnostic Results:  Labs: Reviewed    Health Maintenance Due   Topic Date Due    COVID-19 Vaccine (4 - Booster for Moderna series) 03/02/2022    Influenza Vaccine (1) 09/01/2022    Pneumococcal Vaccines (Age 0-64) (2 - PCV) 02/22/2023         ASSESSMENT & PLAN:   Mr. Efra Payton III is a 63 y.o. male presenting with a sore throat. Pulse ox 86% not re-measured, but patient demonstrated no dyspnea, no increased work of breathing, no nasal congestion, lung fields clear to auscultation. Believed to be erroneous reading.          Sore throat  -     Influenza A & B by Molecular  -     POCT COVID-19 Rapid Screening  -     Cancel: POCT RSV by Molecular  -     POCT Strep A, Molecular       Problem List Items Addressed This Visit          ENT    Sore throat - Primary    Current Assessment & Plan     COVID, Strep negative. Patient advised to continue supportive care for suspected viral URTI.          Relevant Orders    Influenza A & B by Molecular    POCT COVID-19 Rapid Screening (Completed)    POCT Strep A, Molecular (Completed)                RTC if symptoms worsen     Discussed with Dr. Zuluaga  - attestation to follow        Vishnu Vargas MD  Internal Medicine, PGY-2  [facility]  [address] [place] [address]  [phone]

## 2023-02-16 PROBLEM — J02.9 SORE THROAT: Status: ACTIVE | Noted: 2023-02-16

## 2023-02-25 ENCOUNTER — NURSE TRIAGE (OUTPATIENT)
Dept: ADMINISTRATIVE | Facility: CLINIC | Age: 63
End: 2023-02-25
Payer: MEDICAID

## 2023-02-25 DIAGNOSIS — I10 ESSENTIAL HYPERTENSION: ICD-10-CM

## 2023-02-25 NOTE — TELEPHONE ENCOUNTER
Caller states that pt is out of HTN medication norvasc 10 mg. On call provider contacted x 1 no answer, no VM. Pt offered [facility] visit.  Pt advised per protocol and verbalized understanding.   Reason for Disposition   [1] Prescription refill request for ESSENTIAL medicine (i.e., likelihood of harm to patient if not taken) AND [2] triager unable to refill per department policy    Protocols used: Medication Refill and Renewal Call-A-

## 2023-02-27 RX ORDER — AMLODIPINE BESYLATE 10 MG/1
10 TABLET ORAL DAILY
Qty: 90 TABLET | Refills: 3 | Status: SHIPPED | OUTPATIENT
Start: 2023-02-27 | End: 2024-01-22

## 2023-03-17 ENCOUNTER — PATIENT MESSAGE (OUTPATIENT)
Dept: RESEARCH | Facility: HOSPITAL | Age: 63
End: 2023-03-17
Payer: MEDICAID

## 2023-03-27 PROBLEM — J96.01 ACUTE HYPOXEMIC RESPIRATORY FAILURE DUE TO COVID-19: Status: RESOLVED | Noted: 2022-12-20 | Resolved: 2023-03-27

## 2023-03-27 PROBLEM — U07.1 ACUTE HYPOXEMIC RESPIRATORY FAILURE DUE TO COVID-19: Status: RESOLVED | Noted: 2022-12-20 | Resolved: 2023-03-27

## 2023-04-03 ENCOUNTER — HOSPITAL ENCOUNTER (INPATIENT)
Facility: HOSPITAL | Age: 63
LOS: 6 days | Discharge: HOME | DRG: 417 | End: 2023-04-09
Attending: EMERGENCY MEDICINE | Admitting: SURGERY
Payer: MEDICAID

## 2023-04-03 ENCOUNTER — ANESTHESIA EVENT (OUTPATIENT)
Dept: SURGERY | Facility: HOSPITAL | Age: 63
DRG: 417 | End: 2023-04-03
Payer: MEDICAID

## 2023-04-03 DIAGNOSIS — I10 ESSENTIAL HYPERTENSION: ICD-10-CM

## 2023-04-03 DIAGNOSIS — J43.8 OTHER EMPHYSEMA: ICD-10-CM

## 2023-04-03 DIAGNOSIS — N17.9 AKI (ACUTE KIDNEY INJURY): ICD-10-CM

## 2023-04-03 DIAGNOSIS — G89.18 ACUTE POSTOPERATIVE PAIN: ICD-10-CM

## 2023-04-03 DIAGNOSIS — F11.20 METHADONE MAINTENANCE THERAPY PATIENT: ICD-10-CM

## 2023-04-03 DIAGNOSIS — R94.31 QT PROLONGATION: ICD-10-CM

## 2023-04-03 DIAGNOSIS — N40.0 BENIGN PROSTATIC HYPERPLASIA, UNSPECIFIED WHETHER LOWER URINARY TRACT SYMPTOMS PRESENT: ICD-10-CM

## 2023-04-03 DIAGNOSIS — J95.821 ACUTE POSTOPERATIVE RESPIRATORY FAILURE: ICD-10-CM

## 2023-04-03 DIAGNOSIS — K80.10 CALCULUS OF GALLBLADDER WITH CHRONIC CHOLECYSTITIS WITHOUT OBSTRUCTION: Primary | ICD-10-CM

## 2023-04-03 LAB
ALBUMIN SERPL BCP-MCNC: 3.7 G/DL (ref 3.5–5.2)
ALP SERPL-CCNC: 149 U/L (ref 55–135)
ALT SERPL W/O P-5'-P-CCNC: 30 U/L (ref 10–44)
ANION GAP SERPL CALC-SCNC: 9 MMOL/L (ref 8–16)
AST SERPL-CCNC: 26 U/L (ref 10–40)
BASOPHILS # BLD AUTO: 0.02 K/UL (ref 0–0.2)
BASOPHILS NFR BLD: 0.2 % (ref 0–1.9)
BILIRUB SERPL-MCNC: 0.3 MG/DL (ref 0.1–1)
BUN SERPL-MCNC: 21 MG/DL (ref 8–23)
BUN SERPL-MCNC: 23 MG/DL (ref 6–30)
CALCIUM SERPL-MCNC: 9.3 MG/DL (ref 8.7–10.5)
CHLORIDE SERPL-SCNC: 100 MMOL/L (ref 95–110)
CHLORIDE SERPL-SCNC: 103 MMOL/L (ref 95–110)
CO2 SERPL-SCNC: 25 MMOL/L (ref 23–29)
CREAT SERPL-MCNC: 1.4 MG/DL (ref 0.5–1.4)
CREAT SERPL-MCNC: 1.5 MG/DL (ref 0.5–1.4)
DIFFERENTIAL METHOD: ABNORMAL
EOSINOPHIL # BLD AUTO: 0.1 K/UL (ref 0–0.5)
EOSINOPHIL NFR BLD: 0.9 % (ref 0–8)
ERYTHROCYTE [DISTWIDTH] IN BLOOD BY AUTOMATED COUNT: 12.9 % (ref 11.5–14.5)
EST. GFR  (NO RACE VARIABLE): 56.5 ML/MIN/1.73 M^2
GLUCOSE SERPL-MCNC: 112 MG/DL (ref 70–110)
GLUCOSE SERPL-MCNC: 119 MG/DL (ref 70–110)
HCT VFR BLD AUTO: 38.3 % (ref 40–54)
HCT VFR BLD CALC: 38 %PCV (ref 36–54)
HGB BLD-MCNC: 12.4 G/DL (ref 14–18)
IMM GRANULOCYTES # BLD AUTO: 0.04 K/UL (ref 0–0.04)
IMM GRANULOCYTES NFR BLD AUTO: 0.4 % (ref 0–0.5)
LIPASE SERPL-CCNC: 10 U/L (ref 4–60)
LYMPHOCYTES # BLD AUTO: 1.3 K/UL (ref 1–4.8)
LYMPHOCYTES NFR BLD: 14.8 % (ref 18–48)
MCH RBC QN AUTO: 31.4 PG (ref 27–31)
MCHC RBC AUTO-ENTMCNC: 32.4 G/DL (ref 32–36)
MCV RBC AUTO: 97 FL (ref 82–98)
MONOCYTES # BLD AUTO: 1.2 K/UL (ref 0.3–1)
MONOCYTES NFR BLD: 12.9 % (ref 4–15)
NEUTROPHILS # BLD AUTO: 6.4 K/UL (ref 1.8–7.7)
NEUTROPHILS NFR BLD: 70.8 % (ref 38–73)
NRBC BLD-RTO: 0 /100 WBC
PLATELET # BLD AUTO: 144 K/UL (ref 150–450)
PMV BLD AUTO: 10.7 FL (ref 9.2–12.9)
POC IONIZED CALCIUM: 1.14 MMOL/L (ref 1.06–1.42)
POC TCO2 (MEASURED): 28 MMOL/L (ref 23–29)
POTASSIUM BLD-SCNC: 4.2 MMOL/L (ref 3.5–5.1)
POTASSIUM SERPL-SCNC: 4.2 MMOL/L (ref 3.5–5.1)
PROT SERPL-MCNC: 7.6 G/DL (ref 6–8.4)
RBC # BLD AUTO: 3.95 M/UL (ref 4.6–6.2)
SAMPLE: ABNORMAL
SODIUM BLD-SCNC: 138 MMOL/L (ref 136–145)
SODIUM SERPL-SCNC: 137 MMOL/L (ref 136–145)
WBC # BLD AUTO: 9.01 K/UL (ref 3.9–12.7)

## 2023-04-03 PROCEDURE — G0378 HOSPITAL OBSERVATION PER HR: HCPCS

## 2023-04-03 PROCEDURE — 63600175 PHARM REV CODE 636 W HCPCS: Performed by: STUDENT IN AN ORGANIZED HEALTH CARE EDUCATION/TRAINING PROGRAM

## 2023-04-03 PROCEDURE — 96365 THER/PROPH/DIAG IV INF INIT: CPT

## 2023-04-03 PROCEDURE — 63600175 PHARM REV CODE 636 W HCPCS

## 2023-04-03 PROCEDURE — 96375 TX/PRO/DX INJ NEW DRUG ADDON: CPT

## 2023-04-03 PROCEDURE — 11000001 HC ACUTE MED/SURG PRIVATE ROOM

## 2023-04-03 PROCEDURE — 80053 COMPREHEN METABOLIC PANEL: CPT | Performed by: EMERGENCY MEDICINE

## 2023-04-03 PROCEDURE — 83690 ASSAY OF LIPASE: CPT | Performed by: EMERGENCY MEDICINE

## 2023-04-03 PROCEDURE — 25000003 PHARM REV CODE 250: Performed by: STUDENT IN AN ORGANIZED HEALTH CARE EDUCATION/TRAINING PROGRAM

## 2023-04-03 PROCEDURE — 85025 COMPLETE CBC W/AUTO DIFF WBC: CPT | Performed by: EMERGENCY MEDICINE

## 2023-04-03 PROCEDURE — 99285 EMERGENCY DEPT VISIT HI MDM: CPT | Mod: ,,, | Performed by: EMERGENCY MEDICINE

## 2023-04-03 PROCEDURE — 99285 EMERGENCY DEPT VISIT HI MDM: CPT | Mod: 25

## 2023-04-03 PROCEDURE — 99285 PR EMERGENCY DEPT VISIT,LEVEL V: ICD-10-PCS | Mod: ,,, | Performed by: EMERGENCY MEDICINE

## 2023-04-03 PROCEDURE — 25000003 PHARM REV CODE 250

## 2023-04-03 PROCEDURE — 96372 THER/PROPH/DIAG INJ SC/IM: CPT | Performed by: STUDENT IN AN ORGANIZED HEALTH CARE EDUCATION/TRAINING PROGRAM

## 2023-04-03 RX ORDER — SODIUM CHLORIDE 0.9 % (FLUSH) 0.9 %
10 SYRINGE (ML) INJECTION
Status: DISCONTINUED | OUTPATIENT
Start: 2023-04-03 | End: 2023-04-09 | Stop reason: HOSPADM

## 2023-04-03 RX ORDER — TALC
6 POWDER (GRAM) TOPICAL NIGHTLY PRN
Status: DISCONTINUED | OUTPATIENT
Start: 2023-04-03 | End: 2023-04-09 | Stop reason: HOSPADM

## 2023-04-03 RX ORDER — FLUTICASONE FUROATE AND VILANTEROL 100; 25 UG/1; UG/1
1 POWDER RESPIRATORY (INHALATION) DAILY
Status: DISCONTINUED | OUTPATIENT
Start: 2023-04-04 | End: 2023-04-09 | Stop reason: HOSPADM

## 2023-04-03 RX ORDER — BUPROPION HYDROCHLORIDE 75 MG/1
150 TABLET ORAL 2 TIMES DAILY
Status: DISCONTINUED | OUTPATIENT
Start: 2023-04-03 | End: 2023-04-09 | Stop reason: HOSPADM

## 2023-04-03 RX ORDER — AMLODIPINE BESYLATE 10 MG/1
10 TABLET ORAL DAILY
Status: DISCONTINUED | OUTPATIENT
Start: 2023-04-04 | End: 2023-04-09 | Stop reason: HOSPADM

## 2023-04-03 RX ORDER — OXYCODONE HYDROCHLORIDE 5 MG/1
5 TABLET ORAL EVERY 6 HOURS PRN
Status: DISCONTINUED | OUTPATIENT
Start: 2023-04-03 | End: 2023-04-05

## 2023-04-03 RX ORDER — KETOROLAC TROMETHAMINE 30 MG/ML
15 INJECTION, SOLUTION INTRAMUSCULAR; INTRAVENOUS ONCE
Status: COMPLETED | OUTPATIENT
Start: 2023-04-03 | End: 2023-04-03

## 2023-04-03 RX ORDER — ALBUTEROL SULFATE 90 UG/1
1 AEROSOL, METERED RESPIRATORY (INHALATION) EVERY 6 HOURS PRN
Status: DISCONTINUED | OUTPATIENT
Start: 2023-04-03 | End: 2023-04-09 | Stop reason: HOSPADM

## 2023-04-03 RX ORDER — LIDOCAINE 50 MG/G
1 PATCH TOPICAL
Status: COMPLETED | OUTPATIENT
Start: 2023-04-03 | End: 2023-04-04

## 2023-04-03 RX ORDER — HEPARIN SODIUM 5000 [USP'U]/ML
5000 INJECTION, SOLUTION INTRAVENOUS; SUBCUTANEOUS EVERY 8 HOURS
Status: DISCONTINUED | OUTPATIENT
Start: 2023-04-03 | End: 2023-04-05

## 2023-04-03 RX ORDER — SODIUM CHLORIDE 9 MG/ML
INJECTION, SOLUTION INTRAVENOUS CONTINUOUS
Status: DISCONTINUED | OUTPATIENT
Start: 2023-04-03 | End: 2023-04-05

## 2023-04-03 RX ORDER — TAMSULOSIN HYDROCHLORIDE 0.4 MG/1
0.4 CAPSULE ORAL DAILY
Status: DISCONTINUED | OUTPATIENT
Start: 2023-04-04 | End: 2023-04-09 | Stop reason: HOSPADM

## 2023-04-03 RX ORDER — ONDANSETRON 2 MG/ML
4 INJECTION INTRAMUSCULAR; INTRAVENOUS EVERY 6 HOURS PRN
Status: DISCONTINUED | OUTPATIENT
Start: 2023-04-03 | End: 2023-04-09 | Stop reason: HOSPADM

## 2023-04-03 RX ORDER — ONDANSETRON 2 MG/ML
4 INJECTION INTRAMUSCULAR; INTRAVENOUS
Status: COMPLETED | OUTPATIENT
Start: 2023-04-03 | End: 2023-04-03

## 2023-04-03 RX ADMIN — PIPERACILLIN SODIUM AND TAZOBACTAM SODIUM 4.5 G: 4; .5 INJECTION, POWDER, FOR SOLUTION INTRAVENOUS at 05:04

## 2023-04-03 RX ADMIN — LIDOCAINE 1 PATCH: 50 PATCH TOPICAL at 05:04

## 2023-04-03 RX ADMIN — HEPARIN SODIUM 5000 UNITS: 5000 INJECTION INTRAVENOUS; SUBCUTANEOUS at 10:04

## 2023-04-03 RX ADMIN — SODIUM CHLORIDE 1000 ML: 9 INJECTION, SOLUTION INTRAVENOUS at 05:04

## 2023-04-03 RX ADMIN — KETOROLAC TROMETHAMINE 15 MG: 30 INJECTION, SOLUTION INTRAMUSCULAR; INTRAVENOUS at 09:04

## 2023-04-03 RX ADMIN — BUPROPION HYDROCHLORIDE 150 MG: 75 TABLET, FILM COATED ORAL at 10:04

## 2023-04-03 RX ADMIN — ONDANSETRON 4 MG: 2 INJECTION INTRAMUSCULAR; INTRAVENOUS at 05:04

## 2023-04-03 NOTE — ED PROVIDER NOTES
Encounter Date: 4/3/2023       History     Chief Complaint   Patient presents with    Abdominal Pain     Told at [place] needing gall bladder out     63-year-old male history of COPD, eczema and on methadone presents to emergency department after being diagnosed with possible cholecystitis yesterday at [facility].  Patient reports he presented yesterday to the emergency department for right upper quadrant pain with episodes of nausea.  He had no gallstones.  Patient reports he has also been experiencing intermittent subjective fevers at home.  He was towed at [facility] that he likely needs his gallbladder removed however he left because he reports he takes care of his grandchildren want to be treated here.  Patient is still tolerating p.o. does endorse dark foul-smelling urine.  No shortness of breath or chest pain at this time    Review of patient's allergies indicates:   Allergen Reactions    Tylenol [acetaminophen] Other (See Comments)     Can't take Tylenol while he is taking Methadone     Past Medical History:   Diagnosis Date    Cirrhosis     COPD (chronic obstructive pulmonary disease)     Eczema     Hepatitis C virus infection cured after antiviral drug therapy     s/p Rx, treated / cured - svr 2021    History of drug abuse     Now on methadone    Substance abuse      Past Surgical History:   Procedure Laterality Date    arm surgery      COLONOSCOPY N/A 7/8/2020    Procedure: COLONOSCOPY;  Surgeon: Mona Powell MD;  Location: [facility] ([address] [place] [address]);  Service: Endoscopy;  Laterality: N/A;  covid [place]-7/5-tb-hx copd    COLONOSCOPY N/A 5/27/2021    Procedure: COLONOSCOPY;  Surgeon: Mona Powell MD;  Location: [facility] ([address] [place] [address]);  Service: Endoscopy;  Laterality: N/A;  Constipation prep - pg  CBC, PT/INR (cirrhosis) done 5/4/21 - pg  Covid-19 test 5/24/21 at [facility] - pg.5/26 Called pt. to move up earlier.Left VM.EC    ENDOSCOPIC ULTRASOUND OF UPPER GASTROINTESTINAL TRACT N/A 3/24/2021     Procedure: ULTRASOUND, UPPER GI TRACT, ENDOSCOPIC - w/ varices screen;  Surgeon: George Carrillo MD;  Location: [facility] ([address] [place] [address]);  Service: Endoscopy;  Laterality: N/A;  Covid-19 test 3/21/21 at [facility] - pg  PM prep    HAND SURGERY       Family History   Problem Relation Age of Onset    Heart disease Father     Diabetes Father     Colon cancer Father     Hypertension Sister     Diabetes Sister     Hypertension Brother     Diabetes Brother     Colon cancer Brother     Heart attack Paternal Grandmother     Heart disease Paternal Grandmother     Diabetes Paternal Grandmother     Diabetes Paternal Grandfather     Colon cancer Paternal Grandfather      Social History     Tobacco Use    Smoking status: Every Day     Packs/day: 1.00     Years: 49.00     Pack years: 49.00     Types: Cigarettes     Start date: 7/25/1969    Smokeless tobacco: Never    Tobacco comments:     decreased his smoking to 1 pack per day 5 months ago    Substance Use Topics    Alcohol use: No     Comment: has past history of alcohol abuse     Drug use: No     Comment: has past history of substance abuse      Review of Systems   Constitutional:  Positive for fever.   HENT:  Negative for sore throat.    Respiratory:  Negative for shortness of breath.    Cardiovascular:  Negative for chest pain.   Gastrointestinal:  Positive for abdominal pain and nausea. Negative for vomiting.   Genitourinary:  Negative for dysuria.   Musculoskeletal:  Negative for back pain.   Skin:  Negative for rash.   Neurological:  Negative for weakness.   Hematological:  Does not bruise/bleed easily.     Physical Exam     Initial Vitals [04/03/23 1458]   BP Pulse Resp Temp SpO2   (!) 143/80 89 18 98.2 °F (36.8 °C) 95 %      MAP       --         Physical Exam    Constitutional: He appears well-developed and well-nourished.   HENT:   Head: Normocephalic and atraumatic.   Eyes: Conjunctivae and EOM are normal.   Neck:   Normal range of motion.  Cardiovascular:  Normal rate and  regular rhythm.           Pulmonary/Chest: Breath sounds normal. No respiratory distress.   Abdominal: Abdomen is soft. There is abdominal tenderness.   Positive Bynum's sign   Musculoskeletal:         General: Normal range of motion.      Cervical back: Normal range of motion.     Neurological: He is alert and oriented to person, place, and time.   Skin: Skin is warm and dry.   Psychiatric: He has a normal mood and affect. Thought content normal.       ED Course   Procedures  Labs Reviewed   CBC W/ AUTO DIFFERENTIAL - Abnormal; Notable for the following components:       Result Value    RBC 3.95 (*)     Hemoglobin 12.4 (*)     Hematocrit 38.3 (*)     MCH 31.4 (*)     Platelets 144 (*)     Mono # 1.2 (*)     Lymph % 14.8 (*)     All other components within normal limits   COMPREHENSIVE METABOLIC PANEL - Abnormal; Notable for the following components:    Glucose 112 (*)     Alkaline Phosphatase 149 (*)     eGFR 56.5 (*)     All other components within normal limits   ISTAT PROCEDURE - Abnormal; Notable for the following components:    POC Glucose 119 (*)     POC Creatinine 1.5 (*)     All other components within normal limits   LIPASE   URINALYSIS, REFLEX TO URINE CULTURE   ISTAT CHEM8          Imaging Results              US Abdomen Limited (Final result)  Result time 04/03/23 18:31:13      Final result by Chapin Wolf MD (04/03/23 18:31:13)                   Impression:      Cholelithiasis with findings which are equivocal for acute cholecystitis.  Absence of wall hypervascularity and reportedly negative sonographic Bynum sign make the other positive imaging findings less concerning for cholecystitis.  A HIDA scan may be obtained for further evaluation.    Electronically signed by resident: Abdiel Mead  Date:    04/03/2023  Time:    18:21    Electronically signed by: Chapin Wolf MD  Date:    04/03/2023  Time:    18:31               Narrative:    EXAMINATION:  US ABDOMEN LIMITED    CLINICAL HISTORY:  Abdominal  Pain - Gallbladder;    TECHNIQUE:  Limited ultrasound of the right upper quadrant of the abdomen focused on the biliary system was performed.    COMPARISON:  06/06/2022, 12/02/2021    FINDINGS:  Gallbladder: Multiple mobile shadowing gallstones are seen.  The largest stone measures 1.9 cm.  The gallbladder is mildly distended.  The gallbladder wall is diffusely thickened measuring 5 mm.  No wall hypervascularity.  Trace pericholecystic fluid.  No sonographic Bynum's sign.    Biliary system: The common duct is not dilated, measuring 6 mm.  No intrahepatic ductal dilatation.    Pancreas: The visualized portions of pancreas appear normal.    Miscellaneous: No upper abdominal ascites and no abnormalities of the visualized liver.                                       Medications   sodium chloride 0.9% flush 10 mL (has no administration in time range)   ondansetron injection 4 mg (4 mg Intravenous Given 4/4/23 0612)   melatonin tablet 6 mg (has no administration in time range)   oxyCODONE immediate release tablet 5 mg (has no administration in time range)   heparin (porcine) injection 5,000 Units (5,000 Units Subcutaneous Not Given 4/4/23 0600)   albuterol inhaler 1 puff (has no administration in time range)   tamsulosin 24 hr capsule 0.4 mg (0.4 mg Oral Given 4/4/23 0836)   amLODIPine tablet 10 mg (10 mg Oral Given 4/4/23 0837)   fluticasone furoate-vilanteroL 100-25 mcg/dose diskus inhaler 1 puff ( Inhalation Canceled Entry 4/4/23 0900)   buPROPion tablet 150 mg (150 mg Oral Given 4/4/23 0836)   ipratropium-albuteroL inhaler 1 puff (has no administration in time range)   methadone tablet 115 mg (has no administration in time range)   0.9%  NaCl infusion ( Intravenous Rate/Dose Change 4/4/23 0716)   albumin human 5% bottle 25 g (has no administration in time range)   metronidazole IVPB 500 mg (has no administration in time range)   ceFEPIme (MAXIPIME) 2 g in dextrose 5 % in water (D5W) 5 % 50 mL IVPB (MB+) (2 g  Intravenous New Bag 4/4/23 0812)   sodium chloride 0.9% bolus 1,000 mL 1,000 mL (0 mLs Intravenous Stopped 4/3/23 1852)   ondansetron injection 4 mg (4 mg Intravenous Given 4/3/23 1727)   LIDOcaine 5 % patch 1 patch (1 patch Transdermal Patch Removed 4/4/23 6249)   piperacillin-tazobactam (ZOSYN) 4.5 g in dextrose 5 % in water (D5W) 5 % 100 mL IVPB (MB+) (0 g Intravenous Stopped 4/3/23 1843)   ketorolac injection 15 mg (15 mg Intravenous Given 4/3/23 2134)     Medical Decision Making:   Differential Diagnosis:   Differential diagnosis:  Cholelithiasis, cholecystitis, cholangitis  Clinical Tests:   Lab Tests: Ordered  ED Management:  63-year-old male with known gallstones and concern for cholecystitis after being evaluated yesterday at [facility] presents today to the emergency department for continuation of right upper quadrant pain with nausea.  Patient is also experiencing subjective fevers at home.  Right upper quadrant ultrasound was remarkable for thickened gallbladder wall with trace pericholecystic fluid.  Patient also had large 1.9 cm stone.  Due to clinical picture consistent with cholecystitis general surgery was evaluated who admitted him to his service for likely surgical intervention.  Patient given symptomatic management in the emergency department along with IV antibiotics.  Pt discussed with supervising physician            Attending Attestation:     Physician Attestation Statement for NP/PA:   I discussed this assessment and plan of this patient with the NP/PA, but I did not personally examine the patient. The face to face encounter was performed by the NP/PA.    Other NP/PA Attestation Additions:      Medical Decision Making: Given IV Zosyn upon arrival based solely on exam and history.                        Clinical Impression:    Acute cholecystitis     ED Disposition Condition    Observation                 Abigail Bear PA-C  04/04/23 0029       James Jhaveri MD  04/04/23  0811

## 2023-04-03 NOTE — FIRST PROVIDER EVALUATION
Medical screening examination initiated.  I have conducted a focused provider triage encounter, findings are as follows:    Brief history of present illness:  ruq pain, told at outside hospital last night he needed gallbladder out, he would rather be here for care    There were no vitals filed for this visit.    Pertinent physical exam:  ambulatory    Brief workup plan:  abd labs, u/s    Preliminary workup initiated; this workup will be continued and followed by the physician or advanced practice provider that is assigned to the patient when roomed.

## 2023-04-04 ENCOUNTER — ANESTHESIA (OUTPATIENT)
Dept: SURGERY | Facility: HOSPITAL | Age: 63
DRG: 417 | End: 2023-04-04
Payer: MEDICAID

## 2023-04-04 PROBLEM — N17.9 AKI (ACUTE KIDNEY INJURY): Status: ACTIVE | Noted: 2023-04-04

## 2023-04-04 LAB
ALBUMIN SERPL BCP-MCNC: 3.4 G/DL (ref 3.5–5.2)
ALP SERPL-CCNC: 126 U/L (ref 55–135)
ALT SERPL W/O P-5'-P-CCNC: 23 U/L (ref 10–44)
ANION GAP SERPL CALC-SCNC: 10 MMOL/L (ref 8–16)
ANION GAP SERPL CALC-SCNC: 8 MMOL/L (ref 8–16)
ANISOCYTOSIS BLD QL SMEAR: SLIGHT
AST SERPL-CCNC: 23 U/L (ref 10–40)
BASOPHILS # BLD AUTO: 0.03 K/UL (ref 0–0.2)
BASOPHILS NFR BLD: 0.5 % (ref 0–1.9)
BILIRUB SERPL-MCNC: 0.3 MG/DL (ref 0.1–1)
BUN SERPL-MCNC: 21 MG/DL (ref 8–23)
BUN SERPL-MCNC: 23 MG/DL (ref 8–23)
CALCIUM SERPL-MCNC: 8.6 MG/DL (ref 8.7–10.5)
CALCIUM SERPL-MCNC: 9.2 MG/DL (ref 8.7–10.5)
CHLORIDE SERPL-SCNC: 105 MMOL/L (ref 95–110)
CHLORIDE SERPL-SCNC: 108 MMOL/L (ref 95–110)
CO2 SERPL-SCNC: 18 MMOL/L (ref 23–29)
CO2 SERPL-SCNC: 25 MMOL/L (ref 23–29)
CREAT SERPL-MCNC: 1.3 MG/DL (ref 0.5–1.4)
CREAT SERPL-MCNC: 1.6 MG/DL (ref 0.5–1.4)
DIFFERENTIAL METHOD: ABNORMAL
EOSINOPHIL # BLD AUTO: 0.1 K/UL (ref 0–0.5)
EOSINOPHIL NFR BLD: 1 % (ref 0–8)
ERYTHROCYTE [DISTWIDTH] IN BLOOD BY AUTOMATED COUNT: 13.1 % (ref 11.5–14.5)
EST. GFR  (NO RACE VARIABLE): 48.1 ML/MIN/1.73 M^2
EST. GFR  (NO RACE VARIABLE): >60 ML/MIN/1.73 M^2
GLUCOSE SERPL-MCNC: 110 MG/DL (ref 70–110)
GLUCOSE SERPL-MCNC: 148 MG/DL (ref 70–110)
HCT VFR BLD AUTO: 36.5 % (ref 40–54)
HGB BLD-MCNC: 11.4 G/DL (ref 14–18)
IMM GRANULOCYTES # BLD AUTO: 0.02 K/UL (ref 0–0.04)
IMM GRANULOCYTES NFR BLD AUTO: 0.3 % (ref 0–0.5)
INR PPP: 1 (ref 0.8–1.2)
LYMPHOCYTES # BLD AUTO: 0.8 K/UL (ref 1–4.8)
LYMPHOCYTES NFR BLD: 13.5 % (ref 18–48)
MAGNESIUM SERPL-MCNC: 2.4 MG/DL (ref 1.6–2.6)
MCH RBC QN AUTO: 31.1 PG (ref 27–31)
MCHC RBC AUTO-ENTMCNC: 31.2 G/DL (ref 32–36)
MCV RBC AUTO: 100 FL (ref 82–98)
MONOCYTES # BLD AUTO: 0.7 K/UL (ref 0.3–1)
MONOCYTES NFR BLD: 11.3 % (ref 4–15)
NEUTROPHILS # BLD AUTO: 4.3 K/UL (ref 1.8–7.7)
NEUTROPHILS NFR BLD: 73.4 % (ref 38–73)
NRBC BLD-RTO: 0 /100 WBC
PHOSPHATE SERPL-MCNC: 3.9 MG/DL (ref 2.7–4.5)
PLATELET # BLD AUTO: 112 K/UL (ref 150–450)
PLATELET BLD QL SMEAR: ABNORMAL
PMV BLD AUTO: 10.8 FL (ref 9.2–12.9)
POLYCHROMASIA BLD QL SMEAR: ABNORMAL
POTASSIUM SERPL-SCNC: 4.8 MMOL/L (ref 3.5–5.1)
POTASSIUM SERPL-SCNC: 5.1 MMOL/L (ref 3.5–5.1)
PROT SERPL-MCNC: 6.9 G/DL (ref 6–8.4)
PROTHROMBIN TIME: 10.3 SEC (ref 9–12.5)
RBC # BLD AUTO: 3.67 M/UL (ref 4.6–6.2)
SODIUM SERPL-SCNC: 136 MMOL/L (ref 136–145)
SODIUM SERPL-SCNC: 138 MMOL/L (ref 136–145)
WBC # BLD AUTO: 5.84 K/UL (ref 3.9–12.7)

## 2023-04-04 PROCEDURE — 25000003 PHARM REV CODE 250: Performed by: SURGERY

## 2023-04-04 PROCEDURE — S0030 INJECTION, METRONIDAZOLE: HCPCS | Performed by: STUDENT IN AN ORGANIZED HEALTH CARE EDUCATION/TRAINING PROGRAM

## 2023-04-04 PROCEDURE — 63600175 PHARM REV CODE 636 W HCPCS: Performed by: STUDENT IN AN ORGANIZED HEALTH CARE EDUCATION/TRAINING PROGRAM

## 2023-04-04 PROCEDURE — P9045 ALBUMIN (HUMAN), 5%, 250 ML: HCPCS | Mod: JZ,JG | Performed by: STUDENT IN AN ORGANIZED HEALTH CARE EDUCATION/TRAINING PROGRAM

## 2023-04-04 PROCEDURE — 25000003 PHARM REV CODE 250: Performed by: STUDENT IN AN ORGANIZED HEALTH CARE EDUCATION/TRAINING PROGRAM

## 2023-04-04 PROCEDURE — 94640 AIRWAY INHALATION TREATMENT: CPT | Mod: XB

## 2023-04-04 PROCEDURE — 36000709 HC OR TIME LEV III EA ADD 15 MIN: Performed by: SURGERY

## 2023-04-04 PROCEDURE — 63600175 PHARM REV CODE 636 W HCPCS

## 2023-04-04 PROCEDURE — 85610 PROTHROMBIN TIME: CPT | Performed by: STUDENT IN AN ORGANIZED HEALTH CARE EDUCATION/TRAINING PROGRAM

## 2023-04-04 PROCEDURE — 37000009 HC ANESTHESIA EA ADD 15 MINS: Performed by: SURGERY

## 2023-04-04 PROCEDURE — 85025 COMPLETE CBC W/AUTO DIFF WBC: CPT | Performed by: STUDENT IN AN ORGANIZED HEALTH CARE EDUCATION/TRAINING PROGRAM

## 2023-04-04 PROCEDURE — 11000001 HC ACUTE MED/SURG PRIVATE ROOM

## 2023-04-04 PROCEDURE — D9220A PRA ANESTHESIA: Mod: CRNA,,, | Performed by: NURSE ANESTHETIST, CERTIFIED REGISTERED

## 2023-04-04 PROCEDURE — 63600175 PHARM REV CODE 636 W HCPCS: Performed by: NURSE ANESTHETIST, CERTIFIED REGISTERED

## 2023-04-04 PROCEDURE — D9220A PRA ANESTHESIA: ICD-10-PCS | Mod: CRNA,,, | Performed by: NURSE ANESTHETIST, CERTIFIED REGISTERED

## 2023-04-04 PROCEDURE — G0378 HOSPITAL OBSERVATION PER HR: HCPCS

## 2023-04-04 PROCEDURE — 88304 TISSUE EXAM BY PATHOLOGIST: CPT | Performed by: PATHOLOGY

## 2023-04-04 PROCEDURE — D9220A PRA ANESTHESIA: ICD-10-PCS | Mod: ANES,,, | Performed by: ANESTHESIOLOGY

## 2023-04-04 PROCEDURE — 84100 ASSAY OF PHOSPHORUS: CPT | Performed by: STUDENT IN AN ORGANIZED HEALTH CARE EDUCATION/TRAINING PROGRAM

## 2023-04-04 PROCEDURE — 93010 ELECTROCARDIOGRAM REPORT: CPT | Mod: ,,, | Performed by: INTERNAL MEDICINE

## 2023-04-04 PROCEDURE — 36415 COLL VENOUS BLD VENIPUNCTURE: CPT | Performed by: STUDENT IN AN ORGANIZED HEALTH CARE EDUCATION/TRAINING PROGRAM

## 2023-04-04 PROCEDURE — 63600175 PHARM REV CODE 636 W HCPCS: Mod: JZ,JG | Performed by: STUDENT IN AN ORGANIZED HEALTH CARE EDUCATION/TRAINING PROGRAM

## 2023-04-04 PROCEDURE — 94640 AIRWAY INHALATION TREATMENT: CPT

## 2023-04-04 PROCEDURE — 71000016 HC POSTOP RECOV ADDL HR: Performed by: SURGERY

## 2023-04-04 PROCEDURE — 27201423 OPTIME MED/SURG SUP & DEVICES STERILE SUPPLY: Performed by: SURGERY

## 2023-04-04 PROCEDURE — 93005 ELECTROCARDIOGRAM TRACING: CPT

## 2023-04-04 PROCEDURE — 99232 PR SUBSEQUENT HOSPITAL CARE,LEVL II: ICD-10-PCS | Mod: ,,, | Performed by: STUDENT IN AN ORGANIZED HEALTH CARE EDUCATION/TRAINING PROGRAM

## 2023-04-04 PROCEDURE — 80053 COMPREHEN METABOLIC PANEL: CPT | Performed by: STUDENT IN AN ORGANIZED HEALTH CARE EDUCATION/TRAINING PROGRAM

## 2023-04-04 PROCEDURE — 25000242 PHARM REV CODE 250 ALT 637 W/ HCPCS: Performed by: STUDENT IN AN ORGANIZED HEALTH CARE EDUCATION/TRAINING PROGRAM

## 2023-04-04 PROCEDURE — 63600175 PHARM REV CODE 636 W HCPCS: Performed by: ANESTHESIOLOGY

## 2023-04-04 PROCEDURE — 96372 THER/PROPH/DIAG INJ SC/IM: CPT | Performed by: STUDENT IN AN ORGANIZED HEALTH CARE EDUCATION/TRAINING PROGRAM

## 2023-04-04 PROCEDURE — 25000003 PHARM REV CODE 250: Performed by: NURSE ANESTHETIST, CERTIFIED REGISTERED

## 2023-04-04 PROCEDURE — 51700 IRRIGATION OF BLADDER: CPT

## 2023-04-04 PROCEDURE — 99232 SBSQ HOSP IP/OBS MODERATE 35: CPT | Mod: ,,, | Performed by: STUDENT IN AN ORGANIZED HEALTH CARE EDUCATION/TRAINING PROGRAM

## 2023-04-04 PROCEDURE — D9220A PRA ANESTHESIA: Mod: ANES,,, | Performed by: ANESTHESIOLOGY

## 2023-04-04 PROCEDURE — 99900035 HC TECH TIME PER 15 MIN (STAT)

## 2023-04-04 PROCEDURE — 37000008 HC ANESTHESIA 1ST 15 MINUTES: Performed by: SURGERY

## 2023-04-04 PROCEDURE — 25000242 PHARM REV CODE 250 ALT 637 W/ HCPCS: Performed by: SURGERY

## 2023-04-04 PROCEDURE — 88304 PR  SURG PATH,LEVEL III: ICD-10-PCS | Mod: 26,,, | Performed by: PATHOLOGY

## 2023-04-04 PROCEDURE — 99223 1ST HOSP IP/OBS HIGH 75: CPT | Mod: 57,,, | Performed by: SURGERY

## 2023-04-04 PROCEDURE — 47562 PR LAP,CHOLECYSTECTOMY: ICD-10-PCS | Mod: 22,,, | Performed by: SURGERY

## 2023-04-04 PROCEDURE — 71000033 HC RECOVERY, INTIAL HOUR: Performed by: SURGERY

## 2023-04-04 PROCEDURE — 80048 BASIC METABOLIC PNL TOTAL CA: CPT | Mod: XB | Performed by: STUDENT IN AN ORGANIZED HEALTH CARE EDUCATION/TRAINING PROGRAM

## 2023-04-04 PROCEDURE — 93010 EKG 12-LEAD: ICD-10-PCS | Mod: ,,, | Performed by: INTERNAL MEDICINE

## 2023-04-04 PROCEDURE — 94761 N-INVAS EAR/PLS OXIMETRY MLT: CPT

## 2023-04-04 PROCEDURE — 99223 PR INITIAL HOSPITAL CARE,LEVL III: ICD-10-PCS | Mod: 57,,, | Performed by: SURGERY

## 2023-04-04 PROCEDURE — 71000015 HC POSTOP RECOV 1ST HR: Performed by: SURGERY

## 2023-04-04 PROCEDURE — 88304 TISSUE EXAM BY PATHOLOGIST: CPT | Mod: 26,,, | Performed by: PATHOLOGY

## 2023-04-04 PROCEDURE — 83735 ASSAY OF MAGNESIUM: CPT | Performed by: STUDENT IN AN ORGANIZED HEALTH CARE EDUCATION/TRAINING PROGRAM

## 2023-04-04 PROCEDURE — 47562 LAPAROSCOPIC CHOLECYSTECTOMY: CPT | Mod: 22,,, | Performed by: SURGERY

## 2023-04-04 PROCEDURE — 36000708 HC OR TIME LEV III 1ST 15 MIN: Performed by: SURGERY

## 2023-04-04 RX ORDER — VASOPRESSIN 20 [USP'U]/ML
INJECTION, SOLUTION INTRAMUSCULAR; SUBCUTANEOUS
Status: DISCONTINUED | OUTPATIENT
Start: 2023-04-04 | End: 2023-04-04

## 2023-04-04 RX ORDER — ROCURONIUM BROMIDE 10 MG/ML
INJECTION, SOLUTION INTRAVENOUS
Status: DISCONTINUED | OUTPATIENT
Start: 2023-04-04 | End: 2023-04-04

## 2023-04-04 RX ORDER — PROPOFOL 10 MG/ML
VIAL (ML) INTRAVENOUS
Status: DISCONTINUED | OUTPATIENT
Start: 2023-04-04 | End: 2023-04-04

## 2023-04-04 RX ORDER — KETAMINE HCL IN 0.9 % NACL 50 MG/5 ML
SYRINGE (ML) INTRAVENOUS
Status: DISCONTINUED | OUTPATIENT
Start: 2023-04-04 | End: 2023-04-04

## 2023-04-04 RX ORDER — DEXMEDETOMIDINE HYDROCHLORIDE 100 UG/ML
INJECTION, SOLUTION INTRAVENOUS
Status: DISCONTINUED | OUTPATIENT
Start: 2023-04-04 | End: 2023-04-04

## 2023-04-04 RX ORDER — DEXAMETHASONE SODIUM PHOSPHATE 4 MG/ML
INJECTION, SOLUTION INTRA-ARTICULAR; INTRALESIONAL; INTRAMUSCULAR; INTRAVENOUS; SOFT TISSUE
Status: DISCONTINUED | OUTPATIENT
Start: 2023-04-04 | End: 2023-04-04

## 2023-04-04 RX ORDER — FENTANYL CITRATE 50 UG/ML
INJECTION, SOLUTION INTRAMUSCULAR; INTRAVENOUS
Status: COMPLETED
Start: 2023-04-04 | End: 2023-04-04

## 2023-04-04 RX ORDER — SODIUM CHLORIDE 9 MG/ML
INJECTION, SOLUTION INTRAVENOUS CONTINUOUS
Status: DISCONTINUED | OUTPATIENT
Start: 2023-04-04 | End: 2023-04-04

## 2023-04-04 RX ORDER — ONDANSETRON 2 MG/ML
INJECTION INTRAMUSCULAR; INTRAVENOUS
Status: DISCONTINUED | OUTPATIENT
Start: 2023-04-04 | End: 2023-04-04

## 2023-04-04 RX ORDER — FENTANYL CITRATE 50 UG/ML
25 INJECTION, SOLUTION INTRAMUSCULAR; INTRAVENOUS EVERY 5 MIN PRN
Status: DISCONTINUED | OUTPATIENT
Start: 2023-04-04 | End: 2023-04-04 | Stop reason: HOSPADM

## 2023-04-04 RX ORDER — SODIUM CHLORIDE 0.9 % (FLUSH) 0.9 %
3 SYRINGE (ML) INJECTION
Status: DISCONTINUED | OUTPATIENT
Start: 2023-04-04 | End: 2023-04-04 | Stop reason: HOSPADM

## 2023-04-04 RX ORDER — SUCCINYLCHOLINE CHLORIDE 20 MG/ML
INJECTION INTRAMUSCULAR; INTRAVENOUS
Status: DISCONTINUED | OUTPATIENT
Start: 2023-04-04 | End: 2023-04-04

## 2023-04-04 RX ORDER — MIDAZOLAM HYDROCHLORIDE 1 MG/ML
INJECTION, SOLUTION INTRAMUSCULAR; INTRAVENOUS
Status: DISCONTINUED | OUTPATIENT
Start: 2023-04-04 | End: 2023-04-04

## 2023-04-04 RX ORDER — LORAZEPAM 2 MG/ML
0.25 INJECTION INTRAMUSCULAR ONCE AS NEEDED
Status: COMPLETED | OUTPATIENT
Start: 2023-04-04 | End: 2023-04-04

## 2023-04-04 RX ORDER — FENTANYL CITRATE 50 UG/ML
INJECTION, SOLUTION INTRAMUSCULAR; INTRAVENOUS
Status: DISCONTINUED | OUTPATIENT
Start: 2023-04-04 | End: 2023-04-04

## 2023-04-04 RX ORDER — ONDANSETRON 2 MG/ML
4 INJECTION INTRAMUSCULAR; INTRAVENOUS ONCE AS NEEDED
Status: DISCONTINUED | OUTPATIENT
Start: 2023-04-04 | End: 2023-04-04 | Stop reason: HOSPADM

## 2023-04-04 RX ORDER — IPRATROPIUM BROMIDE AND ALBUTEROL SULFATE 2.5; .5 MG/3ML; MG/3ML
3 SOLUTION RESPIRATORY (INHALATION) ONCE
Status: COMPLETED | OUTPATIENT
Start: 2023-04-04 | End: 2023-04-04

## 2023-04-04 RX ORDER — ALBUMIN HUMAN 50 G/1000ML
25 SOLUTION INTRAVENOUS ONCE
Status: COMPLETED | OUTPATIENT
Start: 2023-04-04 | End: 2023-04-04

## 2023-04-04 RX ORDER — HYDROMORPHONE HYDROCHLORIDE 1 MG/ML
0.2 INJECTION, SOLUTION INTRAMUSCULAR; INTRAVENOUS; SUBCUTANEOUS EVERY 5 MIN PRN
Status: COMPLETED | OUTPATIENT
Start: 2023-04-04 | End: 2023-04-04

## 2023-04-04 RX ORDER — HYDROMORPHONE HYDROCHLORIDE 1 MG/ML
INJECTION, SOLUTION INTRAMUSCULAR; INTRAVENOUS; SUBCUTANEOUS
Status: COMPLETED
Start: 2023-04-04 | End: 2023-04-04

## 2023-04-04 RX ORDER — BUPIVACAINE HYDROCHLORIDE 2.5 MG/ML
INJECTION, SOLUTION EPIDURAL; INFILTRATION; INTRACAUDAL
Status: DISCONTINUED | OUTPATIENT
Start: 2023-04-04 | End: 2023-04-04 | Stop reason: HOSPADM

## 2023-04-04 RX ORDER — ALBUMIN HUMAN 50 G/1000ML
SOLUTION INTRAVENOUS CONTINUOUS PRN
Status: DISCONTINUED | OUTPATIENT
Start: 2023-04-04 | End: 2023-04-04

## 2023-04-04 RX ORDER — NEOSTIGMINE METHYLSULFATE 0.5 MG/ML
INJECTION, SOLUTION INTRAVENOUS
Status: DISCONTINUED | OUTPATIENT
Start: 2023-04-04 | End: 2023-04-04

## 2023-04-04 RX ORDER — PHENYLEPHRINE HYDROCHLORIDE 10 MG/ML
INJECTION INTRAVENOUS
Status: DISCONTINUED | OUTPATIENT
Start: 2023-04-04 | End: 2023-04-04

## 2023-04-04 RX ORDER — METRONIDAZOLE 500 MG/100ML
500 INJECTION, SOLUTION INTRAVENOUS
Status: DISCONTINUED | OUTPATIENT
Start: 2023-04-04 | End: 2023-04-05

## 2023-04-04 RX ADMIN — PROPOFOL 200 MG: 10 INJECTION, EMULSION INTRAVENOUS at 01:04

## 2023-04-04 RX ADMIN — METRONIDAZOLE 500 MG: 500 INJECTION, SOLUTION INTRAVENOUS at 05:04

## 2023-04-04 RX ADMIN — VASOPRESSIN 1 UNITS: 20 INJECTION INTRAVENOUS at 03:04

## 2023-04-04 RX ADMIN — PHENYLEPHRINE HYDROCHLORIDE 200 MCG: 10 INJECTION INTRAVENOUS at 02:04

## 2023-04-04 RX ADMIN — ALBUMIN (HUMAN) 25 G: 12.5 SOLUTION INTRAVENOUS at 09:04

## 2023-04-04 RX ADMIN — FENTANYL CITRATE 50 MCG: 50 INJECTION, SOLUTION INTRAMUSCULAR; INTRAVENOUS at 04:04

## 2023-04-04 RX ADMIN — AMLODIPINE BESYLATE 10 MG: 10 TABLET ORAL at 08:04

## 2023-04-04 RX ADMIN — VASOPRESSIN 1 UNITS: 20 INJECTION INTRAVENOUS at 02:04

## 2023-04-04 RX ADMIN — LORAZEPAM 0.25 MG: 2 INJECTION INTRAMUSCULAR; INTRAVENOUS at 04:04

## 2023-04-04 RX ADMIN — SODIUM CHLORIDE: 9 INJECTION, SOLUTION INTRAVENOUS at 02:04

## 2023-04-04 RX ADMIN — VASOPRESSIN 2 UNITS: 20 INJECTION INTRAVENOUS at 02:04

## 2023-04-04 RX ADMIN — HYDROMORPHONE HYDROCHLORIDE 0.2 MG: 1 INJECTION, SOLUTION INTRAMUSCULAR; INTRAVENOUS; SUBCUTANEOUS at 04:04

## 2023-04-04 RX ADMIN — PHENYLEPHRINE HYDROCHLORIDE 100 MCG: 10 INJECTION INTRAVENOUS at 01:04

## 2023-04-04 RX ADMIN — HYDROMORPHONE HYDROCHLORIDE 0.2 MG: 1 INJECTION, SOLUTION INTRAMUSCULAR; INTRAVENOUS; SUBCUTANEOUS at 05:04

## 2023-04-04 RX ADMIN — OXYCODONE 5 MG: 5 TABLET ORAL at 11:04

## 2023-04-04 RX ADMIN — FENTANYL CITRATE 50 MCG: 50 INJECTION, SOLUTION INTRAMUSCULAR; INTRAVENOUS at 01:04

## 2023-04-04 RX ADMIN — FENTANYL CITRATE 25 MCG: 50 INJECTION INTRAMUSCULAR; INTRAVENOUS at 05:04

## 2023-04-04 RX ADMIN — CEFEPIME 2 G: 2 INJECTION, POWDER, FOR SOLUTION INTRAVENOUS at 08:04

## 2023-04-04 RX ADMIN — Medication 10 MG: at 02:04

## 2023-04-04 RX ADMIN — PHENYLEPHRINE HYDROCHLORIDE 100 MCG: 10 INJECTION INTRAVENOUS at 02:04

## 2023-04-04 RX ADMIN — DEXMEDETOMIDINE HYDROCHLORIDE 8 MCG: 100 INJECTION, SOLUTION INTRAVENOUS at 04:04

## 2023-04-04 RX ADMIN — BUPROPION HYDROCHLORIDE 150 MG: 75 TABLET, FILM COATED ORAL at 08:04

## 2023-04-04 RX ADMIN — METRONIDAZOLE 500 MG: 500 INJECTION, SOLUTION INTRAVENOUS at 11:04

## 2023-04-04 RX ADMIN — GLYCOPYRROLATE 0.4 MG: 0.2 INJECTION INTRAMUSCULAR; INTRAVENOUS at 04:04

## 2023-04-04 RX ADMIN — ONDANSETRON 4 MG: 2 INJECTION INTRAMUSCULAR; INTRAVENOUS at 03:04

## 2023-04-04 RX ADMIN — TAMSULOSIN HYDROCHLORIDE 0.4 MG: 0.4 CAPSULE ORAL at 08:04

## 2023-04-04 RX ADMIN — FLUTICASONE FUROATE AND VILANTEROL TRIFENATATE 1 PUFF: 100; 25 POWDER RESPIRATORY (INHALATION) at 07:04

## 2023-04-04 RX ADMIN — IPRATROPIUM BROMIDE AND ALBUTEROL SULFATE 3 ML: 2.5; .5 SOLUTION RESPIRATORY (INHALATION) at 11:04

## 2023-04-04 RX ADMIN — METRONIDAZOLE 500 MG: 500 INJECTION, SOLUTION INTRAVENOUS at 08:04

## 2023-04-04 RX ADMIN — ONDANSETRON 4 MG: 2 INJECTION INTRAMUSCULAR; INTRAVENOUS at 06:04

## 2023-04-04 RX ADMIN — OXYCODONE 5 MG: 5 TABLET ORAL at 04:04

## 2023-04-04 RX ADMIN — SUCCINYLCHOLINE CHLORIDE 120 MG: 20 INJECTION, SOLUTION INTRAMUSCULAR; INTRAVENOUS at 01:04

## 2023-04-04 RX ADMIN — ROCURONIUM BROMIDE 30 MG: 10 INJECTION INTRAVENOUS at 01:04

## 2023-04-04 RX ADMIN — SODIUM CHLORIDE: 9 INJECTION, SOLUTION INTRAVENOUS at 12:04

## 2023-04-04 RX ADMIN — HEPARIN SODIUM 5000 UNITS: 5000 INJECTION INTRAVENOUS; SUBCUTANEOUS at 10:04

## 2023-04-04 RX ADMIN — ROCURONIUM BROMIDE 10 MG: 10 INJECTION INTRAVENOUS at 01:04

## 2023-04-04 RX ADMIN — MIDAZOLAM HYDROCHLORIDE 2 MG: 1 INJECTION, SOLUTION INTRAMUSCULAR; INTRAVENOUS at 01:04

## 2023-04-04 RX ADMIN — PIPERACILLIN SODIUM AND TAZOBACTAM SODIUM 4.5 G: 4; .5 INJECTION, POWDER, FOR SOLUTION INTRAVENOUS at 12:04

## 2023-04-04 RX ADMIN — DEXAMETHASONE SODIUM PHOSPHATE 4 MG: 4 INJECTION, SOLUTION INTRAMUSCULAR; INTRAVENOUS at 02:04

## 2023-04-04 RX ADMIN — ALBUMIN HUMAN: 50 SOLUTION INTRAVENOUS at 02:04

## 2023-04-04 RX ADMIN — NEOSTIGMINE METHYLSULFATE 4 MG: 0.5 INJECTION INTRAVENOUS at 04:04

## 2023-04-04 NOTE — TRANSFER OF CARE
"Anesthesia Transfer of Care Note    Patient: Efra Payton III    Procedure(s) Performed: Procedure(s) (LRB):  CHOLECYSTECTOMY, LAPAROSCOPIC (N/A)    Patient location: PACU    Anesthesia Type: general    Transport from OR: Transported from OR on 6-10 L/min O2 by face mask with adequate spontaneous ventilation    Post pain: pain needs to be addressed    Post assessment: tolerated procedure well and no apparent anesthetic complications    Post vital signs: stable    Level of consciousness: awake and alert    Nausea/Vomiting: nausea and no vomiting    Complications: none    Transfer of care protocol was followed      Last vitals:   Visit Vitals  BP (!) 158/83 (BP Location: Left arm, Patient Position: Lying)   Pulse 96   Temp 36.5 °C (97.7 °F) (Temporal)   Resp 20   Ht 5' 7" (1.702 m)   Wt 71 kg (156 lb 8.4 oz)   SpO2 (!) 90%   BMI 24.52 kg/m²     "

## 2023-04-04 NOTE — PLAN OF CARE
Pt IV flushed. Pt reports pain relief following administration of medication. Incision sites to abd CDI

## 2023-04-04 NOTE — CONSULTS
[facility] - Emergency Dept  General Surgery  Consult Note    Patient Name: Efra Payton III  MRN: [id number]  Code Status: Full Code  Admission Date: 4/3/2023  Hospital Length of Stay: 0 days  Attending Physician: Mahesh Lazaro MD  Primary Care Provider: Jaiden Mak Ii, MD    Patient information was obtained from patient, spouse/SO, past medical records and ER records.     Inpatient consult to General surgery  Consult performed by: Sherice Alanis MD  Consult ordered by: Abigail Bear PA-C        Subjective:     Principal Problem: Calculus of gallbladder with chronic cholecystitis without obstruction    History of Present Illness: Efra Payton III is a 63 y.o. male with a history of HCV cirrhosis (MELD 6), COPD, HTN, BPH, chronic pain (on methadone), and known gallstones who presented with RUQ pain. He has had this pain for a couple of years intermittently and typically occurs after eating. He has been looking into other etiologies such as constipation and a inguinal hernia which he had repaired laparoscopically 6/2022. The pain has persisted throughout this. He went to [facility] for evaluation yesterday as the pain was quite severe and was told he likely needed to have his gallbladder taken out. He ultimately decided he would prefer to be evaluated here so left and came to our ED.     Upon arrival he was AF and HDS. Labs showed a normal WBC without left shift. His chem panel showed an MARILYN with BUN/Cr 21/1.4 (baseline Cr 0.8-1.1). US showed multiple gallstones with the largest measuring 1.9 cm. The GB was noted to be mildly distended with diffuse GB wall thickening to 5 mm and there was trace pericholecystic fluid.    With regards to his chronic problems, he is a well compensated cirrhotic with no known varices and has never had to get a paracentesis. He has no known coagulopathy. He used to be a smoker with 1 PPDx49 years and recently quit Jan 1. He says he feels his breathing has improved  significantly since then. He would be able to climb a flight of stairs. Did not have any respiratory issues after his lap inguinal in .      No current facility-administered medications on file prior to encounter.     Current Outpatient Medications on File Prior to Encounter   Medication Sig    albuterol (PROVENTIL/VENTOLIN HFA) 90 mcg/actuation inhaler Inhale 1-2 puffs into the lungs every 6 (six) hours as needed for Wheezing or Shortness of Breath. Rescue    alfuzosin (UROXATRAL) 10 mg Tb24 Take 1 tablet (10 mg total) by mouth daily with breakfast.    amLODIPine (NORVASC) 10 MG tablet Take 1 tablet (10 mg total) by mouth once daily.    budesonide-formoterol 80-4.5 mcg (SYMBICORT) 80-4.5 mcg/actuation HFAA Inhale 2 puffs into the lungs once daily at 6am. Controller    buPROPion (WELLBUTRIN SR) 150 MG TBSR 12 hr tablet Take 1 tablet (150 mg total) by mouth 2 (two) times daily.    COMBIVENT RESPIMAT  mcg/actuation inhaler SMARTSI Puff(s) By Mouth Every 6 Hours PRN    lisinopriL (PRINIVIL,ZESTRIL) 20 MG tablet Take 1 tablet (20 mg total) by mouth once daily.    methadone HCl (METHADONE ORAL) Take 115 mg by mouth once daily.     pulse oximeter (PULSE OXIMETER) device by Apply Externally route 2 (two) times a day. Use twice daily at 8 AM and 3 PM and record the value in MyChart as directed.       Review of patient's allergies indicates:   Allergen Reactions    Tylenol [acetaminophen] Other (See Comments)     Can't take Tylenol while he is taking Methadone       Past Medical History:   Diagnosis Date    Cirrhosis     COPD (chronic obstructive pulmonary disease)     Eczema     Hepatitis C virus infection cured after antiviral drug therapy     s/p Rx, treated / cured - svr     History of drug abuse     Now on methadone    Substance abuse      Past Surgical History:   Procedure Laterality Date    arm surgery      COLONOSCOPY N/A 2020    Procedure: COLONOSCOPY;  Surgeon: Mona Powell MD;   Location: [facility] (4TH FLR);  Service: Endoscopy;  Laterality: N/A;  [facility]    COLONOSCOPY N/A 5/27/2021    Procedure: COLONOSCOPY;  Surgeon: Mona Powell MD;  Location: [facility] (4TH FLR);  Service: Endoscopy;  Laterality: N/A;  Constipation prep - pg  CBC, PT/INR (cirrhosis) done 5/4/21 - pg  Covid-19 test 5/24/21 at [facility] - pg.5/26 Called pt. to move up earlier.Left VM.EC    ENDOSCOPIC ULTRASOUND OF UPPER GASTROINTESTINAL TRACT N/A 3/24/2021    Procedure: ULTRASOUND, UPPER GI TRACT, ENDOSCOPIC - w/ varices screen;  Surgeon: George Carrillo MD;  Location: [facility] (2ND FLR);  Service: Endoscopy;  Laterality: N/A;  Covid-19 test 3/21/21 at [place] -   PM prep    HAND SURGERY       Family History       Problem Relation (Age of Onset)    Colon cancer Father, Brother, Paternal Grandfather    Diabetes Father, Sister, Brother, Paternal Grandmother, Paternal Grandfather    Heart attack Paternal Grandmother    Heart disease Father, Paternal Grandmother    Hypertension Sister, Brother          Tobacco Use    Smoking status: Every Day     Packs/day: 1.00     Years: 49.00     Pack years: 49.00     Types: Cigarettes     Start date: 7/25/1969    Smokeless tobacco: Never    Tobacco comments:     decreased his smoking to 1 pack per day 5 months ago    Substance and Sexual Activity    Alcohol use: No     Comment: has past history of alcohol abuse     Drug use: No     Comment: has past history of substance abuse     Sexual activity: Not on file     Review of Systems   Constitutional:  Negative for chills, diaphoresis, fever and unexpected weight change.   HENT:  Negative for sinus pressure and sore throat.    Eyes:  Negative for photophobia and visual disturbance.   Respiratory:  Negative for cough and shortness of breath.    Cardiovascular:  Negative for chest pain, palpitations and leg swelling.   Gastrointestinal:  Positive for abdominal pain. Negative for abdominal distention, blood in stool,  diarrhea, nausea and vomiting.   Musculoskeletal:  Negative for arthralgias and myalgias.   Skin:  Negative for rash and wound.   Neurological:  Negative for syncope and headaches.   Psychiatric/Behavioral:  Negative for confusion and hallucinations.    Objective:     Vital Signs (Most Recent):  Temp: 97.8 °F (36.6 °C) (04/03/23 1703)  Pulse: 80 (04/03/23 1844)  Resp: 18 (04/03/23 1844)  BP: (!) 101/56 (04/03/23 1844)  SpO2: (!) 91 % (hx COPD. pt states this is his baseline) (04/03/23 1844)   Vital Signs (24h Range):  Temp:  [97.8 °F (36.6 °C)-98.2 °F (36.8 °C)] 97.8 °F (36.6 °C)  Pulse:  [80-89] 80  Resp:  [16-18] 18  SpO2:  [91 %-98 %] 91 %  BP: (101-143)/(56-80) 101/56     Weight: 68 kg (150 lb)  Body mass index is 23.49 kg/m².    Physical Exam  Vitals and nursing note reviewed.   Constitutional:       General: He is not in acute distress.     Appearance: He is well-developed. He is not diaphoretic.   HENT:      Mouth/Throat:      Pharynx: Oropharynx is clear. No oropharyngeal exudate.   Eyes:      General: No scleral icterus.     Extraocular Movements: Extraocular movements intact.   Cardiovascular:      Rate and Rhythm: Normal rate and regular rhythm.   Pulmonary:      Effort: Pulmonary effort is normal. No respiratory distress.   Abdominal:      Comments: Well healed lap sites  Soft, non-distended, moderate tenderness in the RUQ. Negative Bynum's. No rebound. Some voluntary guarding   Musculoskeletal:         General: No deformity. Normal range of motion.   Skin:     General: Skin is warm and dry.      Coloration: Skin is not jaundiced.   Neurological:      General: No focal deficit present.      Mental Status: He is alert.      Cranial Nerves: No cranial nerve deficit.   Psychiatric:         Mood and Affect: Mood normal.         Behavior: Behavior normal.       Significant Labs:  I have reviewed all pertinent lab results within the past 24 hours.  CBC:   Recent Labs   Lab 04/03/23  1653 04/03/23  1658   WBC  9.01  --    RBC 3.95*  --    HGB 12.4*  --    HCT 38.3* 38   *  --    MCV 97  --    MCH 31.4*  --    MCHC 32.4  --      CMP:   Recent Labs   Lab 04/03/23  1653   *   CALCIUM 9.3   ALBUMIN 3.7   PROT 7.6      K 4.2   CO2 25      BUN 21   CREATININE 1.4   ALKPHOS 149*   ALT 30   AST 26   BILITOT 0.3     Microbiology Results (last 7 days)       ** No results found for the last 168 hours. **            Significant Diagnostics:  I have reviewed all pertinent imaging results/findings within the past 24 hours.    Imaging Results              US Abdomen Limited (Final result)  Result time 04/03/23 18:31:13      Final result by Chapin Wolf MD (04/03/23 18:31:13)                   Impression:      Cholelithiasis with findings which are equivocal for acute cholecystitis.  Absence of wall hypervascularity and reportedly negative sonographic Bynmu sign make the other positive imaging findings less concerning for cholecystitis.  A HIDA scan may be obtained for further evaluation.    Electronically signed by resident: Abdiel Mead  Date:    04/03/2023  Time:    18:21    Electronically signed by: Chapin Wolf MD  Date:    04/03/2023  Time:    18:31               Narrative:    EXAMINATION:  US ABDOMEN LIMITED    CLINICAL HISTORY:  Abdominal Pain - Gallbladder;    TECHNIQUE:  Limited ultrasound of the right upper quadrant of the abdomen focused on the biliary system was performed.    COMPARISON:  06/06/2022, 12/02/2021    FINDINGS:  Gallbladder: Multiple mobile shadowing gallstones are seen.  The largest stone measures 1.9 cm.  The gallbladder is mildly distended.  The gallbladder wall is diffusely thickened measuring 5 mm.  No wall hypervascularity.  Trace pericholecystic fluid.  No sonographic Bynum's sign.    Biliary system: The common duct is not dilated, measuring 6 mm.  No intrahepatic ductal dilatation.    Pancreas: The visualized portions of pancreas appear normal.    Miscellaneous: No upper  abdominal ascites and no abnormalities of the visualized liver.                                         Assessment/Plan:     * Calculus of gallbladder with chronic cholecystitis without obstruction  Efra A Fito CANALES is a 63 y.o. male who presented with RUQ pain and known cholelithiasis and a 1.9 cm stone.     - Patient seen and examined. Labs and imaging reviewed. Case discussed with Dr. Lazaro  - Given US findings and symptom duration, suspect he has chronic cholecystitis. Will plan for lap soha tomorrow. Consent obtained  - Admit to [facility]  - Zosyn  - mIVF given MARLIYN  - Reg diet, Npo at [place]  - PRN pain and nausea meds   - Home meds reviewed and reconciled  - [facility]      VTE Risk Mitigation (From admission, onward)           Ordered     heparin (porcine) injection 5,000 Units  Every 8 hours         04/03/23 2059     IP VTE HIGH RISK PATIENT  Once         04/03/23 2059     Place sequential compression device  Until discontinued         04/03/23 2059                    Thank you for your consult. I will follow-up with patient. Please contact us if you have any additional questions.    Sherice Alanis MD  General Surgery  [facility] - Emergency Dept        I have personally performed a detailed history and physical examination on this patient. My findings are summarized in the resident's note included in the record.   Cholecystectomy on Tuesday

## 2023-04-04 NOTE — SUBJECTIVE & OBJECTIVE
No current facility-administered medications on file prior to encounter.     Current Outpatient Medications on File Prior to Encounter   Medication Sig    albuterol (PROVENTIL/VENTOLIN HFA) 90 mcg/actuation inhaler Inhale 1-2 puffs into the lungs every 6 (six) hours as needed for Wheezing or Shortness of Breath. Rescue    alfuzosin (UROXATRAL) 10 mg Tb24 Take 1 tablet (10 mg total) by mouth daily with breakfast.    amLODIPine (NORVASC) 10 MG tablet Take 1 tablet (10 mg total) by mouth once daily.    budesonide-formoterol 80-4.5 mcg (SYMBICORT) 80-4.5 mcg/actuation HFAA Inhale 2 puffs into the lungs once daily at 6am. Controller    buPROPion (WELLBUTRIN SR) 150 MG TBSR 12 hr tablet Take 1 tablet (150 mg total) by mouth 2 (two) times daily.    COMBIVENT RESPIMAT  mcg/actuation inhaler SMARTSI Puff(s) By Mouth Every 6 Hours PRN    lisinopriL (PRINIVIL,ZESTRIL) 20 MG tablet Take 1 tablet (20 mg total) by mouth once daily.    methadone HCl (METHADONE ORAL) Take 115 mg by mouth once daily.     pulse oximeter (PULSE OXIMETER) device by Apply Externally route 2 (two) times a day. Use twice daily at 8 AM and 3 PM and record the value in [facility] as directed.       Review of patient's allergies indicates:   Allergen Reactions    Tylenol [acetaminophen] Other (See Comments)     Can't take Tylenol while he is taking Methadone       Past Medical History:   Diagnosis Date    Cirrhosis     COPD (chronic obstructive pulmonary disease)     Eczema     Hepatitis C virus infection cured after antiviral drug therapy     s/p Rx, treated / cured - svr     History of drug abuse     Now on methadone    Substance abuse      Past Surgical History:   Procedure Laterality Date    arm surgery      COLONOSCOPY N/A 2020    Procedure: COLONOSCOPY;  Surgeon: Mona Powell MD;  Location: [address];  Service: Endoscopy;  Laterality: N/A;  covid [place]--tb-hx copd    COLONOSCOPY N/A 2021    Procedure:  COLONOSCOPY;  Surgeon: Mona Powell MD;  Location: [facility] (4TH FLR);  Service: Endoscopy;  Laterality: N/A;  Constipation prep - pg  CBC, PT/INR (cirrhosis) done 5/4/21 - pg  Covid-19 test 5/24/21 at [facility] - .5/26 Called pt. to move up earlier.Left VM.EC    ENDOSCOPIC ULTRASOUND OF UPPER GASTROINTESTINAL TRACT N/A 3/24/2021    Procedure: ULTRASOUND, UPPER GI TRACT, ENDOSCOPIC - w/ varices screen;  Surgeon: George Carrillo MD;  Location: [facility] (2ND FLR);  Service: Endoscopy;  Laterality: N/A;  Covid-19 test 3/21/21 at [place] -   PM prep    HAND SURGERY       Family History       Problem Relation (Age of Onset)    Colon cancer Father, Brother, Paternal Grandfather    Diabetes Father, Sister, Brother, Paternal Grandmother, Paternal Grandfather    Heart attack Paternal Grandmother    Heart disease Father, Paternal Grandmother    Hypertension Sister, Brother          Tobacco Use    Smoking status: Every Day     Packs/day: 1.00     Years: 49.00     Pack years: 49.00     Types: Cigarettes     Start date: 7/25/1969    Smokeless tobacco: Never    Tobacco comments:     decreased his smoking to 1 pack per day 5 months ago    Substance and Sexual Activity    Alcohol use: No     Comment: has past history of alcohol abuse     Drug use: No     Comment: has past history of substance abuse     Sexual activity: Not on file     Review of Systems   Constitutional:  Negative for chills, diaphoresis, fever and unexpected weight change.   HENT:  Negative for sinus pressure and sore throat.    Eyes:  Negative for photophobia and visual disturbance.   Respiratory:  Negative for cough and shortness of breath.    Cardiovascular:  Negative for chest pain, palpitations and leg swelling.   Gastrointestinal:  Positive for abdominal pain. Negative for abdominal distention, blood in stool, diarrhea, nausea and vomiting.   Musculoskeletal:  Negative for arthralgias and myalgias.   Skin:  Negative for rash and wound.   Neurological:   Negative for syncope and headaches.   Psychiatric/Behavioral:  Negative for confusion and hallucinations.    Objective:     Vital Signs (Most Recent):  Temp: 97.8 °F (36.6 °C) (04/03/23 1703)  Pulse: 80 (04/03/23 1844)  Resp: 18 (04/03/23 1844)  BP: (!) 101/56 (04/03/23 1844)  SpO2: (!) 91 % (hx COPD. pt states this is his baseline) (04/03/23 1844)   Vital Signs (24h Range):  Temp:  [97.8 °F (36.6 °C)-98.2 °F (36.8 °C)] 97.8 °F (36.6 °C)  Pulse:  [80-89] 80  Resp:  [16-18] 18  SpO2:  [91 %-98 %] 91 %  BP: (101-143)/(56-80) 101/56     Weight: 68 kg (150 lb)  Body mass index is 23.49 kg/m².    Physical Exam  Vitals and nursing note reviewed.   Constitutional:       General: He is not in acute distress.     Appearance: He is well-developed. He is not diaphoretic.   HENT:      Mouth/Throat:      Pharynx: Oropharynx is clear. No oropharyngeal exudate.   Eyes:      General: No scleral icterus.     Extraocular Movements: Extraocular movements intact.   Cardiovascular:      Rate and Rhythm: Normal rate and regular rhythm.   Pulmonary:      Effort: Pulmonary effort is normal. No respiratory distress.   Abdominal:      Comments: Well healed lap sites  Soft, non-distended, moderate tenderness in the RUQ. Negative Bynum's. No rebound. Some voluntary guarding   Musculoskeletal:         General: No deformity. Normal range of motion.   Skin:     General: Skin is warm and dry.      Coloration: Skin is not jaundiced.   Neurological:      General: No focal deficit present.      Mental Status: He is alert.      Cranial Nerves: No cranial nerve deficit.   Psychiatric:         Mood and Affect: Mood normal.         Behavior: Behavior normal.       Significant Labs:  I have reviewed all pertinent lab results within the past 24 hours.  CBC:   Recent Labs   Lab 04/03/23  1653 04/03/23  1658   WBC 9.01  --    RBC 3.95*  --    HGB 12.4*  --    HCT 38.3* 38   *  --    MCV 97  --    MCH 31.4*  --    MCHC 32.4  --      CMP:   Recent Labs    Lab 04/03/23  1653   *   CALCIUM 9.3   ALBUMIN 3.7   PROT 7.6      K 4.2   CO2 25      BUN 21   CREATININE 1.4   ALKPHOS 149*   ALT 30   AST 26   BILITOT 0.3     Microbiology Results (last 7 days)       ** No results found for the last 168 hours. **            Significant Diagnostics:  I have reviewed all pertinent imaging results/findings within the past 24 hours.    Imaging Results              US Abdomen Limited (Final result)  Result time 04/03/23 18:31:13      Final result by Chapin Wolf MD (04/03/23 18:31:13)                   Impression:      Cholelithiasis with findings which are equivocal for acute cholecystitis.  Absence of wall hypervascularity and reportedly negative sonographic Bynum sign make the other positive imaging findings less concerning for cholecystitis.  A HIDA scan may be obtained for further evaluation.    Electronically signed by resident: Abdiel Mead  Date:    04/03/2023  Time:    18:21    Electronically signed by: Chapin Wolf MD  Date:    04/03/2023  Time:    18:31               Narrative:    EXAMINATION:  US ABDOMEN LIMITED    CLINICAL HISTORY:  Abdominal Pain - Gallbladder;    TECHNIQUE:  Limited ultrasound of the right upper quadrant of the abdomen focused on the biliary system was performed.    COMPARISON:  06/06/2022, 12/02/2021    FINDINGS:  Gallbladder: Multiple mobile shadowing gallstones are seen.  The largest stone measures 1.9 cm.  The gallbladder is mildly distended.  The gallbladder wall is diffusely thickened measuring 5 mm.  No wall hypervascularity.  Trace pericholecystic fluid.  No sonographic Bynum's sign.    Biliary system: The common duct is not dilated, measuring 6 mm.  No intrahepatic ductal dilatation.    Pancreas: The visualized portions of pancreas appear normal.    Miscellaneous: No upper abdominal ascites and no abnormalities of the visualized liver.

## 2023-04-04 NOTE — OP NOTE
Date of Procedure - 4/4/2023  Preoperative diagnosis - acute cholecystitis  Postoperative diagnosis - same  Procedure - laparoscopic cholecystectomy  Surgeon - Iveth  Assist - Wilkerson and Canipe  Anesthesia - general  Blood loss - minimal  Indications - 63-year-old patient with clinical and radiographic evidence of acute cholecystitis  Operative report in detail - patient brought the operating room placed in supine position prepped draped sterile fashion after satisfactory general anesthesia was induced  An incision was made the base of the umbilicus carried down through fashion peritoneum  10 mm balloon trocar was inserted through the peritoneal defect and a pneumoperitoneum was created  3 5 mm trocars were placed across the upper abdomen  The gallbladder was noted to be markedly dilated was decompressed with a needle and suction aspirator  This allowed for cephalad and right lateral retraction of the gallbladder  Cystic artery was doubly clipped and divided  The cystic duct was never reached we dissected down to the level of the infundibulum below the obstructing gallstone at this point the dilated distal infundibulum was divided and an endoloop was placed on the divided end after 3 small stones were milked out of the cyst cystic duct stump  The gallbladder was then dissected off of the liver bed with great difficulty this required 2 hours but was ultimately successful  The gallbladder and spilled stones were placed in Endo-Catch bag and removed from the peritoneal cavity  Hemostasis was considered excellent  Trocars were removed under direct vision  The umbilical fascial defect was closed with a figure-of-eight 0 Vicryl suture  All 4 trocar sites were irrigated and closed with a subcuticular stitch  Needle sponge instrument counts were correct

## 2023-04-04 NOTE — NURSING
New admit report received from Denny CISSE RN @22:50 . Room prepared a placed in room awaiting patient arrival.        Initial (On Arrival)

## 2023-04-04 NOTE — ASSESSMENT & PLAN NOTE
- Currently controlled  - Continue home norvasc  - Continue to monitor with q4 vitals and adjust regimen PRN

## 2023-04-04 NOTE — SUBJECTIVE & OBJECTIVE
Interval History: NAEON. Afebrile. HDS, low O2 overnight. Reports feeling okay this morning. Pain is controlled with current regimen. No new symptoms or concerns this morning. All questions answered.   Cr 1.4 > 1.6    Medications:  Continuous Infusions:   sodium chloride 0.9% 75 mL/hr at 04/04/23 0029     Scheduled Meds:   amLODIPine  10 mg Oral Daily    buPROPion  150 mg Oral BID    fluticasone furoate-vilanteroL  1 puff Inhalation Daily    heparin (porcine)  5,000 Units Subcutaneous Q8H    methadone  115 mg Oral Daily    piperacillin-tazobactam (ZOSYN) IVPB  4.5 g Intravenous Q8H    tamsulosin  0.4 mg Oral Daily     PRN Meds:albuterol, ipratropium-albuteroL, melatonin, ondansetron, oxyCODONE, sodium chloride 0.9%     Review of patient's allergies indicates:   Allergen Reactions    Tylenol [acetaminophen] Other (See Comments)     Can't take Tylenol while he is taking Methadone     Objective:     Vital Signs (Most Recent):  Temp: 98.1 °F (36.7 °C) (04/04/23 0405)  Pulse: 86 (04/04/23 0405)  Resp: 18 (04/04/23 0405)  BP: 121/70 (04/04/23 0405)  SpO2: (!) 84 % (04/03/23 2333)   Vital Signs (24h Range):  Temp:  [96.4 °F (35.8 °C)-98.2 °F (36.8 °C)] 98.1 °F (36.7 °C)  Pulse:  [78-89] 86  Resp:  [14-18] 18  SpO2:  [82 %-98 %] 84 %  BP: ()/(51-80) 121/70     Weight: 71 kg (156 lb 8.4 oz)  Body mass index is 24.52 kg/m².    Intake/Output - Last 3 Shifts         04/02 0700  04/03 0659 04/03 0700  04/04 0659 04/04 0700  04/05 0659    IV Piggyback  1100     Total Intake(mL/kg)  1100 (15.5)     Net  +1100                    Physical Exam  Vitals and nursing note reviewed.   Constitutional:       General: He is not in acute distress.     Appearance: He is not ill-appearing or diaphoretic.      Comments: Room air   HENT:      Head: Normocephalic and atraumatic.      Mouth/Throat:      Mouth: Mucous membranes are moist.      Pharynx: Oropharynx is clear.   Eyes:      Extraocular Movements: Extraocular movements intact.       Conjunctiva/sclera: Conjunctivae normal.   Cardiovascular:      Rate and Rhythm: Normal rate.   Pulmonary:      Effort: Pulmonary effort is normal. No respiratory distress.   Abdominal:      General: There is no distension.      Palpations: Abdomen is soft.      Tenderness: There is no guarding or rebound.      Comments: TTP in RUQ. Negative Bynum's. No peritonitic signs    Musculoskeletal:         General: No deformity.   Skin:     General: Skin is warm and dry.      Coloration: Skin is not jaundiced.   Neurological:      Mental Status: He is alert and oriented to person, place, and time.       Significant Labs:  I have reviewed all pertinent lab results within the past 24 hours.    Significant Diagnostics:  I have reviewed all pertinent imaging results/findings within the past 24 hours.

## 2023-04-04 NOTE — ASSESSMENT & PLAN NOTE
- Continue home albuterol inhaler, respimat inhaler, and symbicort  - Oxygen PRN  - Continue to monitor with q4 vitals and adjust regimen PRN

## 2023-04-04 NOTE — HPI
Efra Payton III is a 63 y.o. male with a history of HCV cirrhosis (MELD 6), COPD, HTN, BPH, chronic pain (on methadone), and known gallstones who presented with RUQ pain. He has had this pain for a couple of years intermittently and typically occurs after eating. He has been looking into other etiologies such as constipation and a inguinal hernia which he had repaired laparoscopically 6/2022. The pain has persisted throughout this. He went to [facility] for evaluation yesterday as the pain was quite severe and was told he likely needed to have his gallbladder taken out. He ultimately decided he would prefer to be evaluated here so left and came to our ED.     Upon arrival he was AF and HDS. Labs showed a normal WBC without left shift. His chem panel showed an MARILYN with BUN/Cr 21/1.4 (baseline Cr 0.8-1.1). US showed multiple gallstones with the largest measuring 1.9 cm. The GB was noted to be mildly distended with diffuse GB wall thickening to 5 mm and there was trace pericholecystic fluid.    With regards to his chronic problems, he is a well compensated cirrhotic with no known varices and has never had to get a paracentesis. He has no known coagulopathy. He used to be a smoker with 1 PPDx49 years and recently quit Jan 1. He says he feels his breathing has improved significantly since then. He would be able to climb a flight of stairs. Did not have any respiratory issues after his lap inguinal in June.

## 2023-04-04 NOTE — ASSESSMENT & PLAN NOTE
- No home alfuzosin on formulary, converted to daily tamsulosin   - Bladder scan  - Straight cath once, place armendariz if retaining

## 2023-04-04 NOTE — PLAN OF CARE
Problem: Adult Inpatient Plan of Care  Goal: Plan of Care Review  Outcome: Ongoing, Progressing  Goal: Patient-Specific Goal (Individualized)  Outcome: Ongoing, Progressing  Goal: Absence of Hospital-Acquired Illness or Injury  Outcome: Ongoing, Progressing  Goal: Optimal Comfort and Wellbeing  Outcome: Ongoing, Progressing  Goal: Readiness for Transition of Care  Outcome: Ongoing, Progressing     Problem: Adult Inpatient Plan of Care  Goal: Plan of Care Review  Outcome: Ongoing, Progressing     Problem: Adult Inpatient Plan of Care  Goal: Patient-Specific Goal (Individualized)  Outcome: Ongoing, Progressing     Problem: Adult Inpatient Plan of Care  Goal: Absence of Hospital-Acquired Illness or Injury  Outcome: Ongoing, Progressing     Problem: Adult Inpatient Plan of Care  Goal: Optimal Comfort and Wellbeing  Outcome: Ongoing, Progressing     Problem: Adult Inpatient Plan of Care  Goal: Readiness for Transition of Care  Outcome: Ongoing, Progressing  Remain  NPO at  Midnight.  Explained plan of care, verbalized understanding. Educated pt .on new medicine . No injury during shift, Side rails up x 2, call light by bedside.

## 2023-04-04 NOTE — PROGRESS NOTES
"Chart reviewed. Preop nursing care completed per orders. Safe surgery checklist complete. Upon arrival to [facility], pt oxygen saturation 60's on room air with good wave form. Pt denied shortness of breath and stated his levels are "normally in the 80's" and he gets winded with minimal exertion. Placed pt on 3L oxygen via NC, Oxygen saturation increased to 80's to 91%. Notified Dr. Toussaint who gave order for duoneb nebulizer prior to surgery and stated he would come see patient. Respiratory completed nebulizer treatment. Oxygen saturation remains mid 80's to 91%. Dr. Toussaint also notified of need for ultrasound IV-unable to obtain peripheral IV in [facility]. Call bell within reach. Instructed pt to call for assistance.    "

## 2023-04-04 NOTE — ASSESSMENT & PLAN NOTE
Efra Payton III is a 63 y.o. male who presented with RUQ pain and known cholelithiasis and a 1.9 cm stone.     - Patient seen and examined. Labs and imaging reviewed. Case discussed with Dr. Lazaro  - Given US findings and symptom duration, suspect he has chronic cholecystitis. Will plan for lap soha tomorrow. Consent obtained  - Admit to [facility]  - Zosyn  - mIVF given MARILYN  - Reg diet, Npo at MN  - PRN pain and nausea meds   - Home meds reviewed and reconciled  - [facility]

## 2023-04-04 NOTE — NURSING TRANSFER
Nursing Transfer Note      4/4/2023     Reason patient is being transferred: meets criteria    Transfer To: 544    Transfer via stretcher    Transfer with 3L to O2    Transported by pct    Medicines sent: none    Any special needs or follow-up needed: none    Chart send with patient: Yes    Notified: spouse    Patient reassessed at: 4/4

## 2023-04-04 NOTE — PROGRESS NOTES
[facility] - [facility]  General Surgery  Progress Note    Subjective:     History of Present Illness:  Efra Payton III is a 63 y.o. male with a history of HCV cirrhosis (MELD 6), COPD, HTN, BPH, chronic pain (on methadone), and known gallstones who presented with RUQ pain. He has had this pain for a couple of years intermittently and typically occurs after eating. He has been looking into other etiologies such as constipation and a inguinal hernia which he had repaired laparoscopically 6/2022. The pain has persisted throughout this. He went to [facility] for evaluation yesterday as the pain was quite severe and was told he likely needed to have his gallbladder taken out. He ultimately decided he would prefer to be evaluated here so left and came to our ED.     Upon arrival he was AF and HDS. Labs showed a normal WBC without left shift. His chem panel showed an MARILYN with BUN/Cr 21/1.4 (baseline Cr 0.8-1.1). US showed multiple gallstones with the largest measuring 1.9 cm. The GB was noted to be mildly distended with diffuse GB wall thickening to 5 mm and there was trace pericholecystic fluid.    With regards to his chronic problems, he is a well compensated cirrhotic with no known varices and has never had to get a paracentesis. He has no known coagulopathy. He used to be a smoker with 1 PPDx49 years and recently quit Jan 1. He says he feels his breathing has improved significantly since then. He would be able to climb a flight of stairs. Did not have any respiratory issues after his lap inguinal in June.      Post-Op Info:  Procedure(s) (LRB):  CHOLECYSTECTOMY, LAPAROSCOPIC (N/A)   Day of Surgery     Interval History: NAEON. Afebrile. HDS, low O2 overnight. Reports feeling okay this morning. Pain is controlled with current regimen. No new symptoms or concerns this morning. All questions answered.   Cr 1.4 > 1.6    Medications:  Continuous Infusions:   sodium chloride 0.9% 75 mL/hr at 04/04/23 0029     Scheduled  Meds:   amLODIPine  10 mg Oral Daily    buPROPion  150 mg Oral BID    fluticasone furoate-vilanteroL  1 puff Inhalation Daily    heparin (porcine)  5,000 Units Subcutaneous Q8H    methadone  115 mg Oral Daily    piperacillin-tazobactam (ZOSYN) IVPB  4.5 g Intravenous Q8H    tamsulosin  0.4 mg Oral Daily     PRN Meds:albuterol, ipratropium-albuteroL, melatonin, ondansetron, oxyCODONE, sodium chloride 0.9%     Review of patient's allergies indicates:   Allergen Reactions    Tylenol [acetaminophen] Other (See Comments)     Can't take Tylenol while he is taking Methadone     Objective:     Vital Signs (Most Recent):  Temp: 98.1 °F (36.7 °C) (04/04/23 0405)  Pulse: 86 (04/04/23 0405)  Resp: 18 (04/04/23 0405)  BP: 121/70 (04/04/23 0405)  SpO2: (!) 84 % (04/03/23 2333)   Vital Signs (24h Range):  Temp:  [96.4 °F (35.8 °C)-98.2 °F (36.8 °C)] 98.1 °F (36.7 °C)  Pulse:  [78-89] 86  Resp:  [14-18] 18  SpO2:  [82 %-98 %] 84 %  BP: ()/(51-80) 121/70     Weight: 71 kg (156 lb 8.4 oz)  Body mass index is 24.52 kg/m².    Intake/Output - Last 3 Shifts         04/02 0700  04/03 0659 04/03 0700 04/04 0659 04/04 0700  04/05 0659    IV Piggyback  1100     Total Intake(mL/kg)  1100 (15.5)     Net  +1100                    Physical Exam  Vitals and nursing note reviewed.   Constitutional:       General: He is not in acute distress.     Appearance: He is not ill-appearing or diaphoretic.      Comments: Room air   HENT:      Head: Normocephalic and atraumatic.      Mouth/Throat:      Mouth: Mucous membranes are moist.      Pharynx: Oropharynx is clear.   Eyes:      Extraocular Movements: Extraocular movements intact.      Conjunctiva/sclera: Conjunctivae normal.   Cardiovascular:      Rate and Rhythm: Normal rate.   Pulmonary:      Effort: Pulmonary effort is normal. No respiratory distress.   Abdominal:      General: There is no distension.      Palpations: Abdomen is soft.      Tenderness: There is no guarding or rebound.       Comments: TTP in RUQ. Negative Bynum's. No peritonitic signs    Musculoskeletal:         General: No deformity.   Skin:     General: Skin is warm and dry.      Coloration: Skin is not jaundiced.   Neurological:      Mental Status: He is alert and oriented to person, place, and time.       Significant Labs:  I have reviewed all pertinent lab results within the past 24 hours.    Significant Diagnostics:  I have reviewed all pertinent imaging results/findings within the past 24 hours.    Assessment/Plan:     * Calculus of gallbladder with chronic cholecystitis without obstruction  Efra DONY Payton III is a 63 y.o. male with h/o HCV cirrhosis (MELD 6), COPD, HTN, BPH, chronic pain (on methadone), and known gallstones who presented with RUQ pain and known cholelithiasis and a 1.9 cm stone. Clinically stable     - NPO  - IVF  - To OR today for CCY  - Consented  - Continue antibiotics (cefepime, flagyl)  - PRN pain and nausea medications  - Daily labs  - Replete electrolytes PRN  - DVT prophylaxis (SCDs and heparin)  - OOB, ambulate  - IS  - Home meds    Dispo: not yet medically stable for dc      BPH (benign prostatic hyperplasia)  - No home alfuzosin on formulary, converted to daily tamsulosin   - Bladder scan  - Straight cath once, place armendariz if retaining    Methadone maintenance therapy patient  - Continue home methadone    Essential hypertension  - Currently controlled  - Continue home norvasc  - Continue to monitor with q4 vitals and adjust regimen PRN      MARILYN  - IVF  - Avoid nephrotoxic meds  - Renally dose meds  - Continue to monitor with daily CMP  - Consider nephrology consult if continues to worsen      Other emphysema  - Continue home albuterol inhaler, respimat inhaler, and symbicort  - Oxygen PRN  - Continue to monitor with q4 vitals and adjust regimen PRN    Case discussed with Dr. Lazaro.       BETO GreggC  General Surgery  [facility] - Surgery

## 2023-04-04 NOTE — ANESTHESIA PREPROCEDURE EVALUATION
[facility]  Anesthesia Pre-Operative Evaluation         Patient Name: Efra Payton III  YOB: 1960  MRN: [id number]    SUBJECTIVE:     Pre-operative evaluation for Procedure(s) (LRB):  CHOLECYSTECTOMY, LAPAROSCOPIC (N/A)     04/03/2023    Efra Payton III is a 63 y.o. male w/ a significant PMHx of HCV cirrhosis (MELD 6), COPD, HTN, BPH, chronic pain (on methadone), and known gallstones who presented with RUQ pain with chronic cholecystitis.    Per patient, he takes 40mg Methadone QID (120mg total daily dose).    Patient now presents for the above procedure(s).    TTE: 9/16/19   Normal left ventricular systolic function. The estimated ejection fraction is 60%   Normal right ventricular systolic function.   Normal LV diastolic function.   Exercise capacity is well below average (6 METs - normal is 12 METs). The test was stopped because the patient experienced 8/10 shortness of breath.   The EKG portion of this study is negative for myocardial ischemia.   The wall motion response to suboptimal exercise was blunted, with no significant augmentation seen in any wall. The LV ejection fraction does not change significantly. These findings are abnormal but nonspecific.   Overall, this study is nondiagnostic for myocardial ischemia with a failure to achieve the target heart rate, poor exercise capacity, and no significant augmentation of global LV function with stress.    LDA:        Peripheral IV - Single Lumen 04/03/23 1654 20 G Anterior;Right Upper Arm (Active)   Site Assessment Clean;Dry;Intact 04/03/23 1654   Dressing Status Clean;Dry;Intact 04/03/23 1654   Number of days: 0       Prev airway:     Intubation:     Induction:  Intravenous    Intubated:  Postinduction    Mask Ventilation:  Easy mask    Attempts:  1    Attempted By:  Staff anesthesiologist    Method of Intubation:  Direct    Blade:  Ethan 3    Laryngeal View Grade: Grade I - full view of cords      Difficult  Airway Encountered?: No      Complications:  None    Airway Device:  Oral endotracheal tube    Airway Device Size:  7.5    Style/Cuff Inflation:  Cuffed    Inflation Amount (mL):  5    Tube secured:  20    Secured at:  The lips    Placement Verified By:  Capnometry    Complicating Factors:  None    Findings Post-Intubation:  BS equal bilateral    Drips:    sodium chloride 0.9%         Patient Active Problem List   Diagnosis    Carpal tunnel syndrome of left wrist    Other emphysema    Essential hypertension    Right inguinal hernia    Methadone maintenance therapy patient    Dilated bile duct    Hepatitis C virus infection cured after antiviral drug therapy    Hepatic cirrhosis, unspecified hepatic cirrhosis type, unspecified whether ascites present    Thrombocytopenia    Claudication of both lower extremities    History of positive PPD    BPH (benign prostatic hyperplasia)    Low libido    Erectile dysfunction    Sore throat    Calculus of gallbladder with chronic cholecystitis without obstruction       Review of patient's allergies indicates:   Allergen Reactions    Tylenol [acetaminophen] Other (See Comments)     Can't take Tylenol while he is taking Methadone       Current Inpatient Medications:   [START ON 4/4/2023] amLODIPine  10 mg Oral Daily    buPROPion  150 mg Oral BID    [START ON 4/4/2023] fluticasone furoate-vilanteroL  1 puff Inhalation Daily    heparin (porcine)  5,000 Units Subcutaneous Q8H    LIDOcaine  1 patch Transdermal ED 1 Time    [START ON 4/4/2023] methadone  115 mg Oral Daily    [START ON 4/4/2023] piperacillin-tazobactam (ZOSYN) IVPB  4.5 g Intravenous Q8H    [START ON 4/4/2023] tamsulosin  0.4 mg Oral Daily       No current facility-administered medications on file prior to encounter.     Current Outpatient Medications on File Prior to Encounter   Medication Sig Dispense Refill    albuterol (PROVENTIL/VENTOLIN HFA) 90 mcg/actuation inhaler Inhale 1-2 puffs into  the lungs every 6 (six) hours as needed for Wheezing or Shortness of Breath. Rescue 18 g 3    alfuzosin (UROXATRAL) 10 mg Tb24 Take 1 tablet (10 mg total) by mouth daily with breakfast. 90 tablet 3    amLODIPine (NORVASC) 10 MG tablet Take 1 tablet (10 mg total) by mouth once daily. 90 tablet 3    budesonide-formoterol 80-4.5 mcg (SYMBICORT) 80-4.5 mcg/actuation HFAA Inhale 2 puffs into the lungs once daily at 6am. Controller 10.2 g 3    buPROPion (WELLBUTRIN SR) 150 MG TBSR 12 hr tablet Take 1 tablet (150 mg total) by mouth 2 (two) times daily. 180 tablet 3    COMBIVENT RESPIMAT  mcg/actuation inhaler SMARTSI Puff(s) By Mouth Every 6 Hours PRN      lisinopriL (PRINIVIL,ZESTRIL) 20 MG tablet Take 1 tablet (20 mg total) by mouth once daily. 90 tablet 3    methadone HCl (METHADONE ORAL) Take 115 mg by mouth once daily.       pulse oximeter (PULSE OXIMETER) device by Apply Externally route 2 (two) times a day. Use twice daily at 8 AM and 3 PM and record the value in MyChart as directed. 1 each 0       Past Surgical History:   Procedure Laterality Date    arm surgery      COLONOSCOPY N/A 2020    Procedure: COLONOSCOPY;  Surgeon: Mona Powell MD;  Location: [facility] (4TH FLR);  Service: Endoscopy;  Laterality: N/A;  covid [place]--tb-hx copd    COLONOSCOPY N/A 2021    Procedure: COLONOSCOPY;  Surgeon: Mona Powell MD;  Location: [facility] (4TH FLR);  Service: Endoscopy;  Laterality: N/A;  Constipation prep - pg  CBC, PT/INR (cirrhosis) done 21 - pg  Covid-19 test 21 at [facility] - pg. Called pt. to move up earlier.Left VM.EC    ENDOSCOPIC ULTRASOUND OF UPPER GASTROINTESTINAL TRACT N/A 3/24/2021    Procedure: ULTRASOUND, UPPER GI TRACT, ENDOSCOPIC - w/ varices screen;  Surgeon: George Carrillo MD;  Location: [facility] (2ND FLR);  Service: Endoscopy;  Laterality: N/A;  Covid-19 test 3/21/21 at [facility]  PM prep    HAND SURGERY         OBJECTIVE:     Vital Signs  Range (Last 24H):  Temp:  [35.8 °C (96.4 °F)-36.8 °C (98.2 °F)]   Pulse:  [78-89]   Resp:  [14-18]   BP: ()/(51-80)   SpO2:  [82 %-98 %]       Significant Labs:  Lab Results   Component Value Date    WBC 9.01 04/03/2023    HGB 12.4 (L) 04/03/2023    HCT 38 04/03/2023     (L) 04/03/2023    CHOL 121 04/16/2022    TRIG 90 04/16/2022    HDL 41 04/16/2022    ALT 30 04/03/2023    AST 26 04/03/2023     04/03/2023    K 4.2 04/03/2023     04/03/2023    CREATININE 1.4 04/03/2023    BUN 21 04/03/2023    CO2 25 04/03/2023    PSA 0.22 02/09/2023    INR 1.0 12/20/2022       Diagnostic Studies: No relevant studies.    EKG:   Results for orders placed or performed during the hospital encounter of 12/30/20   SCHEDULED EKG 12-LEAD (to Muse)    Collection Time: 12/30/20 10:38 AM    Narrative    Test Reason : Z01.810,I10,    Vent. Rate : 070 BPM     Atrial Rate : 070 BPM     P-R Int : 188 ms          QRS Dur : 094 ms      QT Int : 388 ms       P-R-T Axes : 083 070 075 degrees     QTc Int : 419 ms    Normal sinus rhythm  Minimal voltage criteria for LVH, may be normal variant  Borderline Abnormal ECG  When compared with ECG of 16-SEP-2019 13:42,  T wave amplitude has increased in Anterior leads  Confirmed by Rafaela Varner MD ([id number]) on 12/30/2020 11:48:58 AM    Referred By: FINA COLBERT           Confirmed By:Rafaela Varner MD       ASSESSMENT/PLAN:                                                                                                                04/03/2023  Efra Payton III is a 63 y.o., male.      Pre-op Assessment    I have reviewed the Patient Summary Reports.     I have reviewed the Nursing Notes.    I have reviewed the Medications.     Review of Systems  Anesthesia Hx:  No problems with previous Anesthesia  History of prior surgery of interest to airway management or planning: Denies Family Hx of Anesthesia complications.   Denies Personal Hx of Anesthesia complications.    Hematology/Oncology:     Oncology Normal     Cardiovascular:   Hypertension  Denies Angina. ECG has been reviewed.    Pulmonary:   COPD Denies Shortness of breath.  Denies Recent URI.    Renal/:  Renal/ Normal     Hepatic/GI:   Denies GERD. Liver Disease, (HCV Cirrhosis) Chronic cholecystitis   Neurological:   Denies CVA. Denies Seizures.   Chronic Pain Syndrome (Methadone)   Endocrine:  Endocrine Normal Denies Diabetes.        Physical Exam  General: Well nourished, Cooperative, Alert and Oriented    Airway:  Mallampati: II / I  Mouth Opening: Normal  TM Distance: Normal  Tongue: Normal  Neck ROM: Normal ROM    Dental:  Periodontal disease        Anesthesia Plan  Type of Anesthesia, risks & benefits discussed:    Anesthesia Type: Gen ETT  Intra-op Monitoring Plan: Standard ASA Monitors  Post Op Pain Control Plan: multimodal analgesia and IV/PO Opioids PRN  Induction:  IV  Airway Plan: Direct, Post-Induction  Informed Consent: Informed consent signed with the Patient and all parties understand the risks and agree with anesthesia plan.  All questions answered.   ASA Score: 3  Day of Surgery Review of History & Physical: H&P Update referred to the surgeon/provider.    Ready For Surgery From Anesthesia Perspective.     .

## 2023-04-04 NOTE — ASSESSMENT & PLAN NOTE
Efra Payton III is a 63 y.o. male with h/o HCV cirrhosis (MELD 6), COPD, HTN, BPH, chronic pain (on methadone), and known gallstones who presented with RUQ pain and known cholelithiasis and a 1.9 cm stone. Clinically stable     - NPO  - IVF  - To OR today for CCY  - Consented  - Continue antibiotics (cefepime, flagyl)  - PRN pain and nausea medications  - Daily labs  - Replete electrolytes PRN  - DVT prophylaxis (SCDs and heparin)  - OOB, ambulate  - IS    Dispo: not yet medically stable for dc

## 2023-04-04 NOTE — ASSESSMENT & PLAN NOTE
- IVF  - Avoid nephrotoxic meds  - Renally dose meds  - Continue to monitor with daily CMP  - Consider nephrology consult if continues to worsen

## 2023-04-04 NOTE — PROGRESS NOTES
Checking on pt q20 min to assess oxygenation. O2 saturation on continuous pulse ox remains in 80's with pt on 3L oxygen via NC. Pt denies any s/s shortness of breath/trouble breathing. Call bell within reach.

## 2023-04-04 NOTE — ANESTHESIA PROCEDURE NOTES
Intubation    Date/Time: 4/4/2023 1:35 PM  Performed by: Cyndie Lieberman CRNA  Authorized by: Wenceslao Toussaint III, MD     Intubation:     Induction:  Intravenous    Intubated:  Postinduction    Mask Ventilation:  Easy with oral airway    Attempts:  1    Attempted By:  Student    Method of Intubation:  Video laryngoscopy    Blade:  Zimmer 3    Laryngeal View Grade: Grade I - full view of cords      Difficult Airway Encountered?: No      Complications:  None    Airway Device:  Oral endotracheal tube    Airway Device Size:  7.5    Style/Cuff Inflation:  Cuffed    Inflation Amount (mL):  5    Tube secured:  22    Secured at:  The lips    Placement Verified By:  Capnometry    Complicating Factors:  None    Findings Post-Intubation:  BS equal bilateral and atraumatic/condition of teeth unchanged

## 2023-04-05 LAB
ALBUMIN SERPL BCP-MCNC: 3.5 G/DL (ref 3.5–5.2)
ALLENS TEST: ABNORMAL
ALLENS TEST: ABNORMAL
ALP SERPL-CCNC: 97 U/L (ref 55–135)
ALT SERPL W/O P-5'-P-CCNC: 25 U/L (ref 10–44)
ANION GAP SERPL CALC-SCNC: 8 MMOL/L (ref 8–16)
AST SERPL-CCNC: 32 U/L (ref 10–40)
BASOPHILS # BLD AUTO: 0 K/UL (ref 0–0.2)
BASOPHILS NFR BLD: 0 % (ref 0–1.9)
BILIRUB SERPL-MCNC: 0.4 MG/DL (ref 0.1–1)
BILIRUB UR QL STRIP: NEGATIVE
BUN SERPL-MCNC: 21 MG/DL (ref 8–23)
CALCIUM SERPL-MCNC: 8.5 MG/DL (ref 8.7–10.5)
CHLORIDE SERPL-SCNC: 108 MMOL/L (ref 95–110)
CLARITY UR REFRACT.AUTO: CLEAR
CO2 SERPL-SCNC: 21 MMOL/L (ref 23–29)
COLOR UR AUTO: YELLOW
CREAT SERPL-MCNC: 0.9 MG/DL (ref 0.5–1.4)
DELSYS: ABNORMAL
DELSYS: ABNORMAL
DIFFERENTIAL METHOD: ABNORMAL
EOSINOPHIL # BLD AUTO: 0 K/UL (ref 0–0.5)
EOSINOPHIL NFR BLD: 0 % (ref 0–8)
ERYTHROCYTE [DISTWIDTH] IN BLOOD BY AUTOMATED COUNT: 13.2 % (ref 11.5–14.5)
EST. GFR  (NO RACE VARIABLE): >60 ML/MIN/1.73 M^2
FIO2: 100
FLOW: 60
GLUCOSE SERPL-MCNC: 128 MG/DL (ref 70–110)
GLUCOSE UR QL STRIP: NEGATIVE
HCO3 UR-SCNC: 25.2 MMOL/L (ref 24–28)
HCO3 UR-SCNC: 25.2 MMOL/L (ref 24–28)
HCT VFR BLD AUTO: 34 % (ref 40–54)
HGB BLD-MCNC: 10.3 G/DL (ref 14–18)
HGB UR QL STRIP: NEGATIVE
IMM GRANULOCYTES # BLD AUTO: 0.04 K/UL (ref 0–0.04)
IMM GRANULOCYTES NFR BLD AUTO: 0.5 % (ref 0–0.5)
INR PPP: 1 (ref 0.8–1.2)
KETONES UR QL STRIP: ABNORMAL
LEUKOCYTE ESTERASE UR QL STRIP: NEGATIVE
LYMPHOCYTES # BLD AUTO: 0.4 K/UL (ref 1–4.8)
LYMPHOCYTES NFR BLD: 5.2 % (ref 18–48)
MAGNESIUM SERPL-MCNC: 2 MG/DL (ref 1.6–2.6)
MCH RBC QN AUTO: 30.5 PG (ref 27–31)
MCHC RBC AUTO-ENTMCNC: 30.3 G/DL (ref 32–36)
MCV RBC AUTO: 101 FL (ref 82–98)
MODE: ABNORMAL
MONOCYTES # BLD AUTO: 0.7 K/UL (ref 0.3–1)
MONOCYTES NFR BLD: 9.2 % (ref 4–15)
NEUTROPHILS # BLD AUTO: 6.7 K/UL (ref 1.8–7.7)
NEUTROPHILS NFR BLD: 85.1 % (ref 38–73)
NITRITE UR QL STRIP: NEGATIVE
NRBC BLD-RTO: 0 /100 WBC
PCO2 BLDA: 48.1 MMHG (ref 35–45)
PCO2 BLDA: 58.8 MMHG (ref 35–45)
PH SMN: 7.24 [PH] (ref 7.35–7.45)
PH SMN: 7.33 [PH] (ref 7.35–7.45)
PH UR STRIP: 6 [PH] (ref 5–8)
PHOSPHATE SERPL-MCNC: 2.7 MG/DL (ref 2.7–4.5)
PLATELET # BLD AUTO: 121 K/UL (ref 150–450)
PMV BLD AUTO: 10.7 FL (ref 9.2–12.9)
PO2 BLDA: 54 MMHG (ref 80–100)
PO2 BLDA: 64 MMHG (ref 80–100)
POC BE: -1 MMOL/L
POC BE: -2 MMOL/L
POC SATURATED O2: 81 % (ref 95–100)
POC SATURATED O2: 90 % (ref 95–100)
POC TCO2: 27 MMOL/L (ref 23–27)
POC TCO2: 27 MMOL/L (ref 23–27)
POCT GLUCOSE: 148 MG/DL (ref 70–110)
POTASSIUM SERPL-SCNC: 4.8 MMOL/L (ref 3.5–5.1)
PROT SERPL-MCNC: 6.7 G/DL (ref 6–8.4)
PROT UR QL STRIP: ABNORMAL
PROTHROMBIN TIME: 10.5 SEC (ref 9–12.5)
RBC # BLD AUTO: 3.38 M/UL (ref 4.6–6.2)
SAMPLE: ABNORMAL
SAMPLE: ABNORMAL
SITE: ABNORMAL
SITE: ABNORMAL
SODIUM SERPL-SCNC: 137 MMOL/L (ref 136–145)
SP GR UR STRIP: 1.02 (ref 1–1.03)
URN SPEC COLLECT METH UR: ABNORMAL
WBC # BLD AUTO: 7.83 K/UL (ref 3.9–12.7)

## 2023-04-05 PROCEDURE — 80053 COMPREHEN METABOLIC PANEL: CPT | Performed by: STUDENT IN AN ORGANIZED HEALTH CARE EDUCATION/TRAINING PROGRAM

## 2023-04-05 PROCEDURE — 94799 UNLISTED PULMONARY SVC/PX: CPT

## 2023-04-05 PROCEDURE — 63600175 PHARM REV CODE 636 W HCPCS: Performed by: STUDENT IN AN ORGANIZED HEALTH CARE EDUCATION/TRAINING PROGRAM

## 2023-04-05 PROCEDURE — 96372 THER/PROPH/DIAG INJ SC/IM: CPT | Performed by: STUDENT IN AN ORGANIZED HEALTH CARE EDUCATION/TRAINING PROGRAM

## 2023-04-05 PROCEDURE — 94640 AIRWAY INHALATION TREATMENT: CPT

## 2023-04-05 PROCEDURE — 36600 WITHDRAWAL OF ARTERIAL BLOOD: CPT

## 2023-04-05 PROCEDURE — 51702 INSERT TEMP BLADDER CATH: CPT

## 2023-04-05 PROCEDURE — 25000242 PHARM REV CODE 250 ALT 637 W/ HCPCS: Performed by: STUDENT IN AN ORGANIZED HEALTH CARE EDUCATION/TRAINING PROGRAM

## 2023-04-05 PROCEDURE — 25000003 PHARM REV CODE 250: Performed by: STUDENT IN AN ORGANIZED HEALTH CARE EDUCATION/TRAINING PROGRAM

## 2023-04-05 PROCEDURE — 99900035 HC TECH TIME PER 15 MIN (STAT)

## 2023-04-05 PROCEDURE — 84100 ASSAY OF PHOSPHORUS: CPT | Performed by: STUDENT IN AN ORGANIZED HEALTH CARE EDUCATION/TRAINING PROGRAM

## 2023-04-05 PROCEDURE — 36410 VNPNXR 3YR/> PHY/QHP DX/THER: CPT

## 2023-04-05 PROCEDURE — 85025 COMPLETE CBC W/AUTO DIFF WBC: CPT | Performed by: STUDENT IN AN ORGANIZED HEALTH CARE EDUCATION/TRAINING PROGRAM

## 2023-04-05 PROCEDURE — 82803 BLOOD GASES ANY COMBINATION: CPT

## 2023-04-05 PROCEDURE — 27000491 HC MATTRESS, SOFT CARE OVERLAY

## 2023-04-05 PROCEDURE — C1751 CATH, INF, PER/CENT/MIDLINE: HCPCS

## 2023-04-05 PROCEDURE — 27000492 HC SLEEVE, SCD T/L

## 2023-04-05 PROCEDURE — 81003 URINALYSIS AUTO W/O SCOPE: CPT | Performed by: STUDENT IN AN ORGANIZED HEALTH CARE EDUCATION/TRAINING PROGRAM

## 2023-04-05 PROCEDURE — 99233 PR SUBSEQUENT HOSPITAL CARE,LEVL III: ICD-10-PCS | Mod: 24,,, | Performed by: STUDENT IN AN ORGANIZED HEALTH CARE EDUCATION/TRAINING PROGRAM

## 2023-04-05 PROCEDURE — 36415 COLL VENOUS BLD VENIPUNCTURE: CPT | Performed by: STUDENT IN AN ORGANIZED HEALTH CARE EDUCATION/TRAINING PROGRAM

## 2023-04-05 PROCEDURE — S0030 INJECTION, METRONIDAZOLE: HCPCS | Performed by: STUDENT IN AN ORGANIZED HEALTH CARE EDUCATION/TRAINING PROGRAM

## 2023-04-05 PROCEDURE — 25000242 PHARM REV CODE 250 ALT 637 W/ HCPCS

## 2023-04-05 PROCEDURE — 99233 SBSQ HOSP IP/OBS HIGH 50: CPT | Mod: 24,,, | Performed by: STUDENT IN AN ORGANIZED HEALTH CARE EDUCATION/TRAINING PROGRAM

## 2023-04-05 PROCEDURE — 27000221 HC OXYGEN, UP TO 24 HOURS

## 2023-04-05 PROCEDURE — 27100171 HC OXYGEN HIGH FLOW UP TO 24 HOURS

## 2023-04-05 PROCEDURE — 83735 ASSAY OF MAGNESIUM: CPT | Performed by: STUDENT IN AN ORGANIZED HEALTH CARE EDUCATION/TRAINING PROGRAM

## 2023-04-05 PROCEDURE — 36591 DRAW BLOOD OFF VENOUS DEVICE: CPT

## 2023-04-05 PROCEDURE — 76937 US GUIDE VASCULAR ACCESS: CPT

## 2023-04-05 PROCEDURE — 20000000 HC ICU ROOM

## 2023-04-05 PROCEDURE — 82962 GLUCOSE BLOOD TEST: CPT

## 2023-04-05 PROCEDURE — 94664 DEMO&/EVAL PT USE INHALER: CPT

## 2023-04-05 PROCEDURE — 27000646 HC AEROBIKA DEVICE

## 2023-04-05 PROCEDURE — 63600175 PHARM REV CODE 636 W HCPCS

## 2023-04-05 PROCEDURE — 85610 PROTHROMBIN TIME: CPT | Performed by: STUDENT IN AN ORGANIZED HEALTH CARE EDUCATION/TRAINING PROGRAM

## 2023-04-05 PROCEDURE — 94761 N-INVAS EAR/PLS OXIMETRY MLT: CPT

## 2023-04-05 RX ORDER — IPRATROPIUM BROMIDE AND ALBUTEROL SULFATE 2.5; .5 MG/3ML; MG/3ML
3 SOLUTION RESPIRATORY (INHALATION) EVERY 8 HOURS
Status: DISCONTINUED | OUTPATIENT
Start: 2023-04-05 | End: 2023-04-09 | Stop reason: HOSPADM

## 2023-04-05 RX ORDER — PHENYLEPHRINE HCL IN 0.9% NACL 1 MG/10 ML
SYRINGE (ML) INTRAVENOUS
Status: DISCONTINUED
Start: 2023-04-05 | End: 2023-04-05 | Stop reason: WASHOUT

## 2023-04-05 RX ORDER — ENOXAPARIN SODIUM 100 MG/ML
40 INJECTION SUBCUTANEOUS EVERY 24 HOURS
Status: DISCONTINUED | OUTPATIENT
Start: 2023-04-05 | End: 2023-04-09 | Stop reason: HOSPADM

## 2023-04-05 RX ORDER — METHADONE HYDROCHLORIDE 5 MG/5ML
115 SOLUTION ORAL DAILY
Status: DISCONTINUED | OUTPATIENT
Start: 2023-04-06 | End: 2023-04-07

## 2023-04-05 RX ORDER — ETOMIDATE 2 MG/ML
INJECTION INTRAVENOUS
Status: DISCONTINUED
Start: 2023-04-05 | End: 2023-04-05 | Stop reason: WASHOUT

## 2023-04-05 RX ORDER — SODIUM CHLORIDE, SODIUM LACTATE, POTASSIUM CHLORIDE, CALCIUM CHLORIDE 600; 310; 30; 20 MG/100ML; MG/100ML; MG/100ML; MG/100ML
INJECTION, SOLUTION INTRAVENOUS CONTINUOUS
Status: DISCONTINUED | OUTPATIENT
Start: 2023-04-05 | End: 2023-04-07

## 2023-04-05 RX ORDER — IBUPROFEN 400 MG/1
400 TABLET ORAL EVERY 6 HOURS PRN
Status: DISCONTINUED | OUTPATIENT
Start: 2023-04-05 | End: 2023-04-05

## 2023-04-05 RX ORDER — ROCURONIUM BROMIDE 10 MG/ML
INJECTION, SOLUTION INTRAVENOUS
Status: DISCONTINUED
Start: 2023-04-05 | End: 2023-04-05 | Stop reason: WASHOUT

## 2023-04-05 RX ORDER — PROPOFOL 10 MG/ML
INJECTION, EMULSION INTRAVENOUS
Status: DISCONTINUED
Start: 2023-04-05 | End: 2023-04-05 | Stop reason: WASHOUT

## 2023-04-05 RX ORDER — KETOROLAC TROMETHAMINE 15 MG/ML
15 INJECTION, SOLUTION INTRAMUSCULAR; INTRAVENOUS EVERY 6 HOURS PRN
Status: DISPENSED | OUTPATIENT
Start: 2023-04-05 | End: 2023-04-06

## 2023-04-05 RX ORDER — SUCCINYLCHOLINE CHLORIDE 20 MG/ML
INJECTION INTRAMUSCULAR; INTRAVENOUS
Status: DISCONTINUED
Start: 2023-04-05 | End: 2023-04-05 | Stop reason: WASHOUT

## 2023-04-05 RX ADMIN — IPRATROPIUM BROMIDE AND ALBUTEROL SULFATE 3 ML: 2.5; .5 SOLUTION RESPIRATORY (INHALATION) at 04:04

## 2023-04-05 RX ADMIN — HEPARIN SODIUM 5000 UNITS: 5000 INJECTION INTRAVENOUS; SUBCUTANEOUS at 04:04

## 2023-04-05 RX ADMIN — FLUTICASONE FUROATE AND VILANTEROL TRIFENATATE 1 PUFF: 100; 25 POWDER RESPIRATORY (INHALATION) at 07:04

## 2023-04-05 RX ADMIN — KETOROLAC TROMETHAMINE 15 MG: 15 INJECTION, SOLUTION INTRAMUSCULAR; INTRAVENOUS at 08:04

## 2023-04-05 RX ADMIN — SODIUM CHLORIDE, POTASSIUM CHLORIDE, SODIUM LACTATE AND CALCIUM CHLORIDE: 600; 310; 30; 20 INJECTION, SOLUTION INTRAVENOUS at 10:04

## 2023-04-05 RX ADMIN — IPRATROPIUM BROMIDE AND ALBUTEROL SULFATE 3 ML: 2.5; .5 SOLUTION RESPIRATORY (INHALATION) at 11:04

## 2023-04-05 RX ADMIN — ENOXAPARIN SODIUM 40 MG: 40 INJECTION SUBCUTANEOUS at 04:04

## 2023-04-05 RX ADMIN — KETOROLAC TROMETHAMINE 15 MG: 15 INJECTION, SOLUTION INTRAMUSCULAR; INTRAVENOUS at 01:04

## 2023-04-05 RX ADMIN — BUPROPION HYDROCHLORIDE 150 MG: 75 TABLET, FILM COATED ORAL at 08:04

## 2023-04-05 RX ADMIN — FLUTICASONE FUROATE AND VILANTEROL TRIFENATATE 1 PUFF: 100; 25 POWDER RESPIRATORY (INHALATION) at 09:04

## 2023-04-05 RX ADMIN — ONDANSETRON 4 MG: 2 INJECTION INTRAMUSCULAR; INTRAVENOUS at 03:04

## 2023-04-05 RX ADMIN — BUPROPION HYDROCHLORIDE 150 MG: 75 TABLET, FILM COATED ORAL at 09:04

## 2023-04-05 RX ADMIN — AMLODIPINE BESYLATE 10 MG: 10 TABLET ORAL at 09:04

## 2023-04-05 RX ADMIN — TAMSULOSIN HYDROCHLORIDE 0.4 MG: 0.4 CAPSULE ORAL at 09:04

## 2023-04-05 RX ADMIN — SODIUM CHLORIDE, POTASSIUM CHLORIDE, SODIUM LACTATE AND CALCIUM CHLORIDE: 600; 310; 30; 20 INJECTION, SOLUTION INTRAVENOUS at 11:04

## 2023-04-05 RX ADMIN — METRONIDAZOLE 500 MG: 500 INJECTION, SOLUTION INTRAVENOUS at 09:04

## 2023-04-05 RX ADMIN — METHADONE HYDROCHLORIDE 115 MG: 10 TABLET ORAL at 04:04

## 2023-04-05 NOTE — NURSING
"Called to bedside @0746 by respiratory who notified me that patients O2 dropped to 70's and he was placed on highflow.    While myself and respiratory were at bedside patient was attempted to get up and urinate; how he and his wife were educated on the importance of allowing full recovery of oxygenation status before standing.     When placed back in bed, his O2 was 60%. After being sustained between 60-70%, pt was placed on a nonrebreather. After a slight recovery he was then placed on a Venturi mask at 50% 15L.    Pt then complained of severe pressure to lower abdomen due to inability to urinate. I bladder scanned him and the results were greater than 981.     I notified KIMBERLYN Olivares with general surgery.    Per MD "place armendariz and transfer to icu".    Rapid response team at bedside.    Pt placed back on nonrebreather.    Armendariz placed myself and nursing student.    Report called to SICU TULIO Moser    Pt transferred to [id number]  "

## 2023-04-05 NOTE — NURSING
Pt admitted as rapid response to room 44347 from the floor.  Pt on a nonrebreather mask upon arrival with SpO2 in the 90's.  Pt connected to wall monitor and charge nurse/SICU resident/RRT notified of arrival.  Assessment performed upon arrival.  All questions and concerns addressed.

## 2023-04-05 NOTE — PLAN OF CARE
[facility] - Surgical Intensive Care  Initial Discharge Assessment       Primary Care Provider: Jaiden Mak Ii, MD    Admission Diagnosis: Calculus of gallbladder with chronic cholecystitis without obstruction [K80.10]    Admission Date: 4/3/2023  Expected Discharge Date: 4/7/2023    Discharge Barriers Identified: None    Payor: MEDICAID / Plan: [facility] COMMUNITY PLAN [facility] ([place] MEDICAID) / Product Type: Managed Medicaid /     Extended Emergency Contact Information  Primary Emergency Contact: Caren Payton  Address: [facility] [address] [place] [address] [facility]  Mobile Phone: [phone]  Relation: Spouse    Discharge Plan A: Home with family         [facility]  [address] [place] [address]  Phone: [phone] Fax: [phone]    [facility]  [address]  [place] [address]  Phone: [phone] Fax: [phone]      Initial Assessment (most recent)       Adult Discharge Assessment - 04/05/23 0946          Discharge Assessment    Assessment Type Discharge Planning Assessment     Confirmed/corrected address, phone number and insurance Yes     Confirmed Demographics Correct on Facesheet     Source of Information family     Does patient/caregiver understand observation status Yes     People in Home spouse;child(zev), adult;grandchild(zev)     Do you expect to return to your current living situation? Yes     Do you have help at home or someone to help you manage your care at home? Yes     Prior to hospitilization cognitive status: Alert/Oriented     Current cognitive status: Alert/Oriented     Equipment Currently Used at Home none     Do you currently have service(s) that help you manage your care at home? No     Do you take prescription medications? Yes     Do you have prescription coverage? Yes     Do you have any problems affording any of your prescribed medications? No     Is the patient taking medications as prescribed? yes      Who is going to help you get home at discharge? wife     How do you get to doctors appointments? car, drives self     Are you on dialysis? No     Discharge Plan A Home with family     DME Needed Upon Discharge  none     Discharge Plan discussed with: Spouse/sig other     Discharge Barriers Identified None        Physical Activity    On average, how many days per week do you engage in moderate to strenuous exercise (like a brisk walk)? 0 days     On average, how many minutes do you engage in exercise at this level? 0 min        Financial Resource Strain    How hard is it for you to pay for the very basics like food, housing, medical care, and heating? Somewhat hard        Housing Stability    In the last 12 months, was there a time when you were not able to pay the mortgage or rent on time? No     In the last 12 months, how many places have you lived? 1     In the last 12 months, was there a time when you did not have a steady place to sleep or slept in a shelter (including now)? No        Transportation Needs    In the past 12 months, has lack of transportation kept you from medical appointments or from getting medications? No     In the past 12 months, has lack of transportation kept you from meetings, work, or from getting things needed for daily living? No        Food Insecurity    Within the past 12 months, you worried that your food would run out before you got the money to buy more. Sometimes true     Within the past 12 months, the food you bought just didn't last and you didn't have money to get more. Never true        Stress    Do you feel stress - tense, restless, nervous, or anxious, or unable to sleep at night because your mind is troubled all the time - these days? To some extent        Social Connections    In a typical week, how many times do you talk on the phone with family, friends, or neighbors? More than three times a week     How often do you get together with friends or relatives? Once a week      How often do you attend Taoist or Yazidism services? Never     Do you belong to any clubs or organizations such as Taoist groups, unions, fraternal or athletic groups, or school groups? No     How often do you attend meetings of the clubs or organizations you belong to? Never     Are you , , , , never , or living with a partner?         Alcohol Use    Q1: How often do you have a drink containing alcohol? Never     Q2: How many drinks containing alcohol do you have on a typical day when you are drinking? Patient does not drink     Q3: How often do you have six or more drinks on one occasion? Never

## 2023-04-05 NOTE — ASSESSMENT & PLAN NOTE
- No home alfuzosin on formulary, converted to daily tamsulosin   - Gutierrez placed for retention

## 2023-04-05 NOTE — CONSULTS
Single lumen 18G x 10CM midline placed left brachial vein. Max dwell date 5/4/23, Lot# [id number].  Needle advanced into the vessel under real time ultrasound guidance.  Image recorded and saved.

## 2023-04-05 NOTE — H&P
[facility] - Surgical Intensive Care  Critical Care - Surgery  History & Physical    Patient Name: Efra Payton III  MRN: [id number]  Admission Date: 4/3/2023  Code Status: Full Code  Attending Physician: Mahesh Lazaro MD   Primary Care Provider: Jaiden Mak Ii, MD   Principal Problem: Calculus of gallbladder with chronic cholecystitis without obstruction    Subjective:     HPI: Efra Payton III is a 63 y.o. male with a history of HCV cirrhosis (MELD 6), COPD, HTN, BPH, chronic pain (on methadone) s/p laparoscopic cholecystectomy on 4/5 for RUQ and gallstones. On POD1 he was somnolent and difficult to arouse on morning rounds. Primary team was notified of a sudden episode of desaturation into 50s, with subsequent rapid response team intervention. Patient was placed on 15L oxygen nonrebreather and O2 sats improved to 80s 90s. The decision was made to step up patient to floor for close monitoring and preparation for possible intubation in case of worsening respiratory status.       On arrival to SICU, patient is AAOx3 on oxygen non rebreather at 15L, verbal and cooperative.       On arrival to SICU, patient is   Hospital/ICU Course:   No notes on file    Follow-up For: Procedure(s) (LRB):  CHOLECYSTECTOMY, LAPAROSCOPIC (N/A)    Post-Operative Day: 1 Day Post-Op     Past Medical History:   Diagnosis Date    Cirrhosis     COPD (chronic obstructive pulmonary disease)     Eczema     Hepatitis C virus infection cured after antiviral drug therapy     s/p Rx, treated / cured - svr 2021    History of drug abuse     Now on methadone    Substance abuse        Past Surgical History:   Procedure Laterality Date    arm surgery      COLONOSCOPY N/A 7/8/2020    Procedure: COLONOSCOPY;  Surgeon: Mona Powell MD;  Location: [facility] ([address] [place] [address]);  Service: Endoscopy;  Laterality: N/A;  covid [place]-7/5-tb-hx copd    COLONOSCOPY N/A 5/27/2021    Procedure: COLONOSCOPY;  Surgeon: Mona Powell MD;  Location: [facility]  (4TH FLR);  Service: Endoscopy;  Laterality: N/A;  Constipation prep - pg  CBC, PT/INR (cirrhosis) done 5/4/21 - pg  Covid-19 test 5/24/21 at [facility] - .5/26 Called pt. to move up earlier.Left VM.EC    ENDOSCOPIC ULTRASOUND OF UPPER GASTROINTESTINAL TRACT N/A 3/24/2021    Procedure: ULTRASOUND, UPPER GI TRACT, ENDOSCOPIC - w/ varices screen;  Surgeon: George Carrillo MD;  Location: [facility] (2ND FLR);  Service: Endoscopy;  Laterality: N/A;  Covid-19 test 3/21/21 at [facility] -   PM prep    HAND SURGERY      LAPAROSCOPIC CHOLECYSTECTOMY N/A 4/4/2023    Procedure: CHOLECYSTECTOMY, LAPAROSCOPIC;  Surgeon: Mahesh Lazaro MD;  Location: [facility] OR [address] [place] [address];  Service: General;  Laterality: N/A;       Review of patient's allergies indicates:   Allergen Reactions    Tylenol [acetaminophen] Other (See Comments)     Can't take Tylenol while he is taking Methadone       Family History       Problem Relation (Age of Onset)    Colon cancer Father, Brother, Paternal Grandfather    Diabetes Father, Sister, Brother, Paternal Grandmother, Paternal Grandfather    Heart attack Paternal Grandmother    Heart disease Father, Paternal Grandmother    Hypertension Sister, Brother          Tobacco Use    Smoking status: Every Day     Packs/day: 1.00     Years: 49.00     Pack years: 49.00     Types: Cigarettes     Start date: 7/25/1969    Smokeless tobacco: Never    Tobacco comments:     decreased his smoking to 1 pack per day 5 months ago    Substance and Sexual Activity    Alcohol use: No     Comment: has past history of alcohol abuse     Drug use: No     Comment: has past history of substance abuse     Sexual activity: Not on file      Review of Systems   Constitutional:  Negative for chills, fatigue, fever and unexpected weight change.   Respiratory:  Positive for apnea. Negative for chest tightness.    Cardiovascular:  Negative for chest pain.   Gastrointestinal:  Positive for abdominal pain. Negative for abdominal distention.    Genitourinary:  Positive for difficulty urinating.   Objective:     Vital Signs (Most Recent):  Temp: 97.4 °F (36.3 °C) (04/05/23 0900)  Pulse: 94 (04/05/23 0903)  Resp: 13 (04/05/23 0903)  BP: 128/63 (04/05/23 0903)  SpO2: (!) 93 % (04/05/23 0903)   Vital Signs (24h Range):  Temp:  [96.6 °F (35.9 °C)-99.5 °F (37.5 °C)] 97.4 °F (36.3 °C)  Pulse:  [] 94  Resp:  [13-43] 13  SpO2:  [69 %-94 %] 93 %  BP: (100-158)/(49-83) 128/63     Weight: 71 kg (156 lb 8.4 oz)  Body mass index is 24.52 kg/m².      Intake/Output Summary (Last 24 hours) at 4/5/2023 0913  Last data filed at 4/5/2023 0903  Gross per 24 hour   Intake 2700 ml   Output 1400 ml   Net 1300 ml       Physical Exam  Constitutional:       Appearance: Normal appearance.   HENT:      Head: Normocephalic.      Nose:      Comments: On 15L o2 nonrebreather     Mouth/Throat:      Mouth: Mucous membranes are moist.   Cardiovascular:      Rate and Rhythm: Normal rate.      Pulses: Normal pulses.   Abdominal:      General: Abdomen is flat.      Palpations: Abdomen is soft.      Tenderness: There is abdominal tenderness (RUQ tenderness, appropriate).      Comments: Incisions cdi   Musculoskeletal:      Comments: Varicose veins    Skin:     General: Skin is warm and dry.   Neurological:      General: No focal deficit present.      Mental Status: He is alert and oriented to person, place, and time.      Motor: No weakness.       Vents:  Oxygen Concentration (%): 100 (04/05/23 0900)    Lines/Drains/Airways       Drain  Duration                  Urethral Catheter 04/05/23 <1 day              Peripheral Intravenous Line  Duration                  Peripheral IV - Single Lumen 04/04/23 1250 18 G Right Antecubital <1 day                    Significant Labs:    CBC/Anemia Profile:  Recent Labs   Lab 04/03/23  1653 04/03/23  1658 04/04/23  0412 04/05/23  0443   WBC 9.01  --  5.84 7.83   HGB 12.4*  --  11.4* 10.3*   HCT 38.3* 38 36.5* 34.0*   *  --  112* 121*   MCV 97   --  100* 101*   RDW 12.9  --  13.1 13.2        Chemistries:  Recent Labs   Lab 04/03/23  1653 04/04/23  0412 04/04/23 2047 04/05/23  0443    138 136 137   K 4.2 4.8 5.1 4.8    105 108 108   CO2 25 25 18* 21*   BUN 21 23 21 21   CREATININE 1.4 1.6* 1.3 0.9   CALCIUM 9.3 9.2 8.6* 8.5*   ALBUMIN 3.7 3.4*  --  3.5   PROT 7.6 6.9  --  6.7   BILITOT 0.3 0.3  --  0.4   ALKPHOS 149* 126  --  97   ALT 30 23  --  25   AST 26 23  --  32   MG  --  2.4  --  2.0   PHOS  --  3.9  --  2.7           Significant Imaging: I have reviewed all pertinent imaging results/findings within the past 24 hours.    Assessment/Plan:     Active Diagnoses:    Diagnosis Date Noted POA    PRINCIPAL PROBLEM:  Calculus of gallbladder with chronic cholecystitis without obstruction [K80.10] 04/03/2023 Yes    MARILYN (acute kidney injury) [N17.9] 04/04/2023 Yes    BPH (benign prostatic hyperplasia) [N40.0] 12/20/2022 Yes    Methadone maintenance therapy patient [F11.20] 12/31/2020 Yes    Essential hypertension [I10] 09/17/2019 Yes    Other emphysema [J43.8]  Yes      Problems Resolved During this Admission:         Neuro/Psych:   -- Sedation: none   -- Pain: toradol 15mg q6, today, advil tomorrow, methadone             Cards:   -- HDS  -- No chronic cardia      Pulm:   -- Goal O2 sat > 90%  -- Wean as able  - COPD  -IS      Renal:  -- Keep armendariz for strict I/O  -- BUN/Cr 21/0.9      FEN / GI:   -- Net +2400ml  -- Replace lytes as needed  -- Nutrition: NPO      ID:   -- Tm: afebrile; WBC 7.8  -- Abx metronidazole, cefepime      Heme/Onc:   -- H/H stable 10.3  -- Daily CBC      Endo:   -- Gluc goal 140-180  -- LR 75ml/hr       PPx:   Feeding: NPO  Analgesia/Sedation: none / toradol, methadone, advil  Thromboembolic prevention: lovenox  HOB >30: no  Stress Ulcer ppx: none  Glucose control: Critical care goal 140-180 g/dl, ISS    Lines/Drains/Airway: armendarizCHAYO      Dispo/Code Status/Palliative:   -- SICU / Full Code         Critical care was time  spent personally by me on the following activities: development of treatment plan with patient or surrogate and bedside caregivers, discussions with consultants, evaluation of patient's response to treatment, examination of patient, ordering and performing treatments and interventions, ordering and review of laboratory studies, ordering and review of radiographic studies, pulse oximetry, re-evaluation of patient's condition.  This critical care time did not overlap with that of any other provider or involve time for any procedures.     Li Davis MD  Critical Care - Surgery  [facility] - Surgical Intensive Care

## 2023-04-05 NOTE — PROGRESS NOTES
ABG's that were drawn at 1359 and 1412 were venous and was emailed to chart correction to be fixed.

## 2023-04-05 NOTE — PLAN OF CARE
"      SICU PLAN OF CARE NOTE    Dx: Calculus of gallbladder with chronic cholecystitis without obstruction    Shift Events: RR from 5th floor.  Pt transitioned from nonrebreather to airvo.  SpO2 to remain > 88%.  Chest and abdominal xray obtained upon arrival.  Midline placed.      Goals of Care: SpO2 > 88%    Neuro: AAO x4, Follows Commands, and Moves All Extremities    Vital Signs: BP (!) 107/58 (BP Location: Right arm, Patient Position: Lying)   Pulse 75   Temp 98.3 °F (36.8 °C) (Oral)   Resp 15   Ht 5' 7" (1.702 m)   Wt 71 kg (156 lb 8.4 oz)   SpO2 96%   BMI 24.52 kg/m²     Cardiac: NSR    Respiratory: HFNC    Diet: NPO    Gtts: MIVF    Urine Output: Urinary Catheter 40-65 cc/hour (1200 CC removed upon placement of armendariz initially)    Labs/Accuchecks: Daily labs.    Skin: Pt skin dry and intact.  No skin breakdown evident upon admission to unit.  4eyes assessment performed and charted.  Foam padding applied to sacrum and heels.  Pt able to shfit weight independently in bed.  Lap sites intact with dermabond in place.  Waffle mattress on bed.    View flowsheet for full assessment details.      "

## 2023-04-05 NOTE — CODE/ RAPID DOCUMENTATION
RAPID RESPONSE NURSE PROACTIVE ROUNDING NOTE       Time of Visit: 815    Admit Date: 4/3/2023  LOS: 0  Code Status: Full Code   Date of Visit: 2023  : 1960  Age: 63 y.o.  Sex: male  Race: White  Bed: 544/544 A:   MRN: [id number]  Was the patient discharged from an ICU this admission? No   Was the patient discharged from a PACU within last 24 hours? Yes   Did the patient receive conscious sedation/general anesthesia in last 24 hours? No  Was the patient in the ED within the past 24 hours? No  Was the patient on NIPPV within the past 24 hours? No   Attending Physician: Mahesh Lazaro MD  Primary Service: General Surgery,Surgery   Time spent at the bedside: 15 -30 min    SITUATION    Notified by Rapid Response RT.  Reason for alert: Hypoxia despite supplemental O2  Called to evaluate the patient for Respiratory    BACKGROUND     Why is the patient in the hospital?: Calculus of gallbladder with chronic cholecystitis without obstruction    Patient has a past medical history of Cirrhosis, COPD (chronic obstructive pulmonary disease), Eczema, Hepatitis C virus infection cured after antiviral drug therapy, History of drug abuse, and Substance abuse.    Last Vitals:  Temp: 96.6 °F (35.9 °C) (809)  Pulse: 98 (809)  Resp: 32 (809)  BP: 133/70 (809)  SpO2: 89 % (809)    24 Hours Vitals Range:  Temp:  [96.6 °F (35.9 °C)-99.5 °F (37.5 °C)]   Pulse:  []   Resp:  [15-32]   BP: (100-158)/(49-83)   SpO2:  [69 %-93 %]     Labs:  Recent Labs     23  1653 04/03/23  16523   WBC 9.01  --  5.84 7.83   HGB 12.4*  --  11.4* 10.3*   HCT 38.3* 38 36.5* 34.0*   *  --  112* 121*       Recent Labs     23  0412 23    136 137   K 4.8 5.1 4.8    108 108   CO2 25 18* 21*   CREATININE 1.6* 1.3 0.9    148* 128*   PHOS 3.9  --  2.7   MG 2.4  --  2.0        No results for input(s): PH, PCO2, PO2, HCO3,  POCSATURATED, BE in the last 72 hours.     ASSESSMENT    Physical Exam    INTERVENTIONS    The patient was seen for Respiratory problem. Staff concerns included tachypnea, new onset of difficulty breathing, oxygen saturation < 90% despite supplemental oxygen, increased WOB, and increased oxygen requirements. The following interventions were performed: supplemental oxygen, POCT arterial blood gas , portable chest x-ray, continued pulse ox monitoring, and continued cardiac monitoring.    RECOMMENDATIONS    HLOC    PROVIDER ESCALATION    Yes/No  yes    Orders received and case discussed with  KIMBERLYN Olivares.    Disposition: Tx in ICU bed 34809.    FOLLOW-UP    bedside RNDaxa  updated on plan of care. Instructed to call the Rapid Response Nurse, Joann Souza RN at [phone] for additional questions or concerns.

## 2023-04-05 NOTE — ASSESSMENT & PLAN NOTE
Efra Payton III is a 63 y.o. male with h/o HCV cirrhosis (MELD 6), COPD, HTN, BPH, chronic pain (on methadone), and known gallstones who presented with RUQ pain and known cholelithiasis and a 1.9 cm stone. S/p lap soha on 4/4/23. Now with impending respiratory failure requiring step-up to the SICU    - Step up to SICU  - NPO. Aspiration risk. F/u CXR  - Antibiotics (cefepime, flagyl) per SICU  - PRN pain and nausea medications  - Daily labs  - Replete electrolytes PRN  - DVT prophylaxis (SCDs and heparin)  - OOB, ambulate  - IS    Dispo: SICU.

## 2023-04-05 NOTE — SUBJECTIVE & OBJECTIVE
Interval History:   -After rounds, he had a desat down to 60% while attempting to urinate this morning. Unable to maintain acceptable sats. Stepped up to ICU. See nursing note for full details of this event.  -Gutierrez placed, 1.2 L of urine returned    Medications:  Continuous Infusions:   sodium chloride 0.9% Stopped (04/04/23 1432)     Scheduled Meds:   amLODIPine  10 mg Oral Daily    buPROPion  150 mg Oral BID    ceFEPime (MAXIPIME) IVPB  2 g Intravenous Q12H    fluticasone furoate-vilanteroL  1 puff Inhalation Daily    heparin (porcine)  5,000 Units Subcutaneous Q8H    methadone  115 mg Oral Daily    metronidazole  500 mg Intravenous Q8H    tamsulosin  0.4 mg Oral Daily     PRN Meds:albuterol, ipratropium-albuteroL, melatonin, ondansetron, sodium chloride 0.9%     Review of patient's allergies indicates:   Allergen Reactions    Tylenol [acetaminophen] Other (See Comments)     Can't take Tylenol while he is taking Methadone     Objective:     Vital Signs (Most Recent):  Temp: 97.4 °F (36.3 °C) (04/05/23 0900)  Pulse: 89 (04/05/23 1001)  Resp: (!) 24 (04/05/23 0956)  BP: 128/63 (04/05/23 0903)  SpO2: (!) 90 % (04/05/23 0930)   Vital Signs (24h Range):  Temp:  [96.6 °F (35.9 °C)-99.5 °F (37.5 °C)] 97.4 °F (36.3 °C)  Pulse:  [] 89  Resp:  [13-43] 24  SpO2:  [69 %-94 %] 90 %  BP: (100-158)/(49-83) 128/63     Weight: 71 kg (156 lb 8.4 oz)  Body mass index is 24.52 kg/m².    Intake/Output - Last 3 Shifts         04/03 0700 04/04 0659 04/04 0700 04/05 0659 04/05 0700 04/06 0659    I.V. (mL/kg)  2700 (38)     IV Piggyback 1100      Total Intake(mL/kg) 1100 (15.5) 2700 (38)     Urine (mL/kg/hr)  200 (0.1) 1200 (5.5)    Total Output  200 1200    Net +1100 +2500 -1200                   Physical Exam  Vitals and nursing note reviewed.   Constitutional:       General: He is not in acute distress.     Appearance: He is not ill-appearing or diaphoretic.   HENT:      Head: Normocephalic and atraumatic.      Mouth/Throat:       Mouth: Mucous membranes are moist.      Pharynx: Oropharynx is clear.   Eyes:      Extraocular Movements: Extraocular movements intact.      Conjunctiva/sclera: Conjunctivae normal.   Cardiovascular:      Rate and Rhythm: Normal rate.   Pulmonary:      Effort: Respiratory distress present.   Abdominal:      General: There is no distension.      Palpations: Abdomen is soft.      Tenderness: There is no guarding or rebound.      Comments: TTP in RUQ. Negative Bynum's. No peritonitic signs    Musculoskeletal:         General: No deformity.   Skin:     General: Skin is warm and dry.      Coloration: Skin is not jaundiced.   Neurological:      Mental Status: He is alert and oriented to person, place, and time.       Significant Labs:  I have reviewed all pertinent lab results within the past 24 hours.  CBC:   Recent Labs   Lab 04/05/23  0443   WBC 7.83   RBC 3.38*   HGB 10.3*   HCT 34.0*   *   *   MCH 30.5   MCHC 30.3*       Significant Diagnostics:  I have reviewed all pertinent imaging results/findings within the past 24 hours.

## 2023-04-05 NOTE — NURSING TRANSFER
Nursing Transfer Note      4/5/2023     Reason patient is being transferred: Hypoxia    Transfer From: [facility] to SICU [address]    Transfer via bed    Transfer with 15L non rebreather, cardiac monitoring    Transported by Rapid RN x2, RN x1, PCT x1    Medicines sent: none    Any special needs or follow-up needed: ABG and Chest X-ray    Chart send with patient: Yes    Notified: spouse    Patient reassessed at: 0855 4/5/23    Upon arrival to floor: cardiac monitor applied, patient oriented to room, call bell in reach, and bed in lowest position

## 2023-04-05 NOTE — NURSING
Nurses Note -- 4 Eyes      4/5/2023   11:33 AM      Skin assessed during: Admit      [x] No Pressure Injuries Present    [x]Prevention Measures Documented      [] Yes- Altered Skin Integrity Present or Discovered   [] LDA Added if Not in Epic (Describe Wound)   [] New Altered Skin Integrity was Present on Admit and Documented in LDA   [] Wound Image Taken    Wound Care Consulted? No    Attending Nurse:  Ehsan Shaw RN     Second RN/Staff Member:  Joanne Lawson RN

## 2023-04-05 NOTE — SIGNIFICANT EVENT
Notified via secure chat that patient desatted to 60% while standing and attempting to urinate.     Immediately presented at bedside, rapid team present. Gutierrez had been placed for bladder scan >900cc with adequate output. Patient satting 80-85% on 15L NBR. BP 120s/70s. HR 90s.     Physical Exam  Vitals and nursing note reviewed.   Constitutional:       General: He is not in acute distress.     Appearance: He is not ill-appearing or diaphoretic.      Comments: 15L NBR   HENT:      Head: Normocephalic and atraumatic.      Mouth/Throat:      Mouth: Mucous membranes are moist.      Pharynx: Oropharynx is clear.   Eyes:      Extraocular Movements: Extraocular movements intact.      Conjunctiva/sclera: Conjunctivae normal.   Cardiovascular:      Rate and Rhythm: Normal rate.   Pulmonary:      Effort: Respiratory distress present.   Abdominal:      General: There is no distension.      Palpations: Abdomen is soft.      Tenderness: There is no guarding or rebound.      Comments: Incision is clean, dry, and intact without drainage, erythema, or increased warmth. Appropriately TTP.   Genitourinary:     Comments: Gutierrez in place  Musculoskeletal:         General: No deformity.   Skin:     General: Skin is warm and dry.      Coloration: Skin is not jaundiced.   Neurological:      Mental Status: He is alert and oriented to person, place, and time.       POCT ABG  CXR, KUB  Pulse ox, Tele   NPO  Transfer to SICU    Case discussed with Dr. Guillen and Dr. Lazaro as well as nursing.       Ehsan Olivares PA-C  General Surgery   [facility] ENGL[facility]

## 2023-04-05 NOTE — PROGRESS NOTES
[facility] - [facility]  General Surgery  Progress Note    Subjective:     History of Present Illness:  Efra Payton III is a 63 y.o. male with a history of HCV cirrhosis (MELD 6), COPD, HTN, BPH, chronic pain (on methadone), and known gallstones who presented with RUQ pain. He has had this pain for a couple of years intermittently and typically occurs after eating. He has been looking into other etiologies such as constipation and a inguinal hernia which he had repaired laparoscopically 6/2022. The pain has persisted throughout this. He went to [facility] for evaluation yesterday as the pain was quite severe and was told he likely needed to have his gallbladder taken out. He ultimately decided he would prefer to be evaluated here so left and came to our ED.     Upon arrival he was AF and HDS. Labs showed a normal WBC without left shift. His chem panel showed an MARILYN with BUN/Cr 21/1.4 (baseline Cr 0.8-1.1). US showed multiple gallstones with the largest measuring 1.9 cm. The GB was noted to be mildly distended with diffuse GB wall thickening to 5 mm and there was trace pericholecystic fluid.    With regards to his chronic problems, he is a well compensated cirrhotic with no known varices and has never had to get a paracentesis. He has no known coagulopathy. He used to be a smoker with 1 PPDx49 years and recently quit Jan 1. He says he feels his breathing has improved significantly since then. He would be able to climb a flight of stairs. Did not have any respiratory issues after his lap inguinal in June.      Post-Op Info:  Procedure(s) (LRB):  CHOLECYSTECTOMY, LAPAROSCOPIC (N/A)   1 Day Post-Op     Interval History:   -After rounds, he had a desat down to 60% while attempting to urinate this morning. Unable to maintain acceptable sats. Stepped up to ICU. See nursing note for full details of this event.  -Gutierrez placed, 1.2 L of urine returned    Medications:  Continuous Infusions:   sodium  chloride 0.9% Stopped (04/04/23 1432)     Scheduled Meds:   amLODIPine  10 mg Oral Daily    buPROPion  150 mg Oral BID    ceFEPime (MAXIPIME) IVPB  2 g Intravenous Q12H    fluticasone furoate-vilanteroL  1 puff Inhalation Daily    heparin (porcine)  5,000 Units Subcutaneous Q8H    methadone  115 mg Oral Daily    metronidazole  500 mg Intravenous Q8H    tamsulosin  0.4 mg Oral Daily     PRN Meds:albuterol, ipratropium-albuteroL, melatonin, ondansetron, sodium chloride 0.9%     Review of patient's allergies indicates:   Allergen Reactions    Tylenol [acetaminophen] Other (See Comments)     Can't take Tylenol while he is taking Methadone     Objective:     Vital Signs (Most Recent):  Temp: 97.4 °F (36.3 °C) (04/05/23 0900)  Pulse: 89 (04/05/23 1001)  Resp: (!) 24 (04/05/23 0956)  BP: 128/63 (04/05/23 0903)  SpO2: (!) 90 % (04/05/23 0930)   Vital Signs (24h Range):  Temp:  [96.6 °F (35.9 °C)-99.5 °F (37.5 °C)] 97.4 °F (36.3 °C)  Pulse:  [] 89  Resp:  [13-43] 24  SpO2:  [69 %-94 %] 90 %  BP: (100-158)/(49-83) 128/63     Weight: 71 kg (156 lb 8.4 oz)  Body mass index is 24.52 kg/m².    Intake/Output - Last 3 Shifts         04/03 0700  04/04 0659 04/04 0700  04/05 0659 04/05 0700  04/06 0659    I.V. (mL/kg)  2700 (38)     IV Piggyback 1100      Total Intake(mL/kg) 1100 (15.5) 2700 (38)     Urine (mL/kg/hr)  200 (0.1) 1200 (5.5)    Total Output  200 1200    Net +1100 +2500 -1200                   Physical Exam  Vitals and nursing note reviewed.   Constitutional:       General: He is not in acute distress.     Appearance: He is not ill-appearing or diaphoretic.   HENT:      Head: Normocephalic and atraumatic.      Mouth/Throat:      Mouth: Mucous membranes are moist.      Pharynx: Oropharynx is clear.   Eyes:      Extraocular Movements: Extraocular movements intact.      Conjunctiva/sclera: Conjunctivae normal.   Cardiovascular:      Rate and Rhythm: Normal rate.   Pulmonary:      Effort: Respiratory distress  present.   Abdominal:      General: There is no distension.      Palpations: Abdomen is soft.      Tenderness: There is no guarding or rebound.      Comments: TTP in RUQ. Negative Bynum's. No peritonitic signs    Musculoskeletal:         General: No deformity.   Skin:     General: Skin is warm and dry.      Coloration: Skin is not jaundiced.   Neurological:      Mental Status: He is alert and oriented to person, place, and time.       Significant Labs:  I have reviewed all pertinent lab results within the past 24 hours.  CBC:   Recent Labs   Lab 04/05/23  0443   WBC 7.83   RBC 3.38*   HGB 10.3*   HCT 34.0*   *   *   MCH 30.5   MCHC 30.3*       Significant Diagnostics:  I have reviewed all pertinent imaging results/findings within the past 24 hours.    Assessment/Plan:     * Calculus of gallbladder with chronic cholecystitis without obstruction  Efra A Fito CANALES is a 63 y.o. male with h/o HCV cirrhosis (MELD 6), COPD, HTN, BPH, chronic pain (on methadone), and known gallstones who presented with RUQ pain and known cholelithiasis and a 1.9 cm stone. S/p lap soha on 4/4/23. Now with impending respiratory failure requiring step-up to the SICU    - Step up to SICU  - NPO. Aspiration risk. F/u CXR  - Antibiotics (cefepime, flagyl) per SICU  - PRN pain and nausea medications  - Daily labs  - Replete electrolytes PRN  - DVT prophylaxis (SCDs and heparin)  - OOB, ambulate  - IS  - Remainder of care per SICU    Dispo: SICU    Jose M Zimmerman MD  General Surgery  [facility] - Surgical Intensive Care

## 2023-04-06 PROBLEM — J96.02 ACUTE RESPIRATORY FAILURE WITH HYPOXIA AND HYPERCARBIA: Status: ACTIVE | Noted: 2023-04-06

## 2023-04-06 PROBLEM — J96.01 ACUTE RESPIRATORY FAILURE WITH HYPOXIA AND HYPERCARBIA: Status: ACTIVE | Noted: 2023-04-06

## 2023-04-06 LAB
ALBUMIN SERPL BCP-MCNC: 3 G/DL (ref 3.5–5.2)
ALLENS TEST: ABNORMAL
ALLENS TEST: ABNORMAL
ALP SERPL-CCNC: 85 U/L (ref 55–135)
ALT SERPL W/O P-5'-P-CCNC: 24 U/L (ref 10–44)
ANION GAP SERPL CALC-SCNC: 9 MMOL/L (ref 8–16)
AST SERPL-CCNC: 38 U/L (ref 10–40)
BASOPHILS # BLD AUTO: 0.02 K/UL (ref 0–0.2)
BASOPHILS NFR BLD: 0.4 % (ref 0–1.9)
BILIRUB SERPL-MCNC: 0.3 MG/DL (ref 0.1–1)
BUN SERPL-MCNC: 21 MG/DL (ref 8–23)
CALCIUM SERPL-MCNC: 8.5 MG/DL (ref 8.7–10.5)
CHLORIDE SERPL-SCNC: 105 MMOL/L (ref 95–110)
CO2 SERPL-SCNC: 24 MMOL/L (ref 23–29)
CREAT SERPL-MCNC: 0.8 MG/DL (ref 0.5–1.4)
DELSYS: ABNORMAL
DELSYS: ABNORMAL
DIFFERENTIAL METHOD: ABNORMAL
EOSINOPHIL # BLD AUTO: 0 K/UL (ref 0–0.5)
EOSINOPHIL NFR BLD: 0.2 % (ref 0–8)
ERYTHROCYTE [DISTWIDTH] IN BLOOD BY AUTOMATED COUNT: 13.2 % (ref 11.5–14.5)
EST. GFR  (NO RACE VARIABLE): >60 ML/MIN/1.73 M^2
GLUCOSE SERPL-MCNC: 84 MG/DL (ref 70–110)
HCO3 UR-SCNC: 25.7 MMOL/L (ref 24–28)
HCO3 UR-SCNC: 26.7 MMOL/L (ref 24–28)
HCT VFR BLD AUTO: 30.5 % (ref 40–54)
HGB BLD-MCNC: 9.5 G/DL (ref 14–18)
IMM GRANULOCYTES # BLD AUTO: 0.01 K/UL (ref 0–0.04)
IMM GRANULOCYTES NFR BLD AUTO: 0.2 % (ref 0–0.5)
LYMPHOCYTES # BLD AUTO: 0.6 K/UL (ref 1–4.8)
LYMPHOCYTES NFR BLD: 11.2 % (ref 18–48)
MAGNESIUM SERPL-MCNC: 1.9 MG/DL (ref 1.6–2.6)
MCH RBC QN AUTO: 30.7 PG (ref 27–31)
MCHC RBC AUTO-ENTMCNC: 31.1 G/DL (ref 32–36)
MCV RBC AUTO: 99 FL (ref 82–98)
MONOCYTES # BLD AUTO: 0.7 K/UL (ref 0.3–1)
MONOCYTES NFR BLD: 11.6 % (ref 4–15)
NEUTROPHILS # BLD AUTO: 4.4 K/UL (ref 1.8–7.7)
NEUTROPHILS NFR BLD: 76.4 % (ref 38–73)
NRBC BLD-RTO: 0 /100 WBC
PCO2 BLDA: 54.5 MMHG (ref 35–45)
PCO2 BLDA: 56.2 MMHG (ref 35–45)
PH SMN: 7.27 [PH] (ref 7.35–7.45)
PH SMN: 7.3 [PH] (ref 7.35–7.45)
PHOSPHATE SERPL-MCNC: 1.9 MG/DL (ref 2.7–4.5)
PHOSPHATE SERPL-MCNC: 2.3 MG/DL (ref 2.7–4.5)
PLATELET # BLD AUTO: 128 K/UL (ref 150–450)
PMV BLD AUTO: 10.9 FL (ref 9.2–12.9)
PO2 BLDA: 35 MMHG (ref 80–100)
PO2 BLDA: 38 MMHG (ref 80–100)
POC BE: -1 MMOL/L
POC BE: 0 MMOL/L
POC SATURATED O2: 58 % (ref 95–100)
POC SATURATED O2: 65 % (ref 95–100)
POC TCO2: 27 MMOL/L (ref 23–27)
POC TCO2: 28 MMOL/L (ref 23–27)
POTASSIUM SERPL-SCNC: 4.3 MMOL/L (ref 3.5–5.1)
PROT SERPL-MCNC: 6.2 G/DL (ref 6–8.4)
RBC # BLD AUTO: 3.09 M/UL (ref 4.6–6.2)
SAMPLE: ABNORMAL
SAMPLE: ABNORMAL
SITE: ABNORMAL
SITE: ABNORMAL
SODIUM SERPL-SCNC: 138 MMOL/L (ref 136–145)
WBC # BLD AUTO: 5.69 K/UL (ref 3.9–12.7)

## 2023-04-06 PROCEDURE — 99233 PR SUBSEQUENT HOSPITAL CARE,LEVL III: ICD-10-PCS | Mod: 24,,, | Performed by: STUDENT IN AN ORGANIZED HEALTH CARE EDUCATION/TRAINING PROGRAM

## 2023-04-06 PROCEDURE — 27000646 HC AEROBIKA DEVICE

## 2023-04-06 PROCEDURE — 84100 ASSAY OF PHOSPHORUS: CPT | Mod: 91 | Performed by: SURGERY

## 2023-04-06 PROCEDURE — 94761 N-INVAS EAR/PLS OXIMETRY MLT: CPT

## 2023-04-06 PROCEDURE — 85025 COMPLETE CBC W/AUTO DIFF WBC: CPT | Performed by: STUDENT IN AN ORGANIZED HEALTH CARE EDUCATION/TRAINING PROGRAM

## 2023-04-06 PROCEDURE — 63600175 PHARM REV CODE 636 W HCPCS

## 2023-04-06 PROCEDURE — 63600175 PHARM REV CODE 636 W HCPCS: Performed by: STUDENT IN AN ORGANIZED HEALTH CARE EDUCATION/TRAINING PROGRAM

## 2023-04-06 PROCEDURE — 94664 DEMO&/EVAL PT USE INHALER: CPT

## 2023-04-06 PROCEDURE — 83735 ASSAY OF MAGNESIUM: CPT | Performed by: STUDENT IN AN ORGANIZED HEALTH CARE EDUCATION/TRAINING PROGRAM

## 2023-04-06 PROCEDURE — 25000003 PHARM REV CODE 250

## 2023-04-06 PROCEDURE — 25000003 PHARM REV CODE 250: Performed by: STUDENT IN AN ORGANIZED HEALTH CARE EDUCATION/TRAINING PROGRAM

## 2023-04-06 PROCEDURE — 25000242 PHARM REV CODE 250 ALT 637 W/ HCPCS

## 2023-04-06 PROCEDURE — 27100171 HC OXYGEN HIGH FLOW UP TO 24 HOURS

## 2023-04-06 PROCEDURE — 94640 AIRWAY INHALATION TREATMENT: CPT

## 2023-04-06 PROCEDURE — 80053 COMPREHEN METABOLIC PANEL: CPT | Performed by: STUDENT IN AN ORGANIZED HEALTH CARE EDUCATION/TRAINING PROGRAM

## 2023-04-06 PROCEDURE — 27000221 HC OXYGEN, UP TO 24 HOURS

## 2023-04-06 PROCEDURE — 25000242 PHARM REV CODE 250 ALT 637 W/ HCPCS: Performed by: STUDENT IN AN ORGANIZED HEALTH CARE EDUCATION/TRAINING PROGRAM

## 2023-04-06 PROCEDURE — 99900035 HC TECH TIME PER 15 MIN (STAT)

## 2023-04-06 PROCEDURE — 84100 ASSAY OF PHOSPHORUS: CPT | Performed by: STUDENT IN AN ORGANIZED HEALTH CARE EDUCATION/TRAINING PROGRAM

## 2023-04-06 PROCEDURE — 99233 SBSQ HOSP IP/OBS HIGH 50: CPT | Mod: 24,,, | Performed by: STUDENT IN AN ORGANIZED HEALTH CARE EDUCATION/TRAINING PROGRAM

## 2023-04-06 PROCEDURE — S0109 METHADONE ORAL 5MG: HCPCS | Performed by: STUDENT IN AN ORGANIZED HEALTH CARE EDUCATION/TRAINING PROGRAM

## 2023-04-06 PROCEDURE — 20000000 HC ICU ROOM

## 2023-04-06 RX ORDER — POLYETHYLENE GLYCOL 3350 17 G/17G
17 POWDER, FOR SOLUTION ORAL 2 TIMES DAILY
Status: DISCONTINUED | OUTPATIENT
Start: 2023-04-06 | End: 2023-04-09

## 2023-04-06 RX ORDER — CALCIUM GLUCONATE 20 MG/ML
1 INJECTION, SOLUTION INTRAVENOUS
Status: DISCONTINUED | OUTPATIENT
Start: 2023-04-06 | End: 2023-04-09

## 2023-04-06 RX ORDER — ONDANSETRON 2 MG/ML
4 INJECTION INTRAMUSCULAR; INTRAVENOUS ONCE
Status: COMPLETED | OUTPATIENT
Start: 2023-04-06 | End: 2023-04-06

## 2023-04-06 RX ORDER — LACTULOSE 10 G/15ML
10 SOLUTION ORAL 3 TIMES DAILY
Status: DISCONTINUED | OUTPATIENT
Start: 2023-04-06 | End: 2023-04-09 | Stop reason: HOSPADM

## 2023-04-06 RX ORDER — CALCIUM GLUCONATE 20 MG/ML
3 INJECTION, SOLUTION INTRAVENOUS
Status: DISCONTINUED | OUTPATIENT
Start: 2023-04-06 | End: 2023-04-09

## 2023-04-06 RX ORDER — PROCHLORPERAZINE MALEATE 5 MG
5 TABLET ORAL 3 TIMES DAILY PRN
Status: DISCONTINUED | OUTPATIENT
Start: 2023-04-06 | End: 2023-04-06

## 2023-04-06 RX ORDER — PSEUDOEPHEDRINE/ACETAMINOPHEN 30MG-500MG
100 TABLET ORAL
Status: DISCONTINUED | OUTPATIENT
Start: 2023-04-06 | End: 2023-04-06

## 2023-04-06 RX ORDER — POTASSIUM CHLORIDE 7.45 MG/ML
40 INJECTION INTRAVENOUS
Status: DISCONTINUED | OUTPATIENT
Start: 2023-04-06 | End: 2023-04-09

## 2023-04-06 RX ORDER — MAGNESIUM SULFATE HEPTAHYDRATE 40 MG/ML
4 INJECTION, SOLUTION INTRAVENOUS
Status: DISCONTINUED | OUTPATIENT
Start: 2023-04-06 | End: 2023-04-09

## 2023-04-06 RX ORDER — SYRING-NEEDL,DISP,INSUL,0.3 ML 29 G X1/2"
296 SYRINGE, EMPTY DISPOSABLE MISCELLANEOUS
Status: DISCONTINUED | OUTPATIENT
Start: 2023-04-06 | End: 2023-04-06

## 2023-04-06 RX ORDER — ONDANSETRON 2 MG/ML
4 INJECTION INTRAMUSCULAR; INTRAVENOUS ONCE
Status: CANCELLED | OUTPATIENT
Start: 2023-04-06

## 2023-04-06 RX ORDER — MUPIROCIN 20 MG/G
OINTMENT TOPICAL 2 TIMES DAILY
Status: DISCONTINUED | OUTPATIENT
Start: 2023-04-06 | End: 2023-04-09 | Stop reason: HOSPADM

## 2023-04-06 RX ORDER — POTASSIUM CHLORIDE 7.45 MG/ML
60 INJECTION INTRAVENOUS
Status: DISCONTINUED | OUTPATIENT
Start: 2023-04-06 | End: 2023-04-09

## 2023-04-06 RX ORDER — ACETAMINOPHEN 325 MG/1
650 TABLET ORAL EVERY 6 HOURS PRN
Status: DISCONTINUED | OUTPATIENT
Start: 2023-04-06 | End: 2023-04-09 | Stop reason: HOSPADM

## 2023-04-06 RX ORDER — CALCIUM GLUCONATE 20 MG/ML
2 INJECTION, SOLUTION INTRAVENOUS
Status: DISCONTINUED | OUTPATIENT
Start: 2023-04-06 | End: 2023-04-09

## 2023-04-06 RX ORDER — POTASSIUM CHLORIDE 7.45 MG/ML
80 INJECTION INTRAVENOUS
Status: DISCONTINUED | OUTPATIENT
Start: 2023-04-06 | End: 2023-04-09

## 2023-04-06 RX ORDER — IBUPROFEN 400 MG/1
400 TABLET ORAL EVERY 6 HOURS PRN
Status: DISCONTINUED | OUTPATIENT
Start: 2023-04-06 | End: 2023-04-09 | Stop reason: HOSPADM

## 2023-04-06 RX ORDER — PROCHLORPERAZINE EDISYLATE 5 MG/ML
5 INJECTION INTRAMUSCULAR; INTRAVENOUS EVERY 6 HOURS PRN
Status: DISCONTINUED | OUTPATIENT
Start: 2023-04-06 | End: 2023-04-09 | Stop reason: HOSPADM

## 2023-04-06 RX ORDER — BISACODYL 10 MG
10 SUPPOSITORY, RECTAL RECTAL DAILY PRN
Status: DISCONTINUED | OUTPATIENT
Start: 2023-04-06 | End: 2023-04-06

## 2023-04-06 RX ORDER — BISACODYL 10 MG
10 SUPPOSITORY, RECTAL RECTAL ONCE
Status: COMPLETED | OUTPATIENT
Start: 2023-04-06 | End: 2023-04-06

## 2023-04-06 RX ORDER — MAGNESIUM SULFATE HEPTAHYDRATE 40 MG/ML
2 INJECTION, SOLUTION INTRAVENOUS
Status: DISCONTINUED | OUTPATIENT
Start: 2023-04-06 | End: 2023-04-09

## 2023-04-06 RX ADMIN — BISACODYL 10 MG: 10 SUPPOSITORY RECTAL at 10:04

## 2023-04-06 RX ADMIN — POLYETHYLENE GLYCOL 3350 17 G: 17 POWDER, FOR SOLUTION ORAL at 08:04

## 2023-04-06 RX ADMIN — SODIUM PHOSPHATE, MONOBASIC, MONOHYDRATE AND SODIUM PHOSPHATE, DIBASIC, ANHYDROUS 15 MMOL: 142; 276 INJECTION, SOLUTION INTRAVENOUS at 06:04

## 2023-04-06 RX ADMIN — SODIUM CHLORIDE, POTASSIUM CHLORIDE, SODIUM LACTATE AND CALCIUM CHLORIDE: 600; 310; 30; 20 INJECTION, SOLUTION INTRAVENOUS at 05:04

## 2023-04-06 RX ADMIN — ONDANSETRON 4 MG: 2 INJECTION INTRAMUSCULAR; INTRAVENOUS at 08:04

## 2023-04-06 RX ADMIN — ONDANSETRON 4 MG: 2 INJECTION INTRAMUSCULAR; INTRAVENOUS at 02:04

## 2023-04-06 RX ADMIN — Medication 1 ENEMA: at 05:04

## 2023-04-06 RX ADMIN — IPRATROPIUM BROMIDE AND ALBUTEROL SULFATE 3 ML: 2.5; .5 SOLUTION RESPIRATORY (INHALATION) at 08:04

## 2023-04-06 RX ADMIN — KETOROLAC TROMETHAMINE 15 MG: 15 INJECTION, SOLUTION INTRAMUSCULAR; INTRAVENOUS at 02:04

## 2023-04-06 RX ADMIN — LACTULOSE 10 G: 20 SOLUTION ORAL at 02:04

## 2023-04-06 RX ADMIN — TAMSULOSIN HYDROCHLORIDE 0.4 MG: 0.4 CAPSULE ORAL at 08:04

## 2023-04-06 RX ADMIN — LACTULOSE 10 G: 20 SOLUTION ORAL at 09:04

## 2023-04-06 RX ADMIN — ONDANSETRON 4 MG: 2 INJECTION INTRAMUSCULAR; INTRAVENOUS at 10:04

## 2023-04-06 RX ADMIN — Medication 6 MG: at 09:04

## 2023-04-06 RX ADMIN — SODIUM PHOSPHATE, MONOBASIC, MONOHYDRATE AND SODIUM PHOSPHATE, DIBASIC, ANHYDROUS 20.01 MMOL: 276; 142 INJECTION, SOLUTION INTRAVENOUS at 05:04

## 2023-04-06 RX ADMIN — IBUPROFEN 400 MG: 400 TABLET ORAL at 02:04

## 2023-04-06 RX ADMIN — FLUTICASONE FUROATE AND VILANTEROL TRIFENATATE 1 PUFF: 100; 25 POWDER RESPIRATORY (INHALATION) at 08:04

## 2023-04-06 RX ADMIN — AMLODIPINE BESYLATE 10 MG: 10 TABLET ORAL at 08:04

## 2023-04-06 RX ADMIN — LACTULOSE 10 G: 20 SOLUTION ORAL at 08:04

## 2023-04-06 RX ADMIN — IBUPROFEN 400 MG: 400 TABLET ORAL at 09:04

## 2023-04-06 RX ADMIN — BUPROPION HYDROCHLORIDE 150 MG: 75 TABLET, FILM COATED ORAL at 09:04

## 2023-04-06 RX ADMIN — KETOROLAC TROMETHAMINE 15 MG: 15 INJECTION, SOLUTION INTRAMUSCULAR; INTRAVENOUS at 08:04

## 2023-04-06 RX ADMIN — IPRATROPIUM BROMIDE AND ALBUTEROL SULFATE 3 ML: 2.5; .5 SOLUTION RESPIRATORY (INHALATION) at 11:04

## 2023-04-06 RX ADMIN — ENOXAPARIN SODIUM 40 MG: 40 INJECTION SUBCUTANEOUS at 04:04

## 2023-04-06 RX ADMIN — BUPROPION HYDROCHLORIDE 150 MG: 75 TABLET, FILM COATED ORAL at 08:04

## 2023-04-06 RX ADMIN — POLYETHYLENE GLYCOL 3350 17 G: 17 POWDER, FOR SOLUTION ORAL at 09:04

## 2023-04-06 RX ADMIN — IPRATROPIUM BROMIDE AND ALBUTEROL SULFATE 3 ML: 2.5; .5 SOLUTION RESPIRATORY (INHALATION) at 04:04

## 2023-04-06 NOTE — NURSING
"      SICU PLAN OF CARE NOTE    Dx: Calculus of gallbladder with chronic cholecystitis without obstruction    Shift Events: Patient took 30 mL of liquid Methadone. Patient refused the rest of the dose (85 mL, wasted in pyxis). Patient educated on s/s of withdrawals.     OOBTC;Multimodal bowel regimen given; Wean Airvo 40L/40%    Goals of Care: O2> 88%    Neuro: AAO x4, Arouses to Voice, and Follows Commands    Vital Signs: /89   Pulse 93   Temp 98.8 °F (37.1 °C) (Oral)   Resp (!) 26   Ht 5' 7" (1.702 m)   Wt 71 kg (156 lb 8.4 oz)   SpO2 (!) 91%   BMI 24.52 kg/m²     Respiratory: Comfort Williams 40L/40%    Diet: NPO    Gtts: LR at 75 ml/hr    Urine Output: Urinary Catheter 1220 cc/shift      Labs/Accuchecks: Daily.    Skin: 5 lap sites w/ dermabond, C/D/I      "

## 2023-04-06 NOTE — ASSESSMENT & PLAN NOTE
  Neuro/Psych:   -- Sedation: none   -- Pain: NSAIDs, methadone, PRN tylenol              Cards:   -- HDS  -- No chronic cardiac issues      Pulm:   -- Goal O2 sat 88-92%  -- Wean as able  -- COPD  -- IS     Renal:  -- Keep armendariz for strict I/O  -- BUN/Cr 21/0.8      FEN / GI:   -- Net -728ml/24hr  -- Replace lytes as needed  -- Nutrition: NPO until BM  -- maintenance fluids: LR 75mL/hr      ID:   -- Tm: afebrile; WBC 5.6 on 4/6  -- Abx: none      Heme/Onc:   -- H/H stable 9.5/30.5 (down from 10.3/34)  -- Daily CBC      Endo:   -- Gluc goal 140-180        PPx:   Feeding: NPO  Analgesia/Sedation: none / methadone, advil, tylenol  Thromboembolic prevention: lovenox  HOB >30: no  Stress Ulcer ppx: none  Glucose control: Critical care goal 140-180 g/dl, ISS     Lines/Drains/Airway: CHAYO armendariz      Dispo/Code Status/Palliative:   -- SICU / Full Code

## 2023-04-06 NOTE — PLAN OF CARE
[facility]  Discharge Reassessment    Primary Care Provider: Jaiden Mak Ii, MD    Expected Discharge Date: 4/7/2023    Reassessment (most recent)       Discharge Reassessment - 04/06/23 1013          Discharge Reassessment    Assessment Type Discharge Planning Reassessment     Did the patient's condition or plan change since previous assessment? Yes     Discharge Plan A --   TBD    Discharge Plan B --   TBD    DME Needed Upon Discharge  none     Why the patient remains in the hospital Requires continued medical care        Post-Acute Status    Discharge Delays None known at this time                   Patient stepped up to SICU

## 2023-04-06 NOTE — ASSESSMENT & PLAN NOTE
Efra Payton III is a 63 y.o. male with h/o HCV cirrhosis (MELD 6), COPD, HTN, BPH, chronic pain (on methadone), and known gallstones who presented with RUQ pain and known cholelithiasis and a 1.9 cm stone. S/p lap soha on 4/4/23. Now with impending respiratory failure requiring step-up to the SICU    - NPO until having bowel function. Aspiration risk.  - KUB this am for distention  - Bowel regimen  - PRN pain and nausea medications  - DVT prophylaxis (SCDs and heparin)  - OOB, ambulate  - IS  - Remainder of care per SICU    Dispo: Continue SICU care for O2 requirements

## 2023-04-06 NOTE — NURSING
No acute events overnight. Attempted to wean airvo, currently at 50L 50% sating 88%. UO 50-75/hr. Blood pressure normotensive. NSR 60-90. Neurologically intact.     See emar and docflo for further details

## 2023-04-06 NOTE — SUBJECTIVE & OBJECTIVE
Interval History:   -On 50% FiO2 and 50 L.   -Worsening distention this morning. No Bms since several days prior to admission  -Pain controlled    Medications:  Continuous Infusions:   lactated ringers 75 mL/hr at 04/06/23 0600     Scheduled Meds:   albuterol-ipratropium  3 mL Nebulization Q8H    amLODIPine  10 mg Oral Daily    buPROPion  150 mg Oral BID    enoxaparin  40 mg Subcutaneous Daily    fluticasone furoate-vilanteroL  1 puff Inhalation Daily    methadone  115 mg Oral Daily    tamsulosin  0.4 mg Oral Daily     PRN Meds:albuterol, calcium gluconate IVPB, calcium gluconate IVPB, calcium gluconate IVPB, ipratropium-albuteroL, ketorolac, magnesium sulfate IVPB, magnesium sulfate IVPB, melatonin, ondansetron, potassium chloride **AND** potassium chloride **AND** potassium chloride, sodium chloride 0.9%, sodium phosphate IVPB, sodium phosphate IVPB, sodium phosphate IVPB     Review of patient's allergies indicates:   Allergen Reactions    Tylenol [acetaminophen] Other (See Comments)     Can't take Tylenol while he is taking Methadone     Objective:     Vital Signs (Most Recent):  Temp: 98.4 °F (36.9 °C) (04/06/23 0300)  Pulse: 80 (04/06/23 0700)  Resp: 16 (04/06/23 0700)  BP: 124/64 (04/06/23 0700)  SpO2: (!) 90 % (04/06/23 0700)   Vital Signs (24h Range):  Temp:  [96.6 °F (35.9 °C)-98.8 °F (37.1 °C)] 98.4 °F (36.9 °C)  Pulse:  [] 80  Resp:  [10-54] 16  SpO2:  [69 %-100 %] 90 %  BP: ()/(54-73) 124/64     Weight: 71 kg (156 lb 8.4 oz)  Body mass index is 24.52 kg/m².    Intake/Output - Last 3 Shifts         04/04 0700 04/05 0659 04/05 0700 04/06 0659 04/06 0700 04/07 0659    I.V. (mL/kg) 2700 (38) 1437.8 (20.3)     IV Piggyback  155.9     Total Intake(mL/kg) 2700 (38) 1593.8 (22.4)     Urine (mL/kg/hr) 200 (0.1) 2360 (1.4)     Total Output 200 2360     Net +2500 -766.2                    Physical Exam  Vitals and nursing note reviewed.   Constitutional:       General: He is not in acute distress.      Appearance: He is not ill-appearing or diaphoretic.   HENT:      Head: Normocephalic and atraumatic.      Mouth/Throat:      Mouth: Mucous membranes are moist.      Pharynx: Oropharynx is clear.   Eyes:      Extraocular Movements: Extraocular movements intact.      Conjunctiva/sclera: Conjunctivae normal.   Cardiovascular:      Rate and Rhythm: Normal rate.   Pulmonary:      Comments: 50% FiO2, 50 L  Abdominal:      General: There is distension.      Palpations: Abdomen is soft.      Tenderness: There is no guarding or rebound.      Comments: TTP in RUQ. Negative Bynum's. No peritonitic signs    Musculoskeletal:         General: No deformity.   Skin:     General: Skin is warm and dry.      Coloration: Skin is not jaundiced.   Neurological:      Mental Status: He is alert and oriented to person, place, and time.       Significant Labs:  I have reviewed all pertinent lab results within the past 24 hours.  CBC:   Recent Labs   Lab 04/06/23  0311   WBC 5.69   RBC 3.09*   HGB 9.5*   HCT 30.5*   *   MCV 99*   MCH 30.7   MCHC 31.1*       Significant Diagnostics:  I have reviewed all pertinent imaging results/findings within the past 24 hours.

## 2023-04-06 NOTE — SUBJECTIVE & OBJECTIVE
Interval History/Significant Events: NAEON. Patient has not had a bowel movement. Complaining of abdominal pain. Still on airvo satting mid to high 80s. O2 sat goals of 88-92. Wean as tolerated.    Follow-up For: Procedure(s) (LRB):  CHOLECYSTECTOMY, LAPAROSCOPIC (N/A)    Post-Operative Day: 2 Days Post-Op    Objective:     Vital Signs (Most Recent):  Temp: 98.6 °F (37 °C) (04/06/23 0730)  Pulse: 94 (04/06/23 1000)  Resp: 18 (04/06/23 1000)  BP: (!) 128/94 (04/06/23 1000)  SpO2: (!) 90 % (04/06/23 1000)   Vital Signs (24h Range):  Temp:  [97.6 °F (36.4 °C)-98.8 °F (37.1 °C)] 98.6 °F (37 °C)  Pulse:  [] 94  Resp:  [10-54] 18  SpO2:  [82 %-100 %] 90 %  BP: ()/(54-94) 128/94     Weight: 71 kg (156 lb 8.4 oz)  Body mass index is 24.52 kg/m².      Intake/Output Summary (Last 24 hours) at 4/6/2023 1008  Last data filed at 4/6/2023 1000  Gross per 24 hour   Intake 2114.59 ml   Output 1525 ml   Net 589.59 ml       Physical Exam  Vitals and nursing note reviewed.   Constitutional:       General: He is not in acute distress.     Appearance: He is not ill-appearing or diaphoretic.   HENT:      Head: Normocephalic and atraumatic.      Mouth/Throat:      Mouth: Mucous membranes are moist.      Pharynx: Oropharynx is clear.   Eyes:      Extraocular Movements: Extraocular movements intact.      Conjunctiva/sclera: Conjunctivae normal.   Cardiovascular:      Rate and Rhythm: Normal rate.   Pulmonary:      Comments: 50% FiO2, 50 L  Abdominal:      General: There is distension.      Palpations: Abdomen is soft.      Tenderness: There is no guarding or rebound.      Comments: TTP in RUQ. Negative Bynum's. No peritonitic signs    Musculoskeletal:         General: No deformity.   Skin:     General: Skin is warm and dry.      Coloration: Skin is not jaundiced.   Neurological:      Mental Status: He is alert and oriented to person, place, and time.       Vents:  Oxygen Concentration (%): 50 (04/06/23  0804)    Lines/Drains/Airways       Drain  Duration                  Urethral Catheter 04/05/23 1 day              Peripheral Intravenous Line  Duration                  Peripheral IV - Single Lumen 04/04/23 1250 18 G Right Antecubital 1 day         Midline Catheter Insertion/Assessment  - Single Lumen 04/05/23 1139 Left brachial vein 18g x 10cm <1 day                    Significant Labs:    CBC/Anemia Profile:  Recent Labs   Lab 04/05/23  0443 04/06/23  0311   WBC 7.83 5.69   HGB 10.3* 9.5*   HCT 34.0* 30.5*   * 128*   * 99*   RDW 13.2 13.2        Chemistries:  Recent Labs   Lab 04/04/23 2047 04/05/23 0443 04/06/23  0311    137 138   K 5.1 4.8 4.3    108 105   CO2 18* 21* 24   BUN 21 21 21   CREATININE 1.3 0.9 0.8   CALCIUM 8.6* 8.5* 8.5*   ALBUMIN  --  3.5 3.0*   PROT  --  6.7 6.2   BILITOT  --  0.4 0.3   ALKPHOS  --  97 85   ALT  --  25 24   AST  --  32 38   MG  --  2.0 1.9   PHOS  --  2.7 1.9*       All pertinent labs within the past 24 hours have been reviewed.    Significant Imaging:  I have reviewed all pertinent imaging results/findings within the past 24 hours.

## 2023-04-06 NOTE — PROGRESS NOTES
[facility] - [facility]  Critical Care - Surgery  Progress Note    Patient Name: Efra Payton III  MRN: [id number]  Admission Date: 4/3/2023  Hospital Length of Stay: 1 days  Code Status: Full Code  Attending Provider: Mahesh Lazaro MD  Primary Care Provider: Jaiden Mak Ii, MD   Principal Problem: Calculus of gallbladder with chronic cholecystitis without obstruction    Subjective:     Interval History/Significant Events: NAEON. Patient has not had a bowel movement. Complaining of abdominal pain. Still on airvo satting mid to high 80s. O2 sat goals of 88-92. Wean as tolerated.    Follow-up For: Procedure(s) (LRB):  CHOLECYSTECTOMY, LAPAROSCOPIC (N/A)    Post-Operative Day: 2 Days Post-Op    Objective:     Vital Signs (Most Recent):  Temp: 98.6 °F (37 °C) (04/06/23 0730)  Pulse: 94 (04/06/23 1000)  Resp: 18 (04/06/23 1000)  BP: (!) 128/94 (04/06/23 1000)  SpO2: (!) 90 % (04/06/23 1000)   Vital Signs (24h Range):  Temp:  [97.6 °F (36.4 °C)-98.8 °F (37.1 °C)] 98.6 °F (37 °C)  Pulse:  [] 94  Resp:  [10-54] 18  SpO2:  [82 %-100 %] 90 %  BP: ()/(54-94) 128/94     Weight: 71 kg (156 lb 8.4 oz)  Body mass index is 24.52 kg/m².      Intake/Output Summary (Last 24 hours) at 4/6/2023 1008  Last data filed at 4/6/2023 1000  Gross per 24 hour   Intake 2114.59 ml   Output 1525 ml   Net 589.59 ml       Physical Exam  Vitals and nursing note reviewed.   Constitutional:       General: He is not in acute distress.     Appearance: He is not ill-appearing or diaphoretic.   HENT:      Head: Normocephalic and atraumatic.      Mouth/Throat:      Mouth: Mucous membranes are moist.      Pharynx: Oropharynx is clear.   Eyes:      Extraocular Movements: Extraocular movements intact.      Conjunctiva/sclera: Conjunctivae normal.   Cardiovascular:      Rate and Rhythm: Normal rate.   Pulmonary:      Comments: 50% FiO2, 50 L  Abdominal:      General: There is distension.      Palpations: Abdomen is soft.       Tenderness: There is no guarding or rebound.      Comments: TTP in RUQ. Negative Bynum's. No peritonitic signs    Musculoskeletal:         General: No deformity.   Skin:     General: Skin is warm and dry.      Coloration: Skin is not jaundiced.   Neurological:      Mental Status: He is alert and oriented to person, place, and time.       Vents:  Oxygen Concentration (%): 50 (04/06/23 0804)    Lines/Drains/Airways       Drain  Duration                  Urethral Catheter 04/05/23 1 day              Peripheral Intravenous Line  Duration                  Peripheral IV - Single Lumen 04/04/23 1250 18 G Right Antecubital 1 day         Midline Catheter Insertion/Assessment  - Single Lumen 04/05/23 1139 Left brachial vein 18g x 10cm <1 day                    Significant Labs:    CBC/Anemia Profile:  Recent Labs   Lab 04/05/23 0443 04/06/23 0311   WBC 7.83 5.69   HGB 10.3* 9.5*   HCT 34.0* 30.5*   * 128*   * 99*   RDW 13.2 13.2        Chemistries:  Recent Labs   Lab 04/04/23 2047 04/05/23 0443 04/06/23  0311    137 138   K 5.1 4.8 4.3    108 105   CO2 18* 21* 24   BUN 21 21 21   CREATININE 1.3 0.9 0.8   CALCIUM 8.6* 8.5* 8.5*   ALBUMIN  --  3.5 3.0*   PROT  --  6.7 6.2   BILITOT  --  0.4 0.3   ALKPHOS  --  97 85   ALT  --  25 24   AST  --  32 38   MG  --  2.0 1.9   PHOS  --  2.7 1.9*       All pertinent labs within the past 24 hours have been reviewed.    Significant Imaging:  I have reviewed all pertinent imaging results/findings within the past 24 hours.    Assessment/Plan:     Pulmonary  Acute respiratory failure with hypoxia and hypercarbia    Neuro/Psych:   -- Sedation: none   -- Pain: NSAIDs, methadone, PRN tylenol              Cards:   -- HDS  -- No chronic cardiac issues      Pulm:   -- Goal O2 sat 88-92%  -- Wean as able  -- COPD  -- IS     Renal:  -- Keep armendariz for strict I/O  -- BUN/Cr 21/0.8      FEN / GI:   -- Net -728ml/24hr  -- Replace lytes as needed  -- Nutrition: NPO  until BM  -- maintenance fluids: LR 75mL/hr      ID:   -- Tm: afebrile; WBC 5.6 on 4/6  -- Abx: none      Heme/Onc:   -- H/H stable 9.5/30.5 (down from 10.3/34)  -- Daily CBC      Endo:   -- Gluc goal 140-180        PPx:   Feeding: NPO  Analgesia/Sedation: none / methadone, advil, tylenol  Thromboembolic prevention: lovenox  HOB >30: no  Stress Ulcer ppx: none  Glucose control: Critical care goal 140-180 g/dl, ISS     Lines/Drains/Airway: armendariz, PIV      Dispo/Code Status/Palliative:   -- SICU / Full Code           Critical care was time spent personally by me on the following activities: development of treatment plan with patient or surrogate and bedside caregivers, discussions with consultants, evaluation of patient's response to treatment, examination of patient, ordering and performing treatments and interventions, ordering and review of laboratory studies, ordering and review of radiographic studies, pulse oximetry, re-evaluation of patient's condition.  This critical care time did not overlap with that of any other provider or involve time for any procedures.     Delmer George MD  Critical Care - Surgery  [facility]

## 2023-04-07 LAB
ALBUMIN SERPL BCP-MCNC: 2.8 G/DL (ref 3.5–5.2)
ALP SERPL-CCNC: 82 U/L (ref 55–135)
ALT SERPL W/O P-5'-P-CCNC: 20 U/L (ref 10–44)
ANION GAP SERPL CALC-SCNC: 7 MMOL/L (ref 8–16)
ANION GAP SERPL CALC-SCNC: 9 MMOL/L (ref 8–16)
AST SERPL-CCNC: 28 U/L (ref 10–40)
BASOPHILS # BLD AUTO: 0.01 K/UL (ref 0–0.2)
BASOPHILS NFR BLD: 0.2 % (ref 0–1.9)
BILIRUB SERPL-MCNC: 0.3 MG/DL (ref 0.1–1)
BUN SERPL-MCNC: 11 MG/DL (ref 8–23)
BUN SERPL-MCNC: 9 MG/DL (ref 8–23)
CALCIUM SERPL-MCNC: 8.6 MG/DL (ref 8.7–10.5)
CALCIUM SERPL-MCNC: 8.6 MG/DL (ref 8.7–10.5)
CHLORIDE SERPL-SCNC: 100 MMOL/L (ref 95–110)
CHLORIDE SERPL-SCNC: 102 MMOL/L (ref 95–110)
CO2 SERPL-SCNC: 29 MMOL/L (ref 23–29)
CO2 SERPL-SCNC: 32 MMOL/L (ref 23–29)
CREAT SERPL-MCNC: 0.7 MG/DL (ref 0.5–1.4)
CREAT SERPL-MCNC: 0.8 MG/DL (ref 0.5–1.4)
DIFFERENTIAL METHOD: ABNORMAL
EOSINOPHIL # BLD AUTO: 0 K/UL (ref 0–0.5)
EOSINOPHIL NFR BLD: 1 % (ref 0–8)
ERYTHROCYTE [DISTWIDTH] IN BLOOD BY AUTOMATED COUNT: 13.2 % (ref 11.5–14.5)
EST. GFR  (NO RACE VARIABLE): >60 ML/MIN/1.73 M^2
EST. GFR  (NO RACE VARIABLE): >60 ML/MIN/1.73 M^2
GLUCOSE SERPL-MCNC: 89 MG/DL (ref 70–110)
GLUCOSE SERPL-MCNC: 89 MG/DL (ref 70–110)
HCT VFR BLD AUTO: 30.2 % (ref 40–54)
HGB BLD-MCNC: 9.4 G/DL (ref 14–18)
IMM GRANULOCYTES # BLD AUTO: 0 K/UL (ref 0–0.04)
IMM GRANULOCYTES NFR BLD AUTO: 0 % (ref 0–0.5)
LYMPHOCYTES # BLD AUTO: 0.6 K/UL (ref 1–4.8)
LYMPHOCYTES NFR BLD: 13.8 % (ref 18–48)
MAGNESIUM SERPL-MCNC: 2 MG/DL (ref 1.6–2.6)
MAGNESIUM SERPL-MCNC: 2 MG/DL (ref 1.6–2.6)
MCH RBC QN AUTO: 30.3 PG (ref 27–31)
MCHC RBC AUTO-ENTMCNC: 31.1 G/DL (ref 32–36)
MCV RBC AUTO: 97 FL (ref 82–98)
MONOCYTES # BLD AUTO: 0.6 K/UL (ref 0.3–1)
MONOCYTES NFR BLD: 14 % (ref 4–15)
NEUTROPHILS # BLD AUTO: 3 K/UL (ref 1.8–7.7)
NEUTROPHILS NFR BLD: 71 % (ref 38–73)
NRBC BLD-RTO: 0 /100 WBC
PHOSPHATE SERPL-MCNC: 2.5 MG/DL (ref 2.7–4.5)
PHOSPHATE SERPL-MCNC: 2.7 MG/DL (ref 2.7–4.5)
PLATELET # BLD AUTO: 135 K/UL (ref 150–450)
PMV BLD AUTO: 10.9 FL (ref 9.2–12.9)
POTASSIUM SERPL-SCNC: 3.7 MMOL/L (ref 3.5–5.1)
POTASSIUM SERPL-SCNC: 4.4 MMOL/L (ref 3.5–5.1)
PROT SERPL-MCNC: 6.1 G/DL (ref 6–8.4)
RBC # BLD AUTO: 3.1 M/UL (ref 4.6–6.2)
SODIUM SERPL-SCNC: 139 MMOL/L (ref 136–145)
SODIUM SERPL-SCNC: 140 MMOL/L (ref 136–145)
WBC # BLD AUTO: 4.21 K/UL (ref 3.9–12.7)

## 2023-04-07 PROCEDURE — 63600175 PHARM REV CODE 636 W HCPCS

## 2023-04-07 PROCEDURE — 25000003 PHARM REV CODE 250: Performed by: STUDENT IN AN ORGANIZED HEALTH CARE EDUCATION/TRAINING PROGRAM

## 2023-04-07 PROCEDURE — 25000003 PHARM REV CODE 250

## 2023-04-07 PROCEDURE — 63600175 PHARM REV CODE 636 W HCPCS: Performed by: STUDENT IN AN ORGANIZED HEALTH CARE EDUCATION/TRAINING PROGRAM

## 2023-04-07 PROCEDURE — 80053 COMPREHEN METABOLIC PANEL: CPT | Performed by: STUDENT IN AN ORGANIZED HEALTH CARE EDUCATION/TRAINING PROGRAM

## 2023-04-07 PROCEDURE — 99900035 HC TECH TIME PER 15 MIN (STAT)

## 2023-04-07 PROCEDURE — 84100 ASSAY OF PHOSPHORUS: CPT | Performed by: STUDENT IN AN ORGANIZED HEALTH CARE EDUCATION/TRAINING PROGRAM

## 2023-04-07 PROCEDURE — 83735 ASSAY OF MAGNESIUM: CPT | Mod: 91 | Performed by: SURGERY

## 2023-04-07 PROCEDURE — 99233 SBSQ HOSP IP/OBS HIGH 50: CPT | Mod: 24,,, | Performed by: STUDENT IN AN ORGANIZED HEALTH CARE EDUCATION/TRAINING PROGRAM

## 2023-04-07 PROCEDURE — 20000000 HC ICU ROOM

## 2023-04-07 PROCEDURE — 84100 ASSAY OF PHOSPHORUS: CPT | Mod: 91 | Performed by: SURGERY

## 2023-04-07 PROCEDURE — 25000242 PHARM REV CODE 250 ALT 637 W/ HCPCS

## 2023-04-07 PROCEDURE — 85025 COMPLETE CBC W/AUTO DIFF WBC: CPT | Performed by: STUDENT IN AN ORGANIZED HEALTH CARE EDUCATION/TRAINING PROGRAM

## 2023-04-07 PROCEDURE — 80048 BASIC METABOLIC PNL TOTAL CA: CPT | Mod: XB | Performed by: SURGERY

## 2023-04-07 PROCEDURE — 94640 AIRWAY INHALATION TREATMENT: CPT

## 2023-04-07 PROCEDURE — 94664 DEMO&/EVAL PT USE INHALER: CPT

## 2023-04-07 PROCEDURE — 27100171 HC OXYGEN HIGH FLOW UP TO 24 HOURS

## 2023-04-07 PROCEDURE — 99233 PR SUBSEQUENT HOSPITAL CARE,LEVL III: ICD-10-PCS | Mod: 24,,, | Performed by: STUDENT IN AN ORGANIZED HEALTH CARE EDUCATION/TRAINING PROGRAM

## 2023-04-07 PROCEDURE — 94761 N-INVAS EAR/PLS OXIMETRY MLT: CPT

## 2023-04-07 PROCEDURE — 83735 ASSAY OF MAGNESIUM: CPT | Performed by: STUDENT IN AN ORGANIZED HEALTH CARE EDUCATION/TRAINING PROGRAM

## 2023-04-07 PROCEDURE — S0109 METHADONE ORAL 5MG: HCPCS

## 2023-04-07 RX ORDER — METHADONE HYDROCHLORIDE 5 MG/5ML
40 SOLUTION ORAL DAILY
Status: DISCONTINUED | OUTPATIENT
Start: 2023-04-07 | End: 2023-04-08

## 2023-04-07 RX ADMIN — BUPROPION HYDROCHLORIDE 150 MG: 75 TABLET, FILM COATED ORAL at 09:04

## 2023-04-07 RX ADMIN — METHADONE HYDROCHLORIDE 40 MG: 5 SOLUTION ORAL at 10:04

## 2023-04-07 RX ADMIN — IPRATROPIUM BROMIDE AND ALBUTEROL SULFATE 3 ML: 2.5; .5 SOLUTION RESPIRATORY (INHALATION) at 03:04

## 2023-04-07 RX ADMIN — ONDANSETRON 4 MG: 2 INJECTION INTRAMUSCULAR; INTRAVENOUS at 12:04

## 2023-04-07 RX ADMIN — POLYETHYLENE GLYCOL 3350 17 G: 17 POWDER, FOR SOLUTION ORAL at 08:04

## 2023-04-07 RX ADMIN — TAMSULOSIN HYDROCHLORIDE 0.4 MG: 0.4 CAPSULE ORAL at 08:04

## 2023-04-07 RX ADMIN — ONDANSETRON 4 MG: 2 INJECTION INTRAMUSCULAR; INTRAVENOUS at 08:04

## 2023-04-07 RX ADMIN — POTASSIUM CHLORIDE 40 MEQ: 7.46 INJECTION, SOLUTION INTRAVENOUS at 06:04

## 2023-04-07 RX ADMIN — SODIUM PHOSPHATE, MONOBASIC, MONOHYDRATE AND SODIUM PHOSPHATE, DIBASIC, ANHYDROUS 15 MMOL: 142; 276 INJECTION, SOLUTION INTRAVENOUS at 06:04

## 2023-04-07 RX ADMIN — SODIUM PHOSPHATE, MONOBASIC, MONOHYDRATE AND SODIUM PHOSPHATE, DIBASIC, ANHYDROUS 15 MMOL: 142; 276 INJECTION, SOLUTION INTRAVENOUS at 05:04

## 2023-04-07 RX ADMIN — POLYETHYLENE GLYCOL 3350 17 G: 17 POWDER, FOR SOLUTION ORAL at 09:04

## 2023-04-07 RX ADMIN — FLUTICASONE FUROATE AND VILANTEROL TRIFENATATE 1 PUFF: 100; 25 POWDER RESPIRATORY (INHALATION) at 08:04

## 2023-04-07 RX ADMIN — SODIUM CHLORIDE, POTASSIUM CHLORIDE, SODIUM LACTATE AND CALCIUM CHLORIDE: 600; 310; 30; 20 INJECTION, SOLUTION INTRAVENOUS at 12:04

## 2023-04-07 RX ADMIN — IBUPROFEN 400 MG: 400 TABLET ORAL at 04:04

## 2023-04-07 RX ADMIN — LACTULOSE 10 G: 20 SOLUTION ORAL at 08:04

## 2023-04-07 RX ADMIN — IBUPROFEN 400 MG: 400 TABLET ORAL at 09:04

## 2023-04-07 RX ADMIN — LACTULOSE 10 G: 20 SOLUTION ORAL at 03:04

## 2023-04-07 RX ADMIN — BUPROPION HYDROCHLORIDE 150 MG: 75 TABLET, FILM COATED ORAL at 08:04

## 2023-04-07 RX ADMIN — LACTULOSE 10 G: 20 SOLUTION ORAL at 09:04

## 2023-04-07 RX ADMIN — IBUPROFEN 400 MG: 400 TABLET ORAL at 03:04

## 2023-04-07 RX ADMIN — ENOXAPARIN SODIUM 40 MG: 40 INJECTION SUBCUTANEOUS at 06:04

## 2023-04-07 RX ADMIN — IPRATROPIUM BROMIDE AND ALBUTEROL SULFATE 3 ML: 2.5; .5 SOLUTION RESPIRATORY (INHALATION) at 11:04

## 2023-04-07 RX ADMIN — AMLODIPINE BESYLATE 10 MG: 10 TABLET ORAL at 08:04

## 2023-04-07 RX ADMIN — IPRATROPIUM BROMIDE AND ALBUTEROL SULFATE 3 ML: 2.5; .5 SOLUTION RESPIRATORY (INHALATION) at 08:04

## 2023-04-07 RX ADMIN — PROCHLORPERAZINE EDISYLATE 5 MG: 5 INJECTION INTRAMUSCULAR; INTRAVENOUS at 10:04

## 2023-04-07 NOTE — NURSING
"      SICU PLAN OF CARE NOTE    Dx: Calculus of gallbladder with chronic cholecystitis without obstruction    Shift Events: OOBTC, O2 wean 5L HFNC, Gutierrez d/c'd    Goals of Care: O2>88%    Neuro: AAO x4, Follows Commands, and Moves All Extremities    Vital Signs: /69   Pulse 79   Temp 98.3 °F (36.8 °C) (Oral)   Resp 15   Ht 5' 7" (1.702 m)   Wt 71 kg (156 lb 8.4 oz)   SpO2 (!) 93%   BMI 24.52 kg/m²     Respiratory: HFNC    Diet: Clear Liquid        Urine Output: Urinary Catheter 985 cc/shift     Labs/Accuchecks: Daily.    Skin: Lap sites x5 dermabond      "

## 2023-04-07 NOTE — PROGRESS NOTES
[facility]  Critical Care - Surgery  Progress Note    Patient Name: Efra Payton III  MRN: [id number]  Admission Date: 4/3/2023  Hospital Length of Stay: 2 days  Code Status: Full Code  Attending Provider: Mahesh Lazaro MD  Primary Care Provider: Jaiden Mak Ii, MD   Principal Problem: Calculus of gallbladder with chronic cholecystitis without obstruction    Subjective:     Hospital/ICU Course:  No notes on file    Interval History/Significant Events: NAEO. Pt doing well this AM. No complaints at this time.    Follow-up For: Procedure(s) (LRB):  CHOLECYSTECTOMY, LAPAROSCOPIC (N/A)    Post-Operative Day: 3 Days Post-Op    Objective:     Vital Signs (Most Recent):  Temp: 98.5 °F (36.9 °C) (04/07/23 0300)  Pulse: 69 (04/07/23 0630)  Resp: 12 (04/07/23 0630)  BP: 130/66 (04/07/23 0600)  SpO2: (!) 94 % (04/07/23 0630)   Vital Signs (24h Range):  Temp:  [98.4 °F (36.9 °C)-98.8 °F (37.1 °C)] 98.5 °F (36.9 °C)  Pulse:  [] 69  Resp:  [7-50] 12  SpO2:  [85 %-95 %] 94 %  BP: (102-148)/(53-94) 130/66     Weight: 71 kg (156 lb 8.4 oz)  Body mass index is 24.52 kg/m².      Intake/Output Summary (Last 24 hours) at 4/7/2023 0647  Last data filed at 4/7/2023 0600  Gross per 24 hour   Intake 2436.34 ml   Output 2145 ml   Net 291.34 ml       Physical Exam  Vitals and nursing note reviewed.   Constitutional:       General: He is not in acute distress.     Appearance: He is not ill-appearing or diaphoretic.   HENT:      Head: Normocephalic and atraumatic.      Mouth/Throat:      Mouth: Mucous membranes are moist.      Pharynx: Oropharynx is clear.   Eyes:      Extraocular Movements: Extraocular movements intact.      Conjunctiva/sclera: Conjunctivae normal.   Cardiovascular:      Rate and Rhythm: Normal rate.   Pulmonary:      Comments: 40% FiO2, 30L  Abdominal:      General: There is distension.      Palpations: Abdomen is soft.      Tenderness: There is no guarding or rebound.      Comments: TTP in  RUQ. Negative Bynum's. No peritonitic signs    Musculoskeletal:         General: No deformity.   Skin:     General: Skin is warm and dry.      Coloration: Skin is not jaundiced.   Neurological:      Mental Status: He is alert and oriented to person, place, and time.       Vents:  Oxygen Concentration (%): 40 (04/07/23 0600)    Lines/Drains/Airways       Drain  Duration                  Urethral Catheter 04/05/23 2 days              Peripheral Intravenous Line  Duration                  Peripheral IV - Single Lumen 04/04/23 1250 18 G Right Antecubital 2 days         Midline Catheter Insertion/Assessment  - Single Lumen 04/05/23 1139 Left brachial vein 18g x 10cm 1 day                    Significant Labs:    CBC/Anemia Profile:  Recent Labs   Lab 04/06/23  0311 04/07/23  0311   WBC 5.69 4.21   HGB 9.5* 9.4*   HCT 30.5* 30.2*   * 135*   MCV 99* 97   RDW 13.2 13.2        Chemistries:  Recent Labs   Lab 04/06/23  0311 04/06/23  1551 04/07/23  0311     --  140   K 4.3  --  4.4     --  102   CO2 24  --  29   BUN 21  --  11   CREATININE 0.8  --  0.8   CALCIUM 8.5*  --  8.6*   ALBUMIN 3.0*  --  2.8*   PROT 6.2  --  6.1   BILITOT 0.3  --  0.3   ALKPHOS 85  --  82   ALT 24  --  20   AST 38  --  28   MG 1.9  --  2.0   PHOS 1.9* 2.3* 2.5*       All pertinent labs within the past 24 hours have been reviewed.    Significant Imaging:  I have reviewed all pertinent imaging results/findings within the past 24 hours.  Assessment/Plan:     Pulmonary  Acute respiratory failure with hypoxia and hypercarbia  63M sp cholecystectomy      Neuro/Psych:   -- Sedation: none   -- Pain: NSAIDs, methadone, PRN tylenol              Cards:   -- HDS  -- No chronic cardiac issues      Pulm:   -- Goal O2 sat 88-92%  -- HFNC, Wean as able  -- COPD  -- IS     Renal:  -- Keep armendariz for strict I/O  -- BUN/Cr stable      FEN / GI:   -- Net +290 /24hr  -- Replace lytes as needed  -- Nutrition: NPO until BM  -- maintenance fluids: LR  75mL/hr      ID:   -- Tm: afebrile; WBC stable  -- Abx: none      Heme/Onc:   -- H/H stable   -- Daily CBC      Endo:   -- Gluc goal 140-180        PPx:   Feeding: NPO  Analgesia/Sedation: none / methadone, advil, tylenol  Thromboembolic prevention: lovenox  HOB >30: no  Stress Ulcer ppx: none  Glucose control: Critical care goal 140-180 g/dl, ISS     Lines/Drains/Airway: armendariz, PIV      Dispo/Code Status/Palliative:   -- Step down if tolerating NC / Full Code            Bowen Martin III, MD  Critical Care - Surgery  [facility]

## 2023-04-07 NOTE — ASSESSMENT & PLAN NOTE
Efra Payton III is a 63 y.o. male with h/o HCV cirrhosis (MELD 6), COPD, HTN, BPH, chronic pain (on methadone), and known gallstones who presented with RUQ pain and known cholelithiasis and a 1.9 cm stone. S/p lap soha on 4/4/23. Now with impending respiratory failure requiring step-up to the SICU    - CLD  - Bowel regimen  - PRN pain and nausea medications  - d/c Gutierrez, flomax  - DVT prophylaxis (SCDs and heparin)  - OOB, ambulate  - IS, acapella  - Remainder of care per SICU    Dispo: Continue SICU care for O2 requirements

## 2023-04-07 NOTE — PROGRESS NOTES
[facility]  General Surgery  Progress Note    Subjective:     History of Present Illness:  Efra Payton III is a 63 y.o. male with a history of HCV cirrhosis (MELD 6), COPD, HTN, BPH, chronic pain (on methadone), and known gallstones who presented with RUQ pain. He has had this pain for a couple of years intermittently and typically occurs after eating. He has been looking into other etiologies such as constipation and a inguinal hernia which he had repaired laparoscopically 6/2022. The pain has persisted throughout this. He went to [facility] for evaluation yesterday as the pain was quite severe and was told he likely needed to have his gallbladder taken out. He ultimately decided he would prefer to be evaluated here so left and came to our ED.     Upon arrival he was AF and HDS. Labs showed a normal WBC without left shift. His chem panel showed an MARILYN with BUN/Cr 21/1.4 (baseline Cr 0.8-1.1). US showed multiple gallstones with the largest measuring 1.9 cm. The GB was noted to be mildly distended with diffuse GB wall thickening to 5 mm and there was trace pericholecystic fluid.    With regards to his chronic problems, he is a well compensated cirrhotic with no known varices and has never had to get a paracentesis. He has no known coagulopathy. He used to be a smoker with 1 PPDx49 years and recently quit Jan 1. He says he feels his breathing has improved significantly since then. He would be able to climb a flight of stairs. Did not have any respiratory issues after his lap inguinal in June.      Post-Op Info:  Procedure(s) (LRB):  CHOLECYSTECTOMY, LAPAROSCOPIC (N/A)   3 Days Post-Op     Interval History:   -On 35% FiO2 and 30 L.   -Had two bowel movements yesterday  -Pain controlled  -Tolerated clears    Medications:  Continuous Infusions:   lactated ringers 75 mL/hr at 04/07/23 0600     Scheduled Meds:   albuterol-ipratropium  3 mL Nebulization Q8H    amLODIPine  10 mg Oral Daily     buPROPion  150 mg Oral BID    enoxaparin  40 mg Subcutaneous Daily    fluticasone furoate-vilanteroL  1 puff Inhalation Daily    lactulose  10 g Oral TID    methadone  115 mg Oral Daily    mupirocin   Nasal BID    polyethylene glycol  17 g Oral BID    tamsulosin  0.4 mg Oral Daily     PRN Meds:acetaminophen, albuterol, calcium gluconate IVPB, calcium gluconate IVPB, calcium gluconate IVPB, ibuprofen, ipratropium-albuteroL, magnesium sulfate IVPB, magnesium sulfate IVPB, melatonin, ondansetron, potassium chloride **AND** potassium chloride **AND** potassium chloride, prochlorperazine, sodium chloride 0.9%, sodium phosphate IVPB, sodium phosphate IVPB, sodium phosphate IVPB     Review of patient's allergies indicates:  No Active Allergies    Objective:     Vital Signs (Most Recent):  Temp: 98.5 °F (36.9 °C) (04/07/23 0300)  Pulse: 70 (04/07/23 0615)  Resp: 11 (04/07/23 0615)  BP: 130/66 (04/07/23 0600)  SpO2: (!) 94 % (04/07/23 0615)   Vital Signs (24h Range):  Temp:  [98.4 °F (36.9 °C)-98.8 °F (37.1 °C)] 98.5 °F (36.9 °C)  Pulse:  [] 70  Resp:  [7-50] 11  SpO2:  [85 %-95 %] 94 %  BP: (102-148)/(53-94) 130/66     Weight: 71 kg (156 lb 8.4 oz)  Body mass index is 24.52 kg/m².    Intake/Output - Last 3 Shifts         04/05 0700  04/06 0659 04/06 0700 04/07 0659    P.O.  120    I.V. (mL/kg) 1437.8 (20.3) 1793 (25.3)    IV Piggyback 155.9 523.4    Total Intake(mL/kg) 1593.8 (22.4) 2436.3 (34.3)    Urine (mL/kg/hr) 2360 (1.4) 2145 (1.3)    Emesis/NG output  0    Stool  0    Total Output 2360 2145    Net -766.2 +291.3          Stool Occurrence  2 x    Emesis Occurrence  1 x            Physical Exam  Vitals and nursing note reviewed.   Constitutional:       General: He is not in acute distress.     Appearance: He is not ill-appearing or diaphoretic.   HENT:      Head: Normocephalic and atraumatic.      Mouth/Throat:      Mouth: Mucous membranes are moist.      Pharynx: Oropharynx is clear.   Eyes:       Extraocular Movements: Extraocular movements intact.      Conjunctiva/sclera: Conjunctivae normal.   Cardiovascular:      Rate and Rhythm: Normal rate.   Pulmonary:      Comments: 40% FiO2, 30L  Abdominal:      General: There is distension.      Palpations: Abdomen is soft.      Tenderness: There is no guarding or rebound.      Comments: Appropriately tender   Musculoskeletal:         General: No deformity.   Skin:     General: Skin is warm and dry.      Coloration: Skin is not jaundiced.   Neurological:      Mental Status: He is alert and oriented to person, place, and time.       Significant Labs:  I have reviewed all pertinent lab results within the past 24 hours.  CBC:   Recent Labs   Lab 04/07/23  0311   WBC 4.21   RBC 3.10*   HGB 9.4*   HCT 30.2*   *   MCV 97   MCH 30.3   MCHC 31.1*         Significant Diagnostics:  I have reviewed all pertinent imaging results/findings within the past 24 hours.    Assessment/Plan:     * Calculus of gallbladder with chronic cholecystitis without obstruction  Efra DONY Payton III is a 63 y.o. male with h/o HCV cirrhosis (MELD 6), COPD, HTN, BPH, chronic pain (on methadone), and known gallstones who presented with RUQ pain and known cholelithiasis and a 1.9 cm stone. S/p lap soha on 4/4/23. Now with impending respiratory failure requiring step-up to the SICU    - CLD  - Bowel regimen  - PRN pain and nausea medications  - d/c Gutierrez, flomax  - DVT prophylaxis (SCDs and heparin)  - OOB, ambulate  - IS, acapella  - Remainder of care per SICU    Dispo: Continue SICU care for O2 requirements      BPH (benign prostatic hyperplasia)  - No home alfuzosin on formulary, converted to daily tamsulosin   - Gutierrez placed for retention     Methadone maintenance therapy patient  - Continue home methadone    Essential hypertension  - Currently controlled  - Continue home norvasc  - Continue to monitor with q4 vitals and adjust regimen PRN      Other emphysema  - Continue home albuterol  inhaler, respimat inhaler, and symbicort  - Oxygen PRN  - Continue to monitor with q4 vitals and adjust regimen PRN        Gerardo Murray MD  General Surgery  [facility]

## 2023-04-07 NOTE — SUBJECTIVE & OBJECTIVE
Interval History/Significant Events: NAEO. Pt doing well this AM. No complaints at this time.    Follow-up For: Procedure(s) (LRB):  CHOLECYSTECTOMY, LAPAROSCOPIC (N/A)    Post-Operative Day: 3 Days Post-Op    Objective:     Vital Signs (Most Recent):  Temp: 98.5 °F (36.9 °C) (04/07/23 0300)  Pulse: 69 (04/07/23 0630)  Resp: 12 (04/07/23 0630)  BP: 130/66 (04/07/23 0600)  SpO2: (!) 94 % (04/07/23 0630)   Vital Signs (24h Range):  Temp:  [98.4 °F (36.9 °C)-98.8 °F (37.1 °C)] 98.5 °F (36.9 °C)  Pulse:  [] 69  Resp:  [7-50] 12  SpO2:  [85 %-95 %] 94 %  BP: (102-148)/(53-94) 130/66     Weight: 71 kg (156 lb 8.4 oz)  Body mass index is 24.52 kg/m².      Intake/Output Summary (Last 24 hours) at 4/7/2023 0647  Last data filed at 4/7/2023 0600  Gross per 24 hour   Intake 2436.34 ml   Output 2145 ml   Net 291.34 ml       Physical Exam  Vitals and nursing note reviewed.   Constitutional:       General: He is not in acute distress.     Appearance: He is not ill-appearing or diaphoretic.   HENT:      Head: Normocephalic and atraumatic.      Mouth/Throat:      Mouth: Mucous membranes are moist.      Pharynx: Oropharynx is clear.   Eyes:      Extraocular Movements: Extraocular movements intact.      Conjunctiva/sclera: Conjunctivae normal.   Cardiovascular:      Rate and Rhythm: Normal rate.   Pulmonary:      Comments: 40% FiO2, 30L  Abdominal:      General: There is distension.      Palpations: Abdomen is soft.      Tenderness: There is no guarding or rebound.      Comments: TTP in RUQ. Negative Bynum's. No peritonitic signs    Musculoskeletal:         General: No deformity.   Skin:     General: Skin is warm and dry.      Coloration: Skin is not jaundiced.   Neurological:      Mental Status: He is alert and oriented to person, place, and time.       Vents:  Oxygen Concentration (%): 40 (04/07/23 0600)    Lines/Drains/Airways       Drain  Duration                  Urethral Catheter 04/05/23 2 days              Peripheral  Intravenous Line  Duration                  Peripheral IV - Single Lumen 04/04/23 1250 18 G Right Antecubital 2 days         Midline Catheter Insertion/Assessment  - Single Lumen 04/05/23 1139 Left brachial vein 18g x 10cm 1 day                    Significant Labs:    CBC/Anemia Profile:  Recent Labs   Lab 04/06/23 0311 04/07/23 0311   WBC 5.69 4.21   HGB 9.5* 9.4*   HCT 30.5* 30.2*   * 135*   MCV 99* 97   RDW 13.2 13.2        Chemistries:  Recent Labs   Lab 04/06/23 0311 04/06/23  1551 04/07/23 0311     --  140   K 4.3  --  4.4     --  102   CO2 24  --  29   BUN 21  --  11   CREATININE 0.8  --  0.8   CALCIUM 8.5*  --  8.6*   ALBUMIN 3.0*  --  2.8*   PROT 6.2  --  6.1   BILITOT 0.3  --  0.3   ALKPHOS 85  --  82   ALT 24  --  20   AST 38  --  28   MG 1.9  --  2.0   PHOS 1.9* 2.3* 2.5*       All pertinent labs within the past 24 hours have been reviewed.    Significant Imaging:  I have reviewed all pertinent imaging results/findings within the past 24 hours.

## 2023-04-07 NOTE — SUBJECTIVE & OBJECTIVE
Interval History:   -On 35% FiO2 and 30 L.   -Had two bowel movements yesterday  -Pain controlled  -Tolerated clears    Medications:  Continuous Infusions:   lactated ringers 75 mL/hr at 04/07/23 0600     Scheduled Meds:   albuterol-ipratropium  3 mL Nebulization Q8H    amLODIPine  10 mg Oral Daily    buPROPion  150 mg Oral BID    enoxaparin  40 mg Subcutaneous Daily    fluticasone furoate-vilanteroL  1 puff Inhalation Daily    lactulose  10 g Oral TID    methadone  115 mg Oral Daily    mupirocin   Nasal BID    polyethylene glycol  17 g Oral BID    tamsulosin  0.4 mg Oral Daily     PRN Meds:acetaminophen, albuterol, calcium gluconate IVPB, calcium gluconate IVPB, calcium gluconate IVPB, ibuprofen, ipratropium-albuteroL, magnesium sulfate IVPB, magnesium sulfate IVPB, melatonin, ondansetron, potassium chloride **AND** potassium chloride **AND** potassium chloride, prochlorperazine, sodium chloride 0.9%, sodium phosphate IVPB, sodium phosphate IVPB, sodium phosphate IVPB     Review of patient's allergies indicates:  No Active Allergies    Objective:     Vital Signs (Most Recent):  Temp: 98.5 °F (36.9 °C) (04/07/23 0300)  Pulse: 70 (04/07/23 0615)  Resp: 11 (04/07/23 0615)  BP: 130/66 (04/07/23 0600)  SpO2: (!) 94 % (04/07/23 0615)   Vital Signs (24h Range):  Temp:  [98.4 °F (36.9 °C)-98.8 °F (37.1 °C)] 98.5 °F (36.9 °C)  Pulse:  [] 70  Resp:  [7-50] 11  SpO2:  [85 %-95 %] 94 %  BP: (102-148)/(53-94) 130/66     Weight: 71 kg (156 lb 8.4 oz)  Body mass index is 24.52 kg/m².    Intake/Output - Last 3 Shifts         04/05 0700 04/06 0659 04/06 0700 04/07 0659    P.O.  120    I.V. (mL/kg) 1437.8 (20.3) 1793 (25.3)    IV Piggyback 155.9 523.4    Total Intake(mL/kg) 1593.8 (22.4) 2436.3 (34.3)    Urine (mL/kg/hr) 2360 (1.4) 2145 (1.3)    Emesis/NG output  0    Stool  0    Total Output 2360 2145    Net -766.2 +291.3          Stool Occurrence  2 x    Emesis Occurrence  1 x            Physical Exam  Vitals and nursing  note reviewed.   Constitutional:       General: He is not in acute distress.     Appearance: He is not ill-appearing or diaphoretic.   HENT:      Head: Normocephalic and atraumatic.      Mouth/Throat:      Mouth: Mucous membranes are moist.      Pharynx: Oropharynx is clear.   Eyes:      Extraocular Movements: Extraocular movements intact.      Conjunctiva/sclera: Conjunctivae normal.   Cardiovascular:      Rate and Rhythm: Normal rate.   Pulmonary:      Comments: 40% FiO2, 30L  Abdominal:      General: There is distension.      Palpations: Abdomen is soft.      Tenderness: There is no guarding or rebound.      Comments: Appropriately tender   Musculoskeletal:         General: No deformity.   Skin:     General: Skin is warm and dry.      Coloration: Skin is not jaundiced.   Neurological:      Mental Status: He is alert and oriented to person, place, and time.       Significant Labs:  I have reviewed all pertinent lab results within the past 24 hours.  CBC:   Recent Labs   Lab 04/07/23  0311   WBC 4.21   RBC 3.10*   HGB 9.4*   HCT 30.2*   *   MCV 97   MCH 30.3   MCHC 31.1*         Significant Diagnostics:  I have reviewed all pertinent imaging results/findings within the past 24 hours.

## 2023-04-07 NOTE — ASSESSMENT & PLAN NOTE
63M sp cholecystectomy      Neuro/Psych:   -- Sedation: none   -- Pain: NSAIDs, methadone, PRN tylenol              Cards:   -- HDS  -- No chronic cardiac issues      Pulm:   -- Goal O2 sat 88-92%  -- HFNC, Wean as able  -- COPD  -- IS     Renal:  -- Keep armendariz for strict I/O  -- BUN/Cr stable      FEN / GI:   -- Net +290 /24hr  -- Replace lytes as needed  -- Nutrition: NPO until BM  -- maintenance fluids: LR 75mL/hr      ID:   -- Tm: afebrile; WBC stable  -- Abx: none      Heme/Onc:   -- H/H stable   -- Daily CBC      Endo:   -- Gluc goal 140-180        PPx:   Feeding: NPO  Analgesia/Sedation: none / methadone, advil, tylenol  Thromboembolic prevention: lovenox  HOB >30: no  Stress Ulcer ppx: none  Glucose control: Critical care goal 140-180 g/dl, ISS     Lines/Drains/Airway: CHAYO armendariz      Dispo/Code Status/Palliative:   -- Step down if tolerating NC / Full Code

## 2023-04-08 LAB
ALBUMIN SERPL BCP-MCNC: 3.1 G/DL (ref 3.5–5.2)
ALP SERPL-CCNC: 84 U/L (ref 55–135)
ALT SERPL W/O P-5'-P-CCNC: 18 U/L (ref 10–44)
ANION GAP SERPL CALC-SCNC: 9 MMOL/L (ref 8–16)
AST SERPL-CCNC: 21 U/L (ref 10–40)
BASOPHILS # BLD AUTO: 0.02 K/UL (ref 0–0.2)
BASOPHILS NFR BLD: 0.5 % (ref 0–1.9)
BILIRUB SERPL-MCNC: 0.3 MG/DL (ref 0.1–1)
BUN SERPL-MCNC: 10 MG/DL (ref 8–23)
CALCIUM SERPL-MCNC: 9 MG/DL (ref 8.7–10.5)
CHLORIDE SERPL-SCNC: 100 MMOL/L (ref 95–110)
CO2 SERPL-SCNC: 31 MMOL/L (ref 23–29)
CREAT SERPL-MCNC: 0.7 MG/DL (ref 0.5–1.4)
DIFFERENTIAL METHOD: ABNORMAL
EOSINOPHIL # BLD AUTO: 0.1 K/UL (ref 0–0.5)
EOSINOPHIL NFR BLD: 3.7 % (ref 0–8)
ERYTHROCYTE [DISTWIDTH] IN BLOOD BY AUTOMATED COUNT: 12.9 % (ref 11.5–14.5)
EST. GFR  (NO RACE VARIABLE): >60 ML/MIN/1.73 M^2
GLUCOSE SERPL-MCNC: 84 MG/DL (ref 70–110)
HCT VFR BLD AUTO: 33.7 % (ref 40–54)
HGB BLD-MCNC: 10.7 G/DL (ref 14–18)
IMM GRANULOCYTES # BLD AUTO: 0.02 K/UL (ref 0–0.04)
IMM GRANULOCYTES NFR BLD AUTO: 0.5 % (ref 0–0.5)
LYMPHOCYTES # BLD AUTO: 0.7 K/UL (ref 1–4.8)
LYMPHOCYTES NFR BLD: 18.4 % (ref 18–48)
MAGNESIUM SERPL-MCNC: 2 MG/DL (ref 1.6–2.6)
MCH RBC QN AUTO: 30.7 PG (ref 27–31)
MCHC RBC AUTO-ENTMCNC: 31.8 G/DL (ref 32–36)
MCV RBC AUTO: 97 FL (ref 82–98)
MONOCYTES # BLD AUTO: 0.4 K/UL (ref 0.3–1)
MONOCYTES NFR BLD: 11.7 % (ref 4–15)
NEUTROPHILS # BLD AUTO: 2.5 K/UL (ref 1.8–7.7)
NEUTROPHILS NFR BLD: 65.2 % (ref 38–73)
NRBC BLD-RTO: 0 /100 WBC
PHOSPHATE SERPL-MCNC: 2.7 MG/DL (ref 2.7–4.5)
PLATELET # BLD AUTO: 156 K/UL (ref 150–450)
PMV BLD AUTO: 10.3 FL (ref 9.2–12.9)
POTASSIUM SERPL-SCNC: 4 MMOL/L (ref 3.5–5.1)
PROT SERPL-MCNC: 6.3 G/DL (ref 6–8.4)
RBC # BLD AUTO: 3.49 M/UL (ref 4.6–6.2)
SODIUM SERPL-SCNC: 140 MMOL/L (ref 136–145)
WBC # BLD AUTO: 3.76 K/UL (ref 3.9–12.7)

## 2023-04-08 PROCEDURE — 27100171 HC OXYGEN HIGH FLOW UP TO 24 HOURS

## 2023-04-08 PROCEDURE — 25000242 PHARM REV CODE 250 ALT 637 W/ HCPCS

## 2023-04-08 PROCEDURE — 85025 COMPLETE CBC W/AUTO DIFF WBC: CPT | Performed by: SURGERY

## 2023-04-08 PROCEDURE — 27000221 HC OXYGEN, UP TO 24 HOURS

## 2023-04-08 PROCEDURE — 63600175 PHARM REV CODE 636 W HCPCS: Performed by: STUDENT IN AN ORGANIZED HEALTH CARE EDUCATION/TRAINING PROGRAM

## 2023-04-08 PROCEDURE — 25000003 PHARM REV CODE 250: Performed by: SURGERY

## 2023-04-08 PROCEDURE — 84100 ASSAY OF PHOSPHORUS: CPT | Performed by: STUDENT IN AN ORGANIZED HEALTH CARE EDUCATION/TRAINING PROGRAM

## 2023-04-08 PROCEDURE — 25000003 PHARM REV CODE 250: Performed by: STUDENT IN AN ORGANIZED HEALTH CARE EDUCATION/TRAINING PROGRAM

## 2023-04-08 PROCEDURE — 83735 ASSAY OF MAGNESIUM: CPT | Performed by: STUDENT IN AN ORGANIZED HEALTH CARE EDUCATION/TRAINING PROGRAM

## 2023-04-08 PROCEDURE — 94761 N-INVAS EAR/PLS OXIMETRY MLT: CPT

## 2023-04-08 PROCEDURE — 94640 AIRWAY INHALATION TREATMENT: CPT

## 2023-04-08 PROCEDURE — 80053 COMPREHEN METABOLIC PANEL: CPT | Performed by: STUDENT IN AN ORGANIZED HEALTH CARE EDUCATION/TRAINING PROGRAM

## 2023-04-08 PROCEDURE — 20600001 HC STEP DOWN PRIVATE ROOM

## 2023-04-08 PROCEDURE — 94664 DEMO&/EVAL PT USE INHALER: CPT

## 2023-04-08 PROCEDURE — 99233 SBSQ HOSP IP/OBS HIGH 50: CPT | Mod: 24,,, | Performed by: STUDENT IN AN ORGANIZED HEALTH CARE EDUCATION/TRAINING PROGRAM

## 2023-04-08 PROCEDURE — 99233 PR SUBSEQUENT HOSPITAL CARE,LEVL III: ICD-10-PCS | Mod: 24,,, | Performed by: STUDENT IN AN ORGANIZED HEALTH CARE EDUCATION/TRAINING PROGRAM

## 2023-04-08 PROCEDURE — 99900031 HC PATIENT EDUCATION (STAT)

## 2023-04-08 PROCEDURE — 99900035 HC TECH TIME PER 15 MIN (STAT)

## 2023-04-08 RX ORDER — METHADONE HYDROCHLORIDE 5 MG/5ML
115 SOLUTION ORAL DAILY
Status: DISCONTINUED | OUTPATIENT
Start: 2023-04-09 | End: 2023-04-09 | Stop reason: HOSPADM

## 2023-04-08 RX ADMIN — POLYETHYLENE GLYCOL 3350 17 G: 17 POWDER, FOR SOLUTION ORAL at 08:04

## 2023-04-08 RX ADMIN — BUPROPION HYDROCHLORIDE 150 MG: 75 TABLET, FILM COATED ORAL at 08:04

## 2023-04-08 RX ADMIN — IPRATROPIUM BROMIDE AND ALBUTEROL SULFATE 3 ML: 2.5; .5 SOLUTION RESPIRATORY (INHALATION) at 08:04

## 2023-04-08 RX ADMIN — IPRATROPIUM BROMIDE AND ALBUTEROL SULFATE 3 ML: 2.5; .5 SOLUTION RESPIRATORY (INHALATION) at 11:04

## 2023-04-08 RX ADMIN — MUPIROCIN: 20 OINTMENT TOPICAL at 08:04

## 2023-04-08 RX ADMIN — ENOXAPARIN SODIUM 40 MG: 40 INJECTION SUBCUTANEOUS at 04:04

## 2023-04-08 RX ADMIN — LACTULOSE 10 G: 20 SOLUTION ORAL at 02:04

## 2023-04-08 RX ADMIN — LACTULOSE 10 G: 20 SOLUTION ORAL at 08:04

## 2023-04-08 RX ADMIN — FLUTICASONE FUROATE AND VILANTEROL TRIFENATATE 1 PUFF: 100; 25 POWDER RESPIRATORY (INHALATION) at 08:04

## 2023-04-08 RX ADMIN — AMLODIPINE BESYLATE 10 MG: 10 TABLET ORAL at 08:04

## 2023-04-08 RX ADMIN — TAMSULOSIN HYDROCHLORIDE 0.4 MG: 0.4 CAPSULE ORAL at 08:04

## 2023-04-08 NOTE — ASSESSMENT & PLAN NOTE
Efra Payton III is a 63 y.o. male with h/o HCV cirrhosis (MELD 6), COPD, HTN, BPH, chronic pain (on methadone), and known gallstones who presented with RUQ pain and known cholelithiasis and a 1.9 cm stone. S/p lap soha on 4/4/23. Now with impending respiratory failure requiring step-up to the SICU    - Regular diet  - Bowel regimen  - PRN pain and nausea medications  - flomax  - DVT prophylaxis (SCDs and heparin)  - OOB, ambulate  - IS, acapella  - Remainder of care per SICU    Dispo: Nearing stepdown

## 2023-04-08 NOTE — SUBJECTIVE & OBJECTIVE
Interval History:   NAEO  Down to 5L NC  4L of autodiuresis, net negative 1.5L  Tolerating clears    Medications:  Continuous Infusions:  Scheduled Meds:   albuterol-ipratropium  3 mL Nebulization Q8H    amLODIPine  10 mg Oral Daily    buPROPion  150 mg Oral BID    enoxaparin  40 mg Subcutaneous Daily    fluticasone furoate-vilanteroL  1 puff Inhalation Daily    lactulose  10 g Oral TID    methadone  40 mg Oral Daily    mupirocin   Nasal BID    polyethylene glycol  17 g Oral BID    tamsulosin  0.4 mg Oral Daily     PRN Meds:acetaminophen, albuterol, calcium gluconate IVPB, calcium gluconate IVPB, calcium gluconate IVPB, ibuprofen, ipratropium-albuteroL, magnesium sulfate IVPB, magnesium sulfate IVPB, melatonin, ondansetron, potassium chloride **AND** potassium chloride **AND** potassium chloride, prochlorperazine, sodium chloride 0.9%, sodium phosphate IVPB, sodium phosphate IVPB, sodium phosphate IVPB     Review of patient's allergies indicates:  No Active Allergies  Objective:     Vital Signs (Most Recent):  Temp: 98.3 °F (36.8 °C) (04/08/23 0301)  Pulse: 71 (04/08/23 0701)  Resp: 12 (04/08/23 0701)  BP: (!) 156/75 (04/08/23 0701)  SpO2: (!) 93 % (04/08/23 0701)   Vital Signs (24h Range):  Temp:  [98.2 °F (36.8 °C)-98.5 °F (36.9 °C)] 98.3 °F (36.8 °C)  Pulse:  [66-98] 71  Resp:  [11-43] 12  SpO2:  [88 %-97 %] 93 %  BP: (123-160)/(62-84) 156/75     Weight: 71 kg (156 lb 8.4 oz)  Body mass index is 24.52 kg/m².    Intake/Output - Last 3 Shifts         04/06 0700  04/07 0659 04/07 0700 04/08 0659 04/08 0700  04/09 0659    P.O. 120 1480     I.V. (mL/kg) 1793 (25.3) 265.1 (3.7)     IV Piggyback 523.4 873.2     Total Intake(mL/kg) 2436.3 (34.3) 2618.3 (36.9)     Urine (mL/kg/hr) 2145 (1.3) 4060 (2.4)     Emesis/NG output 0      Stool 0 0     Total Output 2145 4060     Net +291.3 -1441.7            Stool Occurrence 2 x 2 x     Emesis Occurrence 1 x              Physical Exam  Vitals and nursing note reviewed.    Constitutional:       General: He is not in acute distress.     Appearance: He is not ill-appearing or diaphoretic.   HENT:      Head: Normocephalic and atraumatic.      Mouth/Throat:      Mouth: Mucous membranes are moist.      Pharynx: Oropharynx is clear.   Eyes:      Extraocular Movements: Extraocular movements intact.      Conjunctiva/sclera: Conjunctivae normal.   Cardiovascular:      Rate and Rhythm: Normal rate.   Pulmonary:      Comments: 5L NC  Abdominal:      Palpations: Abdomen is soft.      Tenderness: There is no guarding or rebound.      Comments: Appropriately tender. Incisions c/d/i   Musculoskeletal:         General: No deformity.   Skin:     General: Skin is warm and dry.      Coloration: Skin is not jaundiced.   Neurological:      Mental Status: He is alert and oriented to person, place, and time.       Significant Labs:  I have reviewed all pertinent lab results within the past 24 hours.  CBC:   Recent Labs   Lab 04/08/23  0616   WBC 3.76*   RBC 3.49*   HGB 10.7*   HCT 33.7*      MCV 97   MCH 30.7   MCHC 31.8*       Significant Diagnostics:  I have reviewed all pertinent imaging results/findings within the past 24 hours.

## 2023-04-08 NOTE — ASSESSMENT & PLAN NOTE
63M sp cholecystectomy, stepped up to ICU for increased respiratory needs      Neuro/Psych:   -- Sedation: none   -- Pain: NSAIDs, methadone, PRN tylenol              Cards:   -- HDS  -- No chronic cardiac issues      Pulm:   -- Goal O2 sat 88-92%  -- HFNC, Wean as able  -- COPD  -- IS     Renal:  -- Keep armendariz for strict I/O  -- BUN/Cr stable      FEN / GI:   -- Net -1.5L/24hr  -- Replace lytes as needed  -- Nutrition: CLD  -- Had 2 BM      ID:   -- Tm: afebrile; WBC stable  -- Abx: none      Heme/Onc:   -- H/H stable   -- Daily CBC      Endo:   -- Gluc goal 140-180        PPx:   Feeding: CLD  Analgesia/Sedation: none / methadone, advil, tylenol  Thromboembolic prevention: lovenox  HOB >30: no  Stress Ulcer ppx: none  Glucose control: Critical care goal 140-180 g/dl, ISS     Lines/Drains/Airway:  PIV      Dispo/Code Status/Palliative:   -- Step down if tolerating NC / Full Code

## 2023-04-08 NOTE — SUBJECTIVE & OBJECTIVE
Interval History/Significant Events: NAEON. Suppository, enema given, 1x emesis after liquid methadone.    Follow-up For: Procedure(s) (LRB):  CHOLECYSTECTOMY, LAPAROSCOPIC (N/A)    Post-Operative Day: 4 Days Post-Op    Objective:     Vital Signs (Most Recent):  Temp: 98.3 °F (36.8 °C) (04/08/23 0301)  Pulse: 66 (04/08/23 0600)  Resp: 11 (04/08/23 0600)  BP: (!) 146/69 (04/08/23 0600)  SpO2: (!) 94 % (04/08/23 0600)   Vital Signs (24h Range):  Temp:  [98.2 °F (36.8 °C)-98.7 °F (37.1 °C)] 98.3 °F (36.8 °C)  Pulse:  [63-98] 66  Resp:  [11-43] 11  SpO2:  [88 %-97 %] 94 %  BP: (114-160)/(55-84) 146/69     Weight: 71 kg (156 lb 8.4 oz)  Body mass index is 24.52 kg/m².      Intake/Output Summary (Last 24 hours) at 4/8/2023 0638  Last data filed at 4/8/2023 0625  Gross per 24 hour   Intake 2618.26 ml   Output 4060 ml   Net -1441.74 ml       Physical Exam  Vitals and nursing note reviewed.   Constitutional:       General: He is not in acute distress.     Appearance: He is not ill-appearing or diaphoretic.   HENT:      Head: Normocephalic and atraumatic.      Mouth/Throat:      Mouth: Mucous membranes are moist.      Pharynx: Oropharynx is clear.   Eyes:      Extraocular Movements: Extraocular movements intact.      Conjunctiva/sclera: Conjunctivae normal.   Cardiovascular:      Rate and Rhythm: Normal rate.   Pulmonary:      Comments: HFNC 6L  Abdominal:      General: There is distension.      Palpations: Abdomen is soft.      Tenderness: There is no guarding or rebound.      Comments: TTP in RUQ. Negative Bynum's. No peritonitic signs    Musculoskeletal:         General: No deformity.   Skin:     General: Skin is warm and dry.      Coloration: Skin is not jaundiced.   Neurological:      Mental Status: He is alert and oriented to person, place, and time.     Vents:  Oxygen Concentration (%): 35 (04/07/23 0807)    Lines/Drains/Airways       Peripheral Intravenous Line  Duration                  Peripheral IV - Single Lumen  04/07/23 1841 20 G Anterior;Left Forearm <1 day                    Significant Labs:    CBC/Anemia Profile:  Recent Labs   Lab 04/07/23  0311 04/08/23  0616   WBC 4.21 3.76*   HGB 9.4* 10.7*   HCT 30.2* 33.7*   * 156   MCV 97 97   RDW 13.2 12.9        Chemistries:  Recent Labs   Lab 04/07/23  0311 04/07/23  1556 04/08/23  0420    139 140   K 4.4 3.7 4.0    100 100   CO2 29 32* 31*   BUN 11 9 10   CREATININE 0.8 0.7 0.7   CALCIUM 8.6* 8.6* 9.0   ALBUMIN 2.8*  --  3.1*   PROT 6.1  --  6.3   BILITOT 0.3  --  0.3   ALKPHOS 82  --  84   ALT 20  --  18   AST 28  --  21   MG 2.0 2.0 2.0   PHOS 2.5* 2.7 2.7       All pertinent labs within the past 24 hours have been reviewed.    Significant Imaging:  I have reviewed all pertinent imaging results/findings within the past 24 hours.

## 2023-04-08 NOTE — PROGRESS NOTES
[facility]  General Surgery  Progress Note    Subjective:     History of Present Illness:  Efra Payton III is a 63 y.o. male with a history of HCV cirrhosis (MELD 6), COPD, HTN, BPH, chronic pain (on methadone), and known gallstones who presented with RUQ pain. He has had this pain for a couple of years intermittently and typically occurs after eating. He has been looking into other etiologies such as constipation and a inguinal hernia which he had repaired laparoscopically 6/2022. The pain has persisted throughout this. He went to [facility] for evaluation yesterday as the pain was quite severe and was told he likely needed to have his gallbladder taken out. He ultimately decided he would prefer to be evaluated here so left and came to our ED.     Upon arrival he was AF and HDS. Labs showed a normal WBC without left shift. His chem panel showed an MARILYN with BUN/Cr 21/1.4 (baseline Cr 0.8-1.1). US showed multiple gallstones with the largest measuring 1.9 cm. The GB was noted to be mildly distended with diffuse GB wall thickening to 5 mm and there was trace pericholecystic fluid.    With regards to his chronic problems, he is a well compensated cirrhotic with no known varices and has never had to get a paracentesis. He has no known coagulopathy. He used to be a smoker with 1 PPDx49 years and recently quit Jan 1. He says he feels his breathing has improved significantly since then. He would be able to climb a flight of stairs. Did not have any respiratory issues after his lap inguinal in June.      Post-Op Info:  Procedure(s) (LRB):  CHOLECYSTECTOMY, LAPAROSCOPIC (N/A)   4 Days Post-Op     Interval History:   NAEO  Down to 5L NC  4L of autodiuresis, net negative 1.5L  Tolerating clears    Medications:  Continuous Infusions:  Scheduled Meds:   albuterol-ipratropium  3 mL Nebulization Q8H    amLODIPine  10 mg Oral Daily    buPROPion  150 mg Oral BID    enoxaparin  40 mg Subcutaneous  Daily    fluticasone furoate-vilanteroL  1 puff Inhalation Daily    lactulose  10 g Oral TID    methadone  40 mg Oral Daily    mupirocin   Nasal BID    polyethylene glycol  17 g Oral BID    tamsulosin  0.4 mg Oral Daily     PRN Meds:acetaminophen, albuterol, calcium gluconate IVPB, calcium gluconate IVPB, calcium gluconate IVPB, ibuprofen, ipratropium-albuteroL, magnesium sulfate IVPB, magnesium sulfate IVPB, melatonin, ondansetron, potassium chloride **AND** potassium chloride **AND** potassium chloride, prochlorperazine, sodium chloride 0.9%, sodium phosphate IVPB, sodium phosphate IVPB, sodium phosphate IVPB     Review of patient's allergies indicates:  No Active Allergies  Objective:     Vital Signs (Most Recent):  Temp: 98.3 °F (36.8 °C) (04/08/23 0301)  Pulse: 71 (04/08/23 0701)  Resp: 12 (04/08/23 0701)  BP: (!) 156/75 (04/08/23 0701)  SpO2: (!) 93 % (04/08/23 0701)   Vital Signs (24h Range):  Temp:  [98.2 °F (36.8 °C)-98.5 °F (36.9 °C)] 98.3 °F (36.8 °C)  Pulse:  [66-98] 71  Resp:  [11-43] 12  SpO2:  [88 %-97 %] 93 %  BP: (123-160)/(62-84) 156/75     Weight: 71 kg (156 lb 8.4 oz)  Body mass index is 24.52 kg/m².    Intake/Output - Last 3 Shifts         04/06 0700 04/07 0659 04/07 0700 04/08 0659 04/08 0700 04/09 0659    P.O. 120 1480     I.V. (mL/kg) 1793 (25.3) 265.1 (3.7)     IV Piggyback 523.4 873.2     Total Intake(mL/kg) 2436.3 (34.3) 2618.3 (36.9)     Urine (mL/kg/hr) 2145 (1.3) 4060 (2.4)     Emesis/NG output 0      Stool 0 0     Total Output 2145 4060     Net +291.3 -1441.7            Stool Occurrence 2 x 2 x     Emesis Occurrence 1 x              Physical Exam  Vitals and nursing note reviewed.   Constitutional:       General: He is not in acute distress.     Appearance: He is not ill-appearing or diaphoretic.   HENT:      Head: Normocephalic and atraumatic.      Mouth/Throat:      Mouth: Mucous membranes are moist.      Pharynx: Oropharynx is clear.   Eyes:      Extraocular Movements:  Extraocular movements intact.      Conjunctiva/sclera: Conjunctivae normal.   Cardiovascular:      Rate and Rhythm: Normal rate.   Pulmonary:      Comments: 5L NC  Abdominal:      Palpations: Abdomen is soft.      Tenderness: There is no guarding or rebound.      Comments: Appropriately tender. Incisions c/d/i   Musculoskeletal:         General: No deformity.   Skin:     General: Skin is warm and dry.      Coloration: Skin is not jaundiced.   Neurological:      Mental Status: He is alert and oriented to person, place, and time.       Significant Labs:  I have reviewed all pertinent lab results within the past 24 hours.  CBC:   Recent Labs   Lab 04/08/23  0616   WBC 3.76*   RBC 3.49*   HGB 10.7*   HCT 33.7*      MCV 97   MCH 30.7   MCHC 31.8*       Significant Diagnostics:  I have reviewed all pertinent imaging results/findings within the past 24 hours.    Assessment/Plan:     * Calculus of gallbladder with chronic cholecystitis without obstruction  Efra DONY Payton III is a 63 y.o. male with h/o HCV cirrhosis (MELD 6), COPD, HTN, BPH, chronic pain (on methadone), and known gallstones who presented with RUQ pain and known cholelithiasis and a 1.9 cm stone. S/p lap soha on 4/4/23. Now with impending respiratory failure requiring step-up to the SICU    - Regular diet  - Bowel regimen  - PRN pain and nausea medications  - flomax  - DVT prophylaxis (SCDs and heparin)  - OOB, ambulate  - IS, acapella  - Remainder of care per SICU    Dispo: Nearing stepdown      BPH (benign prostatic hyperplasia)  - No home alfuzosin on formulary, converted to daily tamsulosin       Methadone maintenance therapy patient  - Continue home methadone    Essential hypertension  - Currently controlled  - Continue home norvasc  - Continue to monitor with q4 vitals and adjust regimen PRN      Other emphysema  - Continue home albuterol inhaler, respimat inhaler, and symbicort  - Oxygen PRN  - Continue to monitor with q4 vitals and adjust  regimen PRN        Jose M Zimmerman MD  General Surgery  [facility]

## 2023-04-08 NOTE — PROGRESS NOTES
[facility]  Critical Care - Surgery  Progress Note    Patient Name: Efra Payton III  MRN: [id number]  Admission Date: 4/3/2023  Hospital Length of Stay: 3 days  Code Status: Full Code  Attending Provider: Mahesh Lazaro MD  Primary Care Provider: Jaiden Mak Ii, MD   Principal Problem: Calculus of gallbladder with chronic cholecystitis without obstruction    Subjective:     Hospital/ICU Course:  No notes on file    Interval History/Significant Events: NAEON. Suppository, enema given, 1x emesis after liquid methadone.    Follow-up For: Procedure(s) (LRB):  CHOLECYSTECTOMY, LAPAROSCOPIC (N/A)    Post-Operative Day: 4 Days Post-Op    Objective:     Vital Signs (Most Recent):  Temp: 98.3 °F (36.8 °C) (04/08/23 0301)  Pulse: 66 (04/08/23 0600)  Resp: 11 (04/08/23 0600)  BP: (!) 146/69 (04/08/23 0600)  SpO2: (!) 94 % (04/08/23 0600)   Vital Signs (24h Range):  Temp:  [98.2 °F (36.8 °C)-98.7 °F (37.1 °C)] 98.3 °F (36.8 °C)  Pulse:  [63-98] 66  Resp:  [11-43] 11  SpO2:  [88 %-97 %] 94 %  BP: (114-160)/(55-84) 146/69     Weight: 71 kg (156 lb 8.4 oz)  Body mass index is 24.52 kg/m².      Intake/Output Summary (Last 24 hours) at 4/8/2023 0638  Last data filed at 4/8/2023 0625  Gross per 24 hour   Intake 2618.26 ml   Output 4060 ml   Net -1441.74 ml       Physical Exam  Vitals and nursing note reviewed.   Constitutional:       General: He is not in acute distress.     Appearance: He is not ill-appearing or diaphoretic.   HENT:      Head: Normocephalic and atraumatic.      Mouth/Throat:      Mouth: Mucous membranes are moist.      Pharynx: Oropharynx is clear.   Eyes:      Extraocular Movements: Extraocular movements intact.      Conjunctiva/sclera: Conjunctivae normal.   Cardiovascular:      Rate and Rhythm: Normal rate.   Pulmonary:      Comments: HFNC 6L  Abdominal:      General: There is distension.      Palpations: Abdomen is soft.      Tenderness: There is no guarding or rebound.       Comments: TTP in RUQ. Negative Bynum's. No peritonitic signs    Musculoskeletal:         General: No deformity.   Skin:     General: Skin is warm and dry.      Coloration: Skin is not jaundiced.   Neurological:      Mental Status: He is alert and oriented to person, place, and time.     Vents:  Oxygen Concentration (%): 35 (04/07/23 0807)    Lines/Drains/Airways       Peripheral Intravenous Line  Duration                  Peripheral IV - Single Lumen 04/07/23 1841 20 G Anterior;Left Forearm <1 day                    Significant Labs:    CBC/Anemia Profile:  Recent Labs   Lab 04/07/23  0311 04/08/23  0616   WBC 4.21 3.76*   HGB 9.4* 10.7*   HCT 30.2* 33.7*   * 156   MCV 97 97   RDW 13.2 12.9        Chemistries:  Recent Labs   Lab 04/07/23  0311 04/07/23  1556 04/08/23  0420    139 140   K 4.4 3.7 4.0    100 100   CO2 29 32* 31*   BUN 11 9 10   CREATININE 0.8 0.7 0.7   CALCIUM 8.6* 8.6* 9.0   ALBUMIN 2.8*  --  3.1*   PROT 6.1  --  6.3   BILITOT 0.3  --  0.3   ALKPHOS 82  --  84   ALT 20  --  18   AST 28  --  21   MG 2.0 2.0 2.0   PHOS 2.5* 2.7 2.7       All pertinent labs within the past 24 hours have been reviewed.    Significant Imaging:  I have reviewed all pertinent imaging results/findings within the past 24 hours.    Assessment/Plan:   63M sp cholecystectomy, stepped up to ICU for increased respiratory needs      Neuro/Psych:   -- Sedation: none   -- Pain: NSAIDs, methadone, PRN tylenol              Cards:   -- HDS  -- No chronic cardiac issues      Pulm:   -- Goal O2 sat 88-92%  -- HFNC, Wean as able  -- COPD  -- IS     Renal:  -- Keep armendariz for strict I/O  -- BUN/Cr stable      FEN / GI:   -- Net -1.5L/24hr  -- Replace lytes as needed  -- Nutrition: CLD  -- Had 2 BM      ID:   -- Tm: afebrile; WBC stable  -- Abx: none      Heme/Onc:   -- H/H stable   -- Daily CBC      Endo:   -- Gluc goal 140-180        PPx:   Feeding: CLD  Analgesia/Sedation: none / methadone, advil,  tylenol  Thromboembolic prevention: lovenox  HOB >30: no  Stress Ulcer ppx: none  Glucose control: Critical care goal 140-180 g/dl, ISS     Lines/Drains/Airway:  PIV      Dispo/Code Status/Palliative:   -- Step down if tolerating NC / Full Code     Critical care was time spent personally by me on the following activities: development of treatment plan with patient or surrogate and bedside caregivers, discussions with consultants, evaluation of patient's response to treatment, examination of patient, ordering and performing treatments and interventions, ordering and review of laboratory studies, ordering and review of radiographic studies, pulse oximetry, re-evaluation of patient's condition.  This critical care time did not overlap with that of any other provider or involve time for any procedures.     Li Davis MD  Critical Care - Surgery  [facility]

## 2023-04-08 NOTE — NURSING TRANSFER
Nursing Transfer Note      4/8/2023     Reason patient is being transferred: decrease level of care     Transfer To: 1021    Transfer via wheelchair    Transfer with O2, cardiac monitoring    Transported by : Abdiel Willis RN     Medicines sent: O2 devices, inhaler     Any special needs or follow-up needed: n/a    Chart send with patient: Yes    Notified: spouse    Patient reassessed at: upon arrival to unit by accepting RN : Abel     Upon arrival to floor: cardiac monitor applied, patient oriented to room, call bell in reach, and bed in lowest position

## 2023-04-08 NOTE — PLAN OF CARE
Pt a&o4, all safety precautions in place, VS stable on 4L NC (baseline w/ COPD), no pain, tolerating diet well, ambulating with minimal assistance, voiding into urinal appropriately, no concerns at this time.  Problem: Adult Inpatient Plan of Care  Goal: Plan of Care Review  Outcome: Ongoing, Progressing  Goal: Patient-Specific Goal (Individualized)  Outcome: Ongoing, Progressing  Goal: Absence of Hospital-Acquired Illness or Injury  Outcome: Ongoing, Progressing  Goal: Optimal Comfort and Wellbeing  Outcome: Ongoing, Progressing  Goal: Readiness for Transition of Care  Outcome: Ongoing, Progressing     Problem: Fluid and Electrolyte Imbalance (Acute Kidney Injury/Impairment)  Goal: Fluid and Electrolyte Balance  Outcome: Ongoing, Progressing     Problem: Oral Intake Inadequate (Acute Kidney Injury/Impairment)  Goal: Optimal Nutrition Intake  Outcome: Ongoing, Progressing     Problem: Renal Function Impairment (Acute Kidney Injury/Impairment)  Goal: Effective Renal Function  Outcome: Ongoing, Progressing     Problem: Fall Injury Risk  Goal: Absence of Fall and Fall-Related Injury  Outcome: Ongoing, Progressing     Problem: Infection  Goal: Absence of Infection Signs and Symptoms  Outcome: Ongoing, Progressing     Problem: Skin Injury Risk Increased  Goal: Skin Health and Integrity  Outcome: Ongoing, Progressing

## 2023-04-09 VITALS
HEART RATE: 91 BPM | HEIGHT: 67 IN | DIASTOLIC BLOOD PRESSURE: 88 MMHG | RESPIRATION RATE: 18 BRPM | SYSTOLIC BLOOD PRESSURE: 170 MMHG | BODY MASS INDEX: 24.56 KG/M2 | OXYGEN SATURATION: 94 % | TEMPERATURE: 98 F | WEIGHT: 156.5 LBS

## 2023-04-09 PROCEDURE — 97165 OT EVAL LOW COMPLEX 30 MIN: CPT

## 2023-04-09 PROCEDURE — 94799 UNLISTED PULMONARY SVC/PX: CPT

## 2023-04-09 PROCEDURE — 94640 AIRWAY INHALATION TREATMENT: CPT

## 2023-04-09 PROCEDURE — 25000003 PHARM REV CODE 250: Performed by: STUDENT IN AN ORGANIZED HEALTH CARE EDUCATION/TRAINING PROGRAM

## 2023-04-09 PROCEDURE — 94761 N-INVAS EAR/PLS OXIMETRY MLT: CPT

## 2023-04-09 PROCEDURE — 27000646 HC AEROBIKA DEVICE

## 2023-04-09 PROCEDURE — 25000242 PHARM REV CODE 250 ALT 637 W/ HCPCS

## 2023-04-09 PROCEDURE — 94664 DEMO&/EVAL PT USE INHALER: CPT

## 2023-04-09 PROCEDURE — 99900035 HC TECH TIME PER 15 MIN (STAT)

## 2023-04-09 RX ORDER — POLYETHYLENE GLYCOL 3350 17 G/17G
17 POWDER, FOR SOLUTION ORAL DAILY
Status: DISCONTINUED | OUTPATIENT
Start: 2023-04-09 | End: 2023-04-09 | Stop reason: HOSPADM

## 2023-04-09 RX ADMIN — BUPROPION HYDROCHLORIDE 150 MG: 75 TABLET, FILM COATED ORAL at 08:04

## 2023-04-09 RX ADMIN — TAMSULOSIN HYDROCHLORIDE 0.4 MG: 0.4 CAPSULE ORAL at 08:04

## 2023-04-09 RX ADMIN — FLUTICASONE FUROATE AND VILANTEROL TRIFENATATE 1 PUFF: 100; 25 POWDER RESPIRATORY (INHALATION) at 07:04

## 2023-04-09 RX ADMIN — IPRATROPIUM BROMIDE AND ALBUTEROL SULFATE 3 ML: 2.5; .5 SOLUTION RESPIRATORY (INHALATION) at 07:04

## 2023-04-09 RX ADMIN — AMLODIPINE BESYLATE 10 MG: 10 TABLET ORAL at 08:04

## 2023-04-09 NOTE — PLAN OF CARE
Problem: Occupational Therapy  Goal: Occupational Therapy Goal  Description: Pt is at functional baseline with no acute needs. OT to sign off. Please reconsult if status changes.    Outcome: Met

## 2023-04-09 NOTE — DISCHARGE SUMMARY
HOSPITAL DISCHARGE SUMMARY      I.   IDENTIFYING DATA         A.  Name:Efra Payton III              Sex:male              MRN:[id number]              :1960              Date of admission:4/3/2023  4:14 PM              Date of discharge: 2023 10:50 AM       II. SUCCINCT SUMMARY OF ADMISSION STATUS AND HOSPITAL COURSE    For details of hospital stay, please refer to daily progress notes. Briefly, this is a 63 y.o. male presented on 4/3/23 with chronic cholecystitis admitted for operative intervention. p Patient was taken to OR and underwent lap soha. Post-operatively patient was admitted to the floor. On POD1 patient was stepped up to the SICU for increased O2 requirements. He was started on high flow oxygen. He was slowly weaned off the O2 and was stepped down to the floor.     On the day of discharge, the patient was ambulating without difficulty, voiding spontaneously, was tolerating a diet without nausea or vomiting, and pain was well controlled on PO pain medications. Discharge instructions were explained to the patient and appropriate follow-up was arranged.      III. PATIENT'S MEDICAL CONDITION AT DISCHARGE       good    IV. SUMMARY OF PROCEDURE(S) PERFORMED          Procedure(s) (LRB):  CHOLECYSTECTOMY, LAPAROSCOPIC (N/A)      V.  DISCHARGE DIAGNOSES        Calculus of gallbladder with chronic cholecystitis without obstruction    Other emphysema    Essential hypertension    Methadone maintenance therapy patient    BPH (benign prostatic hyperplasia)    MARILYN (acute kidney injury)    Acute respiratory failure with hypoxia and hypercarbia       VI. RESULTS PENDING AT TIME OF DISCHARGE         Surgical pathology    VII. MEDICATIONS TAKEN PRIOR TO ADMISSION    Current Outpatient Medications   Medication Instructions    albuterol (PROVENTIL/VENTOLIN HFA) 90 mcg/actuation inhaler 1-2 puffs, Inhalation, Every 6 hours PRN, Rescue    alfuzosin (UROXATRAL) 10 mg, Oral, With breakfast    amLODIPine (NORVASC) 10  mg, Oral, Daily    budesonide-formoterol 80-4.5 mcg (SYMBICORT) 80-4.5 mcg/actuation HFAA 2 puffs, Inhalation, Daily, Controller    buPROPion (WELLBUTRIN SR) 150 mg, Oral, 2 times daily    COMBIVENT RESPIMAT  mcg/actuation inhaler SMARTSI Puff(s) By Mouth Every 6 Hours PRN    lisinopriL (PRINIVIL,ZESTRIL) 20 mg, Oral, Daily    methadone HCl (METHADONE ORAL) 115 mg, Oral, Daily    pulse oximeter (PULSE OXIMETER) device Apply Externally, 2 times daily, Use twice daily at 8 AM and 3 PM and record the value in MyChart as directed.        VIII. MEDICATIONS TO BE TAKEN FOLLOWING DISCHARGE       Medication List        CONTINUE taking these medications      albuterol 90 mcg/actuation inhaler  Commonly known as: PROVENTIL/VENTOLIN HFA  Inhale 1-2 puffs into the lungs every 6 (six) hours as needed for Wheezing or Shortness of Breath. Rescue     alfuzosin 10 mg Tb24  Commonly known as: UROXATRAL  Take 1 tablet (10 mg total) by mouth daily with breakfast.     amLODIPine 10 MG tablet  Commonly known as: NORVASC  Take 1 tablet (10 mg total) by mouth once daily.     budesonide-formoterol 80-4.5 mcg 80-4.5 mcg/actuation Hfaa  Commonly known as: SYMBICORT  Inhale 2 puffs into the lungs once daily at 6am. Controller     buPROPion 150 MG TBSR 12 hr tablet  Commonly known as: WELLBUTRIN SR  Take 1 tablet (150 mg total) by mouth 2 (two) times daily.     CombiVENT RESPIMAT  mcg/actuation inhaler  Generic drug: ipratropium-albuteroL     lisinopriL 20 MG tablet  Commonly known as: PRINIVIL,ZESTRIL  Take 1 tablet (20 mg total) by mouth once daily.     METHADONE ORAL     pulse oximeter device  Commonly known as: pulse oximeter  by Apply Externally route 2 (two) times a day. Use twice daily at 8 AM and 3 PM and record the value in MyChart as directed.               IX.  DISCHARGE ORDERS AND INSTRUCTIONS    Discharge Procedure Orders   Diet Adult Regular     Notify your health care provider if you experience any of the  following:  temperature >100.4     Notify your health care provider if you experience any of the following:  persistent nausea and vomiting or diarrhea     Notify your health care provider if you experience any of the following:  severe uncontrolled pain     Notify your health care provider if you experience any of the following:  redness, tenderness, or signs of infection (pain, swelling, redness, odor or green/yellow discharge around incision site)     Notify your health care provider if you experience any of the following:  difficulty breathing or increased cough     Weight bearing restrictions (specify):   Order Comments: Do not lift greater than 10 lbs for 4 weeks       X. DISCHARGE DISPOSITION          Home or Self Care      Gerardo Murray M.D.  General Surgery, PGY-1

## 2023-04-09 NOTE — PT/OT/SLP EVAL
Occupational Therapy   Evaluation and Discharge Note    Name: Efra Payton III  MRN: [id number]  Admitting Diagnosis: Calculus of gallbladder with chronic cholecystitis without obstruction  Recent Surgery: Procedure(s) (LRB):  CHOLECYSTECTOMY, LAPAROSCOPIC (N/A) 5 Days Post-Op    Recommendations:     Discharge Recommendations: other (see comments)  Discharge Equipment Recommendations: none  Barriers to discharge:  None    Assessment:     Efra Payton III is a 63 y.o. male with a medical diagnosis of Calculus of gallbladder with chronic cholecystitis without obstruction. At this time, patient is functioning at their prior level of function and does not require further acute OT services.     Plan:     During this hospitalization, patient does not require further acute OT services.  Please re-consult if situation changes.    Plan of Care Reviewed with: patient    Subjective     Chief Complaint: None  Patient/Family Comments/goals: None    Occupational Profile:  Living Environment: Pt lives with (7) family members in [facility] with 0STE. Pt shares tub/shower combo  Previous level of function: IND  Roles and Routines: Community dweller / family influence   Equipment Used at home: none  Assistance upon Discharge: Family    Pain/Comfort:  Pain Rating 1: 0/10    Patients cultural, spiritual, Hoahaoism conflicts given the current situation: no    Objective:     Communicated with: Nurse prior to session.  Patient found HOB elevated with telemetry upon OT entry to room.    General Precautions: Standard, fall  Orthopedic Precautions: N/A  Braces: N/A  Respiratory Status: Room air     Occupational Performance:    Bed Mobility:    Patient completed Rolling/Turning to Left with  stand by assistance  Patient completed Rolling/Turning to Right with stand by assistance  Patient completed Supine to Sit with stand by assistance    Functional Mobility/Transfers:  Patient completed Sit <> Stand Transfer with stand by assistance  with  no  assistive device   Patient completed Toilet Transfer Stand Pivot technique with stand by assistance with  no AD  Functional Mobility: Pt ambulated 150 ft in room without AD    Activities of Daily Living:  Toileting: stand by assistance [employer]    Cognitive/Visual Perceptual:  Cognitive/Psychosocial Skills:     -       Oriented to: Person, Place, Time, and Situation   -       Follows Commands/attention:Follows multistep  commands  -       Safety awareness/insight to disability: intact   -       Mood/Affect/Coping skills/emotional control: Appropriate to situation    Physical Exam:  Balance:    -       Godd overall balance  Postural examination/scapula alignment:    -       No postural abnormalities identified  Sensation:    -       Intact  Upper Extremity Range of Motion:     -       Right Upper Extremity: WFL  -       Left Upper Extremity: WFL  Upper Extremity Strength:    -       Right Upper Extremity: WFL  -       Left Upper Extremity: WFL    AMPAC 6 Click ADL:  AMPAC Total Score: 24    Treatment & Education:  Pt educated on role of OT/POC  Pt. Educated on safety precautions   Pt provided self-care/ADL re-education  Pt reviewed bed mobility/functional mobility    Patient left sitting edge of bed with all lines intact and call button in reach    GOALS:   Multidisciplinary Problems       Occupational Therapy Goals       Not on file              Multidisciplinary Problems (Resolved)          Problem: Occupational Therapy    Goal Priority Disciplines Outcome Interventions   Occupational Therapy Goal   (Resolved)     OT, PT/OT Met    Description: Pt is at functional baseline with no acute needs. OT to sign off. Please reconsult if status changes.                         History:     Past Medical History:   Diagnosis Date    Cirrhosis     COPD (chronic obstructive pulmonary disease)     Eczema     Hepatitis C virus infection cured after antiviral drug therapy     s/p Rx, treated / cured - svr 2021    History of drug  abuse     Now on methadone    Substance abuse          Past Surgical History:   Procedure Laterality Date    arm surgery      COLONOSCOPY N/A 7/8/2020    Procedure: COLONOSCOPY;  Surgeon: Mona Powell MD;  Location: [facility] (4TH FLR);  Service: Endoscopy;  Laterality: N/A;  [facility]    COLONOSCOPY N/A 5/27/2021    Procedure: COLONOSCOPY;  Surgeon: Mona Powell MD;  Location: [facility] (4TH FLR);  Service: Endoscopy;  Laterality: N/A;  Constipation prep - pg  CBC, PT/INR (cirrhosis) done 5/4/21 - pg  Covid-19 test 5/24/21 at [facility].5/26 Called pt. to move up earlier.Left VM.EC    ENDOSCOPIC ULTRASOUND OF UPPER GASTROINTESTINAL TRACT N/A 3/24/2021    Procedure: ULTRASOUND, UPPER GI TRACT, ENDOSCOPIC - w/ varices screen;  Surgeon: George Carrillo MD;  Location: [facility] (2ND FLR);  Service: Endoscopy;  Laterality: N/A;  Covid-19 test 3/21/21 at [facility]  PM prep    HAND SURGERY      LAPAROSCOPIC CHOLECYSTECTOMY N/A 4/4/2023    Procedure: CHOLECYSTECTOMY, LAPAROSCOPIC;  Surgeon: Mahesh Lazaro MD;  Location: [address];  Service: General;  Laterality: N/A;       Time Tracking:     OT Date of Treatment: 04/09/23  OT Start Time: 0921  OT Stop Time: 0930  OT Total Time (min): 9 min    Billable Minutes:Evaluation 9    4/9/2023

## 2023-04-09 NOTE — ASSESSMENT & PLAN NOTE
Efra Payton III is a 63 y.o. male with h/o HCV cirrhosis (MELD 6), COPD, HTN, BPH, chronic pain (on methadone), and known gallstones who presented with RUQ pain and known cholelithiasis and a 1.9 cm stone. S/p lap soha on 4/4/23. Now with impending respiratory failure requiring step-up to the SICU    - Regular diet  - Bowel regimen  - PRN pain and nausea medications  - flomax  - DVT prophylaxis (SCDs and heparin)  - OOB, ambulate  - IS, acapella    Dispo: discharge to home today

## 2023-04-09 NOTE — PROGRESS NOTES
[facility]  General Surgery  Progress Note    Subjective:     History of Present Illness:  Efra Payton III is a 63 y.o. male with a history of HCV cirrhosis (MELD 6), COPD, HTN, BPH, chronic pain (on methadone), and known gallstones who presented with RUQ pain. He has had this pain for a couple of years intermittently and typically occurs after eating. He has been looking into other etiologies such as constipation and a inguinal hernia which he had repaired laparoscopically 6/2022. The pain has persisted throughout this. He went to [facility] for evaluation yesterday as the pain was quite severe and was told he likely needed to have his gallbladder taken out. He ultimately decided he would prefer to be evaluated here so left and came to our ED.     Upon arrival he was AF and HDS. Labs showed a normal WBC without left shift. His chem panel showed an MARILYN with BUN/Cr 21/1.4 (baseline Cr 0.8-1.1). US showed multiple gallstones with the largest measuring 1.9 cm. The GB was noted to be mildly distended with diffuse GB wall thickening to 5 mm and there was trace pericholecystic fluid.    With regards to his chronic problems, he is a well compensated cirrhotic with no known varices and has never had to get a paracentesis. He has no known coagulopathy. He used to be a smoker with 1 PPDx49 years and recently quit Jan 1. He says he feels his breathing has improved significantly since then. He would be able to climb a flight of stairs. Did not have any respiratory issues after his lap inguinal in June.      Post-Op Info:  Procedure(s) (LRB):  CHOLECYSTECTOMY, LAPAROSCOPIC (N/A)   5 Days Post-Op     Interval History:   NAEO  Off O2 this morning. Per patient and wife patient is not on home O2.  Tolerating regular diet.    Medications:  Continuous Infusions:  Scheduled Meds:   albuterol-ipratropium  3 mL Nebulization Q8H    amLODIPine  10 mg Oral Daily    buPROPion  150 mg Oral BID    enoxaparin  40 mg  Subcutaneous Daily    fluticasone furoate-vilanteroL  1 puff Inhalation Daily    lactulose  10 g Oral TID    methadone  115 mg Oral Daily    mupirocin   Nasal BID    polyethylene glycol  17 g Oral Daily    tamsulosin  0.4 mg Oral Daily     PRN Meds:acetaminophen, albuterol, calcium gluconate IVPB, calcium gluconate IVPB, calcium gluconate IVPB, ibuprofen, ipratropium-albuteroL, magnesium sulfate IVPB, magnesium sulfate IVPB, melatonin, ondansetron, potassium chloride **AND** potassium chloride **AND** potassium chloride, prochlorperazine, sodium chloride 0.9%, sodium phosphate IVPB, sodium phosphate IVPB, sodium phosphate IVPB     Review of patient's allergies indicates:  No Known Allergies  Objective:     Vital Signs (Most Recent):  Temp: 98 °F (36.7 °C) (04/09/23 0717)  Pulse: 91 (04/09/23 0737)  Resp: 18 (04/09/23 0737)  BP: (!) 170/88 (04/09/23 0717)  SpO2: (!) 94 % (04/09/23 0737)   Vital Signs (24h Range):  Temp:  [96.5 °F (35.8 °C)-98.7 °F (37.1 °C)] 98 °F (36.7 °C)  Pulse:  [76-93] 91  Resp:  [16-33] 18  SpO2:  [86 %-98 %] 94 %  BP: (131-170)/(71-88) 170/88     Weight: 71 kg (156 lb 8.4 oz)  Body mass index is 24.52 kg/m².    Intake/Output - Last 3 Shifts         04/07 0700  04/08 0659 04/08 0700  04/09 0659 04/09 0700  04/10 0659    P.O. 1480 250     I.V. (mL/kg) 265.1 (3.7)      IV Piggyback 873.2      Total Intake(mL/kg) 2618.3 (36.9) 250 (3.5)     Urine (mL/kg/hr) 4060 (2.4) 3070 (1.8)     Emesis/NG output       Stool 0      Total Output 4060 3070     Net -1441.7 -2820            Stool Occurrence 2 x              Physical Exam  Vitals and nursing note reviewed.   Constitutional:       General: He is not in acute distress.     Appearance: He is not ill-appearing or diaphoretic.   HENT:      Head: Normocephalic and atraumatic.      Mouth/Throat:      Mouth: Mucous membranes are moist.      Pharynx: Oropharynx is clear.   Eyes:      Extraocular Movements: Extraocular movements intact.       Conjunctiva/sclera: Conjunctivae normal.   Cardiovascular:      Rate and Rhythm: Normal rate.   Abdominal:      Palpations: Abdomen is soft.      Tenderness: There is no guarding or rebound.      Comments: Appropriately tender. Incisions c/d/i   Musculoskeletal:         General: No deformity.   Skin:     General: Skin is warm and dry.      Coloration: Skin is not jaundiced.   Neurological:      Mental Status: He is alert and oriented to person, place, and time.       Significant Labs:  I have reviewed all pertinent lab results within the past 24 hours.  CBC:   Recent Labs   Lab 04/08/23  0616   WBC 3.76*   RBC 3.49*   HGB 10.7*   HCT 33.7*      MCV 97   MCH 30.7   MCHC 31.8*         Significant Diagnostics:  I have reviewed all pertinent imaging results/findings within the past 24 hours.    Assessment/Plan:     * Calculus of gallbladder with chronic cholecystitis without obstruction  Efra DONY Payton III is a 63 y.o. male with h/o HCV cirrhosis (MELD 6), COPD, HTN, BPH, chronic pain (on methadone), and known gallstones who presented with RUQ pain and known cholelithiasis and a 1.9 cm stone. S/p lap soha on 4/4/23. Now with impending respiratory failure requiring step-up to the SICU    - Regular diet  - Bowel regimen  - PRN pain and nausea medications  - flomax  - DVT prophylaxis (SCDs and heparin)  - OOB, ambulate  - IS, acapella    Dispo: discharge to home today      BPH (benign prostatic hyperplasia)  - No home alfuzosin on formulary, converted to daily tamsulosin       Methadone maintenance therapy patient  - Continue home methadone    Essential hypertension  - Currently controlled  - Continue home norvasc  - Continue to monitor with q4 vitals and adjust regimen PRN      Other emphysema  - Continue home albuterol inhaler, respimat inhaler, and symbicort  - Oxygen PRN  - Continue to monitor with q4 vitals and adjust regimen PRN        Gerardo Murray MD  General Surgery  [facility]

## 2023-04-09 NOTE — SUBJECTIVE & OBJECTIVE
Interval History:   NAEO  Off O2 this morning. Per patient and wife patient is not on home O2.  Tolerating regular diet.    Medications:  Continuous Infusions:  Scheduled Meds:   albuterol-ipratropium  3 mL Nebulization Q8H    amLODIPine  10 mg Oral Daily    buPROPion  150 mg Oral BID    enoxaparin  40 mg Subcutaneous Daily    fluticasone furoate-vilanteroL  1 puff Inhalation Daily    lactulose  10 g Oral TID    methadone  115 mg Oral Daily    mupirocin   Nasal BID    polyethylene glycol  17 g Oral Daily    tamsulosin  0.4 mg Oral Daily     PRN Meds:acetaminophen, albuterol, calcium gluconate IVPB, calcium gluconate IVPB, calcium gluconate IVPB, ibuprofen, ipratropium-albuteroL, magnesium sulfate IVPB, magnesium sulfate IVPB, melatonin, ondansetron, potassium chloride **AND** potassium chloride **AND** potassium chloride, prochlorperazine, sodium chloride 0.9%, sodium phosphate IVPB, sodium phosphate IVPB, sodium phosphate IVPB     Review of patient's allergies indicates:  No Known Allergies  Objective:     Vital Signs (Most Recent):  Temp: 98 °F (36.7 °C) (04/09/23 0717)  Pulse: 91 (04/09/23 0737)  Resp: 18 (04/09/23 0737)  BP: (!) 170/88 (04/09/23 0717)  SpO2: (!) 94 % (04/09/23 0737)   Vital Signs (24h Range):  Temp:  [96.5 °F (35.8 °C)-98.7 °F (37.1 °C)] 98 °F (36.7 °C)  Pulse:  [76-93] 91  Resp:  [16-33] 18  SpO2:  [86 %-98 %] 94 %  BP: (131-170)/(71-88) 170/88     Weight: 71 kg (156 lb 8.4 oz)  Body mass index is 24.52 kg/m².    Intake/Output - Last 3 Shifts         04/07 0700  04/08 0659 04/08 0700  04/09 0659 04/09 0700  04/10 0659    P.O. 1480 250     I.V. (mL/kg) 265.1 (3.7)      IV Piggyback 873.2      Total Intake(mL/kg) 2618.3 (36.9) 250 (3.5)     Urine (mL/kg/hr) 4060 (2.4) 3070 (1.8)     Emesis/NG output       Stool 0      Total Output 4060 3070     Net -1441.7 -2820            Stool Occurrence 2 x              Physical Exam  Vitals and nursing note reviewed.   Constitutional:       General: He is not  in acute distress.     Appearance: He is not ill-appearing or diaphoretic.   HENT:      Head: Normocephalic and atraumatic.      Mouth/Throat:      Mouth: Mucous membranes are moist.      Pharynx: Oropharynx is clear.   Eyes:      Extraocular Movements: Extraocular movements intact.      Conjunctiva/sclera: Conjunctivae normal.   Cardiovascular:      Rate and Rhythm: Normal rate.   Abdominal:      Palpations: Abdomen is soft.      Tenderness: There is no guarding or rebound.      Comments: Appropriately tender. Incisions c/d/i   Musculoskeletal:         General: No deformity.   Skin:     General: Skin is warm and dry.      Coloration: Skin is not jaundiced.   Neurological:      Mental Status: He is alert and oriented to person, place, and time.       Significant Labs:  I have reviewed all pertinent lab results within the past 24 hours.  CBC:   Recent Labs   Lab 04/08/23  0616   WBC 3.76*   RBC 3.49*   HGB 10.7*   HCT 33.7*      MCV 97   MCH 30.7   MCHC 31.8*         Significant Diagnostics:  I have reviewed all pertinent imaging results/findings within the past 24 hours.

## 2023-04-09 NOTE — DISCHARGE INSTRUCTIONS
Postoperative Laparoscopic Cholecystectomy Instructions    What to Expect:  It is normal to experience pain and swelling at the surgical site.  The pain usually decreases significantly after the first week but may last for many weeks.  Each day, the pain should be similar or better to the previous day. If it worsens, call the doctor's office.     Activity:  You should walk beginning on the day of surgery and increase activity slowly over the next two to four weeks.  Do not drive while taking prescription pain medication.  You may return to work when you feel ready.  Do not lift anything heavier than 10lbs for 4 weeks     Wound Care:  You may shower. Let soap and water run over the incisions and pat dry. Do not submerge in a bathtub or pool.     Diet:  You may resume your normal diet postoperatively.  Take a stool softener or laxative to avoid constipation.     Medication:  Pain Control  Take acetaminophen (Tylenol) 650 mg 4 times daily as needed for pain.  Take ibuprofen 600 mg 3 times daily as needed for pain. Stop taking this medication if it causes an upset stomach.  Bowel Regularity  Take an over-the-counter stool softener/laxative (Colace, Miralax, or Milk of Magnesia) to avoid constipation.  Previous Home Medication  Restart your previous home medication unless otherwise instructed.    Call Your Doctor's Office If You Experience:  Fevers greater than 101.3°F.  Vomiting.  Yellowing of the skin or eyes  Spreading redness or drainage from the incisions.  Opening of the incisions.  Worsening pain not controlled by medication.  Chest pain or shortness of breath.    Follow-Up:  Follow-up with your surgeon as scheduled.  If no follow-up appointment has been scheduled, call to schedule an appointment within 1-2 weeks of discharge.     Contact Information:  During normal business hours marisela the clinic with any questions or concerns. On weekends and evenings call [phone] to have the operators page the surgery  resident on call after hours with questions or concerns.

## 2023-04-09 NOTE — PLAN OF CARE
[facility]  Discharge Final Note    Primary Care Provider: Jaiden Mak Ii, MD    Expected Discharge Date: 4/9/2023    Final Discharge Note (most recent)       Final Note - 04/09/23 1045          Final Note    Assessment Type Final Discharge Note     Anticipated Discharge Disposition Home or Self Care                 Mona Alexandra LMSW  [facility]  [id number]      Future Appointments   Date Time Provider Department Center   4/18/2023  2:30 PM Amanda Parker MD [facility]         Contact Info       Jaiden Mak Ii, MD   Specialty: Internal Medicine   Relationship: PCP - General    [address]  [facility] [address]   Phone: [phone]       Next Steps: Go on 4/18/2023    Instructions: Hospital follow up 4/18 at 2:30p

## 2023-04-10 PROBLEM — G89.18 ACUTE POSTOPERATIVE PAIN: Status: ACTIVE | Noted: 2023-04-10

## 2023-04-10 PROBLEM — R94.31 QT PROLONGATION: Status: ACTIVE | Noted: 2023-04-10

## 2023-04-10 PROBLEM — J95.821 ACUTE POSTOPERATIVE RESPIRATORY FAILURE: Status: ACTIVE | Noted: 2023-04-10

## 2023-04-10 NOTE — ANESTHESIA POSTPROCEDURE EVALUATION
Anesthesia Post Evaluation    Patient: Efra Payton III    Procedure(s) Performed: Procedure(s) (LRB):  CHOLECYSTECTOMY, LAPAROSCOPIC (N/A)    Final Anesthesia Type: general      Patient location during evaluation: PACU  Patient participation: Yes- Able to Participate  Level of consciousness: awake and alert and oriented  Post-procedure vital signs: reviewed and stable  Pain management: adequate  Airway patency: patent    PONV status at discharge: No PONV  Anesthetic complications: no      Cardiovascular status: hemodynamically stable  Respiratory status: unassisted, spontaneous ventilation and room air  Hydration status: euvolemic  Follow-up not needed.      VSS  Event Time   Out of Recovery 04/04/2023 17:00:00         Pain/Latrice Score: No data recorded

## 2023-04-11 LAB
FINAL PATHOLOGIC DIAGNOSIS: NORMAL
Lab: NORMAL

## 2023-04-18 ENCOUNTER — OFFICE VISIT (OUTPATIENT)
Dept: INTERNAL MEDICINE | Facility: CLINIC | Age: 63
End: 2023-04-18
Payer: MEDICAID

## 2023-04-18 VITALS
SYSTOLIC BLOOD PRESSURE: 106 MMHG | HEIGHT: 67 IN | HEART RATE: 76 BPM | OXYGEN SATURATION: 95 % | BODY MASS INDEX: 21.84 KG/M2 | DIASTOLIC BLOOD PRESSURE: 62 MMHG | WEIGHT: 139.13 LBS

## 2023-04-18 DIAGNOSIS — J95.821 ACUTE POSTOPERATIVE RESPIRATORY FAILURE: ICD-10-CM

## 2023-04-18 DIAGNOSIS — I10 ESSENTIAL HYPERTENSION: ICD-10-CM

## 2023-04-18 DIAGNOSIS — J43.8 OTHER EMPHYSEMA: Primary | ICD-10-CM

## 2023-04-18 DIAGNOSIS — D64.9 ANEMIA, UNSPECIFIED TYPE: ICD-10-CM

## 2023-04-18 PROBLEM — J02.9 SORE THROAT: Status: RESOLVED | Noted: 2023-02-16 | Resolved: 2023-04-18

## 2023-04-18 PROCEDURE — 3078F DIAST BP <80 MM HG: CPT | Mod: CPTII,,, | Performed by: INTERNAL MEDICINE

## 2023-04-18 PROCEDURE — 1159F PR MEDICATION LIST DOCUMENTED IN MEDICAL RECORD: ICD-10-PCS | Mod: CPTII,,, | Performed by: INTERNAL MEDICINE

## 2023-04-18 PROCEDURE — 1160F PR REVIEW ALL MEDS BY PRESCRIBER/CLIN PHARMACIST DOCUMENTED: ICD-10-PCS | Mod: CPTII,,, | Performed by: INTERNAL MEDICINE

## 2023-04-18 PROCEDURE — 3008F BODY MASS INDEX DOCD: CPT | Mod: CPTII,,, | Performed by: INTERNAL MEDICINE

## 2023-04-18 PROCEDURE — 1111F PR DISCHARGE MEDS RECONCILED W/ CURRENT OUTPATIENT MED LIST: ICD-10-PCS | Mod: CPTII,,, | Performed by: INTERNAL MEDICINE

## 2023-04-18 PROCEDURE — 4010F PR ACE/ARB THEARPY RXD/TAKEN: ICD-10-PCS | Mod: CPTII,,, | Performed by: INTERNAL MEDICINE

## 2023-04-18 PROCEDURE — 3078F PR MOST RECENT DIASTOLIC BLOOD PRESSURE < 80 MM HG: ICD-10-PCS | Mod: CPTII,,, | Performed by: INTERNAL MEDICINE

## 2023-04-18 PROCEDURE — 3074F PR MOST RECENT SYSTOLIC BLOOD PRESSURE < 130 MM HG: ICD-10-PCS | Mod: CPTII,,, | Performed by: INTERNAL MEDICINE

## 2023-04-18 PROCEDURE — 99214 PR OFFICE/OUTPT VISIT, EST, LEVL IV, 30-39 MIN: ICD-10-PCS | Mod: S$PBB,,, | Performed by: INTERNAL MEDICINE

## 2023-04-18 PROCEDURE — 1111F DSCHRG MED/CURRENT MED MERGE: CPT | Mod: CPTII,,, | Performed by: INTERNAL MEDICINE

## 2023-04-18 PROCEDURE — 99999 PR PBB SHADOW E&M-EST. PATIENT-LVL III: CPT | Mod: PBBFAC,,, | Performed by: INTERNAL MEDICINE

## 2023-04-18 PROCEDURE — 99214 OFFICE O/P EST MOD 30 MIN: CPT | Mod: S$PBB,,, | Performed by: INTERNAL MEDICINE

## 2023-04-18 PROCEDURE — 3008F PR BODY MASS INDEX (BMI) DOCUMENTED: ICD-10-PCS | Mod: CPTII,,, | Performed by: INTERNAL MEDICINE

## 2023-04-18 PROCEDURE — 3074F SYST BP LT 130 MM HG: CPT | Mod: CPTII,,, | Performed by: INTERNAL MEDICINE

## 2023-04-18 PROCEDURE — 99999 PR PBB SHADOW E&M-EST. PATIENT-LVL III: ICD-10-PCS | Mod: PBBFAC,,, | Performed by: INTERNAL MEDICINE

## 2023-04-18 PROCEDURE — 1160F RVW MEDS BY RX/DR IN RCRD: CPT | Mod: CPTII,,, | Performed by: INTERNAL MEDICINE

## 2023-04-18 PROCEDURE — 4010F ACE/ARB THERAPY RXD/TAKEN: CPT | Mod: CPTII,,, | Performed by: INTERNAL MEDICINE

## 2023-04-18 PROCEDURE — 99213 OFFICE O/P EST LOW 20 MIN: CPT | Mod: PBBFAC | Performed by: INTERNAL MEDICINE

## 2023-04-18 PROCEDURE — 1159F MED LIST DOCD IN RCRD: CPT | Mod: CPTII,,, | Performed by: INTERNAL MEDICINE

## 2023-04-18 NOTE — PROGRESS NOTES
Subjective     Patient ID: Efra Payton III is a 63 y.o. male.    Chief Complaint: Follow-up    HPI  Admitted 4.3 with cholecystitis.  Hypoxemic after surgery.    Pulse ox 91-92  at times.    He has COPD.  Stopped smoking last December.  Takes wellbutrin now and then.  Denies SOB.  Appetite good, eating well.  To see surgery tomorrow.    Htn, controlled.    Labs reviewed.  Hgb 10.7  mcv 97        Review of Systems   Constitutional:  Negative for activity change and unexpected weight change.   Respiratory:  Negative for chest tightness and shortness of breath.    Cardiovascular:  Negative for chest pain and leg swelling.   Gastrointestinal:  Negative for abdominal pain.   Neurological:  Negative for headaches.   Psychiatric/Behavioral:  Negative for dysphoric mood.         Objective     Physical Exam  Constitutional:       General: He is not in acute distress.     Appearance: He is well-developed. He is not ill-appearing, toxic-appearing or diaphoretic.   Eyes:      General: No scleral icterus.  Neck:      Thyroid: No thyromegaly.      Vascular: No JVD.   Cardiovascular:      Rate and Rhythm: Normal rate and regular rhythm.      Heart sounds: Normal heart sounds. No murmur heard.  Pulmonary:      Effort: Pulmonary effort is normal. No respiratory distress.      Breath sounds: Normal breath sounds. No stridor. No wheezing, rhonchi or rales.   Abdominal:      General: There is no distension.      Palpations: Abdomen is soft. There is no mass.      Tenderness: There is no abdominal tenderness. There is no guarding.      Hernia: No hernia is present.   Musculoskeletal:      Cervical back: No rigidity or tenderness.      Right lower leg: No edema.      Left lower leg: No edema.   Lymphadenopathy:      Cervical: No cervical adenopathy.   Skin:     Comments: Well healed laparoscopic wounds.   Neurological:      Mental Status: He is alert and oriented to person, place, and time.   Psychiatric:         Mood and Affect: Mood  normal.         Behavior: Behavior normal.         Thought Content: Thought content normal.          Assessment and Plan     Problem List Items Addressed This Visit       Other emphysema - Primary    Essential hypertension    Acute postoperative respiratory failure     Other Visit Diagnoses       Anemia, unspecified type        Relevant Orders    Iron and TIBC    Ferritin    Vitamin B12    CBC Without Differential            F/u Dr Mak

## 2023-04-19 ENCOUNTER — TELEPHONE (OUTPATIENT)
Dept: SURGERY | Facility: CLINIC | Age: 63
End: 2023-04-19
Payer: MEDICAID

## 2023-04-19 NOTE — TELEPHONE ENCOUNTER
Patient's wife called to cancel patient's post-op appointment with Dr. Lazaro today.She states they will call back to reschedule.

## 2023-06-15 ENCOUNTER — OFFICE VISIT (OUTPATIENT)
Dept: INTERNAL MEDICINE | Facility: CLINIC | Age: 63
End: 2023-06-15
Payer: MEDICAID

## 2023-06-15 VITALS
SYSTOLIC BLOOD PRESSURE: 120 MMHG | BODY MASS INDEX: 23.36 KG/M2 | DIASTOLIC BLOOD PRESSURE: 70 MMHG | WEIGHT: 148.81 LBS | OXYGEN SATURATION: 93 % | HEIGHT: 67 IN | HEART RATE: 84 BPM

## 2023-06-15 DIAGNOSIS — K21.9 GASTROESOPHAGEAL REFLUX DISEASE WITHOUT ESOPHAGITIS: ICD-10-CM

## 2023-06-15 DIAGNOSIS — T67.5XXA HEAT EXHAUSTION, INITIAL ENCOUNTER: Primary | ICD-10-CM

## 2023-06-15 PROCEDURE — 3078F PR MOST RECENT DIASTOLIC BLOOD PRESSURE < 80 MM HG: ICD-10-PCS | Mod: CPTII,,,

## 2023-06-15 PROCEDURE — 3008F PR BODY MASS INDEX (BMI) DOCUMENTED: ICD-10-PCS | Mod: CPTII,,,

## 2023-06-15 PROCEDURE — 99999 PR PBB SHADOW E&M-EST. PATIENT-LVL III: ICD-10-PCS | Mod: PBBFAC,,,

## 2023-06-15 PROCEDURE — 3008F BODY MASS INDEX DOCD: CPT | Mod: CPTII,,,

## 2023-06-15 PROCEDURE — 4010F PR ACE/ARB THEARPY RXD/TAKEN: ICD-10-PCS | Mod: CPTII,,,

## 2023-06-15 PROCEDURE — 99999 PR PBB SHADOW E&M-EST. PATIENT-LVL III: CPT | Mod: PBBFAC,,,

## 2023-06-15 PROCEDURE — 99213 PR OFFICE/OUTPT VISIT, EST, LEVL III, 20-29 MIN: ICD-10-PCS | Mod: S$PBB,,,

## 2023-06-15 PROCEDURE — 3074F SYST BP LT 130 MM HG: CPT | Mod: CPTII,,,

## 2023-06-15 PROCEDURE — 3078F DIAST BP <80 MM HG: CPT | Mod: CPTII,,,

## 2023-06-15 PROCEDURE — 4010F ACE/ARB THERAPY RXD/TAKEN: CPT | Mod: CPTII,,,

## 2023-06-15 PROCEDURE — 99213 OFFICE O/P EST LOW 20 MIN: CPT | Mod: S$PBB,,,

## 2023-06-15 PROCEDURE — 99213 OFFICE O/P EST LOW 20 MIN: CPT | Mod: PBBFAC

## 2023-06-15 PROCEDURE — 3074F PR MOST RECENT SYSTOLIC BLOOD PRESSURE < 130 MM HG: ICD-10-PCS | Mod: CPTII,,,

## 2023-06-15 NOTE — PROGRESS NOTES
I have reviewed and concur with the resident's history, physical, assessment, and plan.  See below addendum for my evaluation and additional findings.     64 y/o M who presents to clinic for an urgent care appointment. Last week patient reported feeling weak after mowing his lawn for 10 mins. Patient was reportedly in the hot sun with questionable adequate fluid intake. He reports that over the course of a couple days after that experience he has felt better and is asymptomatic today. He is hemodynamically stable in clinic with no clear signs of dehydration at this time. Suspect patient had heat stress due to inadequate oral hydration while in intense heat and was educated by the resident on the need for good Oral hydration and to wait until the cooler part of the evening or early morning to do yard work etc.

## 2023-06-15 NOTE — PROGRESS NOTES
Efra Payton III  1960    Subjective:     Chief Complaint: weakness    History of Present Illness:  Mr. Efra Payton III is a 63 y.o. male who presents to clinic for reported weakness.     Medical problems:  Emphysema, intermittent hypoxia on home oxygen  Former smoker  Hepatitis C, completed treatment  Hx positive PPD with treatment before 2000  Colon polyps 2021 after positive cologuard  HTN, at goal  Anxiety  Hx incarceration  Methadone maintenance therapy    At the time of booking the appointment a week ago, patient was experiencing weakness whenever working outdoors eg in the garden and mowing the lawn. After about 10 minutes of working outside patient would need to go back indoors. He was working in the sun on very hot days. He was drinking water frequently however when asked to quantify, he drinks about 1L a day. Patient also had some muscle weakness last week.   These symptoms are all resolved currently as he has not been outdoors as much. Denies urinary changes or lightheadedness currently.     Indigestion - ever since cholecystectomy. Resolved with taking 1 dose of Pepcid. No abdominal pain, N/V/D.     Review of Systems   Constitutional: Negative.    HENT: Negative.     Respiratory: Negative.     Cardiovascular: Negative.    Gastrointestinal:  Positive for heartburn.   Genitourinary: Negative.    Musculoskeletal:  Positive for myalgias (resolved).   All other systems reviewed and are negative.     PMH:     Past Medical History:   Diagnosis Date    Cirrhosis     COPD (chronic obstructive pulmonary disease)     Eczema     Hepatitis C virus infection cured after antiviral drug therapy     s/p Rx, treated / cured - svr 2021    History of drug abuse     Now on methadone    Substance abuse      Past Surgical History:   Procedure Laterality Date    arm surgery      COLONOSCOPY N/A 7/8/2020    Procedure: COLONOSCOPY;  Surgeon: Mona Powell MD;  Location: [facility] ([address] [place] [address]);  Service: Endoscopy;   Laterality: N/A;  covid [facility]--tb-hx copd    COLONOSCOPY N/A 2021    Procedure: COLONOSCOPY;  Surgeon: Mona Powell MD;  Location: [facility] (4TH FLR);  Service: Endoscopy;  Laterality: N/A;  Constipation prep - pg  CBC, PT/INR (cirrhosis) done 21 - pg  Covid-19 test 21 at [facility]. Called pt. to move up earlier.Left VM.EC    ENDOSCOPIC ULTRASOUND OF UPPER GASTROINTESTINAL TRACT N/A 3/24/2021    Procedure: ULTRASOUND, UPPER GI TRACT, ENDOSCOPIC - w/ varices screen;  Surgeon: George Carrillo MD;  Location: [facility] (2ND FLR);  Service: Endoscopy;  Laterality: N/A;  Covid-19 test 3/21/21 at [facility]  PM prep    HAND SURGERY      LAPAROSCOPIC CHOLECYSTECTOMY N/A 2023    Procedure: CHOLECYSTECTOMY, LAPAROSCOPIC;  Surgeon: Mahesh Lazaro MD;  Location: [address];  Service: General;  Laterality: N/A;     Allergies:   Review of patient's allergies indicates:  No Known Allergies  Medications:     Current Outpatient Medications on File Prior to Visit   Medication Sig Dispense Refill    albuterol (PROVENTIL/VENTOLIN HFA) 90 mcg/actuation inhaler Inhale 1-2 puffs into the lungs every 6 (six) hours as needed for Wheezing or Shortness of Breath. Rescue 18 g 3    alfuzosin (UROXATRAL) 10 mg Tb24 Take 1 tablet (10 mg total) by mouth daily with breakfast. 90 tablet 3    amLODIPine (NORVASC) 10 MG tablet Take 1 tablet (10 mg total) by mouth once daily. 90 tablet 3    budesonide-formoterol 80-4.5 mcg (SYMBICORT) 80-4.5 mcg/actuation HFAA Inhale 2 puffs into the lungs once daily at 6am. Controller 10.2 g 3    buPROPion (WELLBUTRIN SR) 150 MG TBSR 12 hr tablet Take 1 tablet (150 mg total) by mouth 2 (two) times daily. 180 tablet 3    COMBIVENT RESPIMAT  mcg/actuation inhaler SMARTSI Puff(s) By Mouth Every 6 Hours PRN      lisinopriL (PRINIVIL,ZESTRIL) 20 MG tablet Take 1 tablet (20 mg total) by mouth once daily. 90 tablet 3    methadone HCl (METHADONE ORAL) Take 115 mg by mouth  "once daily.       pulse oximeter (PULSE OXIMETER) device by Apply Externally route 2 (two) times a day. Use twice daily at 8 AM and 3 PM and record the value in [employer] as directed. 1 each 0     No current facility-administered medications on file prior to visit.     Social History:     Social History     Tobacco Use    Smoking status: Every Day     Packs/day: 1.00     Years: 49.00     Pack years: 49.00     Types: Cigarettes     Start date: 7/25/1969    Smokeless tobacco: Never    Tobacco comments:     decreased his smoking to 1 pack per day 5 months ago    Substance Use Topics    Alcohol use: No     Comment: has past history of alcohol abuse      Family History:     Family History   Problem Relation Age of Onset    Heart disease Father     Diabetes Father     Colon cancer Father     Hypertension Sister     Diabetes Sister     Hypertension Brother     Diabetes Brother     Colon cancer Brother     Heart attack Paternal Grandmother     Heart disease Paternal Grandmother     Diabetes Paternal Grandmother     Diabetes Paternal Grandfather     Colon cancer Paternal Grandfather      Physical Exam:   /70 (BP Location: Right arm, Patient Position: Sitting, BP Method: Large (Manual))   Pulse 84   Ht 5' 7" (1.702 m)   Wt 67.5 kg (148 lb 13 oz)   SpO2 (!) 93%   BMI 23.31 kg/m²      Body mass index is 23.31 kg/m².     Physical Exam  Vitals reviewed.   Constitutional:       Appearance: He is not ill-appearing.   HENT:      Mouth/Throat:      Mouth: Mucous membranes are dry.   Cardiovascular:      Rate and Rhythm: Normal rate and regular rhythm.      Pulses: Normal pulses.      Heart sounds: Normal heart sounds. No murmur heard.  Pulmonary:      Effort: Pulmonary effort is normal.      Breath sounds: Normal breath sounds. No wheezing, rhonchi or rales.   Musculoskeletal:      Right lower leg: No edema.      Left lower leg: No edema.   Skin:     General: Skin is warm and dry.   Neurological:      Mental Status: He is " alert.        Laboratory  Lab Results   Component Value Date    WBC 3.76 (L) 04/08/2023    HGB 10.7 (L) 04/08/2023    HCT 33.7 (L) 04/08/2023    MCV 97 04/08/2023     04/08/2023     Lab Results   Component Value Date    GLU 84 04/08/2023     04/08/2023    K 4.0 04/08/2023     04/08/2023    CO2 31 (H) 04/08/2023    BUN 10 04/08/2023    CREATININE 0.7 04/08/2023    CALCIUM 9.0 04/08/2023    MG 2.0 04/08/2023     Lab Results   Component Value Date    INR 1.0 04/05/2023    INR 1.0 04/04/2023    INR 1.0 12/20/2022     No results found for: HGBA1C  No results for input(s): POCTGLUCOSE in the last 72 hours.      Health Maintenance         Date Due Completion Date    COVID-19 Vaccine (4 - Moderna series) 03/02/2022 1/5/2022    Pneumococcal Vaccines (Age 0-64) (2 - PCV) 02/22/2023 2/22/2022    LDCT Lung Screen 08/15/2023 8/15/2022    Influenza Vaccine (Season Ended) 09/01/2023 1/3/2022    TETANUS VACCINE 05/24/2026 5/24/2016    Colorectal Cancer Screening 05/27/2026 5/27/2021    Lipid Panel 04/16/2027 4/16/2022            Assessment & Plan:     1. Heat exhaustion, initial encounter        2. Gastroesophageal reflux disease without esophagitis            Efrabear Payton presented today due to experiencing heat exhaustion, dizziness, myalgias occasionally when mowing the lawn and doing garden work. This has now resolved.   - Instructed patient to not work outdoors during midday hours  - Drink 2L of water per day (no contraindications of heart failure or renal failure), at least 3L if working outdoors on a hot day  - BP within normal today but a few weeks ago systolic BP was low-normal  - Asked patient to check BP daily, and if systolic 100 or less, contact PCP or myself, will consider reducing dose of antihypertensives    GERD-  - Commonly becomes worse after cholecystectomy  - Advised patient to eat smaller, more frequent, less fatty meals  - Daily Pepcid as needed (over the counter)      Discussed with Staff:  Dr. Lanette Baltazar MD  Internal Medicine PGY-2  [facility]  [address] [place] [address] [phone]

## 2023-06-15 NOTE — PATIENT INSTRUCTIONS
Make sure to do outdoor activities late in the day when it's not too hot  Drink 2L at least of water per day, 3L if a hot day  Check BP   If top number around 100 let Dr Mak or myself know, we can cut down on blood pressure meds if so    For reflux- daily Pepcid over the counter 20mg, up to twice a day  Smaller, more frequent, less fatty meals

## 2023-06-16 ENCOUNTER — TELEPHONE (OUTPATIENT)
Dept: INTERNAL MEDICINE | Facility: CLINIC | Age: 63
End: 2023-06-16
Payer: MEDICAID

## 2023-06-16 NOTE — TELEPHONE ENCOUNTER
----- Message from Benny Bolivar sent at 6/16/2023  4:29 PM CDT -----  Contact: [phone] @ patient  Good afternoon patient would like a call back he said he seen the doc on yesterday and doc told him to call if his BP  would get low again and it did it was 122/77. Please give patient a call back [phone]

## 2023-06-16 NOTE — TELEPHONE ENCOUNTER
Is still having some vomiting   And blood pressure was low again  It is back up to 122/77   Would like to know what he should now   He had stopped 2 of his bp pills   Wants advise  on when to restart them

## 2023-07-10 PROBLEM — N17.9 AKI (ACUTE KIDNEY INJURY): Status: RESOLVED | Noted: 2023-04-04 | Resolved: 2023-07-10

## 2023-07-10 PROBLEM — J95.821 ACUTE POSTOPERATIVE RESPIRATORY FAILURE: Status: RESOLVED | Noted: 2023-04-10 | Resolved: 2023-07-10

## 2023-07-10 PROBLEM — G89.18 ACUTE POSTOPERATIVE PAIN: Status: RESOLVED | Noted: 2023-04-10 | Resolved: 2023-07-10

## 2023-07-10 PROBLEM — J96.01 ACUTE RESPIRATORY FAILURE WITH HYPOXIA AND HYPERCARBIA: Status: RESOLVED | Noted: 2023-04-06 | Resolved: 2023-07-10

## 2023-07-10 PROBLEM — J96.02 ACUTE RESPIRATORY FAILURE WITH HYPOXIA AND HYPERCARBIA: Status: RESOLVED | Noted: 2023-04-06 | Resolved: 2023-07-10

## 2023-07-21 DIAGNOSIS — I10 ESSENTIAL HYPERTENSION: ICD-10-CM

## 2023-07-24 RX ORDER — LISINOPRIL 20 MG/1
TABLET ORAL
Qty: 90 TABLET | Refills: 1 | Status: SHIPPED | OUTPATIENT
Start: 2023-07-24 | End: 2024-01-22

## 2023-07-24 NOTE — TELEPHONE ENCOUNTER
Refill Decision Note   Efra Payton  is requesting a refill authorization.  Brief Assessment and Rationale for Refill:  Approve     Medication Therapy Plan:         Comments:     Note composed:2:09 PM 07/24/2023

## 2023-07-24 NOTE — TELEPHONE ENCOUNTER
No care due was identified.  [facility] Embedded Care Due Messages. Reference number: [id number].   7/24/2023 9:24:26 AM CDT

## 2023-08-10 ENCOUNTER — OFFICE VISIT (OUTPATIENT)
Dept: INTERNAL MEDICINE | Facility: CLINIC | Age: 63
End: 2023-08-10
Payer: MEDICAID

## 2023-08-10 VITALS
HEART RATE: 71 BPM | WEIGHT: 151.25 LBS | BODY MASS INDEX: 23.74 KG/M2 | HEIGHT: 67 IN | OXYGEN SATURATION: 93 % | DIASTOLIC BLOOD PRESSURE: 72 MMHG | SYSTOLIC BLOOD PRESSURE: 124 MMHG

## 2023-08-10 DIAGNOSIS — Z91.89 AT RISK FOR PROLONGED QT INTERVAL SYNDROME: ICD-10-CM

## 2023-08-10 DIAGNOSIS — R45.4 DIFFICULTY CONTROLLING ANGER: ICD-10-CM

## 2023-08-10 DIAGNOSIS — L85.9 HYPERKERATOTIC HAND DERMATITIS: Primary | ICD-10-CM

## 2023-08-10 PROCEDURE — 4010F ACE/ARB THERAPY RXD/TAKEN: CPT | Mod: CPTII,,,

## 2023-08-10 PROCEDURE — 99999 PR PBB SHADOW E&M-EST. PATIENT-LVL III: ICD-10-PCS | Mod: PBBFAC,,,

## 2023-08-10 PROCEDURE — 3078F PR MOST RECENT DIASTOLIC BLOOD PRESSURE < 80 MM HG: ICD-10-PCS | Mod: CPTII,,,

## 2023-08-10 PROCEDURE — 99213 OFFICE O/P EST LOW 20 MIN: CPT | Mod: PBBFAC

## 2023-08-10 PROCEDURE — 99999 PR PBB SHADOW E&M-EST. PATIENT-LVL III: CPT | Mod: PBBFAC,,,

## 2023-08-10 PROCEDURE — 3078F DIAST BP <80 MM HG: CPT | Mod: CPTII,,,

## 2023-08-10 PROCEDURE — 3074F PR MOST RECENT SYSTOLIC BLOOD PRESSURE < 130 MM HG: ICD-10-PCS | Mod: CPTII,,,

## 2023-08-10 PROCEDURE — 4010F PR ACE/ARB THEARPY RXD/TAKEN: ICD-10-PCS | Mod: CPTII,,,

## 2023-08-10 PROCEDURE — 3008F PR BODY MASS INDEX (BMI) DOCUMENTED: ICD-10-PCS | Mod: CPTII,,,

## 2023-08-10 PROCEDURE — 3008F BODY MASS INDEX DOCD: CPT | Mod: CPTII,,,

## 2023-08-10 PROCEDURE — 99213 OFFICE O/P EST LOW 20 MIN: CPT | Mod: S$PBB,,,

## 2023-08-10 PROCEDURE — 99213 PR OFFICE/OUTPT VISIT, EST, LEVL III, 20-29 MIN: ICD-10-PCS | Mod: S$PBB,,,

## 2023-08-10 PROCEDURE — 3074F SYST BP LT 130 MM HG: CPT | Mod: CPTII,,,

## 2023-08-10 RX ORDER — CLOBETASOL PROPIONATE 0.5 MG/G
OINTMENT TOPICAL 2 TIMES DAILY
Qty: 30 G | Refills: 0 | Status: SHIPPED | OUTPATIENT
Start: 2023-08-10 | End: 2023-12-28

## 2023-08-10 RX ORDER — ESCITALOPRAM OXALATE 10 MG/1
10 TABLET ORAL DAILY
Qty: 30 TABLET | Refills: 2 | Status: SHIPPED | OUTPATIENT
Start: 2023-08-10 | End: 2023-12-28

## 2023-08-10 NOTE — PROGRESS NOTES
Subjective     Chief Complaint: High blood pressure and anixety     History of Present Illness:  Mr. Efra Payton III is a 63 y.o. male with history of hypertension, BPH, and thrombocytopenia here today for concerns of high blood pressure. Patient takes amlodipine 10 and lisinopril 20 daily.  Patient is here with concerns for rash on hands, nasal nares, and ear that has been ongoing waxing waning for the last several months.  He states that this has occurred before many years ago and was seen at [facility] where he was given topical steroids with complete regression of symptoms.  He states over the years they have flared up occasionally but would completely resolve.  He knows the last several months that they never completely resolved, and would wax and wane but never completely going away.  He also endorses increased angry outbursts.  This has been noted prior and with adjustments to his Wellbutrin which he takes 150 mg twice a day.  This is causing him great anxiety because he realizes these outbursts in that it is uncharacteristic of him.    Review of Systems   Constitutional:  Negative for chills, diaphoresis, fever, malaise/fatigue and weight loss.   Respiratory:  Negative for shortness of breath and wheezing.    Cardiovascular:  Negative for chest pain and palpitations.   Gastrointestinal:  Negative for abdominal pain, diarrhea, heartburn, nausea and vomiting.   Genitourinary: Negative.    Skin:  Positive for itching and rash.   Neurological:  Negative for dizziness, tingling, tremors and headaches.   Endo/Heme/Allergies: Negative.    Psychiatric/Behavioral:  Negative for depression and memory loss. The patient is nervous/anxious.        PAST HISTORY:     Past Medical History:   Diagnosis Date    Cirrhosis     COPD (chronic obstructive pulmonary disease)     Eczema     Hepatitis C virus infection cured after antiviral drug therapy     s/p Rx, treated / cured - svr 2021    History of drug abuse     Now  on methadone    Substance abuse        Past Surgical History:   Procedure Laterality Date    arm surgery      COLONOSCOPY N/A 7/8/2020    Procedure: COLONOSCOPY;  Surgeon: Mona Powell MD;  Location: [facility] (4TH FLR);  Service: Endoscopy;  Laterality: N/A;  [facility]    COLONOSCOPY N/A 5/27/2021    Procedure: COLONOSCOPY;  Surgeon: Mona Powell MD;  Location: [facility] (4TH FLR);  Service: Endoscopy;  Laterality: N/A;  Constipation prep - pg  CBC, PT/INR (cirrhosis) done 5/4/21 - pg  Covid-19 test 5/24/21 at [facility] - pg.5/26 Called pt. to move up earlier.Left VM.EC    ENDOSCOPIC ULTRASOUND OF UPPER GASTROINTESTINAL TRACT N/A 3/24/2021    Procedure: ULTRASOUND, UPPER GI TRACT, ENDOSCOPIC - w/ varices screen;  Surgeon: George Carrillo MD;  Location: [facility] (2ND FLR);  Service: Endoscopy;  Laterality: N/A;  Covid-19 test 3/21/21 at [facility]  PM prep    HAND SURGERY      LAPAROSCOPIC CHOLECYSTECTOMY N/A 4/4/2023    Procedure: CHOLECYSTECTOMY, LAPAROSCOPIC;  Surgeon: Mahesh Lazaro MD;  Location: [facility] OR [address] [place] [address];  Service: General;  Laterality: N/A;       Family History   Problem Relation Age of Onset    Heart disease Father     Diabetes Father     Colon cancer Father     Hypertension Sister     Diabetes Sister     Hypertension Brother     Diabetes Brother     Colon cancer Brother     Heart attack Paternal Grandmother     Heart disease Paternal Grandmother     Diabetes Paternal Grandmother     Diabetes Paternal Grandfather     Colon cancer Paternal Grandfather        Social History     Tobacco Use    Smoking status: Every Day     Current packs/day: 1.00     Average packs/day: 1 pack/day for 54.0 years (54.0 ttl pk-yrs)     Types: Cigarettes     Start date: 7/25/1969    Smokeless tobacco: Never    Tobacco comments:     decreased his smoking to 1 pack per day 5 months ago    Substance Use Topics    Alcohol use: No     Comment: has past history of alcohol abuse     Drug use: No      "Comment: has past history of substance abuse        MEDICATIONS & ALLERGIES:     Current Outpatient Medications on File Prior to Visit   Medication Sig    albuterol (PROVENTIL/VENTOLIN HFA) 90 mcg/actuation inhaler Inhale 1-2 puffs into the lungs every 6 (six) hours as needed for Wheezing or Shortness of Breath. Rescue    alfuzosin (UROXATRAL) 10 mg Tb24 Take 1 tablet (10 mg total) by mouth daily with breakfast.    amLODIPine (NORVASC) 10 MG tablet Take 1 tablet (10 mg total) by mouth once daily.    budesonide-formoterol 80-4.5 mcg (SYMBICORT) 80-4.5 mcg/actuation HFAA Inhale 2 puffs into the lungs once daily at 6am. Controller    buPROPion (WELLBUTRIN SR) 150 MG TBSR 12 hr tablet Take 1 tablet (150 mg total) by mouth 2 (two) times daily.    COMBIVENT RESPIMAT  mcg/actuation inhaler SMARTSI Puff(s) By Mouth Every 6 Hours PRN    lisinopriL (PRINIVIL,ZESTRIL) 20 MG tablet Take 1 tablet by mouth once daily    methadone HCl (METHADONE ORAL) Take 115 mg by mouth once daily.     pulse oximeter (PULSE OXIMETER) device by Apply Externally route 2 (two) times a day. Use twice daily at 8 AM and 3 PM and record the value in [employer] as directed.     No current facility-administered medications on file prior to visit.       Review of patient's allergies indicates:  No Known Allergies    OBJECTIVE:     Vital Signs:  Vitals:    08/10/23 1455   BP: 124/72   Pulse: 71   SpO2: (!) 93%   Weight: 68.6 kg (151 lb 3.8 oz)   Height: 5' 7" (1.702 m)       Body mass index is 23.69 kg/m².     Physical Exam  Constitutional:       General: He is not in acute distress.     Appearance: Normal appearance. He is normal weight. He is not ill-appearing, toxic-appearing or diaphoretic.   HENT:      Head: Normocephalic and atraumatic.      Nose: Nasal tenderness present.     Cardiovascular:      Rate and Rhythm: Normal rate and regular rhythm.      Pulses: Normal pulses.      Heart sounds: No murmur heard.  Pulmonary:      Effort: Pulmonary " effort is normal. No respiratory distress.      Breath sounds: Normal breath sounds. No wheezing.   Abdominal:      General: There is no distension.      Tenderness: There is no abdominal tenderness.   Skin:     Coloration: Skin is pale. Skin is not jaundiced.      Findings: Bruising, erythema and rash present. No lesion.   Neurological:      General: No focal deficit present.      Mental Status: He is alert and oriented to person, place, and time. Mental status is at baseline.   Psychiatric:         Mood and Affect: Mood normal.         Behavior: Behavior normal.         Thought Content: Thought content normal.         Judgment: Judgment normal.       Health Maintenance Due   Topic Date Due    COVID-19 Vaccine (4 - Moderna series) 03/02/2022    Pneumococcal Vaccines (Age 0-64) (2 - PCV) 02/22/2023         ASSESSMENT & PLAN:   Mr. Efra Payton III is a 63 y.o. male here today with concerns of rash and angry outbursts. Skin findings consistent with hyperkeratotic palmar dermatitis that was previously treated with topical steroids and creams.   - clobetasol cream b.i.d. for 2 weeks  - derm referral  - Will add Lexapro 10 mg daily to current regimen to help regulate angry outbursts was recently started on Wellbutrin with an increase in the last several months due to these episodes  - patient will need repeat ECG in several weeks due to potential for QTC prolonging effects, upon recent review of EKG from April of 2023 QT not prolonged at that time.  - patient to reach out to PCP for further medical management and medication refills    Hyperkeratotic hand dermatitis  -     clobetasol 0.05% (TEMOVATE) 0.05 % Oint; Apply topically 2 (two) times daily. for 14 days  Dispense: 30 g; Refill: 0  -     Ambulatory referral/consult to Dermatology; Future; Expected date: 08/17/2023    Difficulty controlling anger  -     EScitalopram oxalate (LEXAPRO) 10 MG tablet; Take 1 tablet (10 mg total) by mouth once daily.  Dispense: 30  tablet; Refill: 2    At risk for prolonged QT interval syndrome  -     SCHEDULED EKG 12-LEAD (to Muse); Future; Expected date: 09/10/2023          Discussed with Dr. Denson  - staff attestation to follow      Bhavin Aguirre DO, MSB   Internal Medicine, PGY-2  [facility]  [address] [place] [address]  [phone]

## 2023-08-23 DIAGNOSIS — N40.0 BENIGN PROSTATIC HYPERPLASIA, UNSPECIFIED WHETHER LOWER URINARY TRACT SYMPTOMS PRESENT: ICD-10-CM

## 2023-08-23 RX ORDER — ALFUZOSIN HYDROCHLORIDE 10 MG/1
10 TABLET, EXTENDED RELEASE ORAL
Qty: 90 TABLET | Refills: 3 | Status: SHIPPED | OUTPATIENT
Start: 2023-08-23 | End: 2024-08-22

## 2023-08-23 NOTE — TELEPHONE ENCOUNTER
No care due was identified.  [facility] Embedded Care Due Messages. Reference number: [id number].   8/23/2023 10:48:22 AM CDT

## 2023-09-16 ENCOUNTER — NURSE TRIAGE (OUTPATIENT)
Dept: ADMINISTRATIVE | Facility: CLINIC | Age: 63
End: 2023-09-16
Payer: MEDICAID

## 2023-09-16 NOTE — TELEPHONE ENCOUNTER
OOC NT incoming PES escalation call -  Pt wife, Caren, reports pt recently seen by non [facility] eye MD and started on multiple eye drops and ointments. Reports both eyes are currently swollen almost closed and are fire red around the eyes. Eye swelling protocol followed and pt advised  to be seen by a provider with in the next 4 hours. OAC offered and declined. Pt reports he will go to local . Encounter routed to PCP.   Reason for Disposition   [1] SEVERE eyelid swelling (i.e., shut or almost) AND [2] involves both eyes (Exception: Itchy eyes, which  are probably an allergic reaction.)    Additional Information   Negative: Unresponsive, passed out or very weak   Negative: Difficulty breathing or wheezing   Negative: [1] Difficulty swallowing or slurred speech AND [2] sudden onset   Negative: Sounds like a life-threatening emergency to the triager   Negative: [1] SEVERE eyelid swelling (i.e., shut or almost) AND [2] fever   Negative: [1] Eyelid (outer) is very red AND [2] fever   Negative: Patient sounds very sick or weak to the triager   Negative: [1] Pregnant 20 or more weeks AND [2] sudden weight gain (e.g., more than 3 lbs or 1.4 kg in one week)   Negative: [1] Postpartum (from 0 to 6 weeks after delivery) AND [2] sudden weight gain (e.g., more than 3 lbs or 1.4 kg in one week)    Protocols used: Eye - Swelling-A-

## 2023-09-26 ENCOUNTER — OFFICE VISIT (OUTPATIENT)
Dept: INTERNAL MEDICINE | Facility: CLINIC | Age: 63
End: 2023-09-26
Payer: MEDICAID

## 2023-09-26 VITALS
DIASTOLIC BLOOD PRESSURE: 70 MMHG | HEART RATE: 78 BPM | HEIGHT: 67 IN | OXYGEN SATURATION: 90 % | SYSTOLIC BLOOD PRESSURE: 122 MMHG | WEIGHT: 154.13 LBS | BODY MASS INDEX: 24.19 KG/M2

## 2023-09-26 DIAGNOSIS — L85.9 HYPERKERATOTIC HAND DERMATITIS: Primary | ICD-10-CM

## 2023-09-26 DIAGNOSIS — R21 RASH: ICD-10-CM

## 2023-09-26 PROCEDURE — 3074F PR MOST RECENT SYSTOLIC BLOOD PRESSURE < 130 MM HG: ICD-10-PCS | Mod: CPTII,,, | Performed by: STUDENT IN AN ORGANIZED HEALTH CARE EDUCATION/TRAINING PROGRAM

## 2023-09-26 PROCEDURE — 1159F PR MEDICATION LIST DOCUMENTED IN MEDICAL RECORD: ICD-10-PCS | Mod: CPTII,,, | Performed by: STUDENT IN AN ORGANIZED HEALTH CARE EDUCATION/TRAINING PROGRAM

## 2023-09-26 PROCEDURE — 1160F RVW MEDS BY RX/DR IN RCRD: CPT | Mod: CPTII,,, | Performed by: STUDENT IN AN ORGANIZED HEALTH CARE EDUCATION/TRAINING PROGRAM

## 2023-09-26 PROCEDURE — 99999 PR PBB SHADOW E&M-EST. PATIENT-LVL V: ICD-10-PCS | Mod: PBBFAC,,, | Performed by: STUDENT IN AN ORGANIZED HEALTH CARE EDUCATION/TRAINING PROGRAM

## 2023-09-26 PROCEDURE — 99214 OFFICE O/P EST MOD 30 MIN: CPT | Mod: S$PBB,,, | Performed by: STUDENT IN AN ORGANIZED HEALTH CARE EDUCATION/TRAINING PROGRAM

## 2023-09-26 PROCEDURE — 99215 OFFICE O/P EST HI 40 MIN: CPT | Mod: PBBFAC | Performed by: STUDENT IN AN ORGANIZED HEALTH CARE EDUCATION/TRAINING PROGRAM

## 2023-09-26 PROCEDURE — 1159F MED LIST DOCD IN RCRD: CPT | Mod: CPTII,,, | Performed by: STUDENT IN AN ORGANIZED HEALTH CARE EDUCATION/TRAINING PROGRAM

## 2023-09-26 PROCEDURE — 3008F BODY MASS INDEX DOCD: CPT | Mod: CPTII,,, | Performed by: STUDENT IN AN ORGANIZED HEALTH CARE EDUCATION/TRAINING PROGRAM

## 2023-09-26 PROCEDURE — 4010F PR ACE/ARB THEARPY RXD/TAKEN: ICD-10-PCS | Mod: CPTII,,, | Performed by: STUDENT IN AN ORGANIZED HEALTH CARE EDUCATION/TRAINING PROGRAM

## 2023-09-26 PROCEDURE — 3008F PR BODY MASS INDEX (BMI) DOCUMENTED: ICD-10-PCS | Mod: CPTII,,, | Performed by: STUDENT IN AN ORGANIZED HEALTH CARE EDUCATION/TRAINING PROGRAM

## 2023-09-26 PROCEDURE — 99214 PR OFFICE/OUTPT VISIT, EST, LEVL IV, 30-39 MIN: ICD-10-PCS | Mod: S$PBB,,, | Performed by: STUDENT IN AN ORGANIZED HEALTH CARE EDUCATION/TRAINING PROGRAM

## 2023-09-26 PROCEDURE — 3078F DIAST BP <80 MM HG: CPT | Mod: CPTII,,, | Performed by: STUDENT IN AN ORGANIZED HEALTH CARE EDUCATION/TRAINING PROGRAM

## 2023-09-26 PROCEDURE — 1160F PR REVIEW ALL MEDS BY PRESCRIBER/CLIN PHARMACIST DOCUMENTED: ICD-10-PCS | Mod: CPTII,,, | Performed by: STUDENT IN AN ORGANIZED HEALTH CARE EDUCATION/TRAINING PROGRAM

## 2023-09-26 PROCEDURE — 4010F ACE/ARB THERAPY RXD/TAKEN: CPT | Mod: CPTII,,, | Performed by: STUDENT IN AN ORGANIZED HEALTH CARE EDUCATION/TRAINING PROGRAM

## 2023-09-26 PROCEDURE — 3074F SYST BP LT 130 MM HG: CPT | Mod: CPTII,,, | Performed by: STUDENT IN AN ORGANIZED HEALTH CARE EDUCATION/TRAINING PROGRAM

## 2023-09-26 PROCEDURE — 99999 PR PBB SHADOW E&M-EST. PATIENT-LVL V: CPT | Mod: PBBFAC,,, | Performed by: STUDENT IN AN ORGANIZED HEALTH CARE EDUCATION/TRAINING PROGRAM

## 2023-09-26 PROCEDURE — 3078F PR MOST RECENT DIASTOLIC BLOOD PRESSURE < 80 MM HG: ICD-10-PCS | Mod: CPTII,,, | Performed by: STUDENT IN AN ORGANIZED HEALTH CARE EDUCATION/TRAINING PROGRAM

## 2023-09-26 NOTE — PROGRESS NOTES
Efra Payton III  1960        Subjective     Chief Complaint: Rash    History of Present Illness:  Mr. Efra Payton III is a 63 y.o. male who presents to clinic for evaluation of rash on palms and legs. Last seen a month ago. He had a similar rash at that time    He was initiated on clabetasol for two weeks and referred to dermatology at that time    In the interim the patient noted he had significant eye swelling'    Formerly noted to have a lung nodule and emphazyma    CT scan obtained in 2022, got colonoscopy last hao    Patient reports the eyes have gotten progressively worse. 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 but he believes that this was after his eye swelling began    He also notes that his known rash on his hands and shins has also continued to progress    He has had some watery discharge from his eyes bilaterally    He is in communication with an ophthalmologist per his report    He did not know he had been previously referred to dermatology at this visit    He states that this rash is a longstanding issue, it has previously responded to steroid both topically and orally    He denies any systemic symtpoms including fevers chills nausea vomiting or weight loss    It was noted one month ago   history of hypertension, BPH, and thrombocytopenia here today for concerns of high blood pressure. Patient takes amlodipine 10 and lisinopril 20 daily.  Patient is here with concerns for rash on hands, nasal nares, and ear that has been ongoing waxing waning for the last several months.  He states that this has occurred before many years ago and was seen at [facility] where he was given topical steroids with complete regression of symptoms.  He states over the years they have flared up occasionally but would completely resolve.  He knows the last several months that they never completely resolved, and would wax and wane but never completely going away.  He also endorses  increased angry outbursts.  This has been noted prior and with adjustments to his Wellbutrin which he takes 150 mg twice a day.  This is causing him great anxiety because he realizes these outbursts in that it is uncharacteristic of him.    Review of Systems   Constitutional: Negative.    HENT: Negative.     Respiratory: Negative.     Gastrointestinal: Negative.    Genitourinary: Negative.    Musculoskeletal: Negative.    Skin:  Positive for itching and rash.   Neurological: Negative.    Psychiatric/Behavioral: Negative.          PAST HISTORY:     Past Medical History:   Diagnosis Date    Cirrhosis     COPD (chronic obstructive pulmonary disease)     Eczema     Hepatitis C virus infection cured after antiviral drug therapy     s/p Rx, treated / cured - svr 2021    History of drug abuse     Now on methadone    Substance abuse        Past Surgical History:   Procedure Laterality Date    arm surgery      COLONOSCOPY N/A 7/8/2020    Procedure: COLONOSCOPY;  Surgeon: Mona Powell MD;  Location: [facility] (4TH FLR);  Service: Endoscopy;  Laterality: N/A;  covid [place]-7/5-tb-hx copd    COLONOSCOPY N/A 5/27/2021    Procedure: COLONOSCOPY;  Surgeon: Mona Powell MD;  Location: [facility] (4TH FLR);  Service: Endoscopy;  Laterality: N/A;  Constipation prep - pg  CBC, PT/INR (cirrhosis) done 5/4/21 - pg  Covid-19 test 5/24/21 at [facility].5/26 Called pt. to move up earlier.Left VM.EC    ENDOSCOPIC ULTRASOUND OF UPPER GASTROINTESTINAL TRACT N/A 3/24/2021    Procedure: ULTRASOUND, UPPER GI TRACT, ENDOSCOPIC - w/ varices screen;  Surgeon: George Carrillo MD;  Location: [facility] (2ND FLR);  Service: Endoscopy;  Laterality: N/A;  Covid-19 test 3/21/21 at [facility]  PM prep    HAND SURGERY      LAPAROSCOPIC CHOLECYSTECTOMY N/A 4/4/2023    Procedure: CHOLECYSTECTOMY, LAPAROSCOPIC;  Surgeon: Mahesh Lazaro MD;  Location: [address];  Service: General;  Laterality: N/A;       Family History   Problem Relation  Age of Onset    Heart disease Father     Diabetes Father     Colon cancer Father     Hypertension Sister     Diabetes Sister     Hypertension Brother     Diabetes Brother     Colon cancer Brother     Heart attack Paternal Grandmother     Heart disease Paternal Grandmother     Diabetes Paternal Grandmother     Diabetes Paternal Grandfather     Colon cancer Paternal Grandfather        Social History     Socioeconomic History    Marital status:    Tobacco Use    Smoking status: Every Day     Current packs/day: 1.00     Average packs/day: 1 pack/day for 54.2 years (54.2 ttl pk-yrs)     Types: Cigarettes     Start date: 7/25/1969    Smokeless tobacco: Never    Tobacco comments:     decreased his smoking to 1 pack per day 5 months ago    Substance and Sexual Activity    Alcohol use: No     Comment: has past history of alcohol abuse     Drug use: No     Comment: has past history of substance abuse      Social Determinants of Health     Financial Resource Strain: Medium Risk (4/5/2023)    Overall Financial Resource Strain (CARDIA)     Difficulty of Paying Living Expenses: Somewhat hard   Food Insecurity: Food Insecurity Present (4/5/2023)    Hunger Vital Sign     Worried About Running Out of Food in the Last Year: Sometimes true     Ran Out of Food in the Last Year: Never true   Transportation Needs: No Transportation Needs (4/5/2023)    PRAPARE - Transportation     Lack of Transportation (Medical): No     Lack of Transportation (Non-Medical): No   Physical Activity: Inactive (4/5/2023)    Exercise Vital Sign     Days of Exercise per Week: 0 days     Minutes of Exercise per Session: 0 min   Stress: Stress Concern Present (4/5/2023)    [employer] [place] [employer] - Occupational Stress Questionnaire     Feeling of Stress : To some extent   Social Connections: Moderately Isolated (4/5/2023)    Social Connection and Isolation Panel [NHANES]     Frequency of Communication with Friends and Family: More than  three times a week     Frequency of Social Gatherings with Friends and Family: Once a week     Attends Christian Services: Never     Active Member of Clubs or Organizations: No     Attends Club or Organization Meetings: Never     Marital Status:    Housing Stability: Low Risk  (2023)    Housing Stability Vital Sign     Unable to Pay for Housing in the Last Year: No     Number of Places Lived in the Last Year: 1     Unstable Housing in the Last Year: No       MEDICATIONS & ALLERGIES:     Current Outpatient Medications on File Prior to Visit   Medication Sig    albuterol (PROVENTIL/VENTOLIN HFA) 90 mcg/actuation inhaler Inhale 1-2 puffs into the lungs every 6 (six) hours as needed for Wheezing or Shortness of Breath. Rescue    alfuzosin (UROXATRAL) 10 mg Tb24 Take 1 tablet (10 mg total) by mouth daily with breakfast.    amLODIPine (NORVASC) 10 MG tablet Take 1 tablet (10 mg total) by mouth once daily.    budesonide-formoterol 80-4.5 mcg (SYMBICORT) 80-4.5 mcg/actuation HFAA Inhale 2 puffs into the lungs once daily at 6am. Controller    buPROPion (WELLBUTRIN SR) 150 MG TBSR 12 hr tablet Take 1 tablet (150 mg total) by mouth 2 (two) times daily.    clobetasol 0.05% (TEMOVATE) 0.05 % Oint Apply topically 2 (two) times daily. for 14 days    COMBIVENT RESPIMAT  mcg/actuation inhaler SMARTSI Puff(s) By Mouth Every 6 Hours PRN    EScitalopram oxalate (LEXAPRO) 10 MG tablet Take 1 tablet (10 mg total) by mouth once daily.    lisinopriL (PRINIVIL,ZESTRIL) 20 MG tablet Take 1 tablet by mouth once daily    methadone HCl (METHADONE ORAL) Take 115 mg by mouth once daily.     pulse oximeter (PULSE OXIMETER) device by Apply Externally route 2 (two) times a day. Use twice daily at 8 AM and 3 PM and record the value in MyChart as directed.     No current facility-administered medications on file prior to visit.       Review of patient's allergies indicates:  No Known Allergies    OBJECTIVE:     Vital  "Signs:  Vitals:    09/26/23 1524   BP: 122/70   BP Location: Left arm   Patient Position: Sitting   Pulse: 78   SpO2: (!) 90%   Weight: 69.9 kg (154 lb 1.6 oz)   Height: 5' 7" (1.702 m)       Body mass index is 24.14 kg/m².     Physical Exam:  Physical Exam  Constitutional:       Appearance: Normal appearance.   HENT:      Head: Normocephalic.   Eyes:      Comments: Bilateral Eye erythema   Cardiovascular:      Rate and Rhythm: Normal rate.   Pulmonary:      Effort: Pulmonary effort is normal.   Abdominal:      General: Abdomen is flat.   Skin:     Findings: Rash present.      Comments: Significant conjuntival injection  Periorbital erythema and swelling bilaterally  Multiple punctate lesions noted on anterior and lateral shin  Very cracked and dry inflamed skin on palmar surfaces and on the back of his hands bilaterally   Neurological:      General: No focal deficit present.      Mental Status: He is alert.   Psychiatric:         Mood and Affect: Mood normal.            Laboratory  Lab Results   Component Value Date    WBC 3.76 (L) 04/08/2023    HGB 10.7 (L) 04/08/2023    HCT 33.7 (L) 04/08/2023    MCV 97 04/08/2023     04/08/2023     Lab Results   Component Value Date    GLU 84 04/08/2023     04/08/2023    K 4.0 04/08/2023     04/08/2023    CO2 31 (H) 04/08/2023    BUN 10 04/08/2023    CREATININE 0.7 04/08/2023    CALCIUM 9.0 04/08/2023    MG 2.0 04/08/2023     Lab Results   Component Value Date    INR 1.0 04/05/2023    INR 1.0 04/04/2023    INR 1.0 12/20/2022     No results found for: "HGBA1C"  No results for input(s): "POCTGLUCOSE" in the last 72 hours.      Health Maintenance         Date Due Completion Date    COVID-19 Vaccine (4 - Moderna series) 03/02/2022 1/5/2022    Pneumococcal Vaccines (Age 0-64) (2 - PCV) 02/22/2023 2/22/2022    LDCT Lung Screen 08/15/2023 8/15/2022    Influenza Vaccine (1) 09/01/2023 1/3/2022    TETANUS VACCINE 05/24/2026 5/24/2016    Colorectal Cancer Screening " 05/27/2026 5/27/2021    Lipid Panel 04/16/2027 4/16/2022            ASSESSMENT & PLAN:   Mr. Efra Payton III is a 63 y.o. male who was seen today in clinic for evaluation of rash and periorbital erythema    He likely has an undiagnosed systemic dermatologic process.    No red flag or alarm signs at todays visit.    Given the unclear etiology of the patients rash and symptoms we will refer the patient to Dermatology urgently as well as re-refer the patient to be evaluated by ophtalmology    Patient given strict ER precautions with regards to changes in vision or vision loss    Efra was seen today for rash.    Diagnoses and all orders for this visit:    Hyperkeratotic hand dermatitis    Rash         1. Hyperkeratotic hand dermatitis    2. Rash        RTC as needed with PCP    Camilo Campbell MD  Internal Medicine PGY-3  [facility]  [address] [place] [address]

## 2023-09-28 ENCOUNTER — TELEPHONE (OUTPATIENT)
Dept: ADMINISTRATIVE | Facility: HOSPITAL | Age: 63
End: 2023-09-28
Payer: MEDICAID

## 2023-12-20 ENCOUNTER — TELEPHONE (OUTPATIENT)
Dept: INTERNAL MEDICINE | Facility: CLINIC | Age: 63
End: 2023-12-20
Payer: MEDICAID

## 2023-12-20 NOTE — TELEPHONE ENCOUNTER
----- Message from Tiffany Reilly sent at 12/20/2023  1:50 PM CST -----  Contact: [phone]  Symptom: Nosebleed  Outcome: Schedule a same-day appointment or talk to a nurse or provider within 1 hour.  Reason: Caller denied all higher acuity questions    The caller accepted this outcome    Pt is calling he states he has had this many times before he states this is not emergency and he is calling he has sores in the nose and he states it bleeds off and on please advise there is no appts coming up for me due to the medicaid

## 2023-12-20 NOTE — TELEPHONE ENCOUNTER
Pt states this has been going on for a couple of months.Thr right nostril is not as bad as the left.  He has tried over the counter medications, but did work.  He has scabs in nose.  He has also noticed that while sleeping, he is picing his nose.  Even when blowing his nose, he said it bleeds. Would like to know if yo want to schedule him for a visit or refer him to ENT.

## 2023-12-20 NOTE — TELEPHONE ENCOUNTER
He'll have a hard time getting into ENT.  I can see him and help with this.  Please help him get an appointment with me.    D

## 2023-12-28 ENCOUNTER — OFFICE VISIT (OUTPATIENT)
Dept: INTERNAL MEDICINE | Facility: CLINIC | Age: 63
End: 2023-12-28
Payer: MEDICAID

## 2023-12-28 VITALS
BODY MASS INDEX: 22.73 KG/M2 | WEIGHT: 144.81 LBS | HEART RATE: 60 BPM | DIASTOLIC BLOOD PRESSURE: 62 MMHG | SYSTOLIC BLOOD PRESSURE: 114 MMHG | HEIGHT: 67 IN

## 2023-12-28 DIAGNOSIS — I10 ESSENTIAL HYPERTENSION: ICD-10-CM

## 2023-12-28 DIAGNOSIS — R04.0 FREQUENT NOSEBLEEDS: Primary | ICD-10-CM

## 2023-12-28 DIAGNOSIS — Z87.891 FORMER SMOKER: ICD-10-CM

## 2023-12-28 DIAGNOSIS — F11.20 METHADONE MAINTENANCE THERAPY PATIENT: ICD-10-CM

## 2023-12-28 DIAGNOSIS — Z86.19 HEPATITIS C VIRUS INFECTION CURED AFTER ANTIVIRAL DRUG THERAPY: ICD-10-CM

## 2023-12-28 DIAGNOSIS — L85.3 DRY SKIN: ICD-10-CM

## 2023-12-28 PROCEDURE — 99999 PR PBB SHADOW E&M-EST. PATIENT-LVL IV: ICD-10-PCS | Mod: PBBFAC,,, | Performed by: INTERNAL MEDICINE

## 2023-12-28 PROCEDURE — 1160F PR REVIEW ALL MEDS BY PRESCRIBER/CLIN PHARMACIST DOCUMENTED: ICD-10-PCS | Mod: CPTII,,, | Performed by: INTERNAL MEDICINE

## 2023-12-28 PROCEDURE — 99214 OFFICE O/P EST MOD 30 MIN: CPT | Mod: S$PBB,,, | Performed by: INTERNAL MEDICINE

## 2023-12-28 PROCEDURE — 99214 PR OFFICE/OUTPT VISIT, EST, LEVL IV, 30-39 MIN: ICD-10-PCS | Mod: S$PBB,,, | Performed by: INTERNAL MEDICINE

## 2023-12-28 PROCEDURE — 1160F RVW MEDS BY RX/DR IN RCRD: CPT | Mod: CPTII,,, | Performed by: INTERNAL MEDICINE

## 2023-12-28 PROCEDURE — 3008F BODY MASS INDEX DOCD: CPT | Mod: CPTII,,, | Performed by: INTERNAL MEDICINE

## 2023-12-28 PROCEDURE — 4010F ACE/ARB THERAPY RXD/TAKEN: CPT | Mod: CPTII,,, | Performed by: INTERNAL MEDICINE

## 2023-12-28 PROCEDURE — 3078F DIAST BP <80 MM HG: CPT | Mod: CPTII,,, | Performed by: INTERNAL MEDICINE

## 2023-12-28 PROCEDURE — 4010F PR ACE/ARB THEARPY RXD/TAKEN: ICD-10-PCS | Mod: CPTII,,, | Performed by: INTERNAL MEDICINE

## 2023-12-28 PROCEDURE — 3074F SYST BP LT 130 MM HG: CPT | Mod: CPTII,,, | Performed by: INTERNAL MEDICINE

## 2023-12-28 PROCEDURE — 3074F PR MOST RECENT SYSTOLIC BLOOD PRESSURE < 130 MM HG: ICD-10-PCS | Mod: CPTII,,, | Performed by: INTERNAL MEDICINE

## 2023-12-28 PROCEDURE — 1159F MED LIST DOCD IN RCRD: CPT | Mod: CPTII,,, | Performed by: INTERNAL MEDICINE

## 2023-12-28 PROCEDURE — 1159F PR MEDICATION LIST DOCUMENTED IN MEDICAL RECORD: ICD-10-PCS | Mod: CPTII,,, | Performed by: INTERNAL MEDICINE

## 2023-12-28 PROCEDURE — 99214 OFFICE O/P EST MOD 30 MIN: CPT | Mod: PBBFAC | Performed by: INTERNAL MEDICINE

## 2023-12-28 PROCEDURE — 3078F PR MOST RECENT DIASTOLIC BLOOD PRESSURE < 80 MM HG: ICD-10-PCS | Mod: CPTII,,, | Performed by: INTERNAL MEDICINE

## 2023-12-28 PROCEDURE — 3008F PR BODY MASS INDEX (BMI) DOCUMENTED: ICD-10-PCS | Mod: CPTII,,, | Performed by: INTERNAL MEDICINE

## 2023-12-28 PROCEDURE — 99999 PR PBB SHADOW E&M-EST. PATIENT-LVL IV: CPT | Mod: PBBFAC,,, | Performed by: INTERNAL MEDICINE

## 2023-12-28 RX ORDER — EMOLLIENT - LOTION
LOTION TOPICAL
Qty: 237 ML | Refills: 0 | Status: SHIPPED | OUTPATIENT
Start: 2023-12-28

## 2023-12-28 NOTE — PROGRESS NOTES
Subjective:       Patient ID: Efra Payton III is a 63 y.o. male.    Chief Complaint:   Epistaxis and Abrasion (Right pinky )    HPI - He's doing well.  He has stopped smoking; just made his one year anniversary.  He has had recurring nosebleeds about every 2-3 days.  Has a spot in his right nostril that has not healed.  He thinks he picks at it while asleep.  He's working at [employer] as a  now; has to dip hands in bleachy water regularly.  He has dry, cracked and fissured skin on his hands.  Occasionally these fissures bleed.  He's not using a lotion.  Due for some labs.    PMH  Emphysema stable on treatment  Hepatitis C, completed treatment  Hx positive PPD with treatment before 2000  Colon polyps 2021 after positive cologuard  HTN, at goal  Anxiety  Former smoker  Hx incarceration  Methadone maintenance therapy     Meds:  Reviewed and reconciled in EPIC with patient during visit today     Review of Systems   Constitutional:  Negative for fever.   HENT:  Positive for nosebleeds. Negative for congestion.    Respiratory:  Negative for shortness of breath.    Cardiovascular:  Negative for chest pain.   Gastrointestinal:  Negative for abdominal pain.   Genitourinary:  Negative for difficulty urinating.   Musculoskeletal:  Negative for arthralgias.   Skin:  Positive for wound.   Neurological:  Negative for headaches.   Psychiatric/Behavioral:  Negative for sleep disturbance.        Objective:      Physical Exam  Constitutional:       General: He is not in acute distress.     Appearance: He is well-developed. He is not diaphoretic.   HENT:      Head: Normocephalic and atraumatic.   Cardiovascular:      Rate and Rhythm: Normal rate and regular rhythm.      Heart sounds: Normal heart sounds. No murmur heard.     No friction rub. No gallop.   Pulmonary:      Effort: No respiratory distress.      Breath sounds: No wheezing or rales.   Chest:      Chest wall: No tenderness.   Skin:     General: Skin is warm.       Findings: Lesion and rash present. No erythema.      Comments: Cracked, dry skin both hands, with right>>left.  One fissure is bleeding this am.   Neurological:      Mental Status: He is alert and oriented to person, place, and time.   Psychiatric:         Thought Content: Thought content normal.         Assessment:       1. Frequent nosebleeds    2. Dry skin    3. Former smoker    4. Essential hypertension    5. Methadone maintenance therapy patient    6. Hepatitis C virus infection cured after antiviral drug therapy        Plan:       Efra was seen today for epistaxis and abrasion.    Diagnoses and all orders for this visit:    Frequent nosebleeds - new problem.  Discussed use of vaseline to lesion.  Wear gloves at night.  Will ask ENT to look to see if there's something more worrisome with this nonhealing lesion  -     Ambulatory referral/consult to ENT; Future    Dry skin - hands.  Caused by exposure to dishwater with bleach.  Use a moisturizing soap. Use a good lotion (cetaphil, eucerin or vaseline intensive care) before and after work.  LMK if this doesn't work  -     vit E-glycerin-dimethicone (CETAPHIL MOISTURIZING) Lotn; Apply to hands twice daily    Former smoker - gave positive feedback.  He's late for his annual lung cancer screening  -     CT Chest Lung Screening Low Dose; Future    Essential hypertension - at goal, stay the course.  -     Lipid panel; Future  -     COMPREHENSIVE METABOLIC PANEL; Future    Methadone maintenance therapy patient    Hepatitis C virus infection cured after antiviral drug therapy    Rtc prn    JEN Mak MD MPH  Staff Internist

## 2024-01-02 ENCOUNTER — LAB VISIT (OUTPATIENT)
Dept: LAB | Facility: HOSPITAL | Age: 64
End: 2024-01-02
Attending: INTERNAL MEDICINE
Payer: MEDICAID

## 2024-01-02 DIAGNOSIS — I10 ESSENTIAL HYPERTENSION: ICD-10-CM

## 2024-01-02 LAB
ALBUMIN SERPL BCP-MCNC: 4 G/DL (ref 3.5–5.2)
ALP SERPL-CCNC: 126 U/L (ref 55–135)
ALT SERPL W/O P-5'-P-CCNC: 21 U/L (ref 10–44)
ANION GAP SERPL CALC-SCNC: 8 MMOL/L (ref 8–16)
AST SERPL-CCNC: 28 U/L (ref 10–40)
BILIRUB SERPL-MCNC: 0.3 MG/DL (ref 0.1–1)
BUN SERPL-MCNC: 17 MG/DL (ref 8–23)
CALCIUM SERPL-MCNC: 9.1 MG/DL (ref 8.7–10.5)
CHLORIDE SERPL-SCNC: 104 MMOL/L (ref 95–110)
CHOLEST SERPL-MCNC: 129 MG/DL (ref 120–199)
CHOLEST/HDLC SERPL: 2.7 {RATIO} (ref 2–5)
CO2 SERPL-SCNC: 25 MMOL/L (ref 23–29)
CREAT SERPL-MCNC: 1.1 MG/DL (ref 0.5–1.4)
EST. GFR  (NO RACE VARIABLE): >60 ML/MIN/1.73 M^2
GLUCOSE SERPL-MCNC: 120 MG/DL (ref 70–110)
HDLC SERPL-MCNC: 48 MG/DL (ref 40–75)
HDLC SERPL: 37.2 % (ref 20–50)
LDLC SERPL CALC-MCNC: 68.4 MG/DL (ref 63–159)
NONHDLC SERPL-MCNC: 81 MG/DL
POTASSIUM SERPL-SCNC: 4.4 MMOL/L (ref 3.5–5.1)
PROT SERPL-MCNC: 7.4 G/DL (ref 6–8.4)
SODIUM SERPL-SCNC: 137 MMOL/L (ref 136–145)
TRIGL SERPL-MCNC: 63 MG/DL (ref 30–150)

## 2024-01-02 PROCEDURE — 80061 LIPID PANEL: CPT | Performed by: INTERNAL MEDICINE

## 2024-01-02 PROCEDURE — 80053 COMPREHEN METABOLIC PANEL: CPT | Performed by: INTERNAL MEDICINE

## 2024-01-02 PROCEDURE — 36415 COLL VENOUS BLD VENIPUNCTURE: CPT | Performed by: INTERNAL MEDICINE

## 2024-01-05 DIAGNOSIS — F17.210 NICOTINE DEPENDENCE, CIGARETTES, UNCOMPLICATED: ICD-10-CM

## 2024-01-05 RX ORDER — BUPROPION HYDROCHLORIDE 150 MG/1
150 TABLET, EXTENDED RELEASE ORAL 2 TIMES DAILY
Qty: 180 TABLET | Refills: 3 | Status: SHIPPED | OUTPATIENT
Start: 2024-01-05

## 2024-01-05 NOTE — TELEPHONE ENCOUNTER
No care due was identified.  Health [facility] Embedded Care Due Messages. Reference number: [id number].   1/05/2024 9:06:49 AM CST

## 2024-01-22 DIAGNOSIS — I10 ESSENTIAL HYPERTENSION: ICD-10-CM

## 2024-01-22 RX ORDER — LISINOPRIL 20 MG/1
TABLET ORAL
Qty: 90 TABLET | Refills: 3 | Status: SHIPPED | OUTPATIENT
Start: 2024-01-22

## 2024-01-22 RX ORDER — AMLODIPINE BESYLATE 10 MG/1
10 TABLET ORAL
Qty: 90 TABLET | Refills: 3 | Status: SHIPPED | OUTPATIENT
Start: 2024-01-22 | End: 2024-04-22

## 2024-01-22 NOTE — TELEPHONE ENCOUNTER
Refill Decision Note   Efra Payton  is requesting a refill authorization.  Brief Assessment and Rationale for Refill:  Approve     Medication Therapy Plan:         Comments:     Note composed:4:54 PM 01/22/2024

## 2024-01-22 NOTE — TELEPHONE ENCOUNTER
No care due was identified.  Health [facility] Embedded Care Due Messages. Reference number: [id number].   1/22/2024 4:45:16 PM CST

## 2024-02-06 ENCOUNTER — IMMUNIZATION (OUTPATIENT)
Dept: INTERNAL MEDICINE | Facility: CLINIC | Age: 64
End: 2024-02-06
Payer: MEDICAID

## 2024-02-06 ENCOUNTER — OFFICE VISIT (OUTPATIENT)
Dept: INTERNAL MEDICINE | Facility: CLINIC | Age: 64
End: 2024-02-06
Payer: MEDICAID

## 2024-02-06 VITALS
OXYGEN SATURATION: 94 % | HEART RATE: 75 BPM | HEIGHT: 67 IN | BODY MASS INDEX: 23.04 KG/M2 | DIASTOLIC BLOOD PRESSURE: 64 MMHG | WEIGHT: 146.81 LBS | SYSTOLIC BLOOD PRESSURE: 125 MMHG

## 2024-02-06 DIAGNOSIS — Z23 NEED FOR VACCINATION: Primary | ICD-10-CM

## 2024-02-06 DIAGNOSIS — L85.9 HYPERKERATOTIC HAND DERMATITIS: Primary | ICD-10-CM

## 2024-02-06 PROCEDURE — 99213 OFFICE O/P EST LOW 20 MIN: CPT | Mod: S$PBB,,,

## 2024-02-06 PROCEDURE — 4010F ACE/ARB THERAPY RXD/TAKEN: CPT | Mod: CPTII,,,

## 2024-02-06 PROCEDURE — 99999PBSHW COVID-19 VAC, MRNA 2023 (MODERNA)(PF) 50 MCG/0.5 ML IM SUSR (12+): Mod: PBBFAC,,,

## 2024-02-06 PROCEDURE — 1159F MED LIST DOCD IN RCRD: CPT | Mod: CPTII,,,

## 2024-02-06 PROCEDURE — 90471 IMMUNIZATION ADMIN: CPT | Mod: PBBFAC

## 2024-02-06 PROCEDURE — 99215 OFFICE O/P EST HI 40 MIN: CPT | Mod: PBBFAC

## 2024-02-06 PROCEDURE — 91322 SARSCOV2 VAC 50 MCG/0.5ML IM: CPT | Mod: PBBFAC

## 2024-02-06 PROCEDURE — 1160F RVW MEDS BY RX/DR IN RCRD: CPT | Mod: CPTII,,,

## 2024-02-06 PROCEDURE — 3078F DIAST BP <80 MM HG: CPT | Mod: CPTII,,,

## 2024-02-06 PROCEDURE — 99999PBSHW FLU VACCINE (QUAD) GREATER THAN OR EQUAL TO 3YO PRESERVATIVE FREE IM: Mod: PBBFAC,,,

## 2024-02-06 PROCEDURE — 3008F BODY MASS INDEX DOCD: CPT | Mod: CPTII,,,

## 2024-02-06 PROCEDURE — 99999 PR PBB SHADOW E&M-EST. PATIENT-LVL V: CPT | Mod: PBBFAC,,,

## 2024-02-06 PROCEDURE — 3074F SYST BP LT 130 MM HG: CPT | Mod: CPTII,,,

## 2024-02-06 RX ORDER — CLOBETASOL PROPIONATE 0.5 MG/G
OINTMENT TOPICAL 2 TIMES DAILY
Qty: 30 G | Refills: 0 | Status: SHIPPED | OUTPATIENT
Start: 2024-02-06 | End: 2024-03-07

## 2024-02-06 RX ORDER — METHYLPREDNISOLONE 4 MG/1
TABLET ORAL
Qty: 21 EACH | Refills: 0 | Status: SHIPPED | OUTPATIENT
Start: 2024-02-06 | End: 2024-02-27

## 2024-02-06 NOTE — PROGRESS NOTES
Subjective     Chief Complaint: Dermatitis    History of Present Illness:  Mr. Efra Payton III is a 64 y.o. male with a hx of long standing hyperkeratotic hand dermatitis, emphysema stable on treatment, Hepatitis C (completed treatment), Colon polyps 2021 after positive cologuard, HTN, Anxiety who presents to the clinic for a follow up of his hyperkeratotic hand dermatitis. He reports that he has had new onset of his rash now on his right forearm that began about 4 days ago. Reports pain and itchiness in right forearm hand with swelling beginning 3 days ago. Tried Biofreeze with no improvement. Has also tried OTC eczema creams with no improvement. Also denies new medications use, new laundry detergents or new creams. Denies any other symptoms at this time.     Last seen in August and September 2023  where had a similar rash at that time. He was initiated on clabetasol for two weeks in August and referred to dermatology at that time. He does not recall if he had improvement of his rash at that time. He has not been able to get into see dermatology yet as there are no appointments available. He states that this rash is a longstanding issue, it has previously responded to steroid both topically and orally.     Review of Systems   Constitutional:  Negative for chills and fever.   HENT:  Negative for congestion.    Respiratory:  Negative for cough, shortness of breath and wheezing.    Cardiovascular:  Negative for chest pain.   Gastrointestinal:  Negative for abdominal pain, diarrhea, nausea and vomiting.   Skin:  Positive for itching and rash.   All other systems reviewed and are negative.      PAST HISTORY:     Past Medical History:   Diagnosis Date    Cirrhosis     COPD (chronic obstructive pulmonary disease)     Eczema     Hepatitis C virus infection cured after antiviral drug therapy     s/p Rx, treated / cured - svr 2021    History of drug abuse     Now on methadone    Substance abuse        Past Surgical  History:   Procedure Laterality Date    arm surgery      COLONOSCOPY N/A 7/8/2020    Procedure: COLONOSCOPY;  Surgeon: Mona Powell MD;  Location: [facility] (4TH FLR);  Service: Endoscopy;  Laterality: N/A;  covid [place]-7/5-tb-hx copd    COLONOSCOPY N/A 5/27/2021    Procedure: COLONOSCOPY;  Surgeon: Mona Powell MD;  Location: [facility] (4TH FLR);  Service: Endoscopy;  Laterality: N/A;  Constipation prep - pg  CBC, PT/INR (cirrhosis) done 5/4/21 - pg  Covid-19 test 5/24/21 at [facility] - .5/26 Called pt. to move up earlier.Left VM.EC    ENDOSCOPIC ULTRASOUND OF UPPER GASTROINTESTINAL TRACT N/A 3/24/2021    Procedure: ULTRASOUND, UPPER GI TRACT, ENDOSCOPIC - w/ varices screen;  Surgeon: George Carrillo MD;  Location: [facility] (2ND FLR);  Service: Endoscopy;  Laterality: N/A;  Covid-19 test 3/21/21 at [facility]  PM prep    HAND SURGERY      LAPAROSCOPIC CHOLECYSTECTOMY N/A 4/4/2023    Procedure: CHOLECYSTECTOMY, LAPAROSCOPIC;  Surgeon: Mahesh Lazaro MD;  Location: [facility] OR [address] [place] [address];  Service: General;  Laterality: N/A;       Family History   Problem Relation Age of Onset    Heart disease Father     Diabetes Father     Colon cancer Father     Hypertension Sister     Diabetes Sister     Hypertension Brother     Diabetes Brother     Colon cancer Brother     Heart attack Paternal Grandmother     Heart disease Paternal Grandmother     Diabetes Paternal Grandmother     Diabetes Paternal Grandfather     Colon cancer Paternal Grandfather        Social History     Socioeconomic History    Marital status:    Tobacco Use    Smoking status: Every Day     Current packs/day: 1.00     Average packs/day: 1 pack/day for 54.5 years (54.5 ttl pk-yrs)     Types: Cigarettes     Start date: 7/25/1969    Smokeless tobacco: Never    Tobacco comments:     decreased his smoking to 1 pack per day 5 months ago    Substance and Sexual Activity    Alcohol use: No     Comment: has past history of alcohol abuse     Drug use:  No     Comment: has past history of substance abuse      Social Determinants of Health     Financial Resource Strain: Medium Risk (4/5/2023)    Overall Financial Resource Strain (CARDIA)     Difficulty of Paying Living Expenses: Somewhat hard   Food Insecurity: Food Insecurity Present (4/5/2023)    Hunger Vital Sign     Worried About Running Out of Food in the Last Year: Sometimes true     Ran Out of Food in the Last Year: Never true   Transportation Needs: No Transportation Needs (4/5/2023)    PRAPARE - Transportation     Lack of Transportation (Medical): No     Lack of Transportation (Non-Medical): No   Physical Activity: Inactive (4/5/2023)    Exercise Vital Sign     Days of Exercise per Week: 0 days     Minutes of Exercise per Session: 0 min   Stress: Stress Concern Present (4/5/2023)    [employer] - Occupational Stress Questionnaire     Feeling of Stress : To some extent   Social Connections: Moderately Isolated (4/5/2023)    Social Connection and Isolation Panel [NHANES]     Frequency of Communication with Friends and Family: More than three times a week     Frequency of Social Gatherings with Friends and Family: Once a week     Attends Yazidism Services: Never     Active Member of Clubs or Organizations: No     Attends Club or Organization Meetings: Never     Marital Status:    Housing Stability: Low Risk  (4/5/2023)    Housing Stability Vital Sign     Unable to Pay for Housing in the Last Year: No     Number of Places Lived in the Last Year: 1     Unstable Housing in the Last Year: No       MEDICATIONS & ALLERGIES:     Current Outpatient Medications on File Prior to Visit   Medication Sig    albuterol (PROVENTIL/VENTOLIN HFA) 90 mcg/actuation inhaler Inhale 1-2 puffs into the lungs every 6 (six) hours as needed for Wheezing or Shortness of Breath. Rescue    alfuzosin (UROXATRAL) 10 mg Tb24 Take 1 tablet (10 mg total) by mouth daily with breakfast.    amLODIPine (NORVASC)  "10 MG tablet Take 1 tablet by mouth once daily    budesonide-formoterol 80-4.5 mcg (SYMBICORT) 80-4.5 mcg/actuation HFAA Inhale 2 puffs into the lungs once daily at 6am. Controller    buPROPion (WELLBUTRIN SR) 150 MG TBSR 12 hr tablet Take 1 tablet (150 mg total) by mouth 2 (two) times daily.    lisinopriL (PRINIVIL,ZESTRIL) 20 MG tablet Take 1 tablet by mouth once daily    methadone HCl (METHADONE ORAL) Take 115 mg by mouth once daily.     vit E-glycerin-dimethicone (CETAPHIL MOISTURIZING) Lotn Apply to hands twice daily     No current facility-administered medications on file prior to visit.       Review of patient's allergies indicates:  No Known Allergies    OBJECTIVE:     Vital Signs:  Vitals:    02/06/24 0934   BP: 125/64   Pulse: 75   SpO2: (!) 94%   Weight: 66.6 kg (146 lb 13.2 oz)   Height: 5' 7" (1.702 m)       Body mass index is 23 kg/m².     Physical Exam  Vitals and nursing note reviewed.   Constitutional:       Appearance: Normal appearance.   HENT:      Head: Atraumatic.      Right Ear: External ear normal.      Left Ear: External ear normal.      Mouth/Throat:      Mouth: Mucous membranes are moist.      Pharynx: Oropharynx is clear.   Eyes:      Extraocular Movements: Extraocular movements intact.   Cardiovascular:      Rate and Rhythm: Normal rate and regular rhythm.      Pulses: Normal pulses.      Heart sounds: Normal heart sounds.   Pulmonary:      Effort: Pulmonary effort is normal.      Breath sounds: Normal breath sounds.   Abdominal:      General: Abdomen is flat.      Palpations: Abdomen is soft.   Musculoskeletal:      Cervical back: Normal range of motion.   Skin:     Findings: Erythema and rash (Maculopapular rash to the dorsal aspect of bilateral hands and mid right forarm) present.   Neurological:      General: No focal deficit present.      Mental Status: He is alert and oriented to person, place, and time.   Psychiatric:         Mood and Affect: Mood normal.         Behavior: " Behavior normal.     Laboratory  No results found for this or any previous visit (from the past 24 hour(s)).    Diagnostic Results:      Health Maintenance Due   Topic Date Due    RSV Vaccine (Age 60+ and Pregnant patients) (1 - 1-dose 60+ series) Never done    Pneumococcal Vaccines (Age 0-64) (2 of 2 - PCV) 02/22/2023    LDCT Lung Screen  08/15/2023         ASSESSMENT & PLAN:   Mr. Efra Payton III is a 63 y.o. male who was seen today in clinic for follow up of his hyperkeratotic hand dermatitis of unclear etiology.   He was previously referred to Dermatology but was unable to make an appointment with them.  Despite multiple over-the-counter eczema creams and topical clobetasol, patient's dermatitis appears to be worsening now spreading to his right forearm.  Patient reports that he previously was given IM steroids along with an oral steroid taper in the past which reportedly improved his symptoms. Will start patient on Medrol Dosepak.  Instructed patient to continue applying topical clobetasol for the next 4 weeks. Will resend urgent referral for Dermatology. Will send patient to pharmacy for RSV and pneumococcal vaccine.  Patient to follow with his PCP.    1. Hyperkeratotic hand dermatitis  -     methylPREDNISolone (MEDROL DOSEPACK) 4 mg tablet; use as directed  Dispense: 21 each; Refill: 0  -     Ambulatory referral/consult to Dermatology; Future; Expected date: 02/13/2024  -     clobetasol 0.05% (TEMOVATE) 0.05 % Oint; Apply topically 2 (two) times daily.  Dispense: 30 g; Refill: 0        See above for further detail.    RTC as needed if symptoms do not improve or worsen    Discussed with Dr Whitley -- staff attestation to follow      Isabel Vann DO  Internal Medicine PGY-1  [facility]

## 2024-04-19 DIAGNOSIS — I10 ESSENTIAL HYPERTENSION: ICD-10-CM

## 2024-04-20 NOTE — TELEPHONE ENCOUNTER
Refill Routing Note   Medication(s) are not appropriate for processing by [facility] for the following reason(s):        Drug-disease interaction: amLODIPine and Hepatic cirrhosis, unspecified hepatic cirrhosis type, unspecified whether ascites present     [facility] action(s):  Defer      Medication Therapy Plan:         Appointments  past 12m or future 3m with PCP    Date Provider   Last Visit   12/28/2023 Jaiden Mak II, MD   Next Visit   6/25/2024 Jaiden Mak II, MD   ED visits in past 90 days: 0        Note composed:2:03 PM 04/20/2024

## 2024-04-20 NOTE — TELEPHONE ENCOUNTER
No care due was identified.  [facility] Embedded Care Due Messages. Reference number: [id number].   4/20/2024 1:48:57 PM CDT

## 2024-04-22 RX ORDER — AMLODIPINE BESYLATE 10 MG/1
10 TABLET ORAL DAILY
Qty: 90 TABLET | Refills: 3 | Status: SHIPPED | OUTPATIENT
Start: 2024-04-22

## 2024-06-10 ENCOUNTER — PATIENT MESSAGE (OUTPATIENT)
Dept: INTERNAL MEDICINE | Facility: CLINIC | Age: 64
End: 2024-06-10
Payer: MEDICAID

## 2024-06-11 ENCOUNTER — TELEPHONE (OUTPATIENT)
Dept: OTOLARYNGOLOGY | Facility: CLINIC | Age: 64
End: 2024-06-11
Payer: MEDICAID

## 2024-06-25 ENCOUNTER — OFFICE VISIT (OUTPATIENT)
Dept: INTERNAL MEDICINE | Facility: CLINIC | Age: 64
End: 2024-06-25
Payer: MEDICAID

## 2024-06-25 VITALS
SYSTOLIC BLOOD PRESSURE: 124 MMHG | BODY MASS INDEX: 22.56 KG/M2 | WEIGHT: 143.75 LBS | HEIGHT: 67 IN | DIASTOLIC BLOOD PRESSURE: 70 MMHG | HEART RATE: 86 BPM

## 2024-06-25 DIAGNOSIS — I10 ESSENTIAL HYPERTENSION: Primary | ICD-10-CM

## 2024-06-25 DIAGNOSIS — J43.8 OTHER EMPHYSEMA: ICD-10-CM

## 2024-06-25 DIAGNOSIS — F11.20 METHADONE MAINTENANCE THERAPY PATIENT: ICD-10-CM

## 2024-06-25 DIAGNOSIS — Z87.891 FORMER SMOKER: ICD-10-CM

## 2024-06-25 PROCEDURE — 3074F SYST BP LT 130 MM HG: CPT | Mod: CPTII,,, | Performed by: INTERNAL MEDICINE

## 2024-06-25 PROCEDURE — 1159F MED LIST DOCD IN RCRD: CPT | Mod: CPTII,,, | Performed by: INTERNAL MEDICINE

## 2024-06-25 PROCEDURE — 3008F BODY MASS INDEX DOCD: CPT | Mod: CPTII,,, | Performed by: INTERNAL MEDICINE

## 2024-06-25 PROCEDURE — 99213 OFFICE O/P EST LOW 20 MIN: CPT | Mod: PBBFAC | Performed by: INTERNAL MEDICINE

## 2024-06-25 PROCEDURE — 3078F DIAST BP <80 MM HG: CPT | Mod: CPTII,,, | Performed by: INTERNAL MEDICINE

## 2024-06-25 PROCEDURE — 4010F ACE/ARB THERAPY RXD/TAKEN: CPT | Mod: CPTII,,, | Performed by: INTERNAL MEDICINE

## 2024-06-25 PROCEDURE — 99214 OFFICE O/P EST MOD 30 MIN: CPT | Mod: S$PBB,,, | Performed by: INTERNAL MEDICINE

## 2024-06-25 PROCEDURE — 1160F RVW MEDS BY RX/DR IN RCRD: CPT | Mod: CPTII,,, | Performed by: INTERNAL MEDICINE

## 2024-06-25 PROCEDURE — 99999 PR PBB SHADOW E&M-EST. PATIENT-LVL III: CPT | Mod: PBBFAC,,, | Performed by: INTERNAL MEDICINE

## 2024-06-25 RX ORDER — DUPILUMAB 300 MG/2ML
INJECTION, SOLUTION SUBCUTANEOUS
COMMUNITY
Start: 2024-04-30

## 2024-06-25 NOTE — PROGRESS NOTES
Subjective:       Patient ID: Efra Payton III is a 64 y.o. male.    Chief Complaint:   Follow-up    HPI - he feels well today.  He is now 2 years tobacco free, but he was a former heavy smoker for 25+ years.  He is raising his 2 grandchildren who are under age 10.  He continues to take methadone.  He is working part-time.  Health maintenance up-to-date, other than low-dose CT.    PMH  Emphysema stable on treatment  Hepatitis C, completed treatment  Hx positive PPD with treatment before 2000  Colon polyps 2021 after positive cologuard  HTN, at goal  Anxiety  Former smoker  Hx incarceration  Methadone maintenance therapy     Meds:  Reviewed and reconciled in EPIC with patient during visit today     Review of Systems   Constitutional:  Negative for fever.   HENT:  Negative for congestion.    Respiratory:  Negative for shortness of breath.    Cardiovascular:  Negative for chest pain.   Gastrointestinal:  Negative for abdominal pain.   Genitourinary:  Negative for difficulty urinating.   Musculoskeletal:  Negative for arthralgias.   Skin:  Negative for rash.   Neurological:  Negative for headaches.   Psychiatric/Behavioral:  Negative for sleep disturbance.        Objective:      Physical Exam  Constitutional:       General: He is not in acute distress.     Appearance: He is well-developed. He is not diaphoretic.   HENT:      Head: Normocephalic and atraumatic.   Cardiovascular:      Rate and Rhythm: Normal rate and regular rhythm.      Heart sounds: Normal heart sounds. No murmur heard.     No friction rub. No gallop.   Pulmonary:      Effort: No respiratory distress.      Breath sounds: No wheezing or rales.   Chest:      Chest wall: No tenderness.   Skin:     General: Skin is warm.      Findings: No erythema.   Neurological:      Mental Status: He is alert and oriented to person, place, and time.   Psychiatric:         Thought Content: Thought content normal.         Assessment:       1. Essential hypertension    2.  Other emphysema    3. Methadone maintenance therapy patient    4. Former smoker        Plan:       Efra was seen today for follow-up.    Diagnoses and all orders for this visit:    Essential hypertension - this is at goal.  Continue present care.    Other emphysema - he is stable on a maintenance inhaler.  Continue    Methadone maintenance therapy patient - this is stable.  Continue to monitor    Former smoker - gave positive feedback for ongoing tobacco cessation.  Low-dose CT is overdue  -     CT Chest Lung Screening Low Dose; Future    RTC p.r.n., or in 1 year    JEN Mak MD MPH  Staff Internist

## 2024-08-23 DIAGNOSIS — N40.0 BENIGN PROSTATIC HYPERPLASIA, UNSPECIFIED WHETHER LOWER URINARY TRACT SYMPTOMS PRESENT: ICD-10-CM

## 2024-08-23 RX ORDER — ALFUZOSIN HYDROCHLORIDE 10 MG/1
10 TABLET, EXTENDED RELEASE ORAL
Qty: 90 TABLET | Refills: 3 | Status: SHIPPED | OUTPATIENT
Start: 2024-08-23 | End: 2025-08-23

## 2024-08-23 NOTE — TELEPHONE ENCOUNTER
No care due was identified.  [facility] Embedded Care Due Messages. Reference number: [id number].   8/23/2024 8:11:31 AM CDT

## 2024-08-23 NOTE — TELEPHONE ENCOUNTER
Refill Decision Note   Efra Payton  is requesting a refill authorization.  Brief Assessment and Rationale for Refill:  Approve     Medication Therapy Plan:       Medication Reconciliation Completed: No   Comments:     No Care Gaps recommended.     Note composed:11:04 AM 08/23/2024

## 2024-09-05 ENCOUNTER — PATIENT OUTREACH (OUTPATIENT)
Dept: ADMINISTRATIVE | Facility: HOSPITAL | Age: 64
End: 2024-09-05
Payer: MEDICAID

## 2024-09-05 NOTE — PROGRESS NOTES
Health Maintenance Due   Topic Date Due    RSV Vaccine (Age 60+ and Pregnant patients) (1 - 1-dose 60+ series) Never done    LDCT Lung Screen  08/15/2023    Influenza Vaccine (1) 09/01/2024     Triggered LINKS and reconciled immunizations. Updated Care Everywhere. Pt has office visit scheduled for 9/6/24- please schedule LDC Lung Screen added to appt notes. Pt outreached to schedule LDCT Lung Screen. Chart review completed.

## 2024-10-23 ENCOUNTER — TELEPHONE (OUTPATIENT)
Dept: INTERNAL MEDICINE | Facility: CLINIC | Age: 64
End: 2024-10-23

## 2024-10-23 ENCOUNTER — OFFICE VISIT (OUTPATIENT)
Dept: INTERNAL MEDICINE | Facility: CLINIC | Age: 64
End: 2024-10-23
Payer: MEDICAID

## 2024-10-23 VITALS
HEART RATE: 79 BPM | BODY MASS INDEX: 23.11 KG/M2 | WEIGHT: 147.25 LBS | HEIGHT: 67 IN | SYSTOLIC BLOOD PRESSURE: 121 MMHG | DIASTOLIC BLOOD PRESSURE: 77 MMHG

## 2024-10-23 DIAGNOSIS — K12.2 ORAL INFECTION: Primary | ICD-10-CM

## 2024-10-23 PROCEDURE — 4010F ACE/ARB THERAPY RXD/TAKEN: CPT | Mod: CPTII,,,

## 2024-10-23 PROCEDURE — 99213 OFFICE O/P EST LOW 20 MIN: CPT | Mod: PBBFAC

## 2024-10-23 PROCEDURE — 99999 PR PBB SHADOW E&M-EST. PATIENT-LVL III: CPT | Mod: PBBFAC,,,

## 2024-10-23 PROCEDURE — 3078F DIAST BP <80 MM HG: CPT | Mod: CPTII,,,

## 2024-10-23 PROCEDURE — 3074F SYST BP LT 130 MM HG: CPT | Mod: CPTII,,,

## 2024-10-23 PROCEDURE — 3008F BODY MASS INDEX DOCD: CPT | Mod: CPTII,,,

## 2024-10-23 PROCEDURE — 99213 OFFICE O/P EST LOW 20 MIN: CPT | Mod: S$PBB,,,

## 2024-10-23 RX ORDER — AMOXICILLIN AND CLAVULANATE POTASSIUM 875; 125 MG/1; MG/1
1 TABLET, FILM COATED ORAL EVERY 12 HOURS
Qty: 18 TABLET | Refills: 0 | Status: SHIPPED | OUTPATIENT
Start: 2024-10-23 | End: 2024-11-01

## 2024-10-23 NOTE — PATIENT INSTRUCTIONS
You were seen today for an oral infection. Augmentin twice per day for 10 days is recommended. Taking Ibuprofen as needed can help with pain and inflammation short-term. Please schedule an appointment with your dentist to make sure everything is healing okay.

## 2024-10-23 NOTE — PROGRESS NOTES
Efra Payton III  1960        Subjective     Reason for Visit:    History of Present Illness:  Mr. Efra Payton III is a 64 y.o. male who presents to clinic for jaw and ear pain. The pain initially started in his gum after he ate a hard taco shell about 1 week ago. The pain has not resolved and has spread to along his L mandible. He has tried oral rinses that have not helped. He cannot take ciprofloxacin due to methadone. No known penicillin allergy. No fevers and chills.       #jaw pain  -Augmentin BID x 9 days  -NSAIDs as needed for pain  -schedule appointment with dentist      ROS     PAST HISTORY:     Past Medical History:   Diagnosis Date    Cirrhosis     COPD (chronic obstructive pulmonary disease)     Eczema     Hepatitis C virus infection cured after antiviral drug therapy     s/p Rx, treated / cured - svr 2021    History of drug abuse     Now on methadone    Substance abuse        Past Surgical History:   Procedure Laterality Date    arm surgery      COLONOSCOPY N/A 7/8/2020    Procedure: COLONOSCOPY;  Surgeon: Mona Powell MD;  Location: [facility] (4TH FLR);  Service: Endoscopy;  Laterality: N/A;  [facility]    COLONOSCOPY N/A 5/27/2021    Procedure: COLONOSCOPY;  Surgeon: Mona Powell MD;  Location: [facility] (4TH FLR);  Service: Endoscopy;  Laterality: N/A;  Constipation prep - pg  CBC, PT/INR (cirrhosis) done 5/4/21 - pg  Covid-19 test 5/24/21 at [facility] - .5/26 Called pt. to move up earlier.Left VM.EC    ENDOSCOPIC ULTRASOUND OF UPPER GASTROINTESTINAL TRACT N/A 3/24/2021    Procedure: ULTRASOUND, UPPER GI TRACT, ENDOSCOPIC - w/ varices screen;  Surgeon: George Carrillo MD;  Location: [facility] (2ND FLR);  Service: Endoscopy;  Laterality: N/A;  Covid-19 test 3/21/21 at [place] -   PM prep    HAND SURGERY      LAPAROSCOPIC CHOLECYSTECTOMY N/A 4/4/2023    Procedure: CHOLECYSTECTOMY, LAPAROSCOPIC;  Surgeon: Mahesh Lazaro MD;  Location: [facility] OR [address] [place] [address];  Service:  General;  Laterality: N/A;       Family History   Problem Relation Name Age of Onset    Heart disease Father      Diabetes Father      Colon cancer Father      Hypertension Sister      Diabetes Sister      Hypertension Brother      Diabetes Brother      Colon cancer Brother      Heart attack Paternal Grandmother      Heart disease Paternal Grandmother      Diabetes Paternal Grandmother      Diabetes Paternal Grandfather      Colon cancer Paternal Grandfather         Social History     Socioeconomic History    Marital status:    Tobacco Use    Smoking status: Every Day     Current packs/day: 1.00     Average packs/day: 1 pack/day for 55.2 years (55.2 ttl pk-yrs)     Types: Cigarettes     Start date: 7/25/1969    Smokeless tobacco: Never    Tobacco comments:     decreased his smoking to 1 pack per day 5 months ago    Substance and Sexual Activity    Alcohol use: No     Comment: has past history of alcohol abuse     Drug use: No     Comment: has past history of substance abuse      Social Drivers of Health     Financial Resource Strain: Medium Risk (4/5/2023)    Overall Financial Resource Strain (CARDIA)     Difficulty of Paying Living Expenses: Somewhat hard   Food Insecurity: Food Insecurity Present (4/5/2023)    Hunger Vital Sign     Worried About Running Out of Food in the Last Year: Sometimes true     Ran Out of Food in the Last Year: Never true   Transportation Needs: No Transportation Needs (4/5/2023)    PRAPARE - Transportation     Lack of Transportation (Medical): No     Lack of Transportation (Non-Medical): No   Physical Activity: Inactive (4/5/2023)    Exercise Vital Sign     Days of Exercise per Week: 0 days     Minutes of Exercise per Session: 0 min   Stress: Stress Concern Present (4/5/2023)    [employer] - Occupational Stress Questionnaire     Feeling of Stress : To some extent   Housing Stability: Low Risk  (4/5/2023)    Housing Stability Vital Sign     Unable to Pay  "for Housing in the Last Year: No     Number of Places Lived in the Last Year: 1     Unstable Housing in the Last Year: No       MEDICATIONS & ALLERGIES:     Current Outpatient Medications on File Prior to Visit   Medication Sig    albuterol (PROVENTIL/VENTOLIN HFA) 90 mcg/actuation inhaler Inhale 1-2 puffs into the lungs every 6 (six) hours as needed for Wheezing or Shortness of Breath. Rescue    alfuzosin (UROXATRAL) 10 mg Tb24 Take 1 tablet (10 mg total) by mouth daily with breakfast.    amLODIPine (NORVASC) 10 MG tablet Take 1 tablet (10 mg total) by mouth once daily.    budesonide-formoterol 80-4.5 mcg (SYMBICORT) 80-4.5 mcg/actuation HFAA Inhale 2 puffs into the lungs once daily at 6am. Controller    buPROPion (WELLBUTRIN SR) 150 MG TBSR 12 hr tablet Take 1 tablet (150 mg total) by mouth 2 (two) times daily.    DUPIXENT  mg/2 mL PnIj INJECT 300MG UNDER THE SKIN EVERY 2 WEEKS.    lisinopriL (PRINIVIL,ZESTRIL) 20 MG tablet Take 1 tablet by mouth once daily    methadone HCl (METHADONE ORAL) Take 115 mg by mouth once daily.     vit E-glycerin-dimethicone (CETAPHIL MOISTURIZING) Lotn Apply to hands twice daily     No current facility-administered medications on file prior to visit.       Review of patient's allergies indicates:  No Known Allergies    OBJECTIVE:        Vital Signs:  Vitals:    10/23/24 1451   BP: 121/77   BP Location: Right arm   Patient Position: Sitting   Pulse: 79   Weight: 66.8 kg (147 lb 4.3 oz)   Height: 5' 7" (1.702 m)       Body mass index is 23.07 kg/m².     Physical Exam:  Physical Exam  Constitutional:       General: He is not in acute distress.     Appearance: He is not ill-appearing.   HENT:      Right Ear: Tympanic membrane, ear canal and external ear normal.      Left Ear: Tympanic membrane, ear canal and external ear normal.      Mouth/Throat:      Comments: Tender along L mandible, no oral abscess seen  Eyes:      Extraocular Movements: Extraocular movements intact. " "  Cardiovascular:      Rate and Rhythm: Normal rate and regular rhythm.   Pulmonary:      Effort: Pulmonary effort is normal. No respiratory distress.      Breath sounds: Normal breath sounds.   Neurological:      Mental Status: He is alert. Mental status is at baseline.   Psychiatric:         Mood and Affect: Mood normal.         Behavior: Behavior normal.            Laboratory  Lab Results   Component Value Date    WBC 3.76 (L) 04/08/2023    HGB 10.7 (L) 04/08/2023    HCT 33.7 (L) 04/08/2023    MCV 97 04/08/2023     04/08/2023     Lab Results   Component Value Date     (H) 01/02/2024     01/02/2024    K 4.4 01/02/2024     01/02/2024    CO2 25 01/02/2024    BUN 17 01/02/2024    CREATININE 1.1 01/02/2024    CALCIUM 9.1 01/02/2024    MG 2.0 04/08/2023    PROT 7.4 01/02/2024    BILITOT 0.3 01/02/2024    ALKPHOS 126 01/02/2024    ALT 21 01/02/2024    AST 28 01/02/2024     Lab Results   Component Value Date    INR 1.0 04/05/2023    INR 1.0 04/04/2023    INR 1.0 12/20/2022     Lab Results   Component Value Date    CHOL 129 01/02/2024    HDL 48 01/02/2024    LDLCALC 68.4 01/02/2024    TRIG 63 01/02/2024     No results found for: "HGBA1C"  No results found for: "TSH"  No results for input(s): "POCTGLUCOSE" in the last 72 hours.       Health Maintenance         Date Due Completion Date    RSV Vaccine (Age 60+ and Pregnant patients) (1 - Risk 60-74 years 1-dose series) Never done ---    LDCT Lung Screen 08/15/2023 8/15/2022    Influenza Vaccine (1) 09/01/2024 2/6/2024    COVID-19 Vaccine (5 - 2024-25 season) 09/01/2024 2/6/2024    TETANUS VACCINE 05/24/2026 5/24/2016    Colorectal Cancer Screening 05/27/2026 5/27/2021    Lipid Panel 01/02/2029 1/2/2024                ASSESSMENT & PLAN:       Mr. Efra Payton III is a 64 y.o. male who was seen today in clinic for jaw pain. He was prescribed Augmentin x9 days. No renal impairment. Recommend NSAIDs short term as needed for pain and inflammation. " Encouraged to make a dentist appointment for f/u and routine maintenance.     Efra was seen today for jaw pain and otalgia.    Diagnoses and all orders for this visit:    Oral infection    Other orders  -     amoxicillin-clavulanate 875-125mg (AUGMENTIN) 875-125 mg per tablet; Take 1 tablet by mouth every 12 (twelve) hours for 9 days         1. Oral infection        No follow-ups on file.    Other Orders Placed This Visit:  No orders of the defined types were placed in this encounter.              Discussed with Dr. Tejada - staff attestation to follow    Alisha Jones DO  Internal Medicine, PGY-2  10/23/2024.2:55 PM  [facility]  Internal Medicine Resident Clinic  [address] [place] [id number]  [phone]  [email or web address]

## 2024-10-23 NOTE — TELEPHONE ENCOUNTER
----- Message from Paula sent at 10/22/2024 12:56 PM CDT -----  Contact: [phone]  No blue slot available to schedule an appointment for the patient.  Patient is established with which PCP: Jaiden Mak  Reason for the visit: Symptom: Mouth Pain - Not From Injury  Outcome: Schedule an appointment to be seen within 24 hours.  Reason: Caller denied all higher acuity questions    The caller accepted this outcome.  Would the patient like a call back, or a response through their [facility] portal?:  call back

## 2024-10-23 NOTE — TELEPHONE ENCOUNTER
Spoke to patient he states he's been having jaw and ear pain for a few days now. First thought it was a tooth infection but he's not sure. Appointment scheduled with Hossein for today but patient also requested a referral to ENT because he's having trouble with breathing through his nose since starting work months ago. Please advise

## 2025-01-28 ENCOUNTER — OFFICE VISIT (OUTPATIENT)
Dept: INTERNAL MEDICINE | Facility: CLINIC | Age: 65
End: 2025-01-28
Payer: MEDICAID

## 2025-01-28 VITALS
BODY MASS INDEX: 23.84 KG/M2 | WEIGHT: 151.88 LBS | HEIGHT: 67 IN | SYSTOLIC BLOOD PRESSURE: 110 MMHG | OXYGEN SATURATION: 91 % | DIASTOLIC BLOOD PRESSURE: 70 MMHG | HEART RATE: 80 BPM

## 2025-01-28 DIAGNOSIS — L30.9 DERMATITIS: ICD-10-CM

## 2025-01-28 PROCEDURE — 3008F BODY MASS INDEX DOCD: CPT | Mod: CPTII,,, | Performed by: INTERNAL MEDICINE

## 2025-01-28 PROCEDURE — 3288F FALL RISK ASSESSMENT DOCD: CPT | Mod: CPTII,,, | Performed by: INTERNAL MEDICINE

## 2025-01-28 PROCEDURE — 3078F DIAST BP <80 MM HG: CPT | Mod: CPTII,,, | Performed by: INTERNAL MEDICINE

## 2025-01-28 PROCEDURE — 99999 PR PBB SHADOW E&M-EST. PATIENT-LVL III: CPT | Mod: PBBFAC,,, | Performed by: INTERNAL MEDICINE

## 2025-01-28 PROCEDURE — 99213 OFFICE O/P EST LOW 20 MIN: CPT | Mod: PBBFAC | Performed by: INTERNAL MEDICINE

## 2025-01-28 PROCEDURE — 3074F SYST BP LT 130 MM HG: CPT | Mod: CPTII,,, | Performed by: INTERNAL MEDICINE

## 2025-01-28 PROCEDURE — 4010F ACE/ARB THERAPY RXD/TAKEN: CPT | Mod: CPTII,,, | Performed by: INTERNAL MEDICINE

## 2025-01-28 PROCEDURE — 1159F MED LIST DOCD IN RCRD: CPT | Mod: CPTII,,, | Performed by: INTERNAL MEDICINE

## 2025-01-28 PROCEDURE — 99213 OFFICE O/P EST LOW 20 MIN: CPT | Mod: S$PBB,,, | Performed by: INTERNAL MEDICINE

## 2025-01-28 PROCEDURE — 1101F PT FALLS ASSESS-DOCD LE1/YR: CPT | Mod: CPTII,,, | Performed by: INTERNAL MEDICINE

## 2025-01-28 RX ORDER — MUPIROCIN 20 MG/G
OINTMENT TOPICAL 2 TIMES DAILY PRN
Qty: 22 G | Refills: 1 | Status: SHIPPED | OUTPATIENT
Start: 2025-01-28

## 2025-01-28 RX ORDER — CLOBETASOL PROPIONATE 0.5 MG/G
OINTMENT TOPICAL 2 TIMES DAILY PRN
Qty: 45 G | Refills: 1 | Status: SHIPPED | OUTPATIENT
Start: 2025-01-28

## 2025-01-28 RX ORDER — METHYLPREDNISOLONE 4 MG/1
TABLET ORAL
Qty: 21 EACH | Refills: 0 | Status: SHIPPED | OUTPATIENT
Start: 2025-01-28 | End: 2025-02-18

## 2025-01-28 NOTE — PROGRESS NOTES
"Subjective:       Patient ID: Efra Payton III is a 65 y.o. male.    Chief Complaint: Rash (Rash on hand )  65-year-old who presents today with a rash on his and he reports he has had it previously and many years ago it went away for awhile but in the last few years it has been coming back last year he had a bad episode and when he does he tends to take steroids and use some topical creams which helped he has a lot of itching to both hands and some open wounds he was wanting to get a prescription until he can getting with his dermatologist next month he reports with outlying dermatologist he has seen before previously had him on Dupixent which did help him in the past.    Rash    Review of Systems   Skin:  Positive for rash.        Itching hand dry skin some open areas       Objective:      Blood pressure 110/70, pulse 80, height 5' 7" (1.702 m), weight 68.9 kg (151 lb 14.4 oz),.   Physical Exam  Constitutional:       General: He is not in acute distress.  HENT:      Head: Normocephalic.   Cardiovascular:      Rate and Rhythm: Normal rate and regular rhythm.      Heart sounds: Normal heart sounds. No murmur heard.     No friction rub. No gallop.   Pulmonary:      Effort: Pulmonary effort is normal. No respiratory distress.      Breath sounds: Normal breath sounds.   Abdominal:      General: Bowel sounds are normal.      Palpations: Abdomen is soft. There is no mass.      Tenderness: There is no abdominal tenderness.   Skin:     Comments: Dry cracked skin dermatitis hands bilaterally  Some open wounds no drainage    Neurological:      Mental Status: He is alert.       Assessment:       1. Dermatitis        Plan:       Efra was seen today for rash.    Diagnoses and all orders for this visit:    Dermatitis  Discussed will treat he has tolerated in the past  -     methylPREDNISolone (MEDROL DOSEPACK) 4 mg tablet; use as directed risks benefits reviewed  Topical trial of along with thick moisturizer  -     clobetasol " 0.05% (TEMOVATE) 0.05 % Oint; Apply topically 2 (two) times daily as needed (rash).  -     mupirocin (BACTROBAN) 2 % ointment; Apply topically 2 (two) times daily as needed (open wounds if needed).  If develops open wounds to prevent infection    Also discussed trial zyrtec in the evening if needed to help with itching       He plans to get flu shot at pharmacy covered     He will follow up with PCP as previously recommended and keep dermatology upcoming appointment as planned

## 2025-01-29 DIAGNOSIS — I10 ESSENTIAL HYPERTENSION: ICD-10-CM

## 2025-02-11 ENCOUNTER — NURSE TRIAGE (OUTPATIENT)
Dept: ADMINISTRATIVE | Facility: CLINIC | Age: 65
End: 2025-02-11
Payer: MEDICARE

## 2025-02-11 NOTE — TELEPHONE ENCOUNTER
Day before yesterday. Cough, sore throat, fever, 102.7, broke this am. Does not feel well. Triage done-dispo go to office now Unable to book appointment will go to .   Reason for Disposition   MILD difficulty breathing (e.g., minimal/no SOB at rest, SOB with walking, pulse <100) and still present when not coughing    Additional Information   Negative: Bluish (or gray) lips or face   Negative: SEVERE difficulty breathing (e.g., struggling for each breath, speaks in single words)   Negative: Rapid onset of cough and has hives   Negative: Coughing started suddenly after medicine, an allergic food or bee sting   Negative: Difficulty breathing after exposure to flames, smoke, or fumes   Negative: Sounds like a life-threatening emergency to the triager   Negative: MODERATE difficulty breathing (e.g., speaks in phrases, SOB even at rest, pulse 100-120) and still present when not coughing   Negative: Chest pain present when not coughing   Negative: Passed out (e.g., fainted, lost consciousness, blacked out and was not responding)   Negative: Patient sounds very sick or weak to the triager    Protocols used: Cough-A-OH

## 2025-02-12 ENCOUNTER — HOSPITAL ENCOUNTER (OUTPATIENT)
Dept: RADIOLOGY | Facility: HOSPITAL | Age: 65
Discharge: HOME | End: 2025-02-12
Payer: MEDICARE

## 2025-02-12 ENCOUNTER — OFFICE VISIT (OUTPATIENT)
Dept: INTERNAL MEDICINE | Facility: CLINIC | Age: 65
End: 2025-02-12
Payer: MEDICARE

## 2025-02-12 VITALS
HEIGHT: 67 IN | WEIGHT: 152.31 LBS | OXYGEN SATURATION: 94 % | SYSTOLIC BLOOD PRESSURE: 110 MMHG | BODY MASS INDEX: 23.91 KG/M2 | DIASTOLIC BLOOD PRESSURE: 64 MMHG | HEART RATE: 97 BPM | TEMPERATURE: 98 F

## 2025-02-12 DIAGNOSIS — R30.0 DYSURIA: ICD-10-CM

## 2025-02-12 DIAGNOSIS — J06.9 URTI (ACUTE UPPER RESPIRATORY INFECTION): Primary | ICD-10-CM

## 2025-02-12 DIAGNOSIS — J06.9 URTI (ACUTE UPPER RESPIRATORY INFECTION): ICD-10-CM

## 2025-02-12 PROCEDURE — 71046 X-RAY EXAM CHEST 2 VIEWS: CPT | Mod: TC

## 2025-02-12 PROCEDURE — 99213 OFFICE O/P EST LOW 20 MIN: CPT | Mod: PBBFAC,25

## 2025-02-12 PROCEDURE — 71046 X-RAY EXAM CHEST 2 VIEWS: CPT | Mod: 26,,, | Performed by: RADIOLOGY

## 2025-02-12 PROCEDURE — 99999 PR PBB SHADOW E&M-EST. PATIENT-LVL III: CPT | Mod: PBBFAC,,,

## 2025-02-12 RX ORDER — ALBUTEROL SULFATE 1.25 MG/3ML
1.25 SOLUTION RESPIRATORY (INHALATION) EVERY 6 HOURS PRN
Qty: 75 ML | Refills: 0 | Status: SHIPPED | OUTPATIENT
Start: 2025-02-12 | End: 2026-02-12

## 2025-02-12 NOTE — PROGRESS NOTES
Subjective     Chief Complaint:    History of Present Illness:  Mr. Efra Payton III is a 65 y.o. male who is a known case of liver cirrhosis, COPD, eczema, HCV (cured in 2021), drug use who presented due to fever, cough, chills and aches and pains that started 5 days ago and he feels better today. He was swabbed for Covid and flu during the encounter. He is vaccinated for the Flu about two weeks ago. No diarrhea. Urine has been burning him in the meantime. It has improved today. Patient resorted to over the counter medications in order to alleviate his symptoms. Which gave him minor improvement. He noted burning chest discomfort upon lying flat that improves by sitting forwards.  SPO2 is 94% in the office in room air.  Covid and flu negative  Patient is not in any apparent distress.    PAST HISTORY:     Past Medical History:   Diagnosis Date    Cirrhosis     COPD (chronic obstructive pulmonary disease)     Eczema     Hepatitis C virus infection cured after antiviral drug therapy     s/p Rx, treated / cured - svr 2021    History of drug abuse     Now on methadone    Substance abuse        Past Surgical History:   Procedure Laterality Date    arm surgery      COLONOSCOPY N/A 7/8/2020    Procedure: COLONOSCOPY;  Surgeon: Mona Powell MD;  Location: [facility] (4TH FLR);  Service: Endoscopy;  Laterality: N/A;  covid [place]-7/5-tb-hx copd    COLONOSCOPY N/A 5/27/2021    Procedure: COLONOSCOPY;  Surgeon: Mona Powell MD;  Location: [facility] (4TH FLR);  Service: Endoscopy;  Laterality: N/A;  Constipation prep - pg  CBC, PT/INR (cirrhosis) done 5/4/21 - pg  Covid-19 test 5/24/21 at [facility] - pg.5/26 Called pt. to move up earlier.Left VM.EC    ENDOSCOPIC ULTRASOUND OF UPPER GASTROINTESTINAL TRACT N/A 3/24/2021    Procedure: ULTRASOUND, UPPER GI TRACT, ENDOSCOPIC - w/ varices screen;  Surgeon: George Carrillo MD;  Location: [facility] (2ND FLR);  Service: Endoscopy;  Laterality: N/A;  Covid-19 test 3/21/21 at  [place] - pg  PM prep    HAND SURGERY      LAPAROSCOPIC CHOLECYSTECTOMY N/A 4/4/2023    Procedure: CHOLECYSTECTOMY, LAPAROSCOPIC;  Surgeon: Mahesh Lazaro MD;  [facility] OR [address] [place] [address];  Service: General;  Laterality: N/A;       Family History   Problem Relation Name Age of Onset    Heart disease Father      Diabetes Father      Colon cancer Father      Hypertension Sister      Diabetes Sister      Hypertension Brother      Diabetes Brother      Colon cancer Brother      Heart attack Paternal Grandmother      Heart disease Paternal Grandmother      Diabetes Paternal Grandmother      Diabetes Paternal Grandfather      Colon cancer Paternal Grandfather         Social History     Socioeconomic History    Marital status:    Tobacco Use    Smoking status: Every Day     Current packs/day: 1.00     Average packs/day: 1 pack/day for 55.6 years (55.6 ttl pk-yrs)     Types: Cigarettes     Start date: 7/25/1969    Smokeless tobacco: Never    Tobacco comments:     decreased his smoking to 1 pack per day 5 months ago    Substance and Sexual Activity    Alcohol use: No     Comment: has past history of alcohol abuse     Drug use: No     Comment: has past history of substance abuse      Social Drivers of Health     Financial Resource Strain: Medium Risk (4/5/2023)    Overall Financial Resource Strain (CARDIA)     Difficulty of Paying Living Expenses: Somewhat hard   Food Insecurity: Food Insecurity Present (4/5/2023)    Hunger Vital Sign     Worried About Running Out of Food in the Last Year: Sometimes true     Ran Out of Food in the Last Year: Never true   Transportation Needs: No Transportation Needs (4/5/2023)    PRAPARE - Transportation     Lack of Transportation (Medical): No     Lack of Transportation (Non-Medical): No   Physical Activity: Inactive (4/5/2023)    Exercise Vital Sign     Days of Exercise per Week: 0 days     Minutes of Exercise per Session: 0 min   Stress: Stress Concern Present (4/5/2023)     [facility] [employer] - Occupational Stress Questionnaire     Feeling of Stress : To some extent   Housing Stability: Low Risk  (4/5/2023)    Housing Stability Vital Sign     Unable to Pay for Housing in the Last Year: No     Number of Places Lived in the Last Year: 1     Unstable Housing in the Last Year: No       MEDICATIONS & ALLERGIES:     Current Outpatient Medications on File Prior to Visit   Medication Sig    albuterol (PROVENTIL/VENTOLIN HFA) 90 mcg/actuation inhaler Inhale 1-2 puffs into the lungs every 6 (six) hours as needed for Wheezing or Shortness of Breath. Rescue    alfuzosin (UROXATRAL) 10 mg Tb24 Take 1 tablet (10 mg total) by mouth daily with breakfast.    amLODIPine (NORVASC) 10 MG tablet Take 1 tablet (10 mg total) by mouth once daily.    budesonide-formoterol 80-4.5 mcg (SYMBICORT) 80-4.5 mcg/actuation HFAA Inhale 2 puffs into the lungs once daily at 6am. Controller    buPROPion (WELLBUTRIN SR) 150 MG TBSR 12 hr tablet Take 1 tablet (150 mg total) by mouth 2 (two) times daily.    clobetasol 0.05% (TEMOVATE) 0.05 % Oint Apply topically 2 (two) times daily as needed (rash).    DUPIXENT  mg/2 mL PnIj INJECT 300MG UNDER THE SKIN EVERY 2 WEEKS.    lisinopriL (PRINIVIL,ZESTRIL) 20 MG tablet Take 1 tablet by mouth once daily    methadone HCl (METHADONE ORAL) Take 115 mg by mouth once daily.     methylPREDNISolone (MEDROL DOSEPACK) 4 mg tablet use as directed    mupirocin (BACTROBAN) 2 % ointment Apply topically 2 (two) times daily as needed (open wounds if needed).    vit E-glycerin-dimethicone (CETAPHIL MOISTURIZING) Lotn Apply to hands twice daily     No current facility-administered medications on file prior to visit.       Review of patient's allergies indicates:  No Known Allergies    OBJECTIVE:     Vital Signs:  Vitals:    02/12/25 1502   BP: 110/64   BP Location: Right arm   Patient Position: Sitting   Pulse: 97   Temp: 98 °F (36.7 °C)   TempSrc: Oral   SpO2: (!) 91%  "  Weight: 69.1 kg (152 lb 5.4 oz)   Height: 5' 7" (1.702 m)       Body mass index is 23.86 kg/m².     Physical Exam:  General:  Well developed, well nourished, no acute distress  Head: Normocephalic, atraumatic  Eyes: PERRL, clear sclera  Throat: No posterior pharyngeal erythema or exudate, no tonsillar exudate  Neck: supple, normal ROM, no thyromegaly   CVS:  S1 and S2 normal, no murmurs, rubs, gallops, 2+ peripheral pulses  Resp:  Lungs clear to auscultation, mild wheezes end-expiratory bilaterally, rales, rhonchi, cough  GI:  Abdomen soft, non-tender, non-distended, normoactive bowel sounds  MSK:  No muscle atrophy, cyanosis, peripheral edema   Skin:  No rashes, ulcers, erythema  Neuro:  No focal deficits noted  Psych:  Appropriate mood and affect, normal judgement    Laboratory  Lab Results   Component Value Date    WBC 3.76 (L) 04/08/2023    HGB 10.7 (L) 04/08/2023    HCT 33.7 (L) 04/08/2023    MCV 97 04/08/2023     04/08/2023     @XRBIWATYZ18(GLU,NA,K,Cl,CO2,BUN,Creatinine,Calcium,MG)@  Lab Results   Component Value Date    INR 1.0 04/05/2023    INR 1.0 04/04/2023    INR 1.0 12/20/2022     No results found for: "HGBA1C"  No results for input(s): "POCTGLUCOSE" in the last 72 hours.    Diagnostic Results:  Labs: Reviewed    ASSESSMENT & PLAN:   Mr. Efra Payton III is a 65 y.o. male who presents today for symptoms of URTi. Last fever was recorded yesterday (101.7 F). He is not in any apparent distress.    URTI:  CXR, continue OTC medications, refilled albuterol nebulizers  -- covid and flu negative  - SPO2 is 94% in the office, he is a known COPD    Dysuria:  - improving  -UA reflex to urine culture if positive          RTC with PCP    Case discussed with Dr Margareth Hutchison MD  Internal Medicine PGY2  [facility]  [address] [place] [address] [place] [address]  [phone]     "

## 2025-02-13 DIAGNOSIS — I10 ESSENTIAL HYPERTENSION: ICD-10-CM

## 2025-02-13 NOTE — TELEPHONE ENCOUNTER
Care Due:                  Date            Visit Type   Department     Provider  --------------------------------------------------------------------------------                                EP -                              PRIMARY      [facility] INTERNAL  Kylah Willson  Last Visit: 01-      CARE ([facility])   MEDICINE       Jl  Next Visit: None Scheduled  None         None Found                                                            Last  Test          Frequency    Reason                     Performed    Due Date  --------------------------------------------------------------------------------    CMP.........  12 months..  lisinopriL...............  01- 12-    [facility] Embedded Care Due Messages. Reference number: [id number].   2/13/2025 7:02:42 AM CST

## 2025-02-13 NOTE — TELEPHONE ENCOUNTER
Refill Routing Note   Medication(s) are not appropriate for processing by [facility] for the following reason(s):        Required labs outdated    [facility] action(s):  Defer   Requires labs : Yes             Appointments  past 12m or future 3m with PCP    Date Provider   Last Visit   6/25/2024 Jaiden Mak II, MD   Next Visit   Visit date not found Jaiden Mak II, MD   ED visits in past 90 days: 0        Note composed:11:24 AM 02/13/2025

## 2025-02-14 RX ORDER — LISINOPRIL 20 MG/1
TABLET ORAL
Qty: 90 TABLET | Refills: 3 | Status: SHIPPED | OUTPATIENT
Start: 2025-02-14

## 2025-02-25 ENCOUNTER — PATIENT OUTREACH (OUTPATIENT)
Dept: ADMINISTRATIVE | Facility: HOSPITAL | Age: 65
End: 2025-02-25
Payer: MEDICARE

## 2025-02-25 NOTE — PROGRESS NOTES
Chart reviewed and updated. Reconciled immunizations, health maintenance and care everywhere.  Spoke with pt scheduld CT Lung Dose and Hos[facility] appt with PCP.      Maya Nicole, [facility]  Panel Care Coordinator  [facility]  [phone]  For Jaiden Mak II, MD

## 2025-02-26 ENCOUNTER — HOSPITAL ENCOUNTER (OUTPATIENT)
Dept: RADIOLOGY | Facility: HOSPITAL | Age: 65
Discharge: HOME | End: 2025-02-26
Attending: INTERNAL MEDICINE
Payer: MEDICARE

## 2025-02-26 DIAGNOSIS — Z87.891 FORMER SMOKER: ICD-10-CM

## 2025-02-26 PROCEDURE — 71271 CT THORAX LUNG CANCER SCR C-: CPT | Mod: TC

## 2025-02-26 PROCEDURE — 71271 CT THORAX LUNG CANCER SCR C-: CPT | Mod: 26,,, | Performed by: RADIOLOGY

## 2025-02-27 ENCOUNTER — PATIENT MESSAGE (OUTPATIENT)
Dept: INTERNAL MEDICINE | Facility: CLINIC | Age: 65
End: 2025-02-27
Payer: MEDICARE

## 2025-02-27 DIAGNOSIS — Z87.891 FORMER SMOKER: Primary | ICD-10-CM

## 2025-02-27 DIAGNOSIS — F17.210 NICOTINE DEPENDENCE, CIGARETTES, UNCOMPLICATED: ICD-10-CM

## 2025-03-03 DIAGNOSIS — F17.210 NICOTINE DEPENDENCE, CIGARETTES, UNCOMPLICATED: ICD-10-CM

## 2025-03-03 NOTE — TELEPHONE ENCOUNTER
No care due was identified.  [facility] Embedded Care Due Messages. Reference number: [id number].   3/03/2025 2:25:08 PM CST

## 2025-03-05 RX ORDER — BUPROPION HYDROCHLORIDE 150 MG/1
150 TABLET, EXTENDED RELEASE ORAL 2 TIMES DAILY
Qty: 180 TABLET | Refills: 1 | Status: SHIPPED | OUTPATIENT
Start: 2025-03-05 | End: 2025-03-07 | Stop reason: SDUPTHER

## 2025-03-05 NOTE — TELEPHONE ENCOUNTER
Refill Routing Note   Medication(s) are not appropriate for processing by [facility] for the following reason(s):        Drug-disease interaction    [facility] action(s):  Defer      Medication Therapy Plan: Drug-Disease: buPROPion and Methadone maintenance therapy patient    Pharmacist review requested: Yes     Appointments  past 12m or future 3m with PCP    Date Provider   Last Visit   6/25/2024 Jaiden Mak II, MD   Next Visit   3/7/2025 Jaiden Mak II, MD   ED visits in past 90 days: 0        Note composed:8:57 AM 03/05/2025

## 2025-03-05 NOTE — TELEPHONE ENCOUNTER
Efra Payton  is requesting a refill authorization.  Brief Assessment and Rationale for Refill:  Approve     Medication Therapy Plan:         Pharmacist review requested: Yes   Extended chart review required: Yes   Comments:     Note composed:10:37 AM 03/05/2025

## 2025-03-07 ENCOUNTER — OFFICE VISIT (OUTPATIENT)
Dept: INTERNAL MEDICINE | Facility: CLINIC | Age: 65
End: 2025-03-07
Payer: MEDICARE

## 2025-03-07 ENCOUNTER — TELEPHONE (OUTPATIENT)
Dept: INTERNAL MEDICINE | Facility: CLINIC | Age: 65
End: 2025-03-07

## 2025-03-07 VITALS
OXYGEN SATURATION: 93 % | SYSTOLIC BLOOD PRESSURE: 138 MMHG | BODY MASS INDEX: 23.77 KG/M2 | WEIGHT: 151.44 LBS | HEIGHT: 67 IN | TEMPERATURE: 98 F | HEART RATE: 75 BPM | DIASTOLIC BLOOD PRESSURE: 70 MMHG

## 2025-03-07 DIAGNOSIS — K74.60 HEPATIC CIRRHOSIS, UNSPECIFIED HEPATIC CIRRHOSIS TYPE, UNSPECIFIED WHETHER ASCITES PRESENT: ICD-10-CM

## 2025-03-07 DIAGNOSIS — I10 ESSENTIAL HYPERTENSION: ICD-10-CM

## 2025-03-07 DIAGNOSIS — F11.20 METHADONE MAINTENANCE THERAPY PATIENT: ICD-10-CM

## 2025-03-07 DIAGNOSIS — F32.89 OTHER DEPRESSION: ICD-10-CM

## 2025-03-07 DIAGNOSIS — N40.0 BENIGN PROSTATIC HYPERPLASIA, UNSPECIFIED WHETHER LOWER URINARY TRACT SYMPTOMS PRESENT: ICD-10-CM

## 2025-03-07 DIAGNOSIS — J43.9 PULMONARY EMPHYSEMA, UNSPECIFIED EMPHYSEMA TYPE: Primary | ICD-10-CM

## 2025-03-07 PROCEDURE — 99999 PR PBB SHADOW E&M-EST. PATIENT-LVL IV: CPT | Mod: PBBFAC,,, | Performed by: INTERNAL MEDICINE

## 2025-03-07 RX ORDER — ALFUZOSIN HYDROCHLORIDE 10 MG/1
10 TABLET, EXTENDED RELEASE ORAL
Qty: 90 TABLET | Refills: 3 | Status: SHIPPED | OUTPATIENT
Start: 2025-03-07 | End: 2026-03-07

## 2025-03-07 RX ORDER — BUPROPION HYDROCHLORIDE 150 MG/1
150 TABLET, EXTENDED RELEASE ORAL 2 TIMES DAILY
Qty: 180 TABLET | Refills: 3 | Status: SHIPPED | OUTPATIENT
Start: 2025-03-07

## 2025-03-07 NOTE — TELEPHONE ENCOUNTER
He would benefit from pulmonary evaluation to optimize his emphysema treatment.  I put in a referral.  Could you please call and ask him to make an appointment?

## 2025-03-07 NOTE — PROGRESS NOTES
Subjective:       Patient ID: Efra Payton III is a 65 y.o. male.    Chief Complaint:   Hospital Follow Up and Shortness of Breath    HPI - Mr. Payton has been sick with respiratory illness since February 8th.  He was seen on February toe of here in clinic and treated for an upper respiratory infection.  He presented to the emergency department at [facility] on February 15th and received 10 days of amoxicillin.  He feels significantly better now, though he continues to be a little dyspneic.  His pulse oximeter was low today, but it is often low.  He is now 3 years tobacco free, but he does have emphysema.  His nebulizer for home use broke recently and he asked for a refill.  He also needs a couple of medications refilled.  Blood pressure retake per me was at goal     PMH  Emphysema with a recent exacerbation and intermittent hypoxia  Hepatitis C, completed treatment  Hx positive PPD with treatment before 2000  Colon polyps 2021 after positive cologuard  HTN, at goal  Anxiety  Former smoker  Hx incarceration  Methadone maintenance therapy     Meds:  Reviewed and reconciled in EPIC with patient during visit today     Review of Systems   Constitutional:  Positive for fatigue. Negative for fever.   HENT:  Negative for congestion.    Respiratory:  Positive for cough. Negative for shortness of breath.    Cardiovascular:  Negative for chest pain.   Gastrointestinal:  Negative for abdominal pain.   Genitourinary:  Negative for difficulty urinating.   Musculoskeletal:  Negative for arthralgias.   Skin:  Negative for rash.   Neurological:  Negative for headaches.   Psychiatric/Behavioral:  Negative for sleep disturbance.        Objective:      Physical Exam  Constitutional:       General: He is not in acute distress.     Appearance: He is well-developed and normal weight. He is not diaphoretic.      Comments: Well-appearing man   HENT:      Head: Normocephalic and atraumatic.   Cardiovascular:      Rate and Rhythm: Normal rate  and regular rhythm.      Heart sounds: Normal heart sounds. No murmur heard.     No friction rub. No gallop.   Pulmonary:      Effort: No respiratory distress.      Breath sounds: No wheezing or rales.   Chest:      Chest wall: No tenderness.   Skin:     General: Skin is warm.      Findings: No erythema.   Neurological:      Mental Status: He is alert and oriented to person, place, and time.   Psychiatric:         Thought Content: Thought content normal.         Assessment:       1. Pulmonary emphysema, unspecified emphysema type    2. Hepatic cirrhosis, unspecified hepatic cirrhosis type, unspecified whether ascites present    3. Essential hypertension    4. Benign prostatic hyperplasia, unspecified whether lower urinary tract symptoms present    5. Other depression    6. Methadone maintenance therapy patient        Plan:       Efra was seen today for hospital follow up and shortness of breath.    Diagnoses and all orders for this visit:    Pulmonary emphysema, unspecified emphysema type - recent exacerbation.  He appears to be recovering, though his oxygen tension is a little low still.  I will reorder a nebulizer for home use.  -     NEBULIZER FOR HOME USE    Hepatic cirrhosis, unspecified hepatic cirrhosis type, unspecified whether ascites present - stable.  Continue to monitor    Essential hypertension - at goal on current regimen.  Continue present care    Benign prostatic hyperplasia, unspecified whether lower urinary tract symptoms present - stable.  Refills provided  -     alfuzosin (UROXATRAL) 10 mg Tb24; Take 1 tablet (10 mg total) by mouth daily with breakfast.    Other depression - stable.  Refills provided  -     buPROPion (WELLBUTRIN SR) 150 MG TBSR 12 hr tablet; Take 1 tablet (150 mg total) by mouth 2 (two) times daily.    Methadone maintenance therapy patient - stable.  Monitored by outside facility.  Monitor    Rtc prn, or in 3 months    JEN Mak MD MPH  Staff Internist

## 2025-03-28 NOTE — ED NOTES
"Telebox applied to patient. Mask in place. O2 at 2L per NC.Pt ready for transport.  Wife at bedside Belongings sent. Remdesivir in room.  Wife states that "they" weren't going to take that". Pt states that "they will discuss that further because the physician explained benefits of getting infusion to him with his health issues". Remdesivir sent with patient until they discuss further and come to a decision.   " Continue Prozac

## 2025-04-29 ENCOUNTER — TELEPHONE (OUTPATIENT)
Dept: PULMONOLOGY | Facility: CLINIC | Age: 65
End: 2025-04-29
Payer: MEDICARE

## 2025-04-29 NOTE — TELEPHONE ENCOUNTER
Called and spoke with pt to confirm scheduling of follow up LDCT scan.  Agreeable with scheduling on May 20th, instructions given on where to go.

## 2025-05-21 ENCOUNTER — TELEPHONE (OUTPATIENT)
Dept: PULMONOLOGY | Facility: CLINIC | Age: 65
End: 2025-05-21
Payer: MEDICARE

## 2025-05-28 ENCOUNTER — TELEPHONE (OUTPATIENT)
Dept: PULMONOLOGY | Facility: CLINIC | Age: 65
End: 2025-05-28
Payer: MEDICARE

## (undated) DEVICE — TUBING HF INSUFFLATION W/ FLTR

## (undated) DEVICE — TRAY FOLEY 16FR INFECTION CONT

## (undated) DEVICE — DRAPE ABDOMINAL TIBURON 14X11

## (undated) DEVICE — BAG TISS RETRV MONARCH 10MM

## (undated) DEVICE — SEE MEDLINE ITEM 157117

## (undated) DEVICE — SUT 0 VICRYL / UR6 (J603)

## (undated) DEVICE — DRAPE CORETEMP FLD WRM 56X62IN

## (undated) DEVICE — SUT ENDOLOOP PDSII 18 LIGA

## (undated) DEVICE — DISSECTOR SPACEMAKER + 10-12MM

## (undated) DEVICE — SUT MCRYL PLUS 4-0 PS2 27IN

## (undated) DEVICE — BLADE SURG CARBON STEEL SZ11

## (undated) DEVICE — SEE MEDLINE ITEM 157148

## (undated) DEVICE — TRAY MINOR GEN SURG

## (undated) DEVICE — ADHESIVE DERMABOND ADVANCED

## (undated) DEVICE — APPLIER CLIP ENDO LIGAMAX 5MM

## (undated) DEVICE — TROCAR SPACEMAKER BLUNT 10MM

## (undated) DEVICE — NDL HYPO REG 25G X 1 1/2

## (undated) DEVICE — SOL NS 1000CC

## (undated) DEVICE — TROCAR ENDOPATH XCEL 5MM 7.5CM

## (undated) DEVICE — STRAP SECURE 5MM

## (undated) DEVICE — ELECTRODE REM PLYHSV RETURN 9

## (undated) DEVICE — TRAY MINOR GEN SURG OMC

## (undated) DEVICE — SCISSOR 5MMX35CM DIRECT DRIVE

## (undated) DEVICE — IRRIGATOR ENDOSCOPY DISP.

## (undated) DEVICE — KIT ANTIFOG W/SPONG & FLUID